# Patient Record
Sex: MALE | Race: WHITE | Employment: OTHER | URBAN - METROPOLITAN AREA
[De-identification: names, ages, dates, MRNs, and addresses within clinical notes are randomized per-mention and may not be internally consistent; named-entity substitution may affect disease eponyms.]

---

## 2017-08-09 ENCOUNTER — APPOINTMENT (OUTPATIENT)
Dept: ULTRASOUND IMAGING | Age: 82
End: 2017-08-09
Payer: MEDICARE

## 2017-08-09 ENCOUNTER — APPOINTMENT (OUTPATIENT)
Dept: GENERAL RADIOLOGY | Age: 82
End: 2017-08-09
Payer: MEDICARE

## 2017-08-09 ENCOUNTER — HOSPITAL ENCOUNTER (EMERGENCY)
Age: 82
Discharge: HOME | End: 2017-08-09
Payer: MEDICARE

## 2017-08-09 VITALS
SYSTOLIC BLOOD PRESSURE: 167 MMHG | OXYGEN SATURATION: 97 % | HEIGHT: 67 IN | TEMPERATURE: 97.5 F | DIASTOLIC BLOOD PRESSURE: 60 MMHG | WEIGHT: 170 LBS | HEART RATE: 73 BPM | BODY MASS INDEX: 26.68 KG/M2 | RESPIRATION RATE: 14 BRPM

## 2017-08-09 DIAGNOSIS — S60.00XA CONTUSION OF LEFT HAND INCLUDING FINGERS, INITIAL ENCOUNTER: Primary | ICD-10-CM

## 2017-08-09 DIAGNOSIS — M71.22 BAKER'S CYST OF KNEE, LEFT: ICD-10-CM

## 2017-08-09 DIAGNOSIS — G89.29 CHRONIC PAIN OF LEFT KNEE: ICD-10-CM

## 2017-08-09 DIAGNOSIS — M25.562 CHRONIC PAIN OF LEFT KNEE: ICD-10-CM

## 2017-08-09 DIAGNOSIS — S60.222A CONTUSION OF LEFT HAND INCLUDING FINGERS, INITIAL ENCOUNTER: Primary | ICD-10-CM

## 2017-08-09 PROCEDURE — 73130 X-RAY EXAM OF HAND: CPT

## 2017-08-09 PROCEDURE — 93971 EXTREMITY STUDY: CPT

## 2017-08-09 PROCEDURE — 6370000000 HC RX 637 (ALT 250 FOR IP): Performed by: PHYSICIAN ASSISTANT

## 2017-08-09 PROCEDURE — 99283 EMERGENCY DEPT VISIT LOW MDM: CPT

## 2017-08-09 PROCEDURE — 73562 X-RAY EXAM OF KNEE 3: CPT

## 2017-08-09 RX ORDER — ACETAMINOPHEN 325 MG/1
650 TABLET ORAL EVERY 6 HOURS PRN
Qty: 20 TABLET | Refills: 3 | Status: ON HOLD | OUTPATIENT
Start: 2017-08-09 | End: 2019-10-04 | Stop reason: HOSPADM

## 2017-08-09 RX ORDER — ACETAMINOPHEN 325 MG/1
650 TABLET ORAL ONCE
Status: COMPLETED | OUTPATIENT
Start: 2017-08-09 | End: 2017-08-09

## 2017-08-09 RX ORDER — GINSENG 100 MG
CAPSULE ORAL ONCE
Status: COMPLETED | OUTPATIENT
Start: 2017-08-09 | End: 2017-08-09

## 2017-08-09 RX ADMIN — ACETAMINOPHEN 650 MG: 325 TABLET ORAL at 10:30

## 2017-08-09 RX ADMIN — BACITRACIN: 500 OINTMENT TOPICAL at 10:30

## 2017-08-09 ASSESSMENT — ENCOUNTER SYMPTOMS
GASTROINTESTINAL NEGATIVE: 1
RESPIRATORY NEGATIVE: 1
EYES NEGATIVE: 1

## 2017-08-09 ASSESSMENT — PAIN DESCRIPTION - PAIN TYPE: TYPE: ACUTE PAIN

## 2017-08-09 ASSESSMENT — PAIN DESCRIPTION - LOCATION: LOCATION: HAND

## 2017-08-09 ASSESSMENT — PAIN SCALES - GENERAL
PAINLEVEL_OUTOF10: 8
PAINLEVEL_OUTOF10: 8

## 2017-08-09 ASSESSMENT — PAIN DESCRIPTION - ORIENTATION: ORIENTATION: LEFT

## 2017-08-28 ENCOUNTER — HOSPITAL ENCOUNTER (OUTPATIENT)
Dept: ULTRASOUND IMAGING | Age: 82
Discharge: HOME | End: 2017-08-28
Payer: MEDICARE

## 2017-08-28 DIAGNOSIS — F17.200 SMOKING: ICD-10-CM

## 2017-08-28 PROCEDURE — 76706 US ABDL AORTA SCREEN AAA: CPT

## 2018-09-08 ENCOUNTER — HOSPITAL ENCOUNTER (EMERGENCY)
Age: 83
Discharge: HOME | End: 2018-09-08
Attending: EMERGENCY MEDICINE
Payer: MEDICARE

## 2018-09-08 ENCOUNTER — APPOINTMENT (OUTPATIENT)
Dept: GENERAL RADIOLOGY | Age: 83
End: 2018-09-08
Payer: MEDICARE

## 2018-09-08 VITALS
WEIGHT: 170 LBS | SYSTOLIC BLOOD PRESSURE: 136 MMHG | RESPIRATION RATE: 18 BRPM | BODY MASS INDEX: 26.68 KG/M2 | OXYGEN SATURATION: 97 % | DIASTOLIC BLOOD PRESSURE: 74 MMHG | HEIGHT: 67 IN | HEART RATE: 82 BPM | TEMPERATURE: 98.1 F

## 2018-09-08 DIAGNOSIS — M25.462 KNEE EFFUSION, LEFT: Primary | ICD-10-CM

## 2018-09-08 PROCEDURE — 87205 SMEAR GRAM STAIN: CPT

## 2018-09-08 PROCEDURE — 6370000000 HC RX 637 (ALT 250 FOR IP): Performed by: EMERGENCY MEDICINE

## 2018-09-08 PROCEDURE — 89051 BODY FLUID CELL COUNT: CPT

## 2018-09-08 PROCEDURE — 87070 CULTURE OTHR SPECIMN AEROBIC: CPT

## 2018-09-08 PROCEDURE — 99283 EMERGENCY DEPT VISIT LOW MDM: CPT

## 2018-09-08 PROCEDURE — 73562 X-RAY EXAM OF KNEE 3: CPT

## 2018-09-08 RX ORDER — HYDROCODONE BITARTRATE AND ACETAMINOPHEN 5; 325 MG/1; MG/1
1 TABLET ORAL ONCE
Status: COMPLETED | OUTPATIENT
Start: 2018-09-08 | End: 2018-09-08

## 2018-09-08 RX ORDER — HYDROCODONE BITARTRATE AND ACETAMINOPHEN 5; 325 MG/1; MG/1
1 TABLET ORAL EVERY 6 HOURS PRN
Qty: 20 TABLET | Refills: 0 | Status: SHIPPED | OUTPATIENT
Start: 2018-09-08 | End: 2018-09-15

## 2018-09-08 RX ADMIN — HYDROCODONE BITARTRATE AND ACETAMINOPHEN 1 TABLET: 5; 325 TABLET ORAL at 20:50

## 2018-09-08 ASSESSMENT — ENCOUNTER SYMPTOMS
VOMITING: 0
EYE DISCHARGE: 0
ABDOMINAL PAIN: 0
CHEST TIGHTNESS: 0
ABDOMINAL DISTENTION: 0
WHEEZING: 0
COUGH: 0
SHORTNESS OF BREATH: 0
PHOTOPHOBIA: 0
SORE THROAT: 0

## 2018-09-08 ASSESSMENT — PAIN DESCRIPTION - ORIENTATION: ORIENTATION: LEFT

## 2018-09-08 ASSESSMENT — PAIN DESCRIPTION - PAIN TYPE: TYPE: ACUTE PAIN

## 2018-09-08 ASSESSMENT — PAIN DESCRIPTION - LOCATION: LOCATION: KNEE

## 2018-09-08 ASSESSMENT — PAIN SCALES - GENERAL
PAINLEVEL_OUTOF10: 0
PAINLEVEL_OUTOF10: 9

## 2018-09-09 LAB
APPEARANCE FLUID: NORMAL
CELL COUNT FLUID TYPE: NORMAL
CLOT EVALUATION: NORMAL
COLOR FLUID: YELLOW
LYMPHOCYTES, BODY FLUID: 2 %
MONOCYTE, FLUID: 10 %
NEUTROPHIL, FLUID: 88 %
NRBC FLUID: 2 %
NUCLEATED CELLS FLUID: NORMAL /CUMM
NUMBER OF CELLS COUNTED FLUID: 100
RBC FLUID: 611 /CUMM

## 2018-09-09 NOTE — ED NOTES
Ice applied to left knee.  No s/s of distress     Matt Lover, [facility]  09/08/18 4986

## 2018-09-09 NOTE — ED PROVIDER NOTES
negative. Except as noted above the remainder of the review of systems was reviewed and negative. PAST MEDICAL HISTORY     Past Medical History:   Diagnosis Date    Diabetes mellitus ([address]     Hypertension          SURGICAL HISTORY       Past Surgical History:   Procedure Laterality Date    BACK SURGERY      HERNIA REPAIR           CURRENT MEDICATIONS       Previous Medications    ACETAMINOPHEN (TYLENOL) 325 MG TABLET    Take 2 tablets by mouth every 6 hours as needed for Pain       ALLERGIES     Patient has no known allergies. FAMILY HISTORY     History reviewed. No pertinent family history. SOCIAL HISTORY       Social History     Social History    Marital status:      Spouse name: N/A    Number of children: N/A    Years of education: N/A     Social History Main Topics    Smoking status: Former Smoker    Smokeless tobacco: None      Comment: quit 30 years ago    Alcohol use No    Drug use: No    Sexual activity: Not Asked     Other Topics Concern    None     Social History Narrative    None       SCREENINGS             PHYSICAL EXAM    (up to 7 for level 4, 8 or more for level 5)     ED Triage Vitals   BP Temp Temp Source Pulse Resp SpO2 Height Weight   09/08/18 2030 09/08/18 2028 09/08/18 2028 09/08/18 2028 09/08/18 2028 09/08/18 2028 09/08/18 2028 09/08/18 2028   136/74 98.1 °F (36.7 °C) Oral 82 18 97 % 5' 7\" (1.702 m) 170 lb (77.1 kg)       Physical Exam   Constitutional: He is oriented to person, place, and time. He appears well-developed. HENT:   Head: Normocephalic. Nose: Nose normal.   Eyes: Pupils are equal, round, and reactive to light. Conjunctivae are normal.   Neck: Normal range of motion. Neck supple. Cardiovascular: Normal rate, regular rhythm, normal heart sounds and intact distal pulses. Pulmonary/Chest: Effort normal and breath sounds normal.   Abdominal: Soft. Bowel sounds are normal. There is no tenderness. There is no guarding.    Musculoskeletal: Left knee: He exhibits decreased range of motion, swelling and effusion. He exhibits no deformity, no laceration and no erythema. Legs:  Neurological: He is alert and oriented to person, place, and time. Skin: Skin is warm and dry. Psychiatric: He has a normal mood and affect. Nursing note and vitals reviewed. DIAGNOSTIC RESULTS     EKG: All EKG's are interpreted by the Emergency Department Physician who either signs or Co-signs this chart in the absence of a cardiologist.        RADIOLOGY:   Non-plain film images such as CT, Ultrasound and MRI are read by the radiologist. Plain radiographic images are visualized and preliminarily interpreted by the emergency physician with the below findings:        Interpretation per the Radiologist below, if available at the time of this note:    XR KNEE LEFT (3 VIEWS)    (Results Pending)         ED BEDSIDE ULTRASOUND:   Performed by ED Physician - none    LABS:  Labs Reviewed - No data to display    All other labs were within normal range or not returned as of this dictation. EMERGENCY DEPARTMENT COURSE and DIFFERENTIAL DIAGNOSIS/MDM:   Vitals:    Vitals:    09/08/18 2028 09/08/18 2030   BP:  136/74   Pulse: 82    Resp: 18    Temp: 98.1 °F (36.7 °C)    TempSrc: Oral    SpO2: 97%    Weight: 170 lb (77.1 kg)    Height: 5' 7\" (1.702 m)             MDM patient with a significant left knee effusion. Arthrocentesis was done and this appears to be inflammatory fluid. He will be cultured. He is feeling better post procedure. He is put on limited pain medication told ice and limit weightbearing and follow-up with orthopedics next 2-3 days.   Return for fever or worsening pain        CONSULTS:  None    PROCEDURES:  Unless otherwise noted below, none     Arthrocentesis  Date/Time: 9/8/2018 10:34 PM  Performed by: Alan Henson by: Nadeem Guido     Consent:     Consent obtained:  Verbal    Consent given by:  Patient    Risks discussed: Bleeding, infection and pain    Alternatives discussed:  No treatment  Location:     Location:  Knee  Anesthesia (see MAR for exact dosages): Anesthesia method:  Local infiltration    Local anesthetic:  Lidocaine 1% w/o epi  Procedure details:     Needle gauge:  18 G    Ultrasound guidance: no      Approach:  Superior    Aspirate characteristics:  Yellow and serous    Steroid injected: no      Specimen collected: yes    Post-procedure details:     Dressing:  Adhesive bandage    Patient tolerance of procedure: Tolerated well, no immediate complications        FINAL IMPRESSION      1. Knee effusion, left          DISPOSITION/PLAN   DISPOSITION Decision To Discharge 09/08/2018 10:35:50 PM      PATIENT REFERRED TO:  Clarke Herring MD  [address] [place] [address]  [phone]    In 3 days        DISCHARGE MEDICATIONS:  New Prescriptions    HYDROCODONE-ACETAMINOPHEN (NORCO) 5-325 MG PER TABLET    Take 1 tablet by mouth every 6 hours as needed for Pain for up to 7 days. .          (Please note that portions of this note were completed with a voice recognition program.  Efforts were made to edit the dictations but occasionally words are mis-transcribed.)    Jennifer Denton MD (electronically signed)  Attending Emergency Physician          Jennifer Denton MD  09/08/18 7411

## 2018-09-12 LAB
BODY FLUID CULTURE, STERILE: NORMAL
GRAM STAIN RESULT: NORMAL

## 2019-10-03 ENCOUNTER — APPOINTMENT (OUTPATIENT)
Dept: CT IMAGING | Age: 84
DRG: 310 | End: 2019-10-03
Payer: MEDICARE

## 2019-10-03 ENCOUNTER — HOSPITAL ENCOUNTER (INPATIENT)
Age: 84
LOS: 2 days | Discharge: HOME | DRG: 310 | End: 2019-10-05
Attending: EMERGENCY MEDICINE | Admitting: INTERNAL MEDICINE
Payer: MEDICARE

## 2019-10-03 ENCOUNTER — APPOINTMENT (OUTPATIENT)
Dept: GENERAL RADIOLOGY | Age: 84
DRG: 310 | End: 2019-10-03
Payer: MEDICARE

## 2019-10-03 DIAGNOSIS — R05.9 COUGH: ICD-10-CM

## 2019-10-03 DIAGNOSIS — I10 ESSENTIAL HYPERTENSION: ICD-10-CM

## 2019-10-03 DIAGNOSIS — R06.02 SHORTNESS OF BREATH: ICD-10-CM

## 2019-10-03 DIAGNOSIS — E11.65 TYPE 2 DIABETES MELLITUS WITH HYPERGLYCEMIA, WITHOUT LONG-TERM CURRENT USE OF INSULIN (HCC): ICD-10-CM

## 2019-10-03 DIAGNOSIS — I48.91 ATRIAL FIBRILLATION, NEW ONSET (HCC): Primary | ICD-10-CM

## 2019-10-03 DIAGNOSIS — R07.9 CHEST PAIN, UNSPECIFIED TYPE: ICD-10-CM

## 2019-10-03 LAB
ALBUMIN SERPL-MCNC: 4.2 G/DL (ref 3.5–4.6)
ALP BLD-CCNC: 80 U/L (ref 35–104)
ALT SERPL-CCNC: 16 U/L (ref 0–41)
ANION GAP SERPL CALCULATED.3IONS-SCNC: 12 MEQ/L (ref 9–15)
AST SERPL-CCNC: 20 U/L (ref 0–40)
BASOPHILS ABSOLUTE: 0.1 K/UL (ref 0–0.2)
BASOPHILS RELATIVE PERCENT: 1 %
BILIRUB SERPL-MCNC: 0.5 MG/DL (ref 0.2–0.7)
BILIRUBIN URINE: NEGATIVE
BLOOD, URINE: NEGATIVE
BUN BLDV-MCNC: 24 MG/DL (ref 8–23)
CALCIUM SERPL-MCNC: 9.2 MG/DL (ref 8.5–9.9)
CHLORIDE BLD-SCNC: 103 MEQ/L (ref 95–107)
CLARITY: CLEAR
CO2: 27 MEQ/L (ref 20–31)
COLOR: ABNORMAL
CREAT SERPL-MCNC: 1.18 MG/DL (ref 0.7–1.2)
EKG ATRIAL RATE: 80 BPM
EKG Q-T INTERVAL: 412 MS
EKG QRS DURATION: 154 MS
EKG QTC CALCULATION (BAZETT): 412 MS
EKG R AXIS: -47 DEGREES
EKG T AXIS: 93 DEGREES
EKG VENTRICULAR RATE: 60 BPM
EOSINOPHILS ABSOLUTE: 0.2 K/UL (ref 0–0.7)
EOSINOPHILS RELATIVE PERCENT: 2.5 %
GFR AFRICAN AMERICAN: >60
GFR NON-AFRICAN AMERICAN: 57.9
GLOBULIN: 3.5 G/DL (ref 2.3–3.5)
GLUCOSE BLD-MCNC: 129 MG/DL (ref 70–99)
GLUCOSE URINE: NEGATIVE MG/DL
HCT VFR BLD CALC: 38.3 % (ref 42–52)
HEMOGLOBIN: 12.9 G/DL (ref 14–18)
INFLUENZA A BY PCR: NEGATIVE
INFLUENZA B BY PCR: NEGATIVE
KETONES, URINE: ABNORMAL MG/DL
LACTIC ACID: 1.6 MMOL/L (ref 0.5–2.2)
LEUKOCYTE ESTERASE, URINE: NEGATIVE
LYMPHOCYTES ABSOLUTE: 2.2 K/UL (ref 1–4.8)
LYMPHOCYTES RELATIVE PERCENT: 27 %
MCH RBC QN AUTO: 32.5 PG (ref 27–31.3)
MCHC RBC AUTO-ENTMCNC: 33.7 % (ref 33–37)
MCV RBC AUTO: 96.4 FL (ref 80–100)
MONOCYTES ABSOLUTE: 1.1 K/UL (ref 0.2–0.8)
MONOCYTES RELATIVE PERCENT: 13.8 %
NEUTROPHILS ABSOLUTE: 4.5 K/UL (ref 1.4–6.5)
NEUTROPHILS RELATIVE PERCENT: 55.7 %
NITRITE, URINE: NEGATIVE
PDW BLD-RTO: 13.7 % (ref 11.5–14.5)
PH UA: 5.5 (ref 5–9)
PLATELET # BLD: 242 K/UL (ref 130–400)
POTASSIUM SERPL-SCNC: 3.9 MEQ/L (ref 3.4–4.9)
PROTEIN UA: ABNORMAL MG/DL
RBC # BLD: 3.97 M/UL (ref 4.7–6.1)
SODIUM BLD-SCNC: 142 MEQ/L (ref 135–144)
SPECIFIC GRAVITY UA: 1.03 (ref 1–1.03)
TOTAL PROTEIN: 7.7 G/DL (ref 6.3–8)
TROPONIN: <0.01 NG/ML (ref 0–0.01)
URINE REFLEX TO CULTURE: ABNORMAL
UROBILINOGEN, URINE: 1 E.U./DL
WBC # BLD: 8 K/UL (ref 4.8–10.8)

## 2019-10-03 PROCEDURE — 71275 CT ANGIOGRAPHY CHEST: CPT

## 2019-10-03 PROCEDURE — 94664 DEMO&/EVAL PT USE INHALER: CPT

## 2019-10-03 PROCEDURE — 6370000000 HC RX 637 (ALT 250 FOR IP): Performed by: NURSE PRACTITIONER

## 2019-10-03 PROCEDURE — 6360000004 HC RX CONTRAST MEDICATION: Performed by: PHYSICIAN ASSISTANT

## 2019-10-03 PROCEDURE — 84484 ASSAY OF TROPONIN QUANT: CPT

## 2019-10-03 PROCEDURE — 96372 THER/PROPH/DIAG INJ SC/IM: CPT

## 2019-10-03 PROCEDURE — 2060000000 HC ICU INTERMEDIATE R&B

## 2019-10-03 PROCEDURE — 99285 EMERGENCY DEPT VISIT HI MDM: CPT

## 2019-10-03 PROCEDURE — 87040 BLOOD CULTURE FOR BACTERIA: CPT

## 2019-10-03 PROCEDURE — 85025 COMPLETE CBC W/AUTO DIFF WBC: CPT

## 2019-10-03 PROCEDURE — 71046 X-RAY EXAM CHEST 2 VIEWS: CPT

## 2019-10-03 PROCEDURE — 87502 INFLUENZA DNA AMP PROBE: CPT

## 2019-10-03 PROCEDURE — 81003 URINALYSIS AUTO W/O SCOPE: CPT

## 2019-10-03 PROCEDURE — 36415 COLL VENOUS BLD VENIPUNCTURE: CPT

## 2019-10-03 PROCEDURE — 84145 PROCALCITONIN (PCT): CPT

## 2019-10-03 PROCEDURE — 99223 1ST HOSP IP/OBS HIGH 75: CPT | Performed by: INTERNAL MEDICINE

## 2019-10-03 PROCEDURE — 6370000000 HC RX 637 (ALT 250 FOR IP): Performed by: INTERNAL MEDICINE

## 2019-10-03 PROCEDURE — 80053 COMPREHEN METABOLIC PANEL: CPT

## 2019-10-03 PROCEDURE — 6360000002 HC RX W HCPCS: Performed by: PHYSICIAN ASSISTANT

## 2019-10-03 PROCEDURE — 93005 ELECTROCARDIOGRAM TRACING: CPT | Performed by: PHYSICIAN ASSISTANT

## 2019-10-03 PROCEDURE — 83605 ASSAY OF LACTIC ACID: CPT

## 2019-10-03 RX ORDER — HYDROCHLOROTHIAZIDE 12.5 MG/1
12.5 CAPSULE, GELATIN COATED ORAL DAILY
Status: ON HOLD | COMMUNITY
End: 2019-10-05 | Stop reason: HOSPADM

## 2019-10-03 RX ORDER — IPRATROPIUM BROMIDE AND ALBUTEROL SULFATE 2.5; .5 MG/3ML; MG/3ML
1 SOLUTION RESPIRATORY (INHALATION) EVERY 4 HOURS PRN
Status: DISCONTINUED | OUTPATIENT
Start: 2019-10-03 | End: 2019-10-05 | Stop reason: HOSPADM

## 2019-10-03 RX ORDER — ASPIRIN 81 MG/1
81 TABLET ORAL DAILY
Status: DISCONTINUED | OUTPATIENT
Start: 2019-10-03 | End: 2019-10-03 | Stop reason: RX

## 2019-10-03 RX ORDER — ASPIRIN 81 MG/1
324 TABLET, CHEWABLE ORAL ONCE
Status: DISCONTINUED | OUTPATIENT
Start: 2019-10-03 | End: 2019-10-05 | Stop reason: HOSPADM

## 2019-10-03 RX ORDER — ENALAPRIL MALEATE 20 MG/1
20 TABLET ORAL DAILY
Status: ON HOLD | COMMUNITY
End: 2019-10-05 | Stop reason: HOSPADM

## 2019-10-03 RX ORDER — CARVEDILOL 12.5 MG/1
12.5 TABLET ORAL 2 TIMES DAILY
Status: ON HOLD | COMMUNITY
End: 2019-10-05 | Stop reason: HOSPADM

## 2019-10-03 RX ORDER — TRAMADOL HYDROCHLORIDE 50 MG/1
50 TABLET ORAL EVERY 6 HOURS PRN
Status: DISCONTINUED | OUTPATIENT
Start: 2019-10-03 | End: 2019-10-05 | Stop reason: HOSPADM

## 2019-10-03 RX ORDER — SODIUM CHLORIDE 0.9 % (FLUSH) 0.9 %
10 SYRINGE (ML) INJECTION PRN
Status: DISCONTINUED | OUTPATIENT
Start: 2019-10-03 | End: 2019-10-05 | Stop reason: HOSPADM

## 2019-10-03 RX ORDER — ACETAMINOPHEN 325 MG/1
650 TABLET ORAL EVERY 4 HOURS PRN
Status: DISCONTINUED | OUTPATIENT
Start: 2019-10-03 | End: 2019-10-05 | Stop reason: HOSPADM

## 2019-10-03 RX ORDER — AZITHROMYCIN 250 MG/1
250 TABLET, FILM COATED ORAL DAILY
Status: DISCONTINUED | OUTPATIENT
Start: 2019-10-04 | End: 2019-10-04

## 2019-10-03 RX ORDER — ONDANSETRON 2 MG/ML
4 INJECTION INTRAMUSCULAR; INTRAVENOUS EVERY 6 HOURS PRN
Status: DISCONTINUED | OUTPATIENT
Start: 2019-10-03 | End: 2019-10-05 | Stop reason: HOSPADM

## 2019-10-03 RX ORDER — ASPIRIN 81 MG/1
81 TABLET ORAL DAILY
Status: DISCONTINUED | OUTPATIENT
Start: 2019-10-03 | End: 2019-10-05 | Stop reason: HOSPADM

## 2019-10-03 RX ORDER — AZITHROMYCIN 500 MG/1
500 TABLET, FILM COATED ORAL DAILY
Status: COMPLETED | OUTPATIENT
Start: 2019-10-03 | End: 2019-10-03

## 2019-10-03 RX ORDER — GUAIFENESIN 600 MG/1
600 TABLET, EXTENDED RELEASE ORAL 2 TIMES DAILY
Status: DISCONTINUED | OUTPATIENT
Start: 2019-10-03 | End: 2019-10-05 | Stop reason: HOSPADM

## 2019-10-03 RX ORDER — SODIUM CHLORIDE 0.9 % (FLUSH) 0.9 %
10 SYRINGE (ML) INJECTION EVERY 12 HOURS SCHEDULED
Status: DISCONTINUED | OUTPATIENT
Start: 2019-10-03 | End: 2019-10-05 | Stop reason: HOSPADM

## 2019-10-03 RX ORDER — ASPIRIN 81 MG/1
81 TABLET ORAL DAILY
Status: DISCONTINUED | OUTPATIENT
Start: 2019-10-03 | End: 2019-10-03 | Stop reason: CLARIF

## 2019-10-03 RX ORDER — LISINOPRIL 5 MG/1
5 TABLET ORAL DAILY
Status: DISCONTINUED | OUTPATIENT
Start: 2019-10-03 | End: 2019-10-05 | Stop reason: HOSPADM

## 2019-10-03 RX ADMIN — GUAIFENESIN 600 MG: 600 TABLET, EXTENDED RELEASE ORAL at 22:00

## 2019-10-03 RX ADMIN — ENOXAPARIN SODIUM 70 MG: 80 INJECTION SUBCUTANEOUS at 19:26

## 2019-10-03 RX ADMIN — IOPAMIDOL 100 ML: 612 INJECTION, SOLUTION INTRAVENOUS at 19:44

## 2019-10-03 RX ADMIN — LISINOPRIL 5 MG: 5 TABLET ORAL at 22:00

## 2019-10-03 RX ADMIN — AZITHROMYCIN 500 MG: 500 TABLET, FILM COATED ORAL at 22:00

## 2019-10-03 RX ADMIN — ASPIRIN 81 MG: 81 TABLET, COATED ORAL at 20:17

## 2019-10-03 ASSESSMENT — PAIN DESCRIPTION - LOCATION: LOCATION: BACK

## 2019-10-03 ASSESSMENT — ENCOUNTER SYMPTOMS
VOICE CHANGE: 0
EYE DISCHARGE: 0
BLOOD IN STOOL: 0
ABDOMINAL DISTENTION: 0
BACK PAIN: 1
APNEA: 0
WHEEZING: 0
ANAL BLEEDING: 0
NAUSEA: 0
COUGH: 1
STRIDOR: 0
VOMITING: 0
GASTROINTESTINAL NEGATIVE: 1
CHEST TIGHTNESS: 0
PHOTOPHOBIA: 0
SHORTNESS OF BREATH: 1
EYES NEGATIVE: 1

## 2019-10-03 ASSESSMENT — PAIN SCALES - GENERAL: PAINLEVEL_OUTOF10: 7

## 2019-10-04 LAB
ANION GAP SERPL CALCULATED.3IONS-SCNC: 11 MEQ/L (ref 9–15)
BUN BLDV-MCNC: 22 MG/DL (ref 8–23)
CALCIUM SERPL-MCNC: 8.6 MG/DL (ref 8.5–9.9)
CHLORIDE BLD-SCNC: 107 MEQ/L (ref 95–107)
CO2: 23 MEQ/L (ref 20–31)
CREAT SERPL-MCNC: 0.88 MG/DL (ref 0.7–1.2)
GFR AFRICAN AMERICAN: >60
GFR NON-AFRICAN AMERICAN: >60
GLUCOSE BLD-MCNC: 113 MG/DL (ref 70–99)
HCT VFR BLD CALC: 34.8 % (ref 42–52)
HEMOGLOBIN: 11.7 G/DL (ref 14–18)
LV EF: 35 %
LVEF MODALITY: NORMAL
MCH RBC QN AUTO: 32.6 PG (ref 27–31.3)
MCHC RBC AUTO-ENTMCNC: 33.7 % (ref 33–37)
MCV RBC AUTO: 96.8 FL (ref 80–100)
PDW BLD-RTO: 13.5 % (ref 11.5–14.5)
PLATELET # BLD: 224 K/UL (ref 130–400)
POTASSIUM REFLEX MAGNESIUM: 3.9 MEQ/L (ref 3.4–4.9)
PROCALCITONIN: 0.06 NG/ML (ref 0–0.15)
RBC # BLD: 3.59 M/UL (ref 4.7–6.1)
SODIUM BLD-SCNC: 141 MEQ/L (ref 135–144)
WBC # BLD: 8.2 K/UL (ref 4.8–10.8)

## 2019-10-04 PROCEDURE — 36415 COLL VENOUS BLD VENIPUNCTURE: CPT

## 2019-10-04 PROCEDURE — 6370000000 HC RX 637 (ALT 250 FOR IP): Performed by: NURSE PRACTITIONER

## 2019-10-04 PROCEDURE — 80048 BASIC METABOLIC PNL TOTAL CA: CPT

## 2019-10-04 PROCEDURE — 93010 ELECTROCARDIOGRAM REPORT: CPT | Performed by: INTERNAL MEDICINE

## 2019-10-04 PROCEDURE — 85027 COMPLETE CBC AUTOMATED: CPT

## 2019-10-04 PROCEDURE — 6370000000 HC RX 637 (ALT 250 FOR IP): Performed by: INTERNAL MEDICINE

## 2019-10-04 PROCEDURE — 97165 OT EVAL LOW COMPLEX 30 MIN: CPT

## 2019-10-04 PROCEDURE — 2060000000 HC ICU INTERMEDIATE R&B

## 2019-10-04 PROCEDURE — 6360000002 HC RX W HCPCS: Performed by: INTERNAL MEDICINE

## 2019-10-04 PROCEDURE — 2580000003 HC RX 258: Performed by: NURSE PRACTITIONER

## 2019-10-04 PROCEDURE — 93306 TTE W/DOPPLER COMPLETE: CPT

## 2019-10-04 PROCEDURE — 97161 PT EVAL LOW COMPLEX 20 MIN: CPT

## 2019-10-04 RX ORDER — PREDNISONE 20 MG/1
20 TABLET ORAL DAILY
Qty: 5 TABLET | Refills: 0 | Status: SHIPPED | OUTPATIENT
Start: 2019-10-05 | End: 2019-10-05

## 2019-10-04 RX ORDER — PREDNISONE 10 MG/1
20 TABLET ORAL DAILY
Status: DISCONTINUED | OUTPATIENT
Start: 2019-10-04 | End: 2019-10-05 | Stop reason: HOSPADM

## 2019-10-04 RX ORDER — GUAIFENESIN 600 MG/1
600 TABLET, EXTENDED RELEASE ORAL 2 TIMES DAILY
Qty: 22 TABLET | Refills: 0 | Status: SHIPPED | OUTPATIENT
Start: 2019-10-04 | End: 2019-10-15

## 2019-10-04 RX ORDER — MORPHINE SULFATE 2 MG/ML
0.5 INJECTION, SOLUTION INTRAMUSCULAR; INTRAVENOUS ONCE
Status: DISCONTINUED | OUTPATIENT
Start: 2019-10-04 | End: 2019-10-05 | Stop reason: HOSPADM

## 2019-10-04 RX ADMIN — LISINOPRIL 5 MG: 5 TABLET ORAL at 07:38

## 2019-10-04 RX ADMIN — GUAIFENESIN 600 MG: 600 TABLET, EXTENDED RELEASE ORAL at 07:38

## 2019-10-04 RX ADMIN — GUAIFENESIN 600 MG: 600 TABLET, EXTENDED RELEASE ORAL at 21:32

## 2019-10-04 RX ADMIN — ASPIRIN 81 MG: 81 TABLET, COATED ORAL at 07:38

## 2019-10-04 RX ADMIN — Medication 10 ML: at 07:39

## 2019-10-04 RX ADMIN — ENOXAPARIN SODIUM 73.5 MG: 120 INJECTION SUBCUTANEOUS at 21:32

## 2019-10-04 RX ADMIN — PREDNISONE 20 MG: 10 TABLET ORAL at 12:13

## 2019-10-04 RX ADMIN — ENOXAPARIN SODIUM 73.5 MG: 120 INJECTION SUBCUTANEOUS at 07:39

## 2019-10-04 RX ADMIN — Medication 10 ML: at 21:33

## 2019-10-04 ASSESSMENT — PAIN SCALES - GENERAL
PAINLEVEL_OUTOF10: 0
PAINLEVEL_OUTOF10: 0

## 2019-10-05 VITALS
DIASTOLIC BLOOD PRESSURE: 85 MMHG | SYSTOLIC BLOOD PRESSURE: 153 MMHG | BODY MASS INDEX: 24.55 KG/M2 | TEMPERATURE: 98.2 F | RESPIRATION RATE: 26 BRPM | HEIGHT: 68 IN | OXYGEN SATURATION: 100 % | WEIGHT: 162 LBS | HEART RATE: 35 BPM

## 2019-10-05 LAB
ANION GAP SERPL CALCULATED.3IONS-SCNC: 14 MEQ/L (ref 9–15)
BUN BLDV-MCNC: 19 MG/DL (ref 8–23)
CALCIUM SERPL-MCNC: 8.6 MG/DL (ref 8.5–9.9)
CHLORIDE BLD-SCNC: 104 MEQ/L (ref 95–107)
CO2: 22 MEQ/L (ref 20–31)
CREAT SERPL-MCNC: 0.92 MG/DL (ref 0.7–1.2)
GFR AFRICAN AMERICAN: >60
GFR NON-AFRICAN AMERICAN: >60
GLUCOSE BLD-MCNC: 106 MG/DL (ref 70–99)
HCT VFR BLD CALC: 35.6 % (ref 42–52)
HEMOGLOBIN: 11.8 G/DL (ref 14–18)
MCH RBC QN AUTO: 32.4 PG (ref 27–31.3)
MCHC RBC AUTO-ENTMCNC: 33.2 % (ref 33–37)
MCV RBC AUTO: 97.5 FL (ref 80–100)
PDW BLD-RTO: 13.2 % (ref 11.5–14.5)
PLATELET # BLD: 221 K/UL (ref 130–400)
POTASSIUM REFLEX MAGNESIUM: 3.7 MEQ/L (ref 3.4–4.9)
RBC # BLD: 3.65 M/UL (ref 4.7–6.1)
SODIUM BLD-SCNC: 140 MEQ/L (ref 135–144)
WBC # BLD: 8.3 K/UL (ref 4.8–10.8)

## 2019-10-05 PROCEDURE — 85027 COMPLETE CBC AUTOMATED: CPT

## 2019-10-05 PROCEDURE — 80048 BASIC METABOLIC PNL TOTAL CA: CPT

## 2019-10-05 PROCEDURE — 99233 SBSQ HOSP IP/OBS HIGH 50: CPT | Performed by: INTERNAL MEDICINE

## 2019-10-05 PROCEDURE — 94761 N-INVAS EAR/PLS OXIMETRY MLT: CPT

## 2019-10-05 PROCEDURE — 94640 AIRWAY INHALATION TREATMENT: CPT

## 2019-10-05 PROCEDURE — 6370000000 HC RX 637 (ALT 250 FOR IP): Performed by: NURSE PRACTITIONER

## 2019-10-05 PROCEDURE — 2580000003 HC RX 258: Performed by: NURSE PRACTITIONER

## 2019-10-05 PROCEDURE — 6370000000 HC RX 637 (ALT 250 FOR IP): Performed by: INTERNAL MEDICINE

## 2019-10-05 PROCEDURE — 36415 COLL VENOUS BLD VENIPUNCTURE: CPT

## 2019-10-05 PROCEDURE — 6360000002 HC RX W HCPCS: Performed by: INTERNAL MEDICINE

## 2019-10-05 RX ORDER — LISINOPRIL 5 MG/1
5 TABLET ORAL DAILY
Qty: 30 TABLET | Refills: 0 | Status: SHIPPED | OUTPATIENT
Start: 2019-10-06 | End: 2020-09-18

## 2019-10-05 RX ORDER — PREDNISONE 20 MG/1
20 TABLET ORAL DAILY
Qty: 5 TABLET | Refills: 0 | Status: SHIPPED | OUTPATIENT
Start: 2019-10-05 | End: 2019-10-10

## 2019-10-05 RX ADMIN — ENOXAPARIN SODIUM 73.5 MG: 120 INJECTION SUBCUTANEOUS at 09:20

## 2019-10-05 RX ADMIN — PREDNISONE 20 MG: 10 TABLET ORAL at 09:19

## 2019-10-05 RX ADMIN — Medication 10 ML: at 09:21

## 2019-10-05 RX ADMIN — ASPIRIN 81 MG: 81 TABLET, COATED ORAL at 09:18

## 2019-10-05 RX ADMIN — GUAIFENESIN 600 MG: 600 TABLET, EXTENDED RELEASE ORAL at 09:18

## 2019-10-05 RX ADMIN — LISINOPRIL 5 MG: 5 TABLET ORAL at 09:18

## 2019-10-05 ASSESSMENT — PAIN SCALES - GENERAL: PAINLEVEL_OUTOF10: 0

## 2019-10-08 LAB
BLOOD CULTURE, ROUTINE: NORMAL
CULTURE, BLOOD 2: NORMAL

## 2020-08-08 ENCOUNTER — APPOINTMENT (OUTPATIENT)
Dept: CT IMAGING | Age: 85
DRG: 243 | End: 2020-08-08
Payer: MEDICARE

## 2020-08-08 ENCOUNTER — APPOINTMENT (OUTPATIENT)
Dept: GENERAL RADIOLOGY | Age: 85
DRG: 243 | End: 2020-08-08
Payer: MEDICARE

## 2020-08-08 ENCOUNTER — HOSPITAL ENCOUNTER (INPATIENT)
Age: 85
LOS: 3 days | Discharge: HOME HEALTH | DRG: 243 | End: 2020-08-11
Attending: EMERGENCY MEDICINE | Admitting: INTERNAL MEDICINE
Payer: MEDICARE

## 2020-08-08 PROBLEM — I48.91 ATRIAL FIBRILLATION WITH SLOW VENTRICULAR RESPONSE (HCC): Status: ACTIVE | Noted: 2020-08-08

## 2020-08-08 LAB
ALBUMIN SERPL-MCNC: 4 G/DL (ref 3.5–4.6)
ALP BLD-CCNC: 75 U/L (ref 35–104)
ALT SERPL-CCNC: 12 U/L (ref 0–41)
ANION GAP SERPL CALCULATED.3IONS-SCNC: 15 MEQ/L (ref 9–15)
APTT: 29.3 SEC (ref 24.4–36.8)
AST SERPL-CCNC: 14 U/L (ref 0–40)
BASOPHILS ABSOLUTE: 0.1 K/UL (ref 0–0.2)
BASOPHILS RELATIVE PERCENT: 0.6 %
BILIRUB SERPL-MCNC: 0.7 MG/DL (ref 0.2–0.7)
BILIRUBIN URINE: NEGATIVE
BLOOD, URINE: NEGATIVE
BUN BLDV-MCNC: 20 MG/DL (ref 8–23)
CALCIUM SERPL-MCNC: 8.9 MG/DL (ref 8.5–9.9)
CHLORIDE BLD-SCNC: 104 MEQ/L (ref 95–107)
CLARITY: CLEAR
CO2: 21 MEQ/L (ref 20–31)
COLOR: YELLOW
CREAT SERPL-MCNC: 0.85 MG/DL (ref 0.7–1.2)
EOSINOPHILS ABSOLUTE: 0.2 K/UL (ref 0–0.7)
EOSINOPHILS RELATIVE PERCENT: 2.2 %
GFR AFRICAN AMERICAN: >60
GFR NON-AFRICAN AMERICAN: >60
GLOBULIN: 3.1 G/DL (ref 2.3–3.5)
GLUCOSE BLD-MCNC: 150 MG/DL (ref 70–99)
GLUCOSE URINE: NEGATIVE MG/DL
HCT VFR BLD CALC: 39.4 % (ref 42–52)
HEMOGLOBIN: 13.5 G/DL (ref 14–18)
INR BLD: 1.1
KETONES, URINE: NEGATIVE MG/DL
LACTIC ACID: 2.1 MMOL/L (ref 0.5–2.2)
LEUKOCYTE ESTERASE, URINE: NEGATIVE
LIPASE: 20 U/L (ref 12–95)
LYMPHOCYTES ABSOLUTE: 4.1 K/UL (ref 1–4.8)
LYMPHOCYTES RELATIVE PERCENT: 46 %
MAGNESIUM: 2.3 MG/DL (ref 1.7–2.4)
MCH RBC QN AUTO: 32.4 PG (ref 27–31.3)
MCHC RBC AUTO-ENTMCNC: 34.1 % (ref 33–37)
MCV RBC AUTO: 95 FL (ref 80–100)
MONOCYTES ABSOLUTE: 1 K/UL (ref 0.2–0.8)
MONOCYTES RELATIVE PERCENT: 11.1 %
NEUTROPHILS ABSOLUTE: 3.6 K/UL (ref 1.4–6.5)
NEUTROPHILS RELATIVE PERCENT: 40.1 %
NITRITE, URINE: NEGATIVE
PDW BLD-RTO: 13.3 % (ref 11.5–14.5)
PH UA: 8 (ref 5–9)
PLATELET # BLD: 222 K/UL (ref 130–400)
POTASSIUM SERPL-SCNC: 3.6 MEQ/L (ref 3.4–4.9)
PROTEIN UA: NEGATIVE MG/DL
PROTHROMBIN TIME: 14.3 SEC (ref 12.3–14.9)
RBC # BLD: 4.15 M/UL (ref 4.7–6.1)
SODIUM BLD-SCNC: 140 MEQ/L (ref 135–144)
SPECIFIC GRAVITY UA: 1.01 (ref 1–1.03)
TOTAL PROTEIN: 7.1 G/DL (ref 6.3–8)
TROPONIN: <0.01 NG/ML (ref 0–0.01)
URINE REFLEX TO CULTURE: NORMAL
UROBILINOGEN, URINE: 0.2 E.U./DL
WBC # BLD: 8.9 K/UL (ref 4.8–10.8)

## 2020-08-08 PROCEDURE — 93005 ELECTROCARDIOGRAM TRACING: CPT | Performed by: EMERGENCY MEDICINE

## 2020-08-08 PROCEDURE — 99285 EMERGENCY DEPT VISIT HI MDM: CPT

## 2020-08-08 PROCEDURE — 83605 ASSAY OF LACTIC ACID: CPT

## 2020-08-08 PROCEDURE — 80053 COMPREHEN METABOLIC PANEL: CPT

## 2020-08-08 PROCEDURE — 6360000004 HC RX CONTRAST MEDICATION: Performed by: EMERGENCY MEDICINE

## 2020-08-08 PROCEDURE — 81003 URINALYSIS AUTO W/O SCOPE: CPT

## 2020-08-08 PROCEDURE — 85610 PROTHROMBIN TIME: CPT

## 2020-08-08 PROCEDURE — 6360000002 HC RX W HCPCS: Performed by: EMERGENCY MEDICINE

## 2020-08-08 PROCEDURE — 85025 COMPLETE CBC W/AUTO DIFF WBC: CPT

## 2020-08-08 PROCEDURE — 71045 X-RAY EXAM CHEST 1 VIEW: CPT

## 2020-08-08 PROCEDURE — 85730 THROMBOPLASTIN TIME PARTIAL: CPT

## 2020-08-08 PROCEDURE — 2000000000 HC ICU R&B

## 2020-08-08 PROCEDURE — 83690 ASSAY OF LIPASE: CPT

## 2020-08-08 PROCEDURE — 84484 ASSAY OF TROPONIN QUANT: CPT

## 2020-08-08 PROCEDURE — 36415 COLL VENOUS BLD VENIPUNCTURE: CPT

## 2020-08-08 PROCEDURE — 83735 ASSAY OF MAGNESIUM: CPT

## 2020-08-08 PROCEDURE — 74177 CT ABD & PELVIS W/CONTRAST: CPT

## 2020-08-08 RX ORDER — SODIUM CHLORIDE 0.9 % (FLUSH) 0.9 %
10 SYRINGE (ML) INJECTION PRN
Status: DISCONTINUED | OUTPATIENT
Start: 2020-08-08 | End: 2020-08-11 | Stop reason: HOSPADM

## 2020-08-08 RX ORDER — ATROPINE SULFATE 0.1 MG/ML
1 INJECTION INTRAVENOUS ONCE
Status: COMPLETED | OUTPATIENT
Start: 2020-08-08 | End: 2020-08-08

## 2020-08-08 RX ORDER — ONDANSETRON 2 MG/ML
4 INJECTION INTRAMUSCULAR; INTRAVENOUS ONCE
Status: COMPLETED | OUTPATIENT
Start: 2020-08-08 | End: 2020-08-08

## 2020-08-08 RX ORDER — ACETAMINOPHEN 325 MG/1
650 TABLET ORAL EVERY 6 HOURS PRN
Status: DISCONTINUED | OUTPATIENT
Start: 2020-08-08 | End: 2020-08-11 | Stop reason: HOSPADM

## 2020-08-08 RX ORDER — ONDANSETRON 2 MG/ML
4 INJECTION INTRAMUSCULAR; INTRAVENOUS EVERY 6 HOURS PRN
Status: DISCONTINUED | OUTPATIENT
Start: 2020-08-08 | End: 2020-08-11 | Stop reason: HOSPADM

## 2020-08-08 RX ORDER — LISINOPRIL 10 MG/1
10 TABLET ORAL DAILY
Status: DISCONTINUED | OUTPATIENT
Start: 2020-08-08 | End: 2020-08-09 | Stop reason: SDUPTHER

## 2020-08-08 RX ORDER — ACETAMINOPHEN 650 MG/1
650 SUPPOSITORY RECTAL EVERY 6 HOURS PRN
Status: DISCONTINUED | OUTPATIENT
Start: 2020-08-08 | End: 2020-08-11 | Stop reason: HOSPADM

## 2020-08-08 RX ORDER — ASPIRIN 81 MG/1
81 TABLET, CHEWABLE ORAL DAILY
Status: DISCONTINUED | OUTPATIENT
Start: 2020-08-09 | End: 2020-08-11 | Stop reason: HOSPADM

## 2020-08-08 RX ORDER — SODIUM CHLORIDE 0.9 % (FLUSH) 0.9 %
10 SYRINGE (ML) INJECTION EVERY 12 HOURS SCHEDULED
Status: DISCONTINUED | OUTPATIENT
Start: 2020-08-08 | End: 2020-08-11 | Stop reason: HOSPADM

## 2020-08-08 RX ORDER — MORPHINE SULFATE 2 MG/ML
4 INJECTION, SOLUTION INTRAMUSCULAR; INTRAVENOUS ONCE
Status: COMPLETED | OUTPATIENT
Start: 2020-08-08 | End: 2020-08-08

## 2020-08-08 RX ORDER — PROMETHAZINE HYDROCHLORIDE 25 MG/1
12.5 TABLET ORAL EVERY 6 HOURS PRN
Status: DISCONTINUED | OUTPATIENT
Start: 2020-08-08 | End: 2020-08-11 | Stop reason: HOSPADM

## 2020-08-08 RX ORDER — NITROGLYCERIN 0.4 MG/1
0.4 TABLET SUBLINGUAL EVERY 5 MIN PRN
Status: DISCONTINUED | OUTPATIENT
Start: 2020-08-08 | End: 2020-08-11 | Stop reason: HOSPADM

## 2020-08-08 RX ORDER — POLYETHYLENE GLYCOL 3350 17 G/17G
17 POWDER, FOR SOLUTION ORAL DAILY PRN
Status: DISCONTINUED | OUTPATIENT
Start: 2020-08-08 | End: 2020-08-11 | Stop reason: HOSPADM

## 2020-08-08 RX ORDER — DOPAMINE HYDROCHLORIDE 160 MG/100ML
5 INJECTION, SOLUTION INTRAVENOUS CONTINUOUS
Status: DISCONTINUED | OUTPATIENT
Start: 2020-08-08 | End: 2020-08-11 | Stop reason: HOSPADM

## 2020-08-08 RX ADMIN — ATROPINE SULFATE 1 MG: 0.1 INJECTION PARENTERAL at 17:42

## 2020-08-08 RX ADMIN — IOPAMIDOL 100 ML: 755 INJECTION, SOLUTION INTRAVENOUS at 19:03

## 2020-08-08 RX ADMIN — ONDANSETRON 4 MG: 2 INJECTION INTRAMUSCULAR; INTRAVENOUS at 17:42

## 2020-08-08 RX ADMIN — MORPHINE SULFATE 4 MG: 2 INJECTION, SOLUTION INTRAMUSCULAR; INTRAVENOUS at 18:43

## 2020-08-08 RX ADMIN — ONDANSETRON 4 MG: 2 INJECTION INTRAMUSCULAR; INTRAVENOUS at 18:18

## 2020-08-08 ASSESSMENT — ENCOUNTER SYMPTOMS
SHORTNESS OF BREATH: 0
CHEST TIGHTNESS: 0
SORE THROAT: 0
EYE PAIN: 0
NAUSEA: 0
VOMITING: 0
ABDOMINAL PAIN: 0

## 2020-08-08 ASSESSMENT — PAIN SCALES - GENERAL
PAINLEVEL_OUTOF10: 0
PAINLEVEL_OUTOF10: 0

## 2020-08-08 NOTE — ED PROVIDER NOTES
[facility] ED  EMERGENCY DEPARTMENT ENCOUNTER      Pt Name: Keyon Sam  MRN: [id number]  [place] 2/12/1929  Date of evaluation: 8/8/2020  Provider: Radha [address]       Chief Complaint   Patient presents with    Loss of Consciousness    Chest Pain         HISTORY OF PRESENT ILLNESS   (Location/Symptom, Timing/Onset, Context/Setting, Quality, Duration, Modifying Factors, Severity)  Note limiting factors. Keyon Sam is a 80 y.o. male who presents to the emergency department . Patient is Macedonian-speaking and brought in after developing chest pain and then passing out. Patient was brought in generally weak. Blood sugar 135 on arrival.  History of atrial fibrillation noted in his medical records. Eliquis listed on his med list.  Not on rate controlling medications. HPI    Nursing Notes were reviewed. REVIEW OF SYSTEMS    (2-9 systems for level 4, 10 or more for level 5)     Review of Systems   Constitutional: Negative for activity change, appetite change and fatigue. HENT: Negative for congestion and sore throat. Eyes: Negative for pain and visual disturbance. Respiratory: Negative for chest tightness and shortness of breath. Cardiovascular: Negative for chest pain. Gastrointestinal: Negative for abdominal pain, nausea and vomiting. Endocrine: Negative for polydipsia. Genitourinary: Negative for flank pain and urgency. Musculoskeletal: Negative for gait problem and neck stiffness. Skin: Negative for rash. Neurological: Negative for weakness, light-headedness and headaches. Psychiatric/Behavioral: Negative for confusion and sleep disturbance. Except as noted above the remainder of the review of systems was reviewed and negative.        PAST MEDICAL HISTORY     Past Medical History:   Diagnosis Date    Diabetes mellitus ([address]     Hypertension          SURGICAL HISTORY       Past Surgical History:   Procedure Laterality Date    BACK SURGERY      HERNIA REPAIR           CURRENT MEDICATIONS       Previous Medications    APIXABAN (ELIQUIS) 2.5 MG TABS TABLET    Take 2 tablets by mouth 2 times daily    FLUTICASONE-SALMETEROL (ADVAIR DISKUS) 250-50 MCG/DOSE AEPB    Inhale 1 puff into the lungs every 12 hours for 14 days    LISINOPRIL (PRINIVIL;ZESTRIL) 5 MG TABLET    Take 1 tablet by mouth daily       ALLERGIES     Patient has no known allergies. FAMILY HISTORY     No family history on file.        SOCIAL HISTORY       Social History     Socioeconomic History    Marital status:      Spouse name: Not on file    Number of children: Not on file    Years of education: Not on file    Highest education level: Not on file   Occupational History    Not on file   Social Needs    Financial resource strain: Not on file    Food insecurity     Worry: Not on file     Inability: Not on file    Transportation needs     Medical: Not on file     Non-medical: Not on file   Tobacco Use    Smoking status: Former Smoker    Smokeless tobacco: Never Used    Tobacco comment: quit 30 years ago   Substance and Sexual Activity    Alcohol use: No    Drug use: No    Sexual activity: Not on file   Lifestyle    Physical activity     Days per week: Not on file     Minutes per session: Not on file    Stress: Not on file   Relationships    Social connections     Talks on phone: Not on file     Gets together: Not on file     Attends Oriental orthodox service: Not on file     Active member of club or organization: Not on file     Attends meetings of clubs or organizations: Not on file     Relationship status: Not on file    Intimate partner violence     Fear of current or ex partner: Not on file     Emotionally abused: Not on file     Physically abused: Not on file     Forced sexual activity: Not on file   Other Topics Concern    Not on file   Social History Narrative    Not on file       SCREENINGS                        PHYSICAL EXAM    (up to 7 for level 4, 8 or more for level 5)     ED Triage Vitals [08/08/20 1731]   BP Temp Temp src Pulse Resp SpO2 Height Weight   (!) 146/96 -- -- (!) 33 16 96 % 5' 4\" (1.626 m) 160 lb (72.6 kg)       Physical Exam  Vitals signs and nursing note reviewed. Constitutional:       General: He is in acute distress. Appearance: He is well-developed. He is ill-appearing. He is not diaphoretic. HENT:      Head: Normocephalic and atraumatic. Right Ear: External ear normal.      Left Ear: External ear normal.      Mouth/Throat:      Pharynx: No oropharyngeal exudate. Eyes:      Conjunctiva/sclera: Conjunctivae normal.      Pupils: Pupils are equal, round, and reactive to light. Neck:      Musculoskeletal: Normal range of motion and neck supple. Thyroid: No thyromegaly. Vascular: No JVD. Trachea: No tracheal deviation. Cardiovascular:      Rate and Rhythm: Bradycardia present. Rhythm irregular. Heart sounds: Normal heart sounds. No murmur. Pulmonary:      Effort: Pulmonary effort is normal. No respiratory distress. Breath sounds: Normal breath sounds. No wheezing or rales. Abdominal:      General: Bowel sounds are normal.      Palpations: Abdomen is soft. Tenderness: There is abdominal tenderness. There is no guarding or rebound. Musculoskeletal: Normal range of motion. Right lower leg: No edema. Left lower leg: No edema. Skin:     General: Skin is warm and dry. Coloration: Skin is not pale. Findings: No rash. Neurological:      Mental Status: He is alert and oriented to person, place, and time. Cranial Nerves: No cranial nerve deficit.    Psychiatric:         Behavior: Behavior normal.         DIAGNOSTIC RESULTS     EKG: All EKG's are interpreted by the Emergency Department Physician who either signs or Co-signs this chart in the absence of a cardiologist.    Atrial fibrillation with slow ventricular response 36 bpm left axis deviation and left bundle branch block    RADIOLOGY:   Non-plain film images such as CT, Ultrasound and MRI are read by the radiologist. Plain radiographic images are visualized and preliminarily interpreted by the emergency physician with the below findings:    CT abdomen pelvis basically no acute disease. Does have atherosclerotic vascular disease. Chest x-ray shows mild increase interstitial markings. Borderline cardiomegaly    Interpretation per the Radiologist below, if available at the time of this note:    XR CHEST PORTABLE    (Results Pending)         ED BEDSIDE ULTRASOUND:   Performed by ED Physician - none    LABS:  Labs Reviewed   CBC WITH AUTO DIFFERENTIAL   COMPREHENSIVE METABOLIC PANEL   TROPONIN   PROTIME-INR   APTT   LIPASE   LACTIC ACID, PLASMA   MAGNESIUM       All other labs were within normal range or not returned as of this dictation. EMERGENCY DEPARTMENT COURSE and DIFFERENTIAL DIAGNOSIS/MDM:   Vitals:    Vitals:    08/08/20 1731   BP: (!) 146/96   Pulse: (!) 33   Resp: 16   SpO2: 96%   Weight: 160 lb (72.6 kg)   Height: 5' 4\" (1.626 m)       Patient came in with severe bradycardia in the 30s. Blood pressure was okay despite the bradycardia but patient was listless. Patient was given atropine and heart rate came up into the 70s and he instantly felt better. Patient is on dopamine drip and heart rate is in the 70s. Patient needs to be admitted and will need a pacemaker. Patient is on Eliquis and did take his Eliquis today. [facility]      REASSESSMENT          CRITICAL CARE TIME   Total Critical Care time was 30 minutes, excluding separately reportable procedures. There was a high probability of clinically significant/life threatening deterioration in the patient's condition which required my urgent intervention. CONSULTS:  None    PROCEDURES:  Unless otherwise noted below, none     Procedures        FINAL IMPRESSION      1. Atrial fibrillation with slow ventricular response ([address]    2.  Syncope and collapse DISPOSITION/PLAN   DISPOSITION  Admit      PATIENT REFERRED TO:  No follow-up provider specified. DISCHARGE MEDICATIONS:  New Prescriptions    No medications on file     Controlled Substances Monitoring:     No flowsheet data found.     (Please note that portions of this note were completed with a voice recognition program.  Efforts were made to edit the dictations but occasionally words are mis-transcribed.)    Say Lu DO (electronically signed)  Attending Emergency Physician           Say Lu DO  08/08/20 3187

## 2020-08-08 NOTE — ED NOTES
Pt more awake and alert. Skin more pink then o/a. Pt no longer vomiting. Son at bedside. mp afib but at a rate in the 70's     Cruz Ferrer RN  08/08/20 1926

## 2020-08-08 NOTE — ED NOTES
Bed: 16  Expected date:   Expected time:   Means of arrival:   Comments:  92m syncope/chest pain, 155/83, 57, 21, 95ra     Evaristo Oleary RN  08/08/20 6843

## 2020-08-08 NOTE — ED NOTES
Xray unable to shoot film d/t pt vomiting. pt medicated as ordered     Cj Almaguer.  Marianna Nice RN  08/08/20 7973

## 2020-08-08 NOTE — ED NOTES
Dr. Christina Baez at bedside with pts son using  phone to complete assessment.  # [id number] used.       Francesco Olivia RN  08/08/20 DWAYNE Ervin  08/08/20 4964

## 2020-08-09 LAB
ANION GAP SERPL CALCULATED.3IONS-SCNC: 15 MEQ/L (ref 9–15)
BUN BLDV-MCNC: 18 MG/DL (ref 8–23)
CALCIUM SERPL-MCNC: 9.3 MG/DL (ref 8.5–9.9)
CHLORIDE BLD-SCNC: 100 MEQ/L (ref 95–107)
CHOLESTEROL, TOTAL: 164 MG/DL (ref 0–199)
CO2: 24 MEQ/L (ref 20–31)
CREAT SERPL-MCNC: 0.75 MG/DL (ref 0.7–1.2)
GFR AFRICAN AMERICAN: >60
GFR NON-AFRICAN AMERICAN: >60
GLUCOSE BLD-MCNC: 150 MG/DL (ref 70–99)
HCT VFR BLD CALC: 42.4 % (ref 42–52)
HDLC SERPL-MCNC: 40 MG/DL (ref 40–59)
HEMOGLOBIN: 14.3 G/DL (ref 14–18)
LDL CHOLESTEROL CALCULATED: 113 MG/DL (ref 0–129)
MAGNESIUM: 2.5 MG/DL (ref 1.7–2.4)
MCH RBC QN AUTO: 32 PG (ref 27–31.3)
MCHC RBC AUTO-ENTMCNC: 33.6 % (ref 33–37)
MCV RBC AUTO: 95.2 FL (ref 80–100)
PDW BLD-RTO: 13.6 % (ref 11.5–14.5)
PLATELET # BLD: 238 K/UL (ref 130–400)
POTASSIUM SERPL-SCNC: 3.9 MEQ/L (ref 3.4–4.9)
PRO-BNP: 4005 PG/ML
RBC # BLD: 4.45 M/UL (ref 4.7–6.1)
SODIUM BLD-SCNC: 139 MEQ/L (ref 135–144)
TRIGL SERPL-MCNC: 55 MG/DL (ref 0–150)
TROPONIN: <0.01 NG/ML (ref 0–0.01)
TSH SERPL DL<=0.05 MIU/L-ACNC: 0.7 UIU/ML (ref 0.44–3.86)
WBC # BLD: 8.6 K/UL (ref 4.8–10.8)

## 2020-08-09 PROCEDURE — 80061 LIPID PANEL: CPT

## 2020-08-09 PROCEDURE — 93005 ELECTROCARDIOGRAM TRACING: CPT | Performed by: EMERGENCY MEDICINE

## 2020-08-09 PROCEDURE — 2580000003 HC RX 258: Performed by: EMERGENCY MEDICINE

## 2020-08-09 PROCEDURE — 84484 ASSAY OF TROPONIN QUANT: CPT

## 2020-08-09 PROCEDURE — 99291 CRITICAL CARE FIRST HOUR: CPT | Performed by: INTERNAL MEDICINE

## 2020-08-09 PROCEDURE — 6360000002 HC RX W HCPCS: Performed by: EMERGENCY MEDICINE

## 2020-08-09 PROCEDURE — 36415 COLL VENOUS BLD VENIPUNCTURE: CPT

## 2020-08-09 PROCEDURE — 80048 BASIC METABOLIC PNL TOTAL CA: CPT

## 2020-08-09 PROCEDURE — 84443 ASSAY THYROID STIM HORMONE: CPT

## 2020-08-09 PROCEDURE — 6370000000 HC RX 637 (ALT 250 FOR IP): Performed by: EMERGENCY MEDICINE

## 2020-08-09 PROCEDURE — 2000000000 HC ICU R&B

## 2020-08-09 PROCEDURE — 83735 ASSAY OF MAGNESIUM: CPT

## 2020-08-09 PROCEDURE — 85027 COMPLETE CBC AUTOMATED: CPT

## 2020-08-09 PROCEDURE — 6360000002 HC RX W HCPCS: Performed by: INTERNAL MEDICINE

## 2020-08-09 PROCEDURE — 83880 ASSAY OF NATRIURETIC PEPTIDE: CPT

## 2020-08-09 RX ORDER — LISINOPRIL 5 MG/1
5 TABLET ORAL DAILY
Status: DISCONTINUED | OUTPATIENT
Start: 2020-08-09 | End: 2020-08-09 | Stop reason: DRUGHIGH

## 2020-08-09 RX ORDER — LISINOPRIL 10 MG/1
10 TABLET ORAL DAILY
Status: DISCONTINUED | OUTPATIENT
Start: 2020-08-10 | End: 2020-08-11 | Stop reason: HOSPADM

## 2020-08-09 RX ORDER — ONDANSETRON 2 MG/ML
4 INJECTION INTRAMUSCULAR; INTRAVENOUS EVERY 6 HOURS PRN
Status: DISCONTINUED | OUTPATIENT
Start: 2020-08-09 | End: 2020-08-09 | Stop reason: SDUPTHER

## 2020-08-09 RX ORDER — HYDRALAZINE HYDROCHLORIDE 20 MG/ML
10 INJECTION INTRAMUSCULAR; INTRAVENOUS EVERY 6 HOURS PRN
Status: DISCONTINUED | OUTPATIENT
Start: 2020-08-09 | End: 2020-08-11 | Stop reason: HOSPADM

## 2020-08-09 RX ORDER — LISINOPRIL 10 MG/1
10 TABLET ORAL DAILY
Status: DISCONTINUED | OUTPATIENT
Start: 2020-08-09 | End: 2020-08-09

## 2020-08-09 RX ADMIN — ONDANSETRON 4 MG: 2 INJECTION INTRAMUSCULAR; INTRAVENOUS at 04:01

## 2020-08-09 RX ADMIN — DOPAMINE HYDROCHLORIDE 5 MCG/KG/MIN: 160 INJECTION, SOLUTION INTRAVENOUS at 15:41

## 2020-08-09 RX ADMIN — LISINOPRIL 10 MG: 10 TABLET ORAL at 08:41

## 2020-08-09 RX ADMIN — ONDANSETRON 4 MG: 2 INJECTION INTRAMUSCULAR; INTRAVENOUS at 13:23

## 2020-08-09 RX ADMIN — ASPIRIN 81 MG: 81 TABLET, CHEWABLE ORAL at 08:41

## 2020-08-09 RX ADMIN — Medication 10 ML: at 22:30

## 2020-08-09 RX ADMIN — ENOXAPARIN SODIUM 80 MG: 80 INJECTION SUBCUTANEOUS at 12:20

## 2020-08-09 RX ADMIN — Medication 10 ML: at 08:41

## 2020-08-09 RX ADMIN — HYDRALAZINE HYDROCHLORIDE 10 MG: 20 INJECTION INTRAMUSCULAR; INTRAVENOUS at 12:20

## 2020-08-09 ASSESSMENT — PAIN SCALES - GENERAL
PAINLEVEL_OUTOF10: 0

## 2020-08-09 ASSESSMENT — ENCOUNTER SYMPTOMS
GASTROINTESTINAL NEGATIVE: 1
EYES NEGATIVE: 1
NAUSEA: 0
WHEEZING: 0
SHORTNESS OF BREATH: 0
VOMITING: 0
ABDOMINAL PAIN: 0

## 2020-08-09 NOTE — H&P
Chief Complaint   Patient presents with    Loss of Consciousness    Chest Pain        Patient is a 80 y.o. male who presents with a chief complaint of syncope. Patient is followed on a regular basis by Dr. Aimee Oh. Patient with past medical history of chronic atrial fibrillation on oral anticoagulation. He was noted to have A. fib with slow ventricular response with heart rate in the 30s in the emergency department, placed on dopamine drip and given atropine which responded. Patient is currently seen in intensive care unit, on dopamine drip. He appears to be resting comfortably. Echocardiogram in September 2019 showed ejection fraction of 35%. Patient denies any chest pain, shortness of breath. Cardiac enzymes are negative, BNP is 4000. Potassium is 3.6, magnesium 2.3. Per granddaughter apparently patient had some chest pain prior to his syncopal episode.     Past Medical History:   Diagnosis Date    [facility] ([facility]     Diabetes mellitus ([facility]     Hypertension       Patient Active Problem List   Diagnosis    New onset a-fib ([facility]    Atrial fibrillation with slow ventricular response ([facility])       Past Surgical History:   Procedure Laterality Date    BACK SURGERY      HERNIA REPAIR         Social History     Socioeconomic History    Marital status:      Spouse name: None    Number of children: None    Years of education: None    Highest education level: None   Occupational History    None   Social Needs    Financial resource strain: None    Food insecurity     Worry: None     Inability: None    Transportation needs     Medical: None     Non-medical: None   Tobacco Use    Smoking status: Former Smoker    Smokeless tobacco: Never Used    Tobacco comment: quit 30 years ago   Substance and Sexual Activity    Alcohol use: No    Drug use: No    Sexual activity: None   Lifestyle    Physical activity     Days per week: None     Minutes per session: None    Stress: None Relationships    Social connections     Talks on phone: None     Gets together: None     Attends Christian service: None     Active member of club or organization: None     Attends meetings of clubs or organizations: None     Relationship status: None    Intimate partner violence     Fear of current or ex partner: None     Emotionally abused: None     Physically abused: None     Forced sexual activity: None   Other Topics Concern    None   Social History Narrative    None       History reviewed. No pertinent family history.     Current Facility-Administered Medications   Medication Dose Route Frequency Provider Last Rate Last Dose    hydrALAZINE (APRESOLINE) injection 10 mg  10 mg Intravenous Q6H PRN Fortunato Mason, DO        enoxaparin (LOVENOX) injection 80 mg  1 mg/kg Subcutaneous Once Fortunato Mason, DO        DOPamine (INTROPIN) 400 mg in dextrose 5 % 250 mL infusion  5 mcg/kg/min Intravenous Continuous Marcelle Bishop DO 13.6 mL/hr at 08/09/20 0551 5 mcg/kg/min at 08/09/20 0551    sodium chloride flush 0.9 % injection 10 mL  10 mL Intravenous 2 times per day Marcelle Bishop DO   10 mL at 08/09/20 0841    sodium chloride flush 0.9 % injection 10 mL  10 mL Intravenous PRN Taras Weinstein DO        acetaminophen (TYLENOL) tablet 650 mg  650 mg Oral Q6H PRN Marcelle Thompson DO        Or    acetaminophen (TYLENOL) suppository 650 mg  650 mg Rectal Q6H PRN Marcelle Thompson, DO        polyethylene glycol (GLYCOLAX) packet 17 g  17 g Oral Daily PRN Marcelle Thompson DO        promethazine (PHENERGAN) tablet 12.5 mg  12.5 mg Oral Q6H PRN Marcelle Thompson DO        Or    ondansetron (ZOFRAN) injection 4 mg  4 mg Intravenous Q6H PRN Marcelle Bishop DO   4 mg at 08/09/20 0401    aspirin chewable tablet 81 mg  81 mg Oral Daily Marcelle Bishop DO   81 mg at 08/09/20 0841    nitroGLYCERIN (NITROSTAT) SL tablet 0.4 mg  0.4 mg Sublingual Q5 Min PRN Marcelle Bishop DO        lisinopril (PRINIVIL;ZESTRIL) tablet 10 mg  10 mg Oral Daily Marcelle Bishop, DO   10 mg at 08/09/20 0841       ALLERGIES: Patient has no known allergies. Review of Systems   Constitutional: Negative. Negative for chills and fever. HENT: Negative. Eyes: Negative. Respiratory: Negative for shortness of breath and wheezing. Cardiovascular: Negative for chest pain, palpitations and leg swelling. Gastrointestinal: Negative. Negative for abdominal pain, nausea and vomiting. Genitourinary: Negative. Musculoskeletal: Negative. Skin: Negative. Negative for rash. Neurological: Positive for syncope. Negative for dizziness, weakness and headaches. Psychiatric/Behavioral: Negative. VITALS:  Blood pressure (!) 175/85, pulse (!) 49, temperature 98.1 °F (36.7 °C), temperature source Oral, resp. rate 12, height 5' 4\" (1.626 m), weight 167 lb (75.8 kg), SpO2 99 %. Body mass index is 28.67 kg/m². Physical Exam   Constitutional: He is oriented to person, place, and time. He appears well-developed and well-nourished. HENT:   Head: Normocephalic and atraumatic. Eyes: Pupils are equal, round, and reactive to light. Neck: Normal range of motion. Neck supple. No JVD present. No tracheal deviation present. No thyromegaly present. Cardiovascular: Regular rhythm, normal heart sounds and intact distal pulses. Bradycardia present. PMI is not displaced. Exam reveals no gallop, no S3, no distant heart sounds and no friction rub. No murmur heard. Pulmonary/Chest: No respiratory distress. He has no wheezes. He has no rales. He exhibits no tenderness. Abdominal: Soft. Bowel sounds are normal. He exhibits no distension and no mass. There is no abdominal tenderness. There is no rebound and no guarding. Musculoskeletal:         General: No edema. Neurological: He is alert and oriented to person, place, and time. No cranial nerve deficit. Skin: Skin is warm and dry. No rash noted.  He is not diaphoretic. No erythema. No pallor. Psychiatric: He has a normal mood and affect.  His behavior is normal. Judgment and thought content normal.       LABS:  Recent Results (from the past 24 hour(s))   CBC Auto Differential    Collection Time: 08/08/20  5:30 PM   Result Value Ref Range    WBC 8.9 4.8 - 10.8 K/uL    RBC 4.15 (L) 4.70 - 6.10 M/uL    Hemoglobin 13.5 (L) 14.0 - 18.0 g/dL    Hematocrit 39.4 (L) 42.0 - 52.0 %    MCV 95.0 80.0 - 100.0 fL    MCH 32.4 (H) 27.0 - 31.3 pg    MCHC 34.1 33.0 - 37.0 %    RDW 13.3 11.5 - 14.5 %    Platelets 699 113 - 825 K/uL    Neutrophils % 40.1 %    Lymphocytes % 46.0 %    Monocytes % 11.1 %    Eosinophils % 2.2 %    Basophils % 0.6 %    Neutrophils Absolute 3.6 1.4 - 6.5 K/uL    Lymphocytes Absolute 4.1 1.0 - 4.8 K/uL    Monocytes Absolute 1.0 (H) 0.2 - 0.8 K/uL    Eosinophils Absolute 0.2 0.0 - 0.7 K/uL    Basophils Absolute 0.1 0.0 - 0.2 K/uL   Protime-INR    Collection Time: 08/08/20  5:30 PM   Result Value Ref Range    Protime 14.3 12.3 - 14.9 sec    INR 1.1    APTT    Collection Time: 08/08/20  5:30 PM   Result Value Ref Range    aPTT 29.3 24.4 - 36.8 sec   Lactic Acid, Plasma    Collection Time: 08/08/20  5:30 PM   Result Value Ref Range    Lactic Acid 2.1 0.5 - 2.2 mmol/L   EKG 12 Lead    Collection Time: 08/08/20  5:33 PM   Result Value Ref Range    Ventricular Rate 36 BPM    Atrial Rate 21 BPM    QRS Duration 158 ms    Q-T Interval 560 ms    QTc Calculation (Bazett) 433 ms    R Axis -48 degrees    T Axis 108 degrees   Comprehensive Metabolic Panel    Collection Time: 08/08/20  5:45 PM   Result Value Ref Range    Sodium 140 135 - 144 mEq/L    Potassium 3.6 3.4 - 4.9 mEq/L    Chloride 104 95 - 107 mEq/L    CO2 21 20 - 31 mEq/L    Anion Gap 15 9 - 15 mEq/L    Glucose 150 (H) 70 - 99 mg/dL    BUN 20 8 - 23 mg/dL    CREATININE 0.85 0.70 - 1.20 mg/dL    GFR Non-African American >60.0 >60    GFR  >60.0 >60    Calcium 8.9 8.5 - 9.9 mg/dL    Total Protein 7.1

## 2020-08-09 NOTE — PROGRESS NOTES
8781 assessment complete, see flow sheet. Patient Alert follows all commands with encouragement due  To Italian speaking. Dopamine cont at 5mcq. Resting at present. 1030 Seen by Dr Carl Delgado, update given. 1145 Patients son at bedside, permit for pacemaker reviewed and signed by son. Patients son said that Dr Carl Delgado also called and spoke to patients daughter and explained the procedure to her. No questions and or concerns were asked of me. Consent placed in chart. 1345 Patient had some nausea but no vomiting medicated with prn Zofran. Resting at present with eyes closed. No changes in assessment noted. 1730 Resting watching TV at present with son at bedside, offers no complaints. No changes in assessment noted.

## 2020-08-10 ENCOUNTER — APPOINTMENT (OUTPATIENT)
Dept: GENERAL RADIOLOGY | Age: 85
DRG: 243 | End: 2020-08-10
Payer: MEDICARE

## 2020-08-10 ENCOUNTER — APPOINTMENT (OUTPATIENT)
Dept: CARDIAC CATH/INVASIVE PROCEDURES | Age: 85
DRG: 243 | End: 2020-08-10
Payer: MEDICARE

## 2020-08-10 LAB
ANION GAP SERPL CALCULATED.3IONS-SCNC: 12 MEQ/L (ref 9–15)
BUN BLDV-MCNC: 18 MG/DL (ref 8–23)
CALCIUM SERPL-MCNC: 9.1 MG/DL (ref 8.5–9.9)
CHLORIDE BLD-SCNC: 102 MEQ/L (ref 95–107)
CO2: 22 MEQ/L (ref 20–31)
CREAT SERPL-MCNC: 0.69 MG/DL (ref 0.7–1.2)
GFR AFRICAN AMERICAN: >60
GFR NON-AFRICAN AMERICAN: >60
GLUCOSE BLD-MCNC: 131 MG/DL (ref 70–99)
HCT VFR BLD CALC: 43.3 % (ref 42–52)
HEMOGLOBIN: 14.5 G/DL (ref 14–18)
INR BLD: 1.2
LV EF: 35 %
LVEF MODALITY: NORMAL
MCH RBC QN AUTO: 31.9 PG (ref 27–31.3)
MCHC RBC AUTO-ENTMCNC: 33.4 % (ref 33–37)
MCV RBC AUTO: 95.5 FL (ref 80–100)
PDW BLD-RTO: 13.7 % (ref 11.5–14.5)
PLATELET # BLD: 226 K/UL (ref 130–400)
POTASSIUM SERPL-SCNC: 3.7 MEQ/L (ref 3.4–4.9)
PROTHROMBIN TIME: 14.8 SEC (ref 12.3–14.9)
RBC # BLD: 4.54 M/UL (ref 4.7–6.1)
SODIUM BLD-SCNC: 136 MEQ/L (ref 135–144)
WBC # BLD: 8.7 K/UL (ref 4.8–10.8)

## 2020-08-10 PROCEDURE — 33207 INSERT HEART PM VENTRICULAR: CPT | Performed by: INTERNAL MEDICINE

## 2020-08-10 PROCEDURE — 2580000003 HC RX 258: Performed by: EMERGENCY MEDICINE

## 2020-08-10 PROCEDURE — 2580000003 HC RX 258

## 2020-08-10 PROCEDURE — 6360000002 HC RX W HCPCS: Performed by: EMERGENCY MEDICINE

## 2020-08-10 PROCEDURE — 36415 COLL VENOUS BLD VENIPUNCTURE: CPT

## 2020-08-10 PROCEDURE — 93010 ELECTROCARDIOGRAM REPORT: CPT | Performed by: INTERNAL MEDICINE

## 2020-08-10 PROCEDURE — 2700000000 HC OXYGEN THERAPY PER DAY

## 2020-08-10 PROCEDURE — 93306 TTE W/DOPPLER COMPLETE: CPT

## 2020-08-10 PROCEDURE — 02HK3JZ INSERTION OF PACEMAKER LEAD INTO RIGHT VENTRICLE, PERCUTANEOUS APPROACH: ICD-10-PCS | Performed by: INTERNAL MEDICINE

## 2020-08-10 PROCEDURE — 2580000003 HC RX 258: Performed by: INTERNAL MEDICINE

## 2020-08-10 PROCEDURE — 2060000000 HC ICU INTERMEDIATE R&B

## 2020-08-10 PROCEDURE — 80048 BASIC METABOLIC PNL TOTAL CA: CPT

## 2020-08-10 PROCEDURE — 71045 X-RAY EXAM CHEST 1 VIEW: CPT

## 2020-08-10 PROCEDURE — 6370000000 HC RX 637 (ALT 250 FOR IP)

## 2020-08-10 PROCEDURE — 2500000003 HC RX 250 WO HCPCS

## 2020-08-10 PROCEDURE — 0JH604Z INSERTION OF PACEMAKER, SINGLE CHAMBER INTO CHEST SUBCUTANEOUS TISSUE AND FASCIA, OPEN APPROACH: ICD-10-PCS | Performed by: INTERNAL MEDICINE

## 2020-08-10 PROCEDURE — 85027 COMPLETE CBC AUTOMATED: CPT

## 2020-08-10 PROCEDURE — 6360000002 HC RX W HCPCS

## 2020-08-10 PROCEDURE — 85610 PROTHROMBIN TIME: CPT

## 2020-08-10 PROCEDURE — 6360000002 HC RX W HCPCS: Performed by: INTERNAL MEDICINE

## 2020-08-10 PROCEDURE — 93005 ELECTROCARDIOGRAM TRACING: CPT | Performed by: INTERNAL MEDICINE

## 2020-08-10 RX ADMIN — DOPAMINE HYDROCHLORIDE 5 MCG/KG/MIN: 160 INJECTION, SOLUTION INTRAVENOUS at 10:31

## 2020-08-10 RX ADMIN — Medication 10 ML: at 22:03

## 2020-08-10 RX ADMIN — CEFAZOLIN 1 G: 1 INJECTION, POWDER, FOR SOLUTION INTRAMUSCULAR; INTRAVENOUS at 20:01

## 2020-08-10 ASSESSMENT — PAIN SCALES - WONG BAKER
WONGBAKER_NUMERICALRESPONSE: 0

## 2020-08-10 ASSESSMENT — PAIN SCALES - GENERAL: PAINLEVEL_OUTOF10: 0

## 2020-08-10 NOTE — FLOWSHEET NOTE
0715 report at bedside, pt resting, turns self, vss, remains on dopamine gtt. MP afib 40s-70s. Bed alarm on.    0815 assessment complete, see flowsheet. Awake, alert, PADILLA/follows commands. Denies pain. PPP, no edema. VSS, LS CTA. Call to cath lab, pt is an add-on case and no time yet but most likely early afternoon. Remains NPO    0920 pt son at bedside, updated on above. No questions at this time. _Electronically signed by Parish Case RN on 8/10/2020 at 9:31 AM     1030 [employer] here for 2D.    1200 no change to assessment, resting in bed, no complaints, vss. Awaiting cath lab this afternoon for PPM.   _Electronically signed by Parish Case RN on 8/10/2020 at 1:33 PM    1400 resting with eyes closed. 80 pt son here, updated, call to cath lab, awaiting pickup, hopefully within next hour. _Electronically signed by Parish Csae RN on 8/10/2020 at 4:55 PM    1830 pt son put call light on, appeared that pt turned self over and accidentally got the iv tubing caught on bed.  New #20 placed to L wrist.     1930 Cath lab here to take pt. _Electronically signed by Parish Case RN on 8/10/2020 at 7:40 PM

## 2020-08-10 NOTE — BRIEF OP NOTE
Section of Cardiology  Adult Brief Pacemaker Procedure Note        Procedure(s):  Pacemaker, single chamber    Pre-operative Diagnosis:  SSS    H&P Status: Completed and reviewed.      Post-operative Diagnosis:  Successful PPM placement    Findings: see full report    Complications:  none    Primary Proceduralist:   Dr. Romero Oakley       Full procedure note to follow

## 2020-08-10 NOTE — FLOWSHEET NOTE
1945 Resting quietly watching TV Dopamine at 5 mcg AFIB 50-75  Voiding per urinal  2155 Complete bath with Chlorhexidine solution  Complete linen change and repositioned.   2200 Pacemaker booklet in [place] given to pt  0000 NPO for PM  0415 Slept at intervals Afib 40-70

## 2020-08-11 VITALS
OXYGEN SATURATION: 96 % | WEIGHT: 167 LBS | SYSTOLIC BLOOD PRESSURE: 98 MMHG | HEART RATE: 54 BPM | HEIGHT: 64 IN | DIASTOLIC BLOOD PRESSURE: 54 MMHG | RESPIRATION RATE: 17 BRPM | BODY MASS INDEX: 28.51 KG/M2 | TEMPERATURE: 97.5 F

## 2020-08-11 LAB
ANION GAP SERPL CALCULATED.3IONS-SCNC: 14 MEQ/L (ref 9–15)
BUN BLDV-MCNC: 34 MG/DL (ref 8–23)
CALCIUM SERPL-MCNC: 8.6 MG/DL (ref 8.5–9.9)
CHLORIDE BLD-SCNC: 104 MEQ/L (ref 95–107)
CO2: 21 MEQ/L (ref 20–31)
CREAT SERPL-MCNC: 0.92 MG/DL (ref 0.7–1.2)
EKG ATRIAL RATE: 21 BPM
EKG ATRIAL RATE: 220 BPM
EKG ATRIAL RATE: 65 BPM
EKG ATRIAL RATE: 90 BPM
EKG Q-T INTERVAL: 448 MS
EKG Q-T INTERVAL: 458 MS
EKG Q-T INTERVAL: 522 MS
EKG Q-T INTERVAL: 560 MS
EKG QRS DURATION: 146 MS
EKG QRS DURATION: 150 MS
EKG QRS DURATION: 156 MS
EKG QRS DURATION: 158 MS
EKG QTC CALCULATION (BAZETT): 433 MS
EKG QTC CALCULATION (BAZETT): 485 MS
EKG QTC CALCULATION (BAZETT): 493 MS
EKG QTC CALCULATION (BAZETT): 501 MS
EKG R AXIS: -46 DEGREES
EKG R AXIS: -48 DEGREES
EKG R AXIS: -48 DEGREES
EKG R AXIS: -49 DEGREES
EKG T AXIS: 108 DEGREES
EKG T AXIS: 121 DEGREES
EKG T AXIS: 124 DEGREES
EKG T AXIS: 97 DEGREES
EKG VENTRICULAR RATE: 36 BPM
EKG VENTRICULAR RATE: 52 BPM
EKG VENTRICULAR RATE: 72 BPM
EKG VENTRICULAR RATE: 73 BPM
GFR AFRICAN AMERICAN: >60
GFR NON-AFRICAN AMERICAN: >60
GLUCOSE BLD-MCNC: 102 MG/DL (ref 70–99)
HCT VFR BLD CALC: 40.1 % (ref 42–52)
HEMOGLOBIN: 13.6 G/DL (ref 14–18)
MCH RBC QN AUTO: 32.2 PG (ref 27–31.3)
MCHC RBC AUTO-ENTMCNC: 33.8 % (ref 33–37)
MCV RBC AUTO: 95.2 FL (ref 80–100)
PDW BLD-RTO: 13.3 % (ref 11.5–14.5)
PLATELET # BLD: 218 K/UL (ref 130–400)
POTASSIUM SERPL-SCNC: 3.8 MEQ/L (ref 3.4–4.9)
RBC # BLD: 4.22 M/UL (ref 4.7–6.1)
SODIUM BLD-SCNC: 139 MEQ/L (ref 135–144)
WBC # BLD: 7.8 K/UL (ref 4.8–10.8)

## 2020-08-11 PROCEDURE — C1786 PMKR, SINGLE, RATE-RESP: HCPCS

## 2020-08-11 PROCEDURE — 99024 POSTOP FOLLOW-UP VISIT: CPT | Performed by: INTERNAL MEDICINE

## 2020-08-11 PROCEDURE — 2580000003 HC RX 258: Performed by: INTERNAL MEDICINE

## 2020-08-11 PROCEDURE — 97162 PT EVAL MOD COMPLEX 30 MIN: CPT

## 2020-08-11 PROCEDURE — 6360000002 HC RX W HCPCS: Performed by: INTERNAL MEDICINE

## 2020-08-11 PROCEDURE — C1894 INTRO/SHEATH, NON-LASER: HCPCS

## 2020-08-11 PROCEDURE — 36415 COLL VENOUS BLD VENIPUNCTURE: CPT

## 2020-08-11 PROCEDURE — 80048 BASIC METABOLIC PNL TOTAL CA: CPT

## 2020-08-11 PROCEDURE — 97165 OT EVAL LOW COMPLEX 30 MIN: CPT

## 2020-08-11 PROCEDURE — 2580000003 HC RX 258: Performed by: EMERGENCY MEDICINE

## 2020-08-11 PROCEDURE — 94761 N-INVAS EAR/PLS OXIMETRY MLT: CPT

## 2020-08-11 PROCEDURE — 6370000000 HC RX 637 (ALT 250 FOR IP): Performed by: EMERGENCY MEDICINE

## 2020-08-11 PROCEDURE — 93005 ELECTROCARDIOGRAM TRACING: CPT | Performed by: INTERNAL MEDICINE

## 2020-08-11 PROCEDURE — C1898 LEAD, PMKR, OTHER THAN TRANS: HCPCS

## 2020-08-11 PROCEDURE — 85027 COMPLETE CBC AUTOMATED: CPT

## 2020-08-11 PROCEDURE — 6370000000 HC RX 637 (ALT 250 FOR IP): Performed by: INTERNAL MEDICINE

## 2020-08-11 PROCEDURE — 93010 ELECTROCARDIOGRAM REPORT: CPT | Performed by: INTERNAL MEDICINE

## 2020-08-11 RX ORDER — ASPIRIN 81 MG/1
81 TABLET, CHEWABLE ORAL DAILY
Qty: 30 TABLET | Refills: 3 | Status: SHIPPED | OUTPATIENT
Start: 2020-08-12 | End: 2020-09-18 | Stop reason: SDUPTHER

## 2020-08-11 RX ADMIN — LISINOPRIL 10 MG: 10 TABLET ORAL at 10:09

## 2020-08-11 RX ADMIN — Medication 10 ML: at 12:53

## 2020-08-11 RX ADMIN — CEFAZOLIN 1 G: 1 INJECTION, POWDER, FOR SOLUTION INTRAMUSCULAR; INTRAVENOUS at 12:53

## 2020-08-11 RX ADMIN — CEFAZOLIN 1 G: 1 INJECTION, POWDER, FOR SOLUTION INTRAMUSCULAR; INTRAVENOUS at 04:28

## 2020-08-11 RX ADMIN — ASPIRIN 81 MG: 81 TABLET, CHEWABLE ORAL at 10:08

## 2020-08-11 ASSESSMENT — PAIN SCALES - WONG BAKER
WONGBAKER_NUMERICALRESPONSE: 0

## 2020-08-11 ASSESSMENT — PAIN SCALES - GENERAL: PAINLEVEL_OUTOF10: 0

## 2020-08-11 NOTE — PROGRESS NOTES
Physical Therapy Med Surg Initial Assessment  Facility/Department: [facility]  Room: [facility]13       NAME: Omid Langston  : 1929 (80 y.o.)  MRN: [id number]  CODE STATUS: Full Code    Date of Service: 2020    Patient Diagnosis(es): Atrial fibrillation with slow ventricular response [facility]) [I48.91]   Chief Complaint   Patient presents with    Loss of Consciousness    Chest Pain     Patient Active Problem List    Diagnosis Date Noted    SSS (sick sinus syndrome) ([facility]      Priority: High    Atrial fibrillation with slow ventricular response ([facility] 2020    New onset a-fib ([facility] 10/03/2019        Past Medical History:   Diagnosis Date    Atrial fibrillation ([facility]     Diabetes mellitus ([facility]     Hypertension      Past Surgical History:   Procedure Laterality Date    BACK SURGERY      HERNIA REPAIR         Chart Reviewed: Yes  Family / Caregiver Present: No  Other (Comment):  #:[phone]    Restrictions:  Restrictions/Precautions: Fall Risk     SUBJECTIVE:      Pain  Pre Treatment Pain Screening  Pain at present: 0    Post Treatment Pain Screening:   Pain Assessment  Pain Level: 0    Prior Level of Function:  Social/Functional History  Lives With: Other (comment), Family, Son(Granddaughter)  Type of Home: House  Home Layout: Two level  Home Access: Stairs to enter with rails  Entrance Stairs - Number of Steps: ~ 4 NISH  Bathroom Shower/Tub: Tub/Shower unit  Bathroom Toilet: Standard  Bathroom Equipment: Shower chair  Home Equipment: [employer] [place] Help From: Family  ADL Assistance: Independent  Homemaking Assistance: Needs assistance  Ambulation Assistance: Independent(straight cane)  Transfer Assistance: Independent  Active : No  Leisure & Hobbies: watching tv    OBJECTIVE:   Vision: Impaired  Vision Exceptions: Wears glasses at all times  Hearing: Within functional limits    Cognition:  Overall Orientation Status: Impaired  Orientation Level: Oriented Complexity  History: high  Exam: med  Clinical Presentation: med    Patient Education: pt stated he had no concerns for return to home but that he would see PT if still here  Barriers to Learning: ? cognition    DISCHARGE RECOMMENDATIONS:  Discharge Recommendations: Continue to assess pending progress    Assessment: Pt demonstrates deficits as listed. Pt reports he was previously indep in walking with st cane. Pt is requiring assistance with mobility at this time. Pt reports he feels safe for return to home. Pts nurse informed of concern for pt return to home unless assistance and a ww available  REQUIRES PT FOLLOW UP: Yes      PLAN OF CARE:  Plan  Times per week: 3-6  Current Treatment Recommendations: Transfer Training, Endurance Training, Neuromuscular Re-education, Patient/Caregiver Education & Training, Equipment Evaluation, Education, & procurement, Balance Training, Gait Training, Functional Mobility Training, Safety Education & Training  Safety Devices  Type of devices: Bed alarm in place, Call light within reach, Left in bed    Goals:  Short term goals  Short term goal 1: SBA bed transfers  Short term goal 2: SBa gait with appropriate device 50 feet  Short term goal 3: pt able to stand 2 min with no UE support with SBA    AMPA (6 CLICK) BASIC MOBILITY  AM-PAC Inpatient Mobility Raw Score : 19     Therapy Time:   Individual   Time In 1415   Time Out 1445   Minutes [address], 08/11/20 at 2:52 PM         Definitions for assistance levels  Independent = pt does not require any physical supervision or assistance from another person for activity completion. Device may be needed.   Stand by assistance = pt requires verbal cues or instructions from another person, close to but not touching, to perform the activity  Minimal assistance= pt performs 75% or more of the activity; assistance is required to complete the activity  Moderate assistance= pt performs 50% of the activity; assistance is required to complete the activity  Maximal assistance = pt performs 25% of the activity; assistance is required to complete the activity  Dependent = pt requires total physical assistance to accomplish the task

## 2020-08-11 NOTE — FLOWSHEET NOTE
Discharge paper work discussed with son he states he doesn't understand English well so I called his daughter and discussed paperwork with her. I printed english and Lithuanian versions of discharge instructions.  I also wrote the floor number on the paperwork and told them they can ask for me today till 7 or tomorrow from 7a-7p

## 2020-08-11 NOTE — FLOWSHEET NOTE
Pt is resting in bed with son at bedside. Assessment complete at this time and morning meds given. Aquacel dressing to left clavicle dry and intact. No bleeding or bruising noted. No pain per pt at this time. Will continue to monitor.

## 2020-08-11 NOTE — CARE COORDINATION
ANGELIKA AT [address] [place] [address] Drive
 Consult with Behavioral health to aid in depression, anxiety, or coping issues? No   Palliative Care Consult? No   Pulmonary Rehab order for COPD, PN, and CHF (if EF > 35%)? No    Does patient have a reliable scale and know how to read it (for CHF)?  Nutrition consult for CHF?  Respiratory therapy consult that includes bedside instruction on administration of nebulizers and/or inhalers, and assessment of oxygen and equipment needs in the home?    The plan for Transition of Care is related to the following treatment [address] [facility] DTR  Initial Discharge Plan?    RETURN HOME WITH [facility] DTR AWAIT CARDIAC INTERVENTION/ PT/OT NOTES IF NEEDED    The Patient and/or patient representative: GRAND DTR was provided with choice of any post-acute providers for care and equipment and agrees with discharge plan    Yes    Electronically signed by María Sanchez RN on 8/10/2020 at 1:30 PM

## 2020-08-11 NOTE — DISCHARGE INSTR - COC
Continuity of Care Form    Patient Name: Rosana Moscoso   :  1929  MRN:  [id number]    Admit date:  2020  Discharge date:  20    Code Status Order: Full Code   Advance Directives:   [facility] Documentation     Date/Time Healthcare Directive Type of Healthcare Directive Copy in [facility] [address] Agent's Name Healthcare Agent's Phone Number    [phone]  Yes, patient has an advance directive for healthcare treatment  --  --  --  Marlen Jimenez   --          Admitting Physician:  Gayland Landau, DO  PCP: Esteban Guo    Discharging Nurse: [facility] Unit/Room#: M458/O964-69  Discharging Unit Phone Number: [phone]    Emergency Contact:   Extended Emergency Contact Information  Primary Emergency Contact: Dmitriy Blanc [address] Phone: [phone]  Relation: Grandchild    Past Surgical History:  Past Surgical History:   Procedure Laterality Date    BACK SURGERY      HERNIA REPAIR         Immunization History: There is no immunization history on file for this patient.     Active Problems:  Patient Active Problem List   Diagnosis Code    New onset a-fib ([facility] I48.91    Atrial fibrillation with slow ventricular response ([facility]) I48.91    SSS (sick sinus syndrome) ([facility] [facility] I49.5       Isolation/Infection:   Isolation          No Isolation        Patient Infection Status     None to display          Nurse Assessment:  Last Vital Signs: BP (!) 98/54   Pulse 54   Temp 97.5 °F (36.4 °C) (Oral)   Resp 17   Ht 5' 4\" (1.626 m)   Wt 167 lb (75.8 kg)   SpO2 97%   BMI 28.67 kg/m²     Last documented pain score (0-10 scale): Pain Level: 0  Last Weight:   Wt Readings from Last 1 Encounters:   20 167 lb (75.8 kg)     Mental Status:  oriented    IV Access:  - None    Nursing Mobility/ADLs:  Walking   Assisted  Transfer  Assisted  Bathing  Assisted  Dressing  Assisted  Toileting  Assisted  Feeding  Assisted  Med Admin Assisted  Med Delivery   whole    Wound Care Documentation and Therapy:        Elimination:  Continence:   · Bowel: Yes  · Bladder: Yes  Urinary Catheter: None   Colostomy/Ileostomy/Ileal Conduit: No       Date of Last BM: 8/11/20    Intake/Output Summary (Last 24 hours) at 8/11/2020 1542  Last data filed at 8/10/2020 1630  Gross per 24 hour   Intake --   Output 125 ml   Net -125 ml     I/O last 3 completed shifts:  In: -   Out: 125 [Urine:125]    Safety Concerns:     None    Impairments/Disabilities:      Language Barrier - spainish speaking only     Nutrition Therapy:  Current Nutrition Therapy:   - regular     Routes of Feeding: None  Liquids: No Restrictions  Daily Fluid Restriction: no  Last Modified Barium Swallow with Video (Video Swallowing Test): not done    Treatments at the Time of Hospital Discharge:   Respiratory Treatments: na   Oxygen Therapy:  is not on home oxygen therapy.   Ventilator:    - No ventilator support    Rehab Therapies: Physical Therapy  Weight Bearing Status/Restrictions: No weight bearing restirctions  Other Medical Equipment (for information only, NOT a DME order):   walker  Other Treatments: ***    Patient's personal belongings (please select all that are sent with patient):  Garett    RN SIGNATURE:  Electronically signed by Paxton Ann RN on 8/11/20 at 4:49 PM EDT    CASE MANAGEMENT/SOCIAL WORK SECTION    Inpatient Status Date: ***    Readmission Risk Assessment Score:  Readmission Risk              Risk of Unplanned Readmission:        12           Discharging to Facility/ Agency   · Name:   · Address:  · Phone:  · Fax:    Dialysis Facility (if applicable)   · Name:  · Address:  · Dialysis Schedule:  · Phone:  · Fax:    / signature: {Esignature:[id number]}    PHYSICIAN SECTION    Prognosis: {Prognosis:5064677491}    Condition at Discharge: [address] Patient Condition:096035407}    Rehab Potential (if transferring to Rehab): {Prognosis:8957185589}    Recommended Labs or Other Treatments After Discharge: ***    Physician Certification: I certify the above information and transfer of Frankie Rodriguez  is necessary for the continuing treatment of the diagnosis listed and that he requires {Admit to Appropriate Level of Care:41552} for {GREATER/LESS:622151003} 30 days.      Update Admission H&P: {CHP DME Changes in OJOMY:790830573}    PHYSICIAN SIGNATURE:  {Esignature:158598199}

## 2020-08-11 NOTE — PROGRESS NOTES
[facility] OCC[facility]ATIONAL THERAPY EVALUATION - ACUTE     NAME: Derrell Mason  : 1929 (80 y.o.)  MRN: [id number]  CODE STATUS: Full Code  Room: [place] [id number]    Date of Service: 2020    Patient Diagnosis(es): Atrial fibrillation with slow ventricular response [facility]) [I48.91]   Chief Complaint   Patient presents with    Loss of Consciousness    Chest Pain     Patient Active Problem List    Diagnosis Date Noted    SSS (sick sinus syndrome) ([facility] [address]      Priority: High    Atrial fibrillation with slow ventricular response ([facility] [address] [facility] 2020    New onset a-fib ([facility] [address] 10/03/2019        Past Medical History:   Diagnosis Date    [facility] ([facility]     Diabetes mellitus ([facility]     Hypertension      Past Surgical History:   Procedure Laterality Date    BACK SURGERY      HERNIA REPAIR          Restrictions  Restrictions/Precautions: ROM Restrictions     Safety Devices: Safety Devices  Safety Devices in place: Yes  Type of devices:  All fall risk precautions in place        Subjective  Pre Treatment Pain Screening  Pain at present: 0  Scale Used: Numeric Score  Intervention List: Patient able to continue with treatment    Pain Reassessment:   Pain Assessment  Patient Currently in Pain: Denies       Prior Level of Function:  Social/Functional History  Lives With: Other (comment), Family, Son(Granddaughter)  Type of Home: House  Home Layout: Two level  Home Access: Stairs to enter with rails  Entrance Stairs - Number of Steps: ~ 4 NISH  Bathroom Shower/Tub: Tub/Shower unit  Bathroom Toilet: Standard  Bathroom Equipment: Shower chair  Home Equipment: [employer] Help From: Family  ADL Assistance: Independent  Homemaking Assistance: Needs assistance  Ambulation Assistance: Independent(straight cane)  Transfer Assistance: Independent  Active : No  Leisure & Hobbies: watching tv    OBJECTIVE:     Orientation Status:  Orientation  Overall Orientation Status: Impaired  Orientation Level: Oriented to Bathing: Stand by assistance, Increased time to complete, Verbal cueing  UE Dressing: Verbal cueing, Stand by assistance  LE Dressing: Increased time to complete, Stand by assistance(Sitting on EOB, pt able to doff and don non-skid socks)  Toileting: Stand by assistance  Additional Comments: Simulated ADLs; anticipate above due to functional observation  Toilet Transfers  Toilet - Technique: Stand pivot  Toilet Transfer: Contact guard assistance; vc's for no pushing/pulling  Toilet Transfers Comments: Simulated; anticipate above based on functional observation  Tub Transfers  Tub Transfers: Not tested    Therapy key for assistance levels -   Independent = Pt. is able to perform task with no assistance but may require a device   Stand by assistance = Pt. does not perform task at an independent level but does not need physical assistance, requires verbal cues  Minimal, Moderate, Maximal Assistance = Pt. requires physical assistance (25%, 50%, 75% assist from helper) for task but is able to actively participate in task   Dependent = Pt. requires total assistance with task and is not able to actively participate with task completion     Functional Mobility:     Transfers  Sit to stand: Contact guard assistance  Stand to sit: Stand by assistance  Transfer Comments: VC's for no pushing/pulling    Bed Mobility  Bed mobility  Rolling to Left: Supervision  Rolling to Right: Supervision  Supine to Sit: Supervision  Sit to Supine: Supervision  Scooting: Supervision  Comment: Pt required vc's for no pushing/pulling during bed mobility    Seated and Standing Balance:  Balance  Sitting Balance: Supervision  Standing Balance: Stand by assistance    Functional Endurance:  Activity Tolerance  Activity Tolerance: Patient Tolerated treatment well    D/C Recommendations:  OT D/C RECOMMENDATIONS  REQUIRES OT FOLLOW UP: Yes    Equipment Recommendations:       OT Education:   OT Education  OT Education: OT Role, Plan of Care    OT Follow Up:  OT D/C RECOMMENDATIONS  REQUIRES OT FOLLOW UP: Yes       Assessment/Discharge Disposition:  Assessment: Pt is a 80year-old male who presents with the above deficits which impact pt independence with ADLs. Pt may benefit from OT services to maximize pt independence and safety with ADLs. Pt has supportive family at home to assist as needed. Performance deficits / Impairments: Decreased safe awareness, Decreased endurance, Decreased ADL status, Decreased fine motor control  Prognosis: Good  Discharge Recommendations: Continue to assess pending progress  Decision Making: Low Complexity  History: Pt has medium complex medical history  Exam: 4 deficits  Assistance / Modification: Min A    Six Click Score   How much help for putting on and taking off regular lower body clothing?: A Little  How much help for Bathing?: A Little  How much help for Toileting?: A Little  How much help for putting on and taking off regular upper body clothing?: None  How much help for taking care of personal grooming?: None  How much help for eating meals?: None  AM-[facility] Inpatient Daily Activity Raw Score: 21  AM-PAC Inpatient ADL T-Scale Score : 44.27  ADL Inpatient CMS 0-100% Score: 32.79    Plan:  Plan  Times per week: 1-3x/ week  Plan weeks: Length of acute stay  Current Treatment Recommendations: Endurance Training, Patient/Caregiver Education & Training, Self-Care / ADL, Balance Training, Safety Education & Training, Functional Mobility Training  Plan Comment: OT POC transferred to primary OT    Goals:   Patient will:    - Improve functional endurance to tolerate/complete 45 mins of ADL's  - Be Mod Independent in LB ADLs  - Be Mod Independent in ADL transfers without LOB  - Be Mod Independent in toileting tasks  - Improve B UE strength and endurance to [facility] in order to participate in self-care activities as projected. - Access appropriate D/C site with as few architectural barriers as possible.   - Other :  Supervision maintaining pacemaker precautions during bathing/dressing/toileting/toilet transfers    Patient Goal: Patient goals : Pt did not report     Discussed and agreed upon: Yes Comments:     Therapy Time:   OT Individual Minutes  Time In: 1102  Time Out: 1120  Minutes: 18    Eval: 18 minutes     Electronically signed by:    DAVID Kam  8/11/2020, 11:53 AM

## 2020-08-11 NOTE — DISCHARGE SUMMARY
Discharge Summary    Date:8/11/2020        Patient Claudette Silber     YOB: 1929     Age:89 y.o. Admit Date:8/8/2020   Admission Condition:good   Discharged Condition:good  Discharge Date: 08/11/20     Discharge Diagnoses   Active Problems:    SSS (sick sinus syndrome) (HCC)    Atrial fibrillation with slow ventricular response (HCC)  Resolved Problems:    * No resolved hospital problems. [facility] Stay   Narrative of Hospital Course: Patient is a 80 y.o. male who presents with a chief complaint of syncope. Patient is followed on a regular basis by Dr. Matthew Cervantes. Patient with past medical history of chronic atrial fibrillation on oral anticoagulation. He was noted to have A. fib with slow ventricular response with heart rate in the 30s in the emergency department, placed on dopamine drip and given atropine which responded. Patient is currently seen in intensive care unit, on dopamine drip. He appears to be resting comfortably. Echocardiogram in September 2019 showed ejection fraction of 35%. Patient denies any chest pain, shortness of breath. Cardiac enzymes are negative, BNP is 4000. Potassium is 3.6, magnesium 2.3. Per granddaughter apparently patient had some chest pain prior to his syncopal episode    Echo with ejection fraction of 35%, moderate mitral regurgitation on 8/8/2020. Status post permanent pacemaker placement, single lead. Not AICD candidate secondary to advanced age        Consultants:   IP CONSULT TO CARDIOLOGY    Time Spent on Discharge:  30 minutes were spent in patient examination, evaluation, counseling as well as medication reconciliation, prescriptions for required medications, discharge plan and follow up.       Surgeries/Procedures Performed:   PPM    Treatments:   surgery: PPM      Significant Diagnostic Studies:   Recent Labs:  CBC:   Lab Results   Component Value Date    WBC 7.8 08/11/2020    RBC 4.22 08/11/2020    HGB 13.6 08/11/2020    HCT 40.1 08/11/2020    MCV 95.2 08/11/2020    MCH 32.2 08/11/2020    MCHC 33.8 08/11/2020    RDW 13.3 08/11/2020     08/11/2020     BMP:    Lab Results   Component Value Date    GLUCOSE 102 08/11/2020     08/11/2020    K 3.8 08/11/2020    K 3.7 10/05/2019     08/11/2020    CO2 21 08/11/2020    ANIONGAP 14 08/11/2020    BUN 34 08/11/2020    CREATININE 0.92 08/11/2020    CALCIUM 8.6 08/11/2020    LABGLOM >60.0 08/11/2020    GFRAA >60.0 08/11/2020     HFP:    Lab Results   Component Value Date    PROT 7.1 08/08/2020     CMP:    Lab Results   Component Value Date    GLUCOSE 102 08/11/2020     08/11/2020    K 3.8 08/11/2020    K 3.7 10/05/2019     08/11/2020    CO2 21 08/11/2020    BUN 34 08/11/2020    CREATININE 0.92 08/11/2020    ANIONGAP 14 08/11/2020    ALKPHOS 75 08/08/2020    ALT 12 08/08/2020    AST 14 08/08/2020    BILITOT 0.7 08/08/2020    LABALBU 4.0 08/08/2020    LABGLOM >60.0 08/11/2020    GFRAA >60.0 08/11/2020    PROT 7.1 08/08/2020    CALCIUM 8.6 08/11/2020     PT/INR:    Lab Results   Component Value Date    PROTIME 14.8 08/10/2020    INR 1.2 08/10/2020     PTT:   Lab Results   Component Value Date    APTT 29.3 08/08/2020     FLP:    Lab Results   Component Value Date    CHOL 164 08/09/2020    TRIG 55 08/09/2020    HDL 40 08/09/2020     U/A:    Lab Results   Component Value Date    COLORU Yellow 08/08/2020    SPECGRAV 1.014 08/08/2020    PHUR 8.0 08/08/2020    PROTEINU Negative 08/08/2020    GLUCOSEU Negative 08/08/2020    KETUA Negative 08/08/2020    BILIRUBINUR Negative 08/08/2020    UROBILINOGEN 0.2 08/08/2020    NITRU Negative 08/08/2020    LEUKOCYTESUR Negative 08/08/2020     TSH:    Lab Results   Component Value Date    TSH 0.699 08/09/2020       Radiology Last 7 Days:  Ct Abdomen Pelvis W Iv Contrast Additional Contrast? None    Result Date: 8/9/2020  1. BIBASILAR AREAS ATELECTASIS  GROUNDGLASS INFILTRATES IN THE BASES RIGHT GREATER THAN LEFT 2.  AREAS LOW-ATTENUATION BOTH KIDNEYS. LIKELY RENAL CYST BUT NOT FULLY CHARACTERIZED IN THIS STUDY. CORRELATE CLINICALLY, FOLLOW-UP All CT scans at this facility use dose modulation, iterative reconstruction, and/or weight based dosing when appropriate to reduce radiation dose to as low as reasonably achievable. Xr Chest Portable    Result Date: 8/11/2020  NO ACUTE ACTIVE CARDIOPULMONARY PROCESS    Xr Chest Portable    Result Date: 8/9/2020  AREA OF ATELECTASIS OR GROUNDGLASS INFILTRATE LEFT LOWER LOBE. RADIOGRAPHIC FINDINGS SUGGESTIVE OF COPD.  CORRELATE CLINICALLY      Discharge Plan   Disposition: Home    Provider Follow-Up:   Matthew Cervantes  [facility] [address]  [phone]    In 2 weeks  Post hospitalization follow up    Yessenia Riggs DO  [address] [place] [address]  [facility]  [phone]    In 2 weeks  Post procedure follow up., Post hospitalization follow up       Hospital/Incidental Findings Requiring Follow-Up:      Patient Instructions   Diet: cardiac diet    Activity: activity as tolerated    Other Instructions:     Discharge Medications         Medication List      START taking these medications    aspirin 81 MG chewable tablet  Take 1 tablet by mouth daily  Start taking on:  August 12, 2020     metoprolol tartrate 25 MG tablet  Commonly known as:  LOPRESSOR  Take 1 tablet by mouth 2 times daily        CONTINUE taking these medications    apixaban 2.5 MG Tabs tablet  Commonly known as:  ELIQUIS  Take 2 tablets by mouth 2 times daily     fluticasone-salmeterol 250-50 MCG/DOSE Aepb  Commonly known as:  Advair Diskus  Inhale 1 puff into the lungs every 12 hours for 14 days     lisinopril 5 MG tablet  Commonly known as:  PRINIVIL;ZESTRIL  Take 1 tablet by mouth daily           Where to Get Your Medications      These medications were sent to [facility] [address], [address] [place] [address] DOMO GUPTA - P [phone] - F [phone]  [address] [address]    Hours:  24-hours Phone:  [phone] · aspirin 81 MG chewable tablet  · metoprolol tartrate 25 MG tablet         Electronically signed by Natan Peralta DO on 8/11/20 at 2:08 PM EDT

## 2020-08-11 NOTE — TELEPHONE ENCOUNTER
requesting medication refill. Please approve or deny this request.    Rx requested:  Requested Prescriptions     Pending Prescriptions Disp Refills    metoprolol tartrate (LOPRESSOR) 25 MG tablet [Pharmacy Med Name: METOPROLOL TARTRATE 25MG TABLETS] 180 tablet      Sig: TAKE 1 TABLET BY MOUTH TWICE DAILY         Last Office Visit:   Visit date not found      Next Visit Date:  No future appointments.

## 2020-08-12 ENCOUNTER — CARE COORDINATION (OUTPATIENT)
Dept: CASE MANAGEMENT | Age: 85
End: 2020-08-12

## 2020-08-15 NOTE — PROCEDURES
and  advanced under fluoroscopic guidance into the right atrium. The sheath  was then torn away under direct visualization. The lead was inserted  into the right ventricle under fluoroscopic guidance. Adequate pacing  and sensing thresholds were achieved and the lead was screwed into  place. The lead collar was advanced and anchored into place using #0  silk suture in an interrupted stitch fashion. The lead was then inserted into the appropriate position in the  generator and then secured with the setscrew provided. The lead and  generator were inserted into the pocket with the lead posterior. The  subcutaneous tissue was approximated utilizing #00 and #000 Chromic gut  suture in an interrupted stitch fashion. The dermal layer was  approximated utilizing #4-0 Vicryl in a continuous subcuticular stitch. The area was cleansed with sterile saline and dried. TinCoBen was  applied and Steri-strips overlaid. A sterile 4 x 4 pressure dressing  was applied and the patient was transferred to the post catheterization  holding area in stable and satisfactory condition. The patient tolerated the procedure well. GENERATOR DATA:  BrandMaker. DEVICE:  Model number and serial number, please see chart. STIMULATION THRESHOLDS:    RIGHT VENTRICLE LEAD DATA:  Within normal limits. PACEMAKER PARAMETER SETTINGS:  Pacing mode is VVIR. ASSESSMENT:  Status past successful single-chamber pacemaker placement,  MRI compatible.     PLAN:  Postprocedure care as usual.        Jocelyn Alegria DO    D: 08/15/2020 12:18:07       T: 08/15/2020 13:29:02     [email or web address]  Job#: [id number]     Doc#: [id number]    CC:

## 2020-09-18 ENCOUNTER — OFFICE VISIT (OUTPATIENT)
Dept: CARDIOLOGY CLINIC | Age: 85
End: 2020-09-18
Payer: MEDICARE

## 2020-09-18 VITALS
DIASTOLIC BLOOD PRESSURE: 94 MMHG | SYSTOLIC BLOOD PRESSURE: 148 MMHG | BODY MASS INDEX: 28.15 KG/M2 | WEIGHT: 164 LBS | HEART RATE: 84 BPM

## 2020-09-18 PROBLEM — I42.9 CARDIOMYOPATHY (HCC): Status: ACTIVE | Noted: 2020-09-18

## 2020-09-18 PROBLEM — I48.20 CHRONIC ATRIAL FIBRILLATION (HCC): Status: ACTIVE | Noted: 2020-08-08

## 2020-09-18 PROCEDURE — 1036F TOBACCO NON-USER: CPT | Performed by: INTERNAL MEDICINE

## 2020-09-18 PROCEDURE — G8427 DOCREV CUR MEDS BY ELIG CLIN: HCPCS | Performed by: INTERNAL MEDICINE

## 2020-09-18 PROCEDURE — 1123F ACP DISCUSS/DSCN MKR DOCD: CPT | Performed by: INTERNAL MEDICINE

## 2020-09-18 PROCEDURE — G8417 CALC BMI ABV UP PARAM F/U: HCPCS | Performed by: INTERNAL MEDICINE

## 2020-09-18 PROCEDURE — 4040F PNEUMOC VAC/ADMIN/RCVD: CPT | Performed by: INTERNAL MEDICINE

## 2020-09-18 PROCEDURE — 93000 ELECTROCARDIOGRAM COMPLETE: CPT | Performed by: INTERNAL MEDICINE

## 2020-09-18 PROCEDURE — 99214 OFFICE O/P EST MOD 30 MIN: CPT | Performed by: INTERNAL MEDICINE

## 2020-09-18 RX ORDER — ASPIRIN 81 MG/1
81 TABLET, CHEWABLE ORAL DAILY
Qty: 30 TABLET | Refills: 11 | Status: SHIPPED | OUTPATIENT
Start: 2020-09-18 | End: 2021-02-26 | Stop reason: ALTCHOICE

## 2020-09-18 RX ORDER — METOPROLOL SUCCINATE 50 MG/1
TABLET, EXTENDED RELEASE ORAL
COMMUNITY
Start: 2020-08-15 | End: 2020-09-18 | Stop reason: ALTCHOICE

## 2020-09-18 RX ORDER — LISINOPRIL 5 MG/1
5 TABLET ORAL DAILY
COMMUNITY
End: 2020-09-18 | Stop reason: SDUPTHER

## 2020-09-18 RX ORDER — LISINOPRIL 10 MG/1
TABLET ORAL
COMMUNITY
Start: 2020-08-15 | End: 2020-09-18 | Stop reason: DRUGHIGH

## 2020-09-18 RX ORDER — LISINOPRIL 5 MG/1
5 TABLET ORAL DAILY
Qty: 30 TABLET | Refills: 5 | Status: ON HOLD | OUTPATIENT
Start: 2020-09-18 | End: 2021-06-25 | Stop reason: HOSPADM

## 2020-09-18 NOTE — PROGRESS NOTES
Chief Complaint   Patient presents with    Atrial Fibrillation       8-9-20: Patient is a 80 y.o. male who presents with a chief complaint of syncope. Patient is followed on a regular basis by Dr. Matthew Cervantes. Patient with past medical history of chronic atrial fibrillation on oral anticoagulation. He was noted to have A. fib with slow ventricular response with heart rate in the 30s in the emergency department, placed on dopamine drip and given atropine which responded. Patient is currently seen in intensive care unit, on dopamine drip. He appears to be resting comfortably. Echocardiogram in September 2019 showed ejection fraction of 35%. Patient denies any chest pain, shortness of breath. Cardiac enzymes are negative, BNP is 4000. Potassium is 3.6, magnesium 2.3. Per granddaughter apparently patient had some chest pain prior to his syncopal episode. 9-18-20: Patient presents for initial medical evaluation. Patient is followed on a regular basis by Dr. Matthew Cervantes. S/P  HOSPITALIZATION for SSS, hx of chronic Afibb, noted to have HR in 30's s/p single PPM. Not felt to be a good AICD candidate due to age. Doing well. On DOAC. Pt denies chest pain, dyspnea, dyspnea on exertion, change in exercise capacity, fatigue,  nausea, vomiting, diarrhea, constipation, motor weakness, insomnia, weight loss, syncope, dizziness, lightheadedness, palpitations, PND, orthopnea, or claudication. Echo with EF of 35%, 1-2+ MR.              Patient Active Problem List   Diagnosis    New onset a-fib (HCC)    Chronic atrial fibrillation    [facility]    Cardiomyopathy ([facility]       Past Surgical History:   Procedure Laterality Date    BACK SURGERY      HERNIA REPAIR         Social History     Socioeconomic History    Marital status:      Spouse name: Not on file    Number of children: Not on file    Years of education: Not on file    Highest education level: Not on file   Occupational History    Not on file   Social Needs    Financial resource strain: Not on file    Food insecurity     Worry: Not on file     Inability: Not on file    Transportation needs     Medical: Not on file     Non-medical: Not on file   Tobacco Use    Smoking status: Former Smoker    Smokeless tobacco: Never Used    Tobacco comment: quit 30 years ago   Substance and Sexual Activity    Alcohol use: No    Drug use: No    Sexual activity: Not on file   Lifestyle    Physical activity     Days per week: Not on file     Minutes per session: Not on file    Stress: Not on file   Relationships    Social connections     Talks on phone: Not on file     Gets together: Not on file     Attends Sabianism service: Not on file     Active member of club or organization: Not on file     Attends meetings of clubs or organizations: Not on file     Relationship status: Not on file    Intimate partner violence     Fear of current or ex partner: Not on file     Emotionally abused: Not on file     Physically abused: Not on file     Forced sexual activity: Not on file   Other Topics Concern    Not on file   Social History Narrative    Not on file       No family history on file. Current Outpatient Medications   Medication Sig Dispense Refill    apixaban (ELIQUIS) 2.5 MG TABS tablet Take 1 tablet by mouth 2 times daily 60 tablet 5    lisinopril (PRINIVIL;ZESTRIL) 5 MG tablet Take 1 tablet by mouth daily 30 tablet 5    metoprolol tartrate (LOPRESSOR) 25 MG tablet Take 1 tablet by mouth 2 times daily 60 tablet 5    aspirin 81 MG chewable tablet Take 1 tablet by mouth daily 30 tablet 11    fluticasone-salmeterol (ADVAIR DISKUS) 250-50 MCG/DOSE AEPB Inhale 1 puff into the lungs every 12 hours for 14 days 1 Inhaler 0     No current facility-administered medications for this visit. Patient has no known allergies.     Review of Systems:  General ROS: negative  Psychological ROS: negative  Hematological and Lymphatic ROS: No history of blood clots or bleeding disorder. Respiratory ROS: no cough, shortness of breath, or wheezing  Cardiovascular ROS: no chest pain or dyspnea on exertion  Gastrointestinal ROS: no abdominal pain, change in bowel habits, or black or bloody stools  Genito-Urinary ROS: no dysuria, trouble voiding, or hematuria  Musculoskeletal ROS: negative  Neurological ROS: no TIA or stroke symptoms  Dermatological ROS: negative    VITALS:  Blood pressure (!) 148/94, pulse 84, weight 164 lb (74.4 kg). Body mass index is 28.15 kg/m². Physical Examination:  General appearance - alert, well appearing, and in no distress  Mental status - alert, oriented to person, place, and time  Neck - Neck is supple, no JVD or carotid bruits. No thyromegaly or adenopathy. Chest - clear to auscultation, no wheezes, rales or rhonchi, symmetric air entry  Heart - normal rate, regular rhythm, normal S1, S2, no murmurs, rubs, clicks or gallops  Abdomen - soft, nontender, nondistended, no masses or organomegaly  Neurological - alert, oriented, normal speech, no focal findings or movement disorder noted  Extremities - peripheral pulses normal, no pedal edema, no clubbing or cyanosis  Skin - normal coloration and turgor, no rashes, no suspicious skin lesions noted      EKG: atrial fibrillation    Orders Placed This Encounter   Procedures    EKG 12 Lead       ASSESSMENT:     Diagnosis Orders   1. Atrial fibrillation with slow ventricular response (HCC)  EKG 12 Lead   2. SSS (sick sinus syndrome) (HCC)     3. [facility]           PLAN:     Patient will need to continue to follow up with you for their general medical care    As always, aggressive risk factor modification is strongly recommended. We should adhere to the JNC VIII guidelines for HTN management and the NCEP ATP III guidelines for LDL-C management. Cardiac diet is always recommended with low fat, cholesterol, calories and sodium.     Continue medications at current doses. Follow up with device clinic    Cont with DOAC low dose. Monitor H/H with PCP.      Lopressor 25mg BID    Check EKG

## 2020-11-11 ENCOUNTER — HOSPITAL ENCOUNTER (EMERGENCY)
Age: 85
Discharge: HOME | End: 2020-11-11
Attending: EMERGENCY MEDICINE
Payer: MEDICARE

## 2020-11-11 ENCOUNTER — APPOINTMENT (OUTPATIENT)
Dept: GENERAL RADIOLOGY | Age: 85
End: 2020-11-11
Payer: MEDICARE

## 2020-11-11 VITALS
DIASTOLIC BLOOD PRESSURE: 88 MMHG | RESPIRATION RATE: 24 BRPM | SYSTOLIC BLOOD PRESSURE: 139 MMHG | HEART RATE: 77 BPM | BODY MASS INDEX: 25.11 KG/M2 | TEMPERATURE: 98.7 F | WEIGHT: 160 LBS | HEIGHT: 67 IN

## 2020-11-11 LAB
ALBUMIN SERPL-MCNC: 3.9 G/DL (ref 3.5–4.6)
ALP BLD-CCNC: 84 U/L (ref 35–104)
ALT SERPL-CCNC: 49 U/L (ref 0–41)
ANION GAP SERPL CALCULATED.3IONS-SCNC: 11 MEQ/L (ref 9–15)
AST SERPL-CCNC: 45 U/L (ref 0–40)
BASOPHILS ABSOLUTE: 0.1 K/UL (ref 0–0.2)
BASOPHILS RELATIVE PERCENT: 0.9 %
BILIRUB SERPL-MCNC: 0.6 MG/DL (ref 0.2–0.7)
BUN BLDV-MCNC: 24 MG/DL (ref 8–23)
CALCIUM SERPL-MCNC: 9.2 MG/DL (ref 8.5–9.9)
CHLORIDE BLD-SCNC: 106 MEQ/L (ref 95–107)
CO2: 22 MEQ/L (ref 20–31)
CREAT SERPL-MCNC: 0.89 MG/DL (ref 0.7–1.2)
EKG ATRIAL RATE: 76 BPM
EKG Q-T INTERVAL: 488 MS
EKG QRS DURATION: 194 MS
EKG QTC CALCULATION (BAZETT): 559 MS
EKG R AXIS: -69 DEGREES
EKG T AXIS: 99 DEGREES
EKG VENTRICULAR RATE: 79 BPM
EOSINOPHILS ABSOLUTE: 0.2 K/UL (ref 0–0.7)
EOSINOPHILS RELATIVE PERCENT: 2.6 %
GFR AFRICAN AMERICAN: >60
GFR NON-AFRICAN AMERICAN: >60
GLOBULIN: 2.4 G/DL (ref 2.3–3.5)
GLUCOSE BLD-MCNC: 139 MG/DL (ref 70–99)
HCT VFR BLD CALC: 39.6 % (ref 42–52)
HEMOGLOBIN: 13.2 G/DL (ref 14–18)
INR BLD: 1.2
LYMPHOCYTES ABSOLUTE: 2.3 K/UL (ref 1–4.8)
LYMPHOCYTES RELATIVE PERCENT: 28.1 %
MCH RBC QN AUTO: 32.1 PG (ref 27–31.3)
MCHC RBC AUTO-ENTMCNC: 33.3 % (ref 33–37)
MCV RBC AUTO: 96.3 FL (ref 80–100)
MONOCYTES ABSOLUTE: 0.8 K/UL (ref 0.2–0.8)
MONOCYTES RELATIVE PERCENT: 9.4 %
NEUTROPHILS ABSOLUTE: 4.8 K/UL (ref 1.4–6.5)
NEUTROPHILS RELATIVE PERCENT: 59 %
PDW BLD-RTO: 14.1 % (ref 11.5–14.5)
PLATELET # BLD: 224 K/UL (ref 130–400)
POTASSIUM SERPL-SCNC: 4 MEQ/L (ref 3.4–4.9)
PRO-BNP: 8635 PG/ML
PROTHROMBIN TIME: 15 SEC (ref 12.3–14.9)
RBC # BLD: 4.11 M/UL (ref 4.7–6.1)
SARS-COV-2, NAAT: NOT DETECTED
SODIUM BLD-SCNC: 139 MEQ/L (ref 135–144)
TOTAL PROTEIN: 6.3 G/DL (ref 6.3–8)
TROPONIN: <0.01 NG/ML (ref 0–0.01)
WBC # BLD: 8.1 K/UL (ref 4.8–10.8)

## 2020-11-11 PROCEDURE — 85025 COMPLETE CBC W/AUTO DIFF WBC: CPT

## 2020-11-11 PROCEDURE — 83880 ASSAY OF NATRIURETIC PEPTIDE: CPT

## 2020-11-11 PROCEDURE — 85610 PROTHROMBIN TIME: CPT

## 2020-11-11 PROCEDURE — 99281 EMR DPT VST MAYX REQ PHY/QHP: CPT

## 2020-11-11 PROCEDURE — 6370000000 HC RX 637 (ALT 250 FOR IP): Performed by: EMERGENCY MEDICINE

## 2020-11-11 PROCEDURE — 99282 EMERGENCY DEPT VISIT SF MDM: CPT

## 2020-11-11 PROCEDURE — 94761 N-INVAS EAR/PLS OXIMETRY MLT: CPT

## 2020-11-11 PROCEDURE — 84484 ASSAY OF TROPONIN QUANT: CPT

## 2020-11-11 PROCEDURE — 96365 THER/PROPH/DIAG IV INF INIT: CPT

## 2020-11-11 PROCEDURE — U0002 COVID-19 LAB TEST NON-CDC: HCPCS

## 2020-11-11 PROCEDURE — 36415 COLL VENOUS BLD VENIPUNCTURE: CPT

## 2020-11-11 PROCEDURE — 71045 X-RAY EXAM CHEST 1 VIEW: CPT

## 2020-11-11 PROCEDURE — 80053 COMPREHEN METABOLIC PANEL: CPT

## 2020-11-11 PROCEDURE — 93005 ELECTROCARDIOGRAM TRACING: CPT | Performed by: EMERGENCY MEDICINE

## 2020-11-11 PROCEDURE — 96366 THER/PROPH/DIAG IV INF ADDON: CPT

## 2020-11-11 PROCEDURE — 6360000002 HC RX W HCPCS: Performed by: EMERGENCY MEDICINE

## 2020-11-11 PROCEDURE — 94640 AIRWAY INHALATION TREATMENT: CPT

## 2020-11-11 RX ORDER — IPRATROPIUM BROMIDE AND ALBUTEROL SULFATE 2.5; .5 MG/3ML; MG/3ML
1 SOLUTION RESPIRATORY (INHALATION) PRN
Status: DISCONTINUED | OUTPATIENT
Start: 2020-11-11 | End: 2020-11-11 | Stop reason: HOSPADM

## 2020-11-11 RX ORDER — MAGNESIUM SULFATE IN WATER 40 MG/ML
2 INJECTION, SOLUTION INTRAVENOUS ONCE
Status: COMPLETED | OUTPATIENT
Start: 2020-11-11 | End: 2020-11-11

## 2020-11-11 RX ORDER — ALBUTEROL SULFATE 2.5 MG/3ML
2.5 SOLUTION RESPIRATORY (INHALATION) EVERY 6 HOURS PRN
Qty: 120 EACH | Refills: 1 | Status: ON HOLD | OUTPATIENT
Start: 2020-11-11 | End: 2021-06-07

## 2020-11-11 RX ADMIN — MAGNESIUM SULFATE IN WATER 2 G: 40 INJECTION, SOLUTION INTRAVENOUS at 14:07

## 2020-11-11 RX ADMIN — IPRATROPIUM BROMIDE AND ALBUTEROL SULFATE 1 AMPULE: .5; 3 SOLUTION RESPIRATORY (INHALATION) at 13:23

## 2020-11-11 RX ADMIN — IPRATROPIUM BROMIDE AND ALBUTEROL SULFATE 1 AMPULE: .5; 3 SOLUTION RESPIRATORY (INHALATION) at 13:28

## 2020-11-11 ASSESSMENT — ENCOUNTER SYMPTOMS
EYE PAIN: 0
SORE THROAT: 0
CHEST TIGHTNESS: 0
SHORTNESS OF BREATH: 1
VOMITING: 0
NAUSEA: 0
ABDOMINAL PAIN: 0

## 2020-11-11 ASSESSMENT — PAIN SCALES - GENERAL: PAINLEVEL_OUTOF10: 8

## 2020-11-11 NOTE — ED NOTES
Labs and nasal swab obtained by this RN, labeled and sent to lab via tube system.       Cindy Rouse RN  11/11/20 [id number]

## 2020-11-11 NOTE — ED NOTES
Discharge  instructions given and reviewed. Patient verbalized understanding. Patient ambulated out of ED with a steady gait to POV.      Solis Montalvo RN  11/11/20 [id number]

## 2020-11-11 NOTE — ED PROVIDER NOTES
[address] [facility] ED  EMERGENCY DEPARTMENT ENCOUNTER      Pt Name: Enedelia Hurst  MRN: [id number]  [place] 2/12/1929  Date of evaluation: 11/11/2020  Provider: Maddie Zimmer DO    CHIEF COMPLAINT       Chief Complaint   Patient presents with    Shortness of Breath     and chest pain. pacemaker placed 2 months ago         HISTORY OF PRESENT ILLNESS   (Location/Symptom, Timing/Onset, Context/Setting, Quality, Duration, Modifying Factors, Severity)  Note limiting factors. Enedelia Hurst is a 80 y.o. male who presents to the emergency department . Patient comes in with shortness of breath that started yesterday. He has had a little bit of chest discomfort. No fevers or chills no vomiting. He lives alone and has not been exposed to Covid. Patient had a pacemaker placed 2 months ago. History of chronic atrial fib on anticoagulation. Stopped smoking 4 years ago. Denies history of COPD. HPI    Nursing Notes were reviewed. REVIEW OF SYSTEMS    (2-9 systems for level 4, 10 or more for level 5)     Review of Systems   Constitutional: Negative for activity change, appetite change, fatigue and fever. HENT: Negative for congestion and sore throat. Eyes: Negative for pain and visual disturbance. Respiratory: Positive for shortness of breath. Negative for chest tightness. Cardiovascular: Positive for chest pain. Gastrointestinal: Negative for abdominal pain, nausea and vomiting. Endocrine: Negative for polydipsia. Genitourinary: Negative for flank pain and urgency. Musculoskeletal: Negative for gait problem and neck stiffness. Skin: Negative for rash. Neurological: Negative for weakness, light-headedness and headaches. Psychiatric/Behavioral: Negative for confusion and sleep disturbance. Except as noted above the remainder of the review of systems was reviewed and negative.        PAST MEDICAL HISTORY     Past Medical History:   Diagnosis Date    Atrial fibrillation ([facility]  Cardiomyopathy ([facility] 9/18/2020    [facility]     Hypertension         Echo with ejection fraction of 35%, moderate mitral regurgitation on 8/8/2020. Status post permanent pacemaker placement, single lead. Not AICD candidate secondary to advanced age    SURGICAL HISTORY       Past Surgical History:   Procedure Laterality Date    BACK SURGERY      HERNIA REPAIR           CURRENT MEDICATIONS       Previous Medications    APIXABAN (ELIQUIS) 2.5 MG TABS TABLET    Take 1 tablet by mouth 2 times daily    ASPIRIN 81 MG CHEWABLE TABLET    Take 1 tablet by mouth daily    FLUTICASONE-SALMETEROL (ADVAIR DISKUS) 250-50 MCG/DOSE AEPB    Inhale 1 puff into the lungs every 12 hours for 14 days    LISINOPRIL (PRINIVIL;ZESTRIL) 5 MG TABLET    Take 1 tablet by mouth daily    METOPROLOL TARTRATE (LOPRESSOR) 25 MG TABLET    Take 1 tablet by mouth 2 times daily       ALLERGIES     Patient has no known allergies. FAMILY HISTORY     No family history on file.        SOCIAL HISTORY       Social History     Socioeconomic History    Marital status:      Spouse name: Not on file    Number of children: Not on file    Years of education: Not on file    Highest education level: Not on file   Occupational History    Not on file   Social Needs    Financial resource strain: Not on file    Food insecurity     Worry: Not on file     Inability: Not on file    Transportation needs     Medical: Not on file     Non-medical: Not on file   Tobacco Use    Smoking status: Former Smoker    Smokeless tobacco: Never Used    Tobacco comment: quit 30 years ago   Substance and Sexual Activity    Alcohol use: No    Drug use: No    Sexual activity: Not on file   Lifestyle    Physical activity     Days per week: Not on file     Minutes per session: Not on file    Stress: Not on file   Relationships    Social connections     Talks on phone: Not on file     Gets together: Not on file     Attends [employer] service: Not on file     Active member of club or organization: Not on file     Attends meetings of clubs or organizations: Not on file     Relationship status: Not on file    Intimate partner violence     Fear of current or ex partner: Not on file     Emotionally abused: Not on file     Physically abused: Not on file     Forced sexual activity: Not on file   Other Topics Concern    Not on file   Social History Narrative    Not on file       SCREENINGS                        PHYSICAL EXAM    (up to 7 for level 4, 8 or more for level 5)     ED Triage Vitals [11/11/20 1239]   BP Temp Temp Source Pulse Resp SpO2 Height Weight   139/88 98.7 °F (37.1 °C) Oral 77 24 -- 5' 7\" (1.702 m) 160 lb (72.6 kg)       Physical Exam  Vitals signs and nursing note reviewed. Constitutional:       General: He is not in acute distress. Appearance: He is well-developed. He is not diaphoretic. HENT:      Head: Normocephalic and atraumatic. Right Ear: External ear normal.      Left Ear: External ear normal.      Mouth/Throat:      Pharynx: No oropharyngeal exudate. Eyes:      Conjunctiva/sclera: Conjunctivae normal.      Pupils: Pupils are equal, round, and reactive to light. Neck:      Musculoskeletal: Normal range of motion and neck supple. Thyroid: No thyromegaly. Vascular: No JVD. Trachea: No tracheal deviation. Cardiovascular:      Rate and Rhythm: Normal rate. Heart sounds: Normal heart sounds. No murmur. Pulmonary:      Effort: Tachypnea and respiratory distress present. Breath sounds: Examination of the right-lower field reveals rales. Examination of the left-lower field reveals rhonchi and rales. Rhonchi and rales present. No wheezing. Abdominal:      General: Bowel sounds are normal.      Palpations: Abdomen is soft. Tenderness: There is no abdominal tenderness. There is no guarding. Musculoskeletal: Normal range of motion. Skin:     General: Skin is warm and dry. Findings: No rash. PATIENT REFERRED TO:  Angela French  [address] [address]  [phone]      As needed      DISCHARGE MEDICATIONS:  New Prescriptions    ALBUTEROL (PROVENTIL) (2.5 MG/3ML) 0.083% NEBULIZER SOLUTION    Take 3 mLs by nebulization every 6 hours as needed for Wheezing     Controlled Substances Monitoring:     No flowsheet data found.     (Please note that portions of this note were completed with a voice recognition program.  Efforts were made to edit the dictations but occasionally words are mis-transcribed.)    Frankie Rossi DO (electronically signed)  Attending Emergency Physician            Frankie Rossi DO  11/11/20 2396

## 2020-11-12 PROCEDURE — 93010 ELECTROCARDIOGRAM REPORT: CPT | Performed by: INTERNAL MEDICINE

## 2020-11-18 ENCOUNTER — HOSPITAL ENCOUNTER (INPATIENT)
Age: 85
LOS: 4 days | Discharge: HOME | DRG: 292 | End: 2020-11-22
Attending: STUDENT IN AN ORGANIZED HEALTH CARE EDUCATION/TRAINING PROGRAM | Admitting: INTERNAL MEDICINE
Payer: MEDICARE

## 2020-11-18 ENCOUNTER — APPOINTMENT (OUTPATIENT)
Dept: CT IMAGING | Age: 85
DRG: 292 | End: 2020-11-18
Payer: MEDICARE

## 2020-11-18 PROBLEM — R06.02 SOB (SHORTNESS OF BREATH): Status: ACTIVE | Noted: 2020-11-18

## 2020-11-18 LAB
ALBUMIN SERPL-MCNC: 4.1 G/DL (ref 3.5–4.6)
ALP BLD-CCNC: 93 U/L (ref 35–104)
ALT SERPL-CCNC: 98 U/L (ref 0–41)
ANION GAP SERPL CALCULATED.3IONS-SCNC: 13 MEQ/L (ref 9–15)
ANISOCYTOSIS: ABNORMAL
APTT: 31.4 SEC (ref 24.4–36.8)
AST SERPL-CCNC: 65 U/L (ref 0–40)
BACTERIA: NEGATIVE /HPF
BASOPHILS ABSOLUTE: 0.1 K/UL (ref 0–0.2)
BASOPHILS RELATIVE PERCENT: 1 %
BILIRUB SERPL-MCNC: 1.1 MG/DL (ref 0.2–0.7)
BILIRUBIN URINE: ABNORMAL
BLOOD, URINE: NEGATIVE
BUN BLDV-MCNC: 27 MG/DL (ref 8–23)
CALCIUM SERPL-MCNC: 8.8 MG/DL (ref 8.5–9.9)
CHLORIDE BLD-SCNC: 104 MEQ/L (ref 95–107)
CK MB: 8.1 NG/ML (ref 0–6.7)
CLARITY: CLEAR
CO2: 22 MEQ/L (ref 20–31)
COLOR: ABNORMAL
CREAT SERPL-MCNC: 1.16 MG/DL (ref 0.7–1.2)
CREATINE KINASE-MB INDEX: 3.1 % (ref 0–3.5)
EKG ATRIAL RATE: 43 BPM
EKG Q-T INTERVAL: 480 MS
EKG QRS DURATION: 150 MS
EKG QTC CALCULATION (BAZETT): 503 MS
EKG R AXIS: -51 DEGREES
EKG T AXIS: 95 DEGREES
EKG VENTRICULAR RATE: 66 BPM
EOSINOPHILS ABSOLUTE: 0.1 K/UL (ref 0–0.7)
EOSINOPHILS RELATIVE PERCENT: 1 %
EPITHELIAL CELLS, UA: ABNORMAL /HPF (ref 0–5)
GFR AFRICAN AMERICAN: >60
GFR NON-AFRICAN AMERICAN: 58.9
GLOBULIN: 2.6 G/DL (ref 2.3–3.5)
GLUCOSE BLD-MCNC: 182 MG/DL (ref 70–99)
GLUCOSE URINE: NEGATIVE MG/DL
HAV IGM SER IA-ACNC: NORMAL
HCT VFR BLD CALC: 39.6 % (ref 42–52)
HEMOGLOBIN: 13.1 G/DL (ref 14–18)
HEPATITIS B CORE IGM ANTIBODY: NORMAL
HEPATITIS B SURFACE ANTIGEN INTERPRETATION: NORMAL
HEPATITIS C ANTIBODY INTERPRETATION: NORMAL
HEPATITIS INTERPRETATION:: NORMAL
HYALINE CASTS: ABNORMAL /HPF (ref 0–5)
INR BLD: 1.3
KETONES, URINE: ABNORMAL MG/DL
LEUKOCYTE ESTERASE, URINE: NEGATIVE
LIPASE: 27 U/L (ref 12–95)
LYMPHOCYTES ABSOLUTE: 2.7 K/UL (ref 1–4.8)
LYMPHOCYTES RELATIVE PERCENT: 37 %
MAGNESIUM: 2.3 MG/DL (ref 1.7–2.4)
MCH RBC QN AUTO: 32.3 PG (ref 27–31.3)
MCHC RBC AUTO-ENTMCNC: 33.2 % (ref 33–37)
MCV RBC AUTO: 97.2 FL (ref 80–100)
MONOCYTES ABSOLUTE: 0.6 K/UL (ref 0.2–0.8)
MONOCYTES RELATIVE PERCENT: 8.7 %
NEUTROPHILS ABSOLUTE: 3.7 K/UL (ref 1.4–6.5)
NEUTROPHILS RELATIVE PERCENT: 52 %
NITRITE, URINE: NEGATIVE
PDW BLD-RTO: 15.1 % (ref 11.5–14.5)
PH UA: 5 (ref 5–9)
PLATELET # BLD: 202 K/UL (ref 130–400)
PLATELET SLIDE REVIEW: NORMAL
POC CREATININE WHOLE BLOOD: 1.1
POLYCHROMASIA: ABNORMAL
POTASSIUM SERPL-SCNC: 4.3 MEQ/L (ref 3.4–4.9)
PROCALCITONIN: 0.07 NG/ML (ref 0–0.15)
PROTEIN UA: 30 MG/DL
PROTHROMBIN TIME: 16.6 SEC (ref 12.3–14.9)
RBC # BLD: 4.07 M/UL (ref 4.7–6.1)
RBC UA: ABNORMAL /HPF (ref 0–5)
SARS-COV-2, NAAT: NOT DETECTED
SLIDE REVIEW: ABNORMAL
SODIUM BLD-SCNC: 139 MEQ/L (ref 135–144)
SPECIFIC GRAVITY UA: 1.03 (ref 1–1.03)
TOTAL CK: 262 U/L (ref 0–190)
TOTAL PROTEIN: 6.7 G/DL (ref 6.3–8)
TROPONIN: <0.01 NG/ML (ref 0–0.01)
URINE REFLEX TO CULTURE: ABNORMAL
UROBILINOGEN, URINE: 1 E.U./DL
WBC # BLD: 7.2 K/UL (ref 4.8–10.8)
WBC UA: ABNORMAL /HPF (ref 0–5)

## 2020-11-18 PROCEDURE — 71275 CT ANGIOGRAPHY CHEST: CPT

## 2020-11-18 PROCEDURE — 99284 EMERGENCY DEPT VISIT MOD MDM: CPT

## 2020-11-18 PROCEDURE — U0002 COVID-19 LAB TEST NON-CDC: HCPCS

## 2020-11-18 PROCEDURE — 2580000003 HC RX 258: Performed by: STUDENT IN AN ORGANIZED HEALTH CARE EDUCATION/TRAINING PROGRAM

## 2020-11-18 PROCEDURE — 6360000004 HC RX CONTRAST MEDICATION: Performed by: EMERGENCY MEDICINE

## 2020-11-18 PROCEDURE — 83690 ASSAY OF LIPASE: CPT

## 2020-11-18 PROCEDURE — 81003 URINALYSIS AUTO W/O SCOPE: CPT

## 2020-11-18 PROCEDURE — 6360000004 HC RX CONTRAST MEDICATION: Performed by: STUDENT IN AN ORGANIZED HEALTH CARE EDUCATION/TRAINING PROGRAM

## 2020-11-18 PROCEDURE — 1210000000 HC MED SURG R&B

## 2020-11-18 PROCEDURE — 6370000000 HC RX 637 (ALT 250 FOR IP): Performed by: STUDENT IN AN ORGANIZED HEALTH CARE EDUCATION/TRAINING PROGRAM

## 2020-11-18 PROCEDURE — 74177 CT ABD & PELVIS W/CONTRAST: CPT

## 2020-11-18 PROCEDURE — 84484 ASSAY OF TROPONIN QUANT: CPT

## 2020-11-18 PROCEDURE — P9612 CATHETERIZE FOR URINE SPEC: HCPCS

## 2020-11-18 PROCEDURE — 82550 ASSAY OF CK (CPK): CPT

## 2020-11-18 PROCEDURE — 85730 THROMBOPLASTIN TIME PARTIAL: CPT

## 2020-11-18 PROCEDURE — 80074 ACUTE HEPATITIS PANEL: CPT

## 2020-11-18 PROCEDURE — 84145 PROCALCITONIN (PCT): CPT

## 2020-11-18 PROCEDURE — 6360000002 HC RX W HCPCS: Performed by: EMERGENCY MEDICINE

## 2020-11-18 PROCEDURE — 93005 ELECTROCARDIOGRAM TRACING: CPT | Performed by: STUDENT IN AN ORGANIZED HEALTH CARE EDUCATION/TRAINING PROGRAM

## 2020-11-18 PROCEDURE — 87040 BLOOD CULTURE FOR BACTERIA: CPT

## 2020-11-18 PROCEDURE — 85610 PROTHROMBIN TIME: CPT

## 2020-11-18 PROCEDURE — 96374 THER/PROPH/DIAG INJ IV PUSH: CPT

## 2020-11-18 PROCEDURE — 83735 ASSAY OF MAGNESIUM: CPT

## 2020-11-18 PROCEDURE — 82553 CREATINE MB FRACTION: CPT

## 2020-11-18 PROCEDURE — 80053 COMPREHEN METABOLIC PANEL: CPT

## 2020-11-18 PROCEDURE — 36415 COLL VENOUS BLD VENIPUNCTURE: CPT

## 2020-11-18 PROCEDURE — 85025 COMPLETE CBC W/AUTO DIFF WBC: CPT

## 2020-11-18 RX ORDER — SODIUM CHLORIDE 0.9 % (FLUSH) 0.9 %
10 SYRINGE (ML) INJECTION EVERY 12 HOURS SCHEDULED
Status: DISCONTINUED | OUTPATIENT
Start: 2020-11-18 | End: 2020-11-22 | Stop reason: HOSPADM

## 2020-11-18 RX ORDER — ACETAMINOPHEN 325 MG/1
650 TABLET ORAL EVERY 6 HOURS PRN
Status: DISCONTINUED | OUTPATIENT
Start: 2020-11-18 | End: 2020-11-21

## 2020-11-18 RX ORDER — ACETAMINOPHEN 650 MG/1
650 SUPPOSITORY RECTAL EVERY 6 HOURS PRN
Status: DISCONTINUED | OUTPATIENT
Start: 2020-11-18 | End: 2020-11-21

## 2020-11-18 RX ORDER — SODIUM CHLORIDE 0.9 % (FLUSH) 0.9 %
10 SYRINGE (ML) INJECTION PRN
Status: DISCONTINUED | OUTPATIENT
Start: 2020-11-18 | End: 2020-11-22 | Stop reason: HOSPADM

## 2020-11-18 RX ORDER — SODIUM CHLORIDE 0.9 % (FLUSH) 0.9 %
10 SYRINGE (ML) INJECTION ONCE
Status: COMPLETED | OUTPATIENT
Start: 2020-11-18 | End: 2020-11-18

## 2020-11-18 RX ORDER — PROMETHAZINE HYDROCHLORIDE 12.5 MG/1
12.5 TABLET ORAL EVERY 6 HOURS PRN
Status: DISCONTINUED | OUTPATIENT
Start: 2020-11-18 | End: 2020-11-22 | Stop reason: HOSPADM

## 2020-11-18 RX ORDER — POLYETHYLENE GLYCOL 3350 17 G/17G
17 POWDER, FOR SOLUTION ORAL DAILY PRN
Status: DISCONTINUED | OUTPATIENT
Start: 2020-11-18 | End: 2020-11-22 | Stop reason: HOSPADM

## 2020-11-18 RX ORDER — LORAZEPAM 2 MG/ML
1 INJECTION INTRAMUSCULAR ONCE
Status: COMPLETED | OUTPATIENT
Start: 2020-11-18 | End: 2020-11-18

## 2020-11-18 RX ORDER — PHENAZOPYRIDINE HYDROCHLORIDE 200 MG/1
200 TABLET, FILM COATED ORAL ONCE
Status: COMPLETED | OUTPATIENT
Start: 2020-11-18 | End: 2020-11-18

## 2020-11-18 RX ORDER — 0.9 % SODIUM CHLORIDE 0.9 %
1000 INTRAVENOUS SOLUTION INTRAVENOUS ONCE
Status: COMPLETED | OUTPATIENT
Start: 2020-11-18 | End: 2020-11-18

## 2020-11-18 RX ORDER — ONDANSETRON 2 MG/ML
4 INJECTION INTRAMUSCULAR; INTRAVENOUS EVERY 6 HOURS PRN
Status: DISCONTINUED | OUTPATIENT
Start: 2020-11-18 | End: 2020-11-22 | Stop reason: HOSPADM

## 2020-11-18 RX ADMIN — Medication 10 ML: at 17:34

## 2020-11-18 RX ADMIN — IOPAMIDOL 100 ML: 612 INJECTION, SOLUTION INTRAVENOUS at 20:01

## 2020-11-18 RX ADMIN — SODIUM CHLORIDE 1000 ML: 9 INJECTION, SOLUTION INTRAVENOUS at 17:33

## 2020-11-18 RX ADMIN — IOPAMIDOL 100 ML: 612 INJECTION, SOLUTION INTRAVENOUS at 18:14

## 2020-11-18 RX ADMIN — PHENAZOPYRIDINE HYDROCHLORIDE 200 MG: 200 TABLET ORAL at 17:34

## 2020-11-18 RX ADMIN — LORAZEPAM 1 MG: 2 INJECTION INTRAMUSCULAR; INTRAVENOUS at 20:13

## 2020-11-18 ASSESSMENT — PAIN DESCRIPTION - ORIENTATION: ORIENTATION: LOWER

## 2020-11-18 ASSESSMENT — ENCOUNTER SYMPTOMS
DIARRHEA: 0
CHEST TIGHTNESS: 0
SHORTNESS OF BREATH: 1
SINUS PRESSURE: 0
TROUBLE SWALLOWING: 0
ABDOMINAL PAIN: 1
BACK PAIN: 0
COUGH: 0
VOMITING: 0

## 2020-11-18 ASSESSMENT — PAIN DESCRIPTION - PAIN TYPE: TYPE: ACUTE PAIN

## 2020-11-18 ASSESSMENT — PAIN DESCRIPTION - LOCATION: LOCATION: ABDOMEN

## 2020-11-18 ASSESSMENT — PAIN SCALES - GENERAL: PAINLEVEL_OUTOF10: 10

## 2020-11-18 ASSESSMENT — PAIN DESCRIPTION - DESCRIPTORS: DESCRIPTORS: BURNING

## 2020-11-18 NOTE — ED PROVIDER NOTES
reviewed and negative. PAST MEDICAL HISTORY     Past Medical History:   Diagnosis Date    Atrial fibrillation ([facility]     Cardiomyopathy ([facility] 9/18/2020    Diabetes mellitus ([facility]     Hypertension          SURGICALHISTORY       Past Surgical History:   Procedure Laterality Date    BACK SURGERY      HERNIA REPAIR           CURRENT MEDICATIONS       Discharge Medication List as of 11/22/2020  1:37 PM      CONTINUE these medications which have NOT CHANGED    Details   albuterol (PROVENTIL) (2.5 MG/3ML) 0.083% nebulizer solution Take 3 mLs by nebulization every 6 hours as needed for Wheezing, Disp-120 each,R-1Print      apixaban (ELIQUIS) 2.5 MG TABS tablet Take 1 tablet by mouth 2 times daily, Disp-60 tablet,R-5Normal      lisinopril (PRINIVIL;ZESTRIL) 5 MG tablet Take 1 tablet by mouth daily, Disp-30 tablet,R-5Normal      metoprolol tartrate (LOPRESSOR) 25 MG tablet Take 1 tablet by mouth 2 times daily, Disp-60 tablet,R-5Normal      aspirin 81 MG chewable tablet Take 1 tablet by mouth daily, Disp-30 tablet,R-11Normal      fluticasone-salmeterol (ADVAIR DISKUS) 250-50 MCG/DOSE AEPB Inhale 1 puff into the lungs every 12 hours for 14 days, Disp-1 Inhaler, R-0Normal             ALLERGIES     Patient has no known allergies. FAMILY HISTORY     History reviewed. No pertinent family history.        SOCIAL HISTORY       Social History     Socioeconomic History    Marital status:      Spouse name: None    Number of children: None    Years of education: None    Highest education level: None   Occupational History    None   Social Needs    Financial resource strain: None    Food insecurity     Worry: None     Inability: None    Transportation needs     Medical: None     Non-medical: None   Tobacco Use    Smoking status: Former Smoker    Smokeless tobacco: Never Used    Tobacco comment: quit 30 years ago   Substance and Sexual Activity    Alcohol use: No    Drug use: No    Sexual activity: None Lifestyle    Physical activity     Days per week: None     Minutes per session: None    Stress: None   Relationships    Social connections     Talks on phone: None     Gets together: None     Attends Mu-ism service: None     Active member of club or organization: None     Attends meetings of clubs or organizations: None     Relationship status: None    Intimate partner violence     Fear of current or ex partner: None     Emotionally abused: None     Physically abused: None     Forced sexual activity: None   Other Topics Concern    None   Social History Narrative    None       SCREENINGS    [place] Coma Scale  Eye Opening: Spontaneous  Best Verbal Response: Oriented  Best Motor Response: Obeys commands  [place] Coma Scale Score: 15 @FLOW([id number])@      PHYSICAL EXAM    (up to 7 for level 4, 8 or more for level 5)     ED Triage Vitals [11/18/20 1625]   BP Temp Temp Source Pulse Resp SpO2 Height Weight   (!) 140/98 98.9 °F (37.2 °C) Oral 67 12 98 % 5' 8\" (1.727 m) 168 lb (76.2 kg)       Physical Exam  Vitals signs and nursing note reviewed. Constitutional:       General: He is awake. He is not in acute distress. Appearance: Normal appearance. He is well-developed and normal weight. He is not ill-appearing, toxic-appearing or diaphoretic. Comments: No photophobia. No phonophobia. HENT:      Head: Normocephalic and atraumatic. No Beverly's sign. Right Ear: External ear normal.      Left Ear: External ear normal.      Nose: Nose normal. No congestion or rhinorrhea. Mouth/Throat:      Mouth: Mucous membranes are moist.      Pharynx: Oropharynx is clear. No oropharyngeal exudate or posterior oropharyngeal erythema. Eyes:      General: No scleral icterus. Right eye: No foreign body or discharge. Left eye: No discharge. Extraocular Movements: Extraocular movements intact. Conjunctiva/sclera: Conjunctivae normal.      Left eye: No exudate.      Pupils: Pupils are equal, round, and reactive to light. Neck:      Musculoskeletal: Normal range of motion and neck supple. No neck rigidity. Vascular: No JVD. Trachea: No tracheal deviation. Comments: No meningismus. Cardiovascular:      Rate and Rhythm: Normal rate and regular rhythm. Heart sounds: Normal heart sounds. Heart sounds not distant. No murmur. No friction rub. No gallop. Pulmonary:      Effort: Pulmonary effort is normal. No respiratory distress. Breath sounds: Normal breath sounds. No stridor. No wheezing, rhonchi or rales. Chest:      Chest wall: No tenderness. Abdominal:      General: Abdomen is flat. Bowel sounds are normal. There is no distension or abdominal bruit. There are no signs of injury. Palpations: Abdomen is soft. There is no shifting dullness, fluid wave, hepatomegaly, splenomegaly, mass or pulsatile mass. Tenderness: There is abdominal tenderness in the right lower quadrant, suprapubic area and left lower quadrant. There is guarding. There is no right CVA tenderness, left CVA tenderness or rebound. Hernia: No hernia is present. There is no hernia in the umbilical area. Musculoskeletal: Normal range of motion. General: No swelling, tenderness, deformity or signs of injury. Lymphadenopathy:      Head:      Right side of head: No submental adenopathy. Left side of head: No submental adenopathy. Skin:     General: Skin is warm and dry. Capillary Refill: Capillary refill takes less than 2 seconds. Coloration: Skin is not jaundiced or pale. Findings: No bruising, erythema, lesion or rash. Neurological:      General: No focal deficit present. Mental Status: He is alert and oriented to person, place, and time. Mental status is at baseline. Cranial Nerves: No cranial nerve deficit. Sensory: No sensory deficit. Motor: No weakness.       Coordination: Coordination normal.      Deep Tendon Reflexes: Reflexes are normal and symmetric. Psychiatric:         Mood and Affect: Mood normal.         Behavior: Behavior normal. Behavior is cooperative. Thought Content: Thought content normal.         Judgment: Judgment normal.         DIAGNOSTIC RESULTS     EKG: All EKG's are interpreted by the Emergency Department Physician who either signs or Co-signsthis chart in the absence of a cardiologist.  EKG shows a ventricular paced rhythm rate of 66. No acute ST or T wave changes. Leftward axis axis. abnormal EKG. RADIOLOGY:   Non-plain filmimages such as CT, Ultrasound and MRI are read by the radiologist. Plain radiographic images are visualized and preliminarily interpreted by the emergency physician with the below findings:      Interpretation per the Radiologist below, if available at the time ofthis note:    RADIOLOGY REPORT   Final Result      [address] organ? GALLBLADDER, LIVER   Final Result      ESSENTIALLY NEGATIVE RIGHT UPPER QUADRANT ULTRASOUND. CTA CHEST W WO CONTRAST   Final Result      NO EVIDENCE OF PULMONARY EMBOLI, WITHIN THE MILD LIMITATION OF THE STUDY. MODERATE-SIZED LAYERING PLEURAL EFFUSIONS, MILD TO MODERATE PROBABLE ADJACENT COMPRESSIVE ATELECTASIS/EDEMA. MODERATE CARDIOMEGALY. CT ABDOMEN PELVIS W IV CONTRAST Additional Contrast? None   Final Result      No acute process in the abdomen/pelvis. No bowel obstruction.       Small bilateral pleural effusions with associated atelectasis.               ==========               ED BEDSIDE ULTRASOUND:   Performed by ED Physician - none    LABS:  Labs Reviewed   CBC WITH AUTO DIFFERENTIAL - Abnormal; Notable for the following components:       Result Value    RBC 4.07 (*)     Hemoglobin 13.1 (*)     Hematocrit 39.6 (*)     MCH 32.3 (*)     RDW 15.1 (*)     All other components within normal limits   COMPREHENSIVE METABOLIC PANEL - Abnormal; Notable for the following components:    Glucose 182 (*) BUN 27 (*)     GFR Non- 58.9 (*)     Total Bilirubin 1.1 (*)     ALT 98 (*)     AST 65 (*)     All other components within normal limits   PROTIME-INR - Abnormal; Notable for the following components:    Protime 16.6 (*)     All other components within normal limits   URINE RT REFLEX TO CULTURE - Abnormal; Notable for the following components:    Color, UA DARK YELLOW (*)     Bilirubin Urine SMALL (*)     Ketones, Urine TRACE (*)     Protein, UA 30 (*)     All other components within normal limits   CK - Abnormal; Notable for the following components:     Total  (*)     All other components within normal limits   CKMB & RELATIVE PERCENT - Abnormal; Notable for the following components:    CK-MB 8.1 (*)     All other components within normal limits   MICROSCOPIC URINALYSIS - Abnormal; Notable for the following components:    RBC, UA 3-5 (*)     All other components within normal limits   BASIC METABOLIC PANEL W/ REFLEX TO MG FOR LOW K - Abnormal; Notable for the following components:    BUN 26 (*)     All other components within normal limits   CBC WITH AUTO DIFFERENTIAL - Abnormal; Notable for the following components:    RBC 3.93 (*)     Hemoglobin 12.6 (*)     Hematocrit 38.3 (*)     MCH 32.1 (*)     MCHC 32.9 (*)     Monocytes Absolute 1.1 (*)     All other components within normal limits   BASIC METABOLIC PANEL W/ REFLEX TO MG FOR LOW K - Abnormal; Notable for the following components:    CO2 16 (*)     Anion Gap 16 (*)     Glucose 104 (*)     BUN 36 (*)     All other components within normal limits   CBC WITH AUTO DIFFERENTIAL - Abnormal; Notable for the following components:    RBC 4.15 (*)     Hemoglobin 13.4 (*)     Hematocrit 40.5 (*)     MCH 32.2 (*)     RDW 14.8 (*)     Monocytes Absolute 0.9 (*)     All other components within normal limits   HEPATIC FUNCTION PANEL - Abnormal; Notable for the following components:     (*)      (*)     Total Bilirubin 1.8 (*) Narrative:     ORDER#: [id number]                          ORDERED BY: Gaetano Kelly  SOURCE: Blood                              COLLECTED:  11/18/20 22:17  ANTIBIOTICS AT [facility].:                      RECEIVED :  11/18/20 22:17   APTT   LIPASE   HEPATITIS PANEL, ACUTE   TROPONIN   MAGNESIUM   PROCALCITONIN   COVID-19   PROCALCITONIN   COVID-19   POCT VENOUS       All other labs were within normal range or not returned as of this dictation. EMERGENCY DEPARTMENT COURSE and DIFFERENTIAL DIAGNOSIS/MDM:   Vitals:    Vitals:    11/21/20 1941 11/21/20 2014 11/22/20 0702 11/22/20 0759   BP:  (!) 134/108  120/70   Pulse:  89  92   Resp:  16 18    Temp:  97.5 °F (36.4 °C)  97.5 °F (36.4 °C)   TempSrc:  Oral  Oral   SpO2: 95% 98% 97%    Weight:       Height:           Dr. Evelyn Garcia will follow up the imaging study the CAT scan of the chest determine the patient's disposition. MDM  Patient had complained of lower abdominal pain and denied shortness of breath. CAT scan of the abdomen shows bilateral pleural effusions. The patient's son had asked about the patient's pacemaker function and on the rhythm monitor it was functioning appropriately. Patient then began breathing very rapidly. The patient's son states this is what he was doing at home. The ED attending attempted to call the radiologist and called the answering service that [phone]. They are not answering. A CAT scan of the chest has been ordered. CONSULTS:  IP CONSULT TO PULMONOLOGY  IP CONSULT TO GI    PROCEDURES:  Unless otherwise noted below, none     Procedures    FINAL IMPRESSION      1. Pleural effusion, bilateral    2. Lower abdominal pain of unknown etiology    3. Dyspnea, unspecified type          DISPOSITION/PLAN   DISPOSITION        PATIENT REFERRED TO:  Wili Li  [address] [place] [address] [address]  [phone]    On 11/25/2020 11/25/2020 @ 1:30PM, For hospital followup.       DISCHARGE MEDICATIONS:  Discharge Medication List as of 11/22/2020 1:37 PM      START taking these medications    Details   furosemide (LASIX) 20 MG tablet Take 1 tablet by mouth daily, Disp-30 tablet,R-0Normal                (Please note that portions of this note were completed with a voice recognition program.  Efforts were made to edit the dictations but occasionally words are mis-transcribed.)    Stacy Eugene MD (electronically signed)  Attending Emergency Physician    On my assessment of the patient he is relaxed and in no distress. His vital signs have been okay. His CT of his chest shows no evidence of pulmonary embolism but there are moderate pleural effusions. Reviewing his x-ray from August 2020 it is not present then. In reviewing his x-ray from last week to portable film but they do not appear to be present then either. I think the best approach here based on his symptoms is to admit him for his bilateral effusions with elective thoracentesis and analysis. Given his age group concerned that would be a malignant effusion is present. Infectious etiology has to be concerning also. Covid  testing is pending.      Stacy Eugene MD  11/18/20 3624       Stacy Eugene MD  11/26/20 010

## 2020-11-18 NOTE — ED NOTES
Labs and urine obtained by this RN, labeled and sent to lab via tube system.       Maria R Laird RN  11/18/20 9402

## 2020-11-19 ENCOUNTER — APPOINTMENT (OUTPATIENT)
Dept: ULTRASOUND IMAGING | Age: 85
DRG: 292 | End: 2020-11-19
Payer: MEDICARE

## 2020-11-19 LAB
ANION GAP SERPL CALCULATED.3IONS-SCNC: 13 MEQ/L (ref 9–15)
BASOPHILS ABSOLUTE: 0 K/UL (ref 0–0.2)
BASOPHILS RELATIVE PERCENT: 0.4 %
BUN BLDV-MCNC: 26 MG/DL (ref 8–23)
CALCIUM SERPL-MCNC: 8.7 MG/DL (ref 8.5–9.9)
CHLORIDE BLD-SCNC: 104 MEQ/L (ref 95–107)
CO2: 21 MEQ/L (ref 20–31)
CREAT SERPL-MCNC: 0.96 MG/DL (ref 0.7–1.2)
EOSINOPHILS ABSOLUTE: 0 K/UL (ref 0–0.7)
EOSINOPHILS RELATIVE PERCENT: 0.2 %
GFR AFRICAN AMERICAN: >60
GFR AFRICAN AMERICAN: >60
GFR NON-AFRICAN AMERICAN: >60
GFR NON-AFRICAN AMERICAN: >60
GLUCOSE BLD-MCNC: 119 MG/DL (ref 60–115)
GLUCOSE BLD-MCNC: 133 MG/DL (ref 60–115)
GLUCOSE BLD-MCNC: 90 MG/DL (ref 70–99)
HCT VFR BLD CALC: 38.3 % (ref 42–52)
HEMOGLOBIN: 12.6 G/DL (ref 14–18)
LYMPHOCYTES ABSOLUTE: 1.8 K/UL (ref 1–4.8)
LYMPHOCYTES RELATIVE PERCENT: 20.6 %
MCH RBC QN AUTO: 32.1 PG (ref 27–31.3)
MCHC RBC AUTO-ENTMCNC: 32.9 % (ref 33–37)
MCV RBC AUTO: 97.4 FL (ref 80–100)
MONOCYTES ABSOLUTE: 1.1 K/UL (ref 0.2–0.8)
MONOCYTES RELATIVE PERCENT: 12 %
NEUTROPHILS ABSOLUTE: 5.9 K/UL (ref 1.4–6.5)
NEUTROPHILS RELATIVE PERCENT: 66.8 %
PDW BLD-RTO: 14.5 % (ref 11.5–14.5)
PERFORMED ON: ABNORMAL
PERFORMED ON: ABNORMAL
PERFORMED ON: NORMAL
PLATELET # BLD: 204 K/UL (ref 130–400)
POC CREATININE: 1.1 MG/DL (ref 0.8–1.3)
POC SAMPLE TYPE: NORMAL
POTASSIUM REFLEX MAGNESIUM: 4.1 MEQ/L (ref 3.4–4.9)
RBC # BLD: 3.93 M/UL (ref 4.7–6.1)
SARS-COV-2, NAAT: NOT DETECTED
SODIUM BLD-SCNC: 138 MEQ/L (ref 135–144)
WBC # BLD: 8.9 K/UL (ref 4.8–10.8)

## 2020-11-19 PROCEDURE — 76705 ECHO EXAM OF ABDOMEN: CPT

## 2020-11-19 PROCEDURE — 6370000000 HC RX 637 (ALT 250 FOR IP): Performed by: NURSE PRACTITIONER

## 2020-11-19 PROCEDURE — 6360000002 HC RX W HCPCS: Performed by: NURSE PRACTITIONER

## 2020-11-19 PROCEDURE — 36415 COLL VENOUS BLD VENIPUNCTURE: CPT

## 2020-11-19 PROCEDURE — 85025 COMPLETE CBC W/AUTO DIFF WBC: CPT

## 2020-11-19 PROCEDURE — U0002 COVID-19 LAB TEST NON-CDC: HCPCS

## 2020-11-19 PROCEDURE — 6370000000 HC RX 637 (ALT 250 FOR IP): Performed by: INTERNAL MEDICINE

## 2020-11-19 PROCEDURE — 99222 1ST HOSP IP/OBS MODERATE 55: CPT | Performed by: INTERNAL MEDICINE

## 2020-11-19 PROCEDURE — 80048 BASIC METABOLIC PNL TOTAL CA: CPT

## 2020-11-19 PROCEDURE — 1210000000 HC MED SURG R&B

## 2020-11-19 PROCEDURE — 93010 ELECTROCARDIOGRAM REPORT: CPT | Performed by: INTERNAL MEDICINE

## 2020-11-19 PROCEDURE — 2580000003 HC RX 258: Performed by: NURSE PRACTITIONER

## 2020-11-19 RX ORDER — LISINOPRIL 5 MG/1
5 TABLET ORAL DAILY
Status: DISCONTINUED | OUTPATIENT
Start: 2020-11-19 | End: 2020-11-22 | Stop reason: HOSPADM

## 2020-11-19 RX ORDER — BUDESONIDE AND FORMOTEROL FUMARATE DIHYDRATE 160; 4.5 UG/1; UG/1
2 AEROSOL RESPIRATORY (INHALATION) 2 TIMES DAILY
Status: DISCONTINUED | OUTPATIENT
Start: 2020-11-19 | End: 2020-11-22 | Stop reason: HOSPADM

## 2020-11-19 RX ORDER — DOCUSATE SODIUM 100 MG/1
100 CAPSULE, LIQUID FILLED ORAL DAILY
Status: DISCONTINUED | OUTPATIENT
Start: 2020-11-19 | End: 2020-11-22 | Stop reason: HOSPADM

## 2020-11-19 RX ORDER — DICYCLOMINE HYDROCHLORIDE 10 MG/ML
20 INJECTION INTRAMUSCULAR ONCE
Status: DISCONTINUED | OUTPATIENT
Start: 2020-11-19 | End: 2020-11-19

## 2020-11-19 RX ORDER — DICYCLOMINE HYDROCHLORIDE 10 MG/ML
20 INJECTION INTRAMUSCULAR 4 TIMES DAILY PRN
Status: DISCONTINUED | OUTPATIENT
Start: 2020-11-19 | End: 2020-11-22 | Stop reason: HOSPADM

## 2020-11-19 RX ORDER — GUAIFENESIN 600 MG/1
600 TABLET, EXTENDED RELEASE ORAL 2 TIMES DAILY
Status: DISCONTINUED | OUTPATIENT
Start: 2020-11-19 | End: 2020-11-22 | Stop reason: HOSPADM

## 2020-11-19 RX ORDER — ASPIRIN 81 MG/1
81 TABLET, CHEWABLE ORAL DAILY
Status: DISCONTINUED | OUTPATIENT
Start: 2020-11-19 | End: 2020-11-22 | Stop reason: HOSPADM

## 2020-11-19 RX ADMIN — APIXABAN 2.5 MG: 2.5 TABLET, FILM COATED ORAL at 02:57

## 2020-11-19 RX ADMIN — AZITHROMYCIN MONOHYDRATE 500 MG: 500 INJECTION, POWDER, LYOPHILIZED, FOR SOLUTION INTRAVENOUS at 02:49

## 2020-11-19 RX ADMIN — Medication 10 ML: at 00:47

## 2020-11-19 RX ADMIN — DICYCLOMINE HYDROCHLORIDE 20 MG: 20 INJECTION INTRAMUSCULAR at 17:50

## 2020-11-19 RX ADMIN — CEFTRIAXONE SODIUM 1 G: 1 INJECTION, POWDER, FOR SOLUTION INTRAMUSCULAR; INTRAVENOUS at 03:50

## 2020-11-19 RX ADMIN — ONDANSETRON 4 MG: 2 INJECTION INTRAMUSCULAR; INTRAVENOUS at 17:51

## 2020-11-19 RX ADMIN — APIXABAN 2.5 MG: 2.5 TABLET, FILM COATED ORAL at 20:53

## 2020-11-19 RX ADMIN — GUAIFENESIN 600 MG: 600 TABLET, EXTENDED RELEASE ORAL at 20:53

## 2020-11-19 RX ADMIN — METOPROLOL TARTRATE 25 MG: 25 TABLET, FILM COATED ORAL at 20:54

## 2020-11-19 RX ADMIN — ASPIRIN 81 MG 81 MG: 81 TABLET ORAL at 09:27

## 2020-11-19 RX ADMIN — LISINOPRIL 5 MG: 5 TABLET ORAL at 09:27

## 2020-11-19 RX ADMIN — BUDESONIDE AND FORMOTEROL FUMARATE DIHYDRATE 2 PUFF: 160; 4.5 AEROSOL RESPIRATORY (INHALATION) at 20:48

## 2020-11-19 RX ADMIN — METOPROLOL TARTRATE 25 MG: 25 TABLET, FILM COATED ORAL at 09:27

## 2020-11-19 RX ADMIN — APIXABAN 2.5 MG: 2.5 TABLET, FILM COATED ORAL at 09:27

## 2020-11-19 RX ADMIN — Medication 10 ML: at 20:54

## 2020-11-19 RX ADMIN — DOCUSATE SODIUM 100 MG: 100 CAPSULE ORAL at 02:57

## 2020-11-19 ASSESSMENT — PAIN SCALES - GENERAL
PAINLEVEL_OUTOF10: 10
PAINLEVEL_OUTOF10: 0
PAINLEVEL_OUTOF10: 0

## 2020-11-19 ASSESSMENT — ENCOUNTER SYMPTOMS
ABDOMINAL PAIN: 1
SHORTNESS OF BREATH: 1

## 2020-11-19 ASSESSMENT — PAIN SCALES - WONG BAKER
WONGBAKER_NUMERICALRESPONSE: 4
WONGBAKER_NUMERICALRESPONSE: 0
WONGBAKER_NUMERICALRESPONSE: 0

## 2020-11-19 ASSESSMENT — PAIN DESCRIPTION - PAIN TYPE
TYPE: ACUTE PAIN;CHRONIC PAIN
TYPE: ACUTE PAIN

## 2020-11-19 ASSESSMENT — PAIN DESCRIPTION - ORIENTATION: ORIENTATION: RIGHT

## 2020-11-19 ASSESSMENT — PAIN DESCRIPTION - LOCATION
LOCATION: ABDOMEN
LOCATION: ABDOMEN

## 2020-11-19 ASSESSMENT — PAIN DESCRIPTION - DESCRIPTORS: DESCRIPTORS: OTHER (COMMENT)

## 2020-11-19 NOTE — PROGRESS NOTES
Spoke with   #[id number] on speaker in [facility] rule out room   regarding patient complaints of nausea and abdominal pain. Patient stated he has \"cramping\" and is requesting  something for \"constipation\". Patient verbalized last BM was \"yesterday\" and usually \"goes everyday\". No diarrhea. No chills. No emesis. Diet fair. Denies SOB. Alert and oriented. No further nausea since Zofran given IV as per MAR prn. Patient requesting something for bowels tonight so it 'works in the morning\". Presently no nausea, but still has cramping. Will note changes. No acute distress at this time.

## 2020-11-19 NOTE — FLOWSHEET NOTE
Attempted to complete admission. However, pt is Kazakh speaking only. This RN and the NP attempted to use the  with no success. The pt wouldn't converse with said . The NP tried to also speak with pt with no success. There was no contact information left in the chart. So this RN couldn't contact the family for help with admission. The PCA is Kazakh-speaking and he was able to communicate with her and say he was having some abdominal pain. Denied chest pain. Pt has 3 IV's the one is his RUE infiltrated so I removed it and applied a warm compress. Lungs were diminished with bilat expiratory wheezing. 100%  On 2L NC. BS are heard in all 4 quads, belly is rounded. I tried my best to explain his call light to him, and make him comfortable. I did as much of the admission that I could. Call light within reach. Will continue to monitor. Electronically signed by Mary Ruiz RN on 11/19/2020 at 1:55 AM      Pt sat up on the side bed. He seemed very SOB so I spot checked him and he was 97%. We discovered that he had an episode of bladder incontinence. So we started to change him, he started to get very agitated. Pt stated to the PCA that he feels like he cant breath. PCA said he keeps mentioning his family. Will continue to monitor. Electronically signed by Mary Ruiz RN on 11/19/2020 at 3:48 AM     Pt in bed, resting. Urinated once last night. Lg episode of incontinence. Call light within reach. Will continue to monitor. Electronically signed by Mary Ruiz RN on 11/19/2020 at 6:09 AM    I noticed this morning that there was contact information for this pts granddaughter. I tried to call the number provided but no one answered.      Electronically signed by Mary Ruiz RN on 11/19/2020 at 6:27 AM

## 2020-11-19 NOTE — H&P
[facility]  MEDICINE    HISTORY AND PHYSICAL EXAM    PATIENT NAME:  Kaiser Acevedo    MRN:  [id number]  SERVICE DATE:  11/19/2020   SERVICE TIME:  12:43 AM    Primary Care Physician: Donnell Song         SUBJECTIVE  CHIEF COMPLAINT: Shortness of breath and abdominal pain    HPI: Patient admitted for bilateral pleural effusion and atelectasis. Patient presented to the ER with a chief complaint of shortness of breath, per patient's family has reported to the staff in the ED. Patient is a poor historian. Patient is Albanian-speaking. A strong effort was made to use the  services over the phone. However, patient did not seem able to comprehend using the phone and speaking to myself or the RN at the bedside. It appears that this also may be age-related or related to the fact that patient received Ativan in the ED. In addition, the RN attempted to call the family but they did not leave their contact information with the ER staff when they spoke to them earlier. Therefore, through myself and the PCA, who is Albanian-speaking, patient was able to relay that he had abdominal pain that was generalized. Patient was able to relay that he was short of breath \"a little bit\". Patient denied chest pain. Patient is currently resting in a position of comfort with no guarding or grimacing. Patient has long expiratory respirations but does not seem to be in severe distress. The rest of patient's current and past medical histor was retrieved from the EMR. Family reported to ER staff that patient was seen on 11/11 for shortness of breath and this seems to have worsened. Patient also told the  that was used in the ED that he denied shortness of breath but was having abdominal pain. According to EMR, patient's past medical history includes atrial fibrillation controlled with pacemaker. Patient is also on Eliquis.       PAST MEDICAL HISTORY:    Past Medical History:   Diagnosis Date    [facility]     Cardiomyopathy ([facility] 9/18/2020    Diabetes mellitus ([facility]     Hypertension      PAST SURGICAL HISTORY:    Past Surgical History:   Procedure Laterality Date    BACK SURGERY      HERNIA REPAIR       FAMILY HISTORY:  History reviewed. No pertinent family history. SOCIAL HISTORY:    Social History     Socioeconomic History    Marital status:      Spouse name: Not on file    Number of children: Not on file    Years of education: Not on file    Highest education level: Not on file   Occupational History    Not on file   Social Needs    Financial resource strain: Not on file    Food insecurity     Worry: Not on file     Inability: Not on file    Transportation needs     Medical: Not on file     Non-medical: Not on file   Tobacco Use    Smoking status: Former Smoker    Smokeless tobacco: Never Used    Tobacco comment: quit 30 years ago   Substance and Sexual Activity    Alcohol use: No    Drug use: No    Sexual activity: Not on file   Lifestyle    Physical activity     Days per week: Not on file     Minutes per session: Not on file    Stress: Not on file   Relationships    Social connections     Talks on phone: Not on file     Gets together: Not on file     Attends Church service: Not on file     Active member of club or organization: Not on file     Attends meetings of clubs or organizations: Not on file     Relationship status: Not on file    Intimate partner violence     Fear of current or ex partner: Not on file     Emotionally abused: Not on file     Physically abused: Not on file     Forced sexual activity: Not on file   Other Topics Concern    Not on file   Social History Narrative    Not on file     MEDICATIONS:   Prior to Admission medications    Medication Sig Start Date End Date Taking?  Authorizing Provider   albuterol (PROVENTIL) (2.5 MG/3ML) 0.083% nebulizer solution Take 3 mLs by nebulization every 6 hours as needed for Wheezing 11/11/20 Normal range of motion. Lymphadenopathy:      Cervical: No cervical adenopathy. Skin:     General: Skin is warm and dry. Capillary Refill: Capillary refill takes less than 2 seconds. Neurological:      Mental Status: He is alert. Sensory: Sensation is intact. Deep Tendon Reflexes: Reflexes normal.   Psychiatric:         Thought Content: Thought content normal.           DATA:     Diagnostic tests reviewed for today's visit:    Most recent labs and imaging results reviewed.      LABS:    Recent Results (from the past 24 hour(s))   CBC Auto Differential    Collection Time: 11/18/20  5:00 PM   Result Value Ref Range    WBC 7.2 4.8 - 10.8 K/uL    RBC 4.07 (L) 4.70 - 6.10 M/uL    Hemoglobin 13.1 (L) 14.0 - 18.0 g/dL    Hematocrit 39.6 (L) 42.0 - 52.0 %    MCV 97.2 80.0 - 100.0 fL    MCH 32.3 (H) 27.0 - 31.3 pg    MCHC 33.2 33.0 - 37.0 %    RDW 15.1 (H) 11.5 - 14.5 %    Platelets 563 957 - 206 K/uL    PLATELET SLIDE REVIEW Normal     SLIDE REVIEW see below     Neutrophils % 52.0 %    Lymphocytes % 37.0 %    Monocytes % 8.7 %    Eosinophils % 1.0 %    Basophils % 1.0 %    Neutrophils Absolute 3.7 1.4 - 6.5 K/uL    Lymphocytes Absolute 2.7 1.0 - 4.8 K/uL    Monocytes Absolute 0.6 0.2 - 0.8 K/uL    Eosinophils Absolute 0.1 0.0 - 0.7 K/uL    Basophils Absolute 0.1 0.0 - 0.2 K/uL    Anisocytosis 1+     Polychromasia 1+    Comprehensive Metabolic Panel    Collection Time: 11/18/20  5:00 PM   Result Value Ref Range    Sodium 139 135 - 144 mEq/L    Potassium 4.3 3.4 - 4.9 mEq/L    Chloride 104 95 - 107 mEq/L    CO2 22 20 - 31 mEq/L    Anion Gap 13 9 - 15 mEq/L    Glucose 182 (H) 70 - 99 mg/dL    BUN 27 (H) 8 - 23 mg/dL    CREATININE 1.16 0.70 - 1.20 mg/dL    GFR Non-African American 58.9 (L) >60    GFR  >60.0 >60    Calcium 8.8 8.5 - 9.9 mg/dL    Total Protein 6.7 6.3 - 8.0 g/dL    Alb 4.1 3.5 - 4.6 g/dL    Total Bilirubin 1.1 (H) 0.2 - 0.7 mg/dL    Alkaline Phosphatase 93 35 - 104 U/L    ALT 3-5 (A) 0 - 5 /HPF    Epithelial Cells, UA 0-2 0 - 5 /HPF   POCT Creatinine    Collection Time: 11/18/20  5:11 PM   Result Value Ref Range    POC CREATININE WHOLE BLOOD 1.1    EKG 12 Lead    Collection Time: 11/18/20  8:22 PM   Result Value Ref Range    Ventricular Rate 66 BPM    Atrial Rate 43 BPM    QRS Duration 150 ms    Q-T Interval 480 ms    QTc Calculation (Bazett) 503 ms    R Axis -51 degrees    T Axis 95 degrees   PROCALCITONIN    Collection Time: 11/18/20  9:45 PM   Result Value Ref Range    Procalcitonin 0.07 0.00 - 0.15 ng/mL   COVID-19    Collection Time: 11/18/20 10:18 PM   Result Value Ref Range    SARS-CoV-2, NAAT Not Detected Not Detected       IMAGING:  Ct Abdomen Pelvis W Iv Contrast Additional Contrast? None    Result Date: 11/18/2020  CT ABDOMEN PELVIS W IV CONTRAST HISTORY:   diffuse abdominal pain with vomiting; colitis vs obstruction . Other . Diffuse abdominal pain with emesis. Concern for colitis or bowel obstruction. TECHNIQUE: CT of the abdomen and pelvis was performed using standard technique, scanning from just above the dome of the diaphragm to the symphysis pubis. Including delayed images through the kidneys. Sagittal and coronal reconstructions performed on both phases. Contrast: IV: 100 ml Isovue 300 Oral:  None. All CT scans at this facility use dose modulation, iterative reconstruction, and/or weight based dosing when appropriate to reduce radiation dose to as low as reasonably achievable. COMPARISON: CT 8/8/2020 RESULT: Limitations from motion artifact. Patient had difficulty holding breath for the study. Liver: No mass or lesion. Biliary: No bile duct dilation. Gallbladder is unremarkable. Pancreas: No mass or duct dilation. Spleen: No mass. No splenomegaly. Adrenals: No mass. Kidneys: No mass, calculus or hydronephrosis. Normal uptake and excretion of contrast into the renal collecting systems. GI tract: No dilation or wall thickening.  Mild diverticulosis without diverticulitis. No evidence for appendicitis. Lymph nodes: No abdominal or pelvic lymphadenopathy. Mesentery/Peritoneum/Retroperitoneum: No ascites or mass. Vasculature: The celiac axis and SMA are patent. The portal vein and branches, splenic vein, SMV, and hepatic veins are patent. Moderate arterial atherosclerotic disease without aneurysm. Pelvis: No free fluid. Bladder is decompressed. Changes from prior hernia repair in the right inguinal region. Small fat-containing left inguinal hernia. Bones: No acute osseous findings. Multilevel degenerative changes with multilevel bridging and but osteophytes within the visualized spine. Soft tissues: Unremarkable. Lower thorax: Small bilateral pleural effusions with associated atelectasis, right greater than left. Cardiomegaly. Coronary calcifications and/or stents. Aortic root calcifications. Partially imaged pacemaker. No acute process in the abdomen/pelvis. No bowel obstruction. Small bilateral pleural effusions with associated atelectasis. ==========       VTE Prophylaxis: Patient on Eliquis. ASSESSMENT AND PLAN    1) SOB (shortness of breath): Mild shortness of breath. 100% on 2 L. Chest CTA shows bilateral pleural effusions and atelectasis. We will consult pulmonology. We will monitor patient's respiratory status closely. We will start azithromycin and Rocephin IV. 2) Abd pain: Generalized abdominal pain and tenderness. Mildly elevated LFTs. ALT 98 AST 65. Abdomen pelvis CT was negative for acute disease process but showed some mild gas buildup. We will administer Bentyl and Colace. We will get abdominal ultrasound; gallbladder liver. We will recheck LFTs. 3) Atrial Fib history: History of A. fib. Patient on Eliquis and has pacemaker. EKG in the ED showed paced rhythm with a rate of 66. No ST elevation. Patient will remain on telemetry monitoring. We will resume patient's Eliquis.     Plan of care discussed with: Sincere attempt was made to discuss with patient.       SIGNATURE: Theo Hyatt RN, NP  DATE: November 19, 2020  TIME: 12:43 AM     MD JANELLE - supervising physician

## 2020-11-19 NOTE — CARE COORDINATION
CM PLACED CALL TO PATIENT ROOM, PATIENT IS Turkmen SPEAKING ONLY. PER NURSING/PHYSICIAN NOTES THE PATIENT DOES NOT USE THE TRANSLATION DEVICES VERY EFFECTIVELY. CALL PLACED TO FAMILY, NO ANSWER. WILL ATTEMPT TO CALL THE PATIENT FAMILY TOMORROW TO DISCUSS D/C PLANNING. PATIENT HAS HAD 1 NEG COVID TEST, PATIENT IS ON 2 L NC SATURATION 100%. AWAITING PT/OT EVAL TO DETERMINE LEVEL OF CARE AT D/C.

## 2020-11-19 NOTE — PROGRESS NOTES
Hospitalist Daily Progress Note  Name: Didier Grewal  Age: 80 y.o. Gender: male  CodeStatus: Full Code  Allergies: No Known Allergies    Chief Complaint:Shortness of Breath (increased throughout day)    Primary Care Provider: Santi Sena  InpatientTreatment Team: Treatment Team: Attending Provider: Krystal Gibson DO; Consulting Physician: Ronald Walters MD; Patient Care Tech: Natalia Flores; Registered Nurse: America Zamora RN; Respiratory Therapist (Day): Curtis Encarnacion RCP; : Angel Parra; Patient Care Tech: Candida Rios; : Onelia Castaneda RN  Admission Date: 11/18/2020      Subjective: Patient seen evaluated bedside. Attempts were made to the patient using  however this was unsuccessful I was able to speak with him using a transcription translation service however the patient denied any chest pain shortness of breath nausea or vomiting. Patient said his abdominal pain is resolved. Patient has not no acute pressing concerns. Attempts made to take the power of  over the phone, however is unable to reach either a voicemail or a live person to discuss the case    Physical Exam  Constitutional:       Appearance: Normal appearance. He is not ill-appearing or diaphoretic. HENT:      Head: Normocephalic and atraumatic. Nose: Nose normal.      Mouth/Throat:      Mouth: Mucous membranes are moist.      Pharynx: Oropharynx is clear. Eyes:      Conjunctiva/sclera: Conjunctivae normal.      Pupils: Pupils are equal, round, and reactive to light. Cardiovascular:      Rate and Rhythm: Normal rate. Heart sounds: Murmur present. No friction rub. No gallop. Pulmonary:      Breath sounds: Rales present. No wheezing or rhonchi. Abdominal:      General: There is no distension. Tenderness: There is no abdominal tenderness. There is no guarding. Musculoskeletal: Normal range of motion. Right lower leg: No edema.       Left lower leg: No edema. Neurological:      General: No focal deficit present. Mental Status: He is alert and oriented to person, place, and time. Psychiatric:         Mood and Affect: Mood normal.         Thought Content: Thought content normal.         Judgment: Judgment normal.         Review of Systems  ROS reviewed via transcriptional translating services negative except for as above  Medications:  Reviewed    Infusion Medications:   Scheduled Medications:    docusate sodium  100 mg Oral Daily    azithromycin  500 mg Intravenous Q24H    cefTRIAXone (ROCEPHIN) IV  1 g Intravenous Q24H    lisinopril  5 mg Oral Daily    aspirin  81 mg Oral Daily    apixaban  2.5 mg Oral BID    budesonide-formoterol  2 puff Inhalation BID    metoprolol tartrate  25 mg Oral BID    sodium chloride flush  10 mL Intravenous 2 times per day     PRN Meds: dicyclomine, sodium chloride flush, acetaminophen **OR** acetaminophen, polyethylene glycol, promethazine **OR** ondansetron    Labs:   Recent Labs     11/18/20  1700 11/19/20  0714   WBC 7.2 8.9   HGB 13.1* 12.6*   HCT 39.6* 38.3*    204     Recent Labs     11/18/20  1700 11/18/20  1710 11/19/20  0714     --  138   K 4.3  --  4.1     --  104   CO2 22  --  21   BUN 27*  --  26*   CREATININE 1.16 1.1 0.96   CALCIUM 8.8  --  8.7     Recent Labs     11/18/20  1700   AST 65*   ALT 98*   BILITOT 1.1*   ALKPHOS 93     Recent Labs     11/18/20  1700   INR 1.3     Recent Labs     11/18/20  1700   CKTOTAL 262*   TROPONINI <0.010       Urinalysis:   Lab Results   Component Value Date    NITRU Negative 11/18/2020    WBCUA 0-2 11/18/2020    BACTERIA Negative 11/18/2020    RBCUA 3-5 11/18/2020    BLOODU Negative 11/18/2020    SPECGRAV 1.029 11/18/2020    GLUCOSEU Negative 11/18/2020       Radiology:   Most recent    Chest CT      WITH CONTRAST:No results found for this or any previous visit. WITHOUT CONTRAST: No results found for this or any previous visit.     CXR 2-view:   Results for orders placed during the hospital encounter of 10/03/19   XR CHEST STANDARD (2 VW)    Narrative XR CHEST (2 VW): 10/3/2019    CLINICAL HISTORY:  cough with back pain . COMPARISON: None available. Upright PA and lateral radiographs of the chest were obtained. FINDINGS:    There is no cardiomegaly, pulmonary infiltrate, significant pleural effusion, vascular congestion, pneumothorax, or acute fractures suspected. Moderate degenerative changes of the thoracic spine are noted, with minimal chronic-appearing wedging of the mid thoracic vertebral bodies. Impression NO EVIDENCE OF ACTIVE CARDIOPULMONARY DISEASE. Portable:   Results for orders placed during the hospital encounter of 11/11/20   XR CHEST PORTABLE    Narrative EXAMINATION: CHEST PORTABLE VIEW     CLINICAL HISTORY: Short of breath    COMPARISONS: August 10, 2020     FINDINGS:    2 views of the chest is submitted. Left-sided CCD device. Leads overlying the cardiac silhouette. Unchanged The cardiac silhouette is enlarged. Unremarkable. Pulmonary vascular attenuated  Right sided trachea. Atelectasis left lower lobe  No focal infiltrates. No Pneumothoraces. Impression NO ACUTE ACTIVE CARDIOPULMONARY PROCESS       Echo No results found for this or any previous visit. Assessment/Plan:    Active Hospital Problems    Diagnosis Date Noted    SOB (shortness of breath) [R06.02] 11/18/2020     Dyspnea with bilateral pleural effusions. Pulmonary consulted. Continue Symbicort and as needed nebulizer treatments. Repeat procalcitonin tomorrow remaining negative will discontinue antibiotic coverage. Home O2 evaluation prior to discharge. Right upper quadrant abdominal pain: Apparently resolved at this time. Ultrasound negative for imaging.   Continue Bentyl for pain    Atrial fibrillation: Continue Eliquis, continue metoprolol for rate control. Cardiomyopathy: Unknown type: Continue ACE inhibitor, beta-blocker, aspirin, Eliquis. DVT prophylaxis indicated well anticoagulated with Eliquis    Additional work up or/and treatment plan may be added today or then after based on clinical progression. I am managing a portion of pt care. Some medical issues are handled byother specialists. Additional work up and treatment should be done in out pt setting by pt PCP and other out pt providers. In addition to examining and evaluating pt, I spent additional time explaining care, normaland abnormal findings, and treatment plan. All of pt questions were answered. Counseling, diet and education were provided. Case will be discussed with nursing staff when appropriate. Family will be updated if and whenappropriate.       Electronically signed by Radha Salmeron DO on 11/19/2020 at 3:18 PM

## 2020-11-19 NOTE — CONSULTS
Pulmonary Medicine  Consult Note      Reason for consultation: Pleural effusion      HISTORY OF PRESENT ILLNESS:      This is a 70-year-old male with past medical history significant for atrial fibrillation, cardiomyopathy, hypertension, diabetes mellitus, was presented in ER with increased shortness of breath for last 10 days. He was having abdominal pain but denies having any abdominal pain at this time. He has no fever or chills. He is very poor historian and Lithuanian-speaking and difficult to understand what he is trying to say. He had CT chest done which shows right-sided pleural effusion with compressive atelectasis. He does not have any cough or sputum production. No vomiting or diarrhea. He does have some swelling in the lower extremity. Past Medical History:        Diagnosis Date    At[facility] ([facility]     Cardiomyopathy ([facility] 9/18/2020    [facility]     Hypertension        Past Surgical History:        Procedure Laterality Date    BACK SURGERY      HERNIA REPAIR         Social History:     reports that he has quit smoking. He has never used smokeless tobacco. He reports that he does not drink alcohol or use drugs. Family History:   History reviewed. No pertinent family history. Allergies:  Patient has no known allergies.         MEDICATIONS during current hospitalization:    Continuous Infusions:    Scheduled Meds:   docusate sodium  100 mg Oral Daily    azithromycin  500 mg Intravenous Q24H    cefTRIAXone (ROCEPHIN) IV  1 g Intravenous Q24H    lisinopril  5 mg Oral Daily    aspirin  81 mg Oral Daily    apixaban  2.5 mg Oral BID    budesonide-formoterol  2 puff Inhalation BID    metoprolol tartrate  25 mg Oral BID    guaiFENesin  600 mg Oral BID    sodium chloride flush  10 mL Intravenous 2 times per day       PRN Meds:dicyclomine, sodium chloride flush, acetaminophen **OR** acetaminophen, polyethylene glycol, promethazine **OR** ondansetron    REVIEW OF SYSTEMS:  As in history of present illness  Other 14 point review of system is negative. PHYSICAL EXAM:    Vitals:  BP (!) 140/83   Pulse 81   Temp 97.2 °F (36.2 °C) (Oral)   Resp 18   Ht 5' 8\" (1.727 m)   Wt 168 lb (76.2 kg)   SpO2 100%   BMI 25.54 kg/m²   General:Alert, awake, Oriented x3  .comfortable in bed, No distress. Head: Atraumatic , Normocephalic   Eyes: PERRL. No sclera icterus. No conjunctival injection. No discharge   ENT: No nasal  discharge. Pharynx clear. Neck:  Trachea midline. No thyromegaly, no JVD, No cervical adenopathy. Chest : Bilaterally symmetrical ,Normal effort,  No accessory muscle use  Lung : Diminished BS right base. Few right basilar Rales. No wheezing. No rhonchi. Heart[de-identified] Normal  rate. Regular rhythm. No mumur ,  Rub or gallop  ABD: Non-tender. Non-distended. No masses. No organmegaly. Normal bowel sounds. No hernia. Extremities: 1+ pitting in both lower leg , No Cyanosis ,No clubbing  Neuro:no cranial nerve abnormality, normal reflex and sensation, no focal weakness   Skin: Warm and dry. No erythema rash on exposed extremities. Data Review  Recent Labs     11/18/20  1700 11/19/20  0714   WBC 7.2 8.9   HGB 13.1* 12.6*   HCT 39.6* 38.3*    204      Recent Labs     11/18/20  1700 11/18/20  1710 11/19/20  0714     --  138   K 4.3  --  4.1     --  104   CO2 22  --  21   BUN 27*  --  26*   CREATININE 1.16 1.1 0.96   GLUCOSE 182*  --  90       MV Settings: ABGs: No results for input(s): PHART, UVR2VIA, PO2ART, XWF0NWX, BEART, F8QCVLLX, CKJ8ZYF in the last 72 hours. O2 Device: Nasal cannula  O2 Flow Rate (L/min): 2 L/min  Lab Results   Component Value Date    LACTA 2.1 08/08/2020    LACTA 1.6 10/03/2019       Radiology  Cta Chest W Wo Contrast    Result Date: 11/19/2020  CTA CHEST W WO CONTRAST: 11/18/2020 CLINICAL HISTORY:  SOB, tachypnea . COMPARISON: Chest CTA 10/3/2019 and CT abdomen pelvis 8/20/2020.  Spiral enhanced images were both phases. Contrast: IV: 100 ml Isovue 300 Oral:  None. All CT scans at this facility use dose modulation, iterative reconstruction, and/or weight based dosing when appropriate to reduce radiation dose to as low as reasonably achievable. COMPARISON: CT 8/8/2020 RESULT: Limitations from motion artifact. Patient had difficulty holding breath for the study. Liver: No mass or lesion. Biliary: No bile duct dilation. Gallbladder is unremarkable. Pancreas: No mass or duct dilation. Spleen: No mass. No splenomegaly. Adrenals: No mass. Kidneys: No mass, calculus or hydronephrosis. Normal uptake and excretion of contrast into the renal collecting systems. GI tract: No dilation or wall thickening. Mild diverticulosis without diverticulitis. No evidence for appendicitis. Lymph nodes: No abdominal or pelvic lymphadenopathy. Mesentery/Peritoneum/Retroperitoneum: No ascites or mass. Vasculature: The celiac axis and SMA are patent. The portal vein and branches, splenic vein, SMV, and hepatic veins are patent. Moderate arterial atherosclerotic disease without aneurysm. Pelvis: No free fluid. Bladder is decompressed. Changes from prior hernia repair in the right inguinal region. Small fat-containing left inguinal hernia. Bones: No acute osseous findings. Multilevel degenerative changes with multilevel bridging and but osteophytes within the visualized spine. Soft tissues: Unremarkable. Lower thorax: Small bilateral pleural effusions with associated atelectasis, right greater than left. Cardiomegaly. Coronary calcifications and/or stents. Aortic root calcifications. Partially imaged pacemaker. No acute process in the abdomen/pelvis. No bowel obstruction. Small bilateral pleural effusions with associated atelectasis. ==========     Xr Chest Portable    Result Date: 11/11/2020  EXAMINATION: CHEST PORTABLE VIEW  CLINICAL HISTORY: Short of breath COMPARISONS: August 10, 2020  FINDINGS: 2 views of the chest is submitted. Left-sided CCD device. Leads overlying the cardiac silhouette. Unchanged The cardiac silhouette is enlarged. Unremarkable. Pulmonary vascular attenuated Right sided trachea. Atelectasis left lower lobe No focal infiltrates. No Pneumothoraces. NO ACUTE ACTIVE CARDIOPULMONARY PROCESS    Us Abdomen Limited Specify Organ? Gallbladder, Liver    Result Date: 11/19/2020  US ABDOMEN LIMITED : 11/19/2020 CLINICAL HISTORY:  general abd pain with elevated LFTs . COMPARISON: CT abdomen and pelvis and chest CTA 11/18/2020. TECHNIQUE: Transabdominal ultrasound of the right upper quadrant was performed. FINDINGS: A moderate-sized right pleural effusion is noted. There is borderline dilatation of the pancreatic duct within the head for the patient's age group measuring approximately 2.5 mm in caliber. The gallbladder, visualized liver, pancreas, right kidney, and great vessels are unremarkable. There is no significant gallbladder distention, wall thickening, calculi, pericholecystic fluid, biliary dilatation, ascites, or incidental findings of concern identified. The common duct measures approximately 2 mm at the bren hepatis. ESSENTIALLY NEGATIVE RIGHT UPPER QUADRANT ULTRASOUND. Assessment and  plan:     1. Right pleural effusion with compressive atelectasis doubt pneumonia  2. Abdominal pain, resolved  3. Atrial fibrillation  4. Cardiomyopathy    Patient is afebrile, WBC within normal range and procalcitonin is negative doubt any underlying pneumonia recommend to DC antibiotic. Right pleural effusion likely etiology cardiac. Effusion is moderate size and will request thoracentesis under ultrasound. He is on 2 L O2 via nasal cannula and O2 saturation 100%. He is in isolation to rule out Covid.   Will follow    Thank you for consult    Electronically signed by Wiliam Hawkins MD, FCCP on 11/19/2020 at 4:44 PM

## 2020-11-20 LAB
ALBUMIN SERPL-MCNC: 3.9 G/DL (ref 3.5–4.6)
ALP BLD-CCNC: 100 U/L (ref 35–104)
ALT SERPL-CCNC: 549 U/L (ref 0–41)
ANION GAP SERPL CALCULATED.3IONS-SCNC: 16 MEQ/L (ref 9–15)
AST SERPL-CCNC: 579 U/L (ref 0–40)
BASOPHILS ABSOLUTE: 0 K/UL (ref 0–0.2)
BASOPHILS RELATIVE PERCENT: 0.4 %
BILIRUB SERPL-MCNC: 1.8 MG/DL (ref 0.2–0.7)
BILIRUBIN DIRECT: 0.6 MG/DL (ref 0–0.4)
BILIRUBIN, INDIRECT: 1.2 MG/DL (ref 0–0.6)
BUN BLDV-MCNC: 36 MG/DL (ref 8–23)
CALCIUM SERPL-MCNC: 8.8 MG/DL (ref 8.5–9.9)
CHLORIDE BLD-SCNC: 105 MEQ/L (ref 95–107)
CO2: 16 MEQ/L (ref 20–31)
CREAT SERPL-MCNC: 1.02 MG/DL (ref 0.7–1.2)
EOSINOPHILS ABSOLUTE: 0 K/UL (ref 0–0.7)
EOSINOPHILS RELATIVE PERCENT: 0.1 %
GFR AFRICAN AMERICAN: >60
GFR NON-AFRICAN AMERICAN: >60
GLUCOSE BLD-MCNC: 104 MG/DL (ref 70–99)
HCT VFR BLD CALC: 40.5 % (ref 42–52)
HEMOGLOBIN: 13.4 G/DL (ref 14–18)
LYMPHOCYTES ABSOLUTE: 2 K/UL (ref 1–4.8)
LYMPHOCYTES RELATIVE PERCENT: 23.4 %
MCH RBC QN AUTO: 32.2 PG (ref 27–31.3)
MCHC RBC AUTO-ENTMCNC: 33 % (ref 33–37)
MCV RBC AUTO: 97.7 FL (ref 80–100)
MONOCYTES ABSOLUTE: 0.9 K/UL (ref 0.2–0.8)
MONOCYTES RELATIVE PERCENT: 10.5 %
NEUTROPHILS ABSOLUTE: 5.6 K/UL (ref 1.4–6.5)
NEUTROPHILS RELATIVE PERCENT: 65.6 %
PDW BLD-RTO: 14.8 % (ref 11.5–14.5)
PLATELET # BLD: 204 K/UL (ref 130–400)
POTASSIUM REFLEX MAGNESIUM: 4.8 MEQ/L (ref 3.4–4.9)
PROCALCITONIN: 0.15 NG/ML (ref 0–0.15)
RBC # BLD: 4.15 M/UL (ref 4.7–6.1)
SODIUM BLD-SCNC: 137 MEQ/L (ref 135–144)
TOTAL PROTEIN: 6.6 G/DL (ref 6.3–8)
WBC # BLD: 8.6 K/UL (ref 4.8–10.8)

## 2020-11-20 PROCEDURE — 36415 COLL VENOUS BLD VENIPUNCTURE: CPT

## 2020-11-20 PROCEDURE — 99232 SBSQ HOSP IP/OBS MODERATE 35: CPT | Performed by: INTERNAL MEDICINE

## 2020-11-20 PROCEDURE — 80076 HEPATIC FUNCTION PANEL: CPT

## 2020-11-20 PROCEDURE — 6370000000 HC RX 637 (ALT 250 FOR IP): Performed by: NURSE PRACTITIONER

## 2020-11-20 PROCEDURE — 94640 AIRWAY INHALATION TREATMENT: CPT

## 2020-11-20 PROCEDURE — 85025 COMPLETE CBC W/AUTO DIFF WBC: CPT

## 2020-11-20 PROCEDURE — 6370000000 HC RX 637 (ALT 250 FOR IP): Performed by: INTERNAL MEDICINE

## 2020-11-20 PROCEDURE — 2700000000 HC OXYGEN THERAPY PER DAY

## 2020-11-20 PROCEDURE — 80048 BASIC METABOLIC PNL TOTAL CA: CPT

## 2020-11-20 PROCEDURE — 94761 N-INVAS EAR/PLS OXIMETRY MLT: CPT

## 2020-11-20 PROCEDURE — 1210000000 HC MED SURG R&B

## 2020-11-20 PROCEDURE — 6360000002 HC RX W HCPCS: Performed by: INTERNAL MEDICINE

## 2020-11-20 PROCEDURE — 2580000003 HC RX 258: Performed by: NURSE PRACTITIONER

## 2020-11-20 PROCEDURE — 84145 PROCALCITONIN (PCT): CPT

## 2020-11-20 RX ORDER — FUROSEMIDE 10 MG/ML
20 INJECTION INTRAMUSCULAR; INTRAVENOUS 2 TIMES DAILY
Status: DISCONTINUED | OUTPATIENT
Start: 2020-11-20 | End: 2020-11-21

## 2020-11-20 RX ADMIN — BUDESONIDE AND FORMOTEROL FUMARATE DIHYDRATE 2 PUFF: 160; 4.5 AEROSOL RESPIRATORY (INHALATION) at 08:04

## 2020-11-20 RX ADMIN — Medication 10 ML: at 20:38

## 2020-11-20 RX ADMIN — BUDESONIDE AND FORMOTEROL FUMARATE DIHYDRATE 2 PUFF: 160; 4.5 AEROSOL RESPIRATORY (INHALATION) at 19:56

## 2020-11-20 RX ADMIN — METOPROLOL TARTRATE 25 MG: 25 TABLET, FILM COATED ORAL at 09:58

## 2020-11-20 RX ADMIN — GUAIFENESIN 600 MG: 600 TABLET, EXTENDED RELEASE ORAL at 20:37

## 2020-11-20 RX ADMIN — FUROSEMIDE 20 MG: 10 INJECTION, SOLUTION INTRAVENOUS at 18:05

## 2020-11-20 RX ADMIN — APIXABAN 2.5 MG: 2.5 TABLET, FILM COATED ORAL at 20:37

## 2020-11-20 RX ADMIN — Medication 10 ML: at 09:59

## 2020-11-20 RX ADMIN — LISINOPRIL 5 MG: 5 TABLET ORAL at 09:58

## 2020-11-20 RX ADMIN — DOCUSATE SODIUM 100 MG: 100 CAPSULE ORAL at 09:58

## 2020-11-20 RX ADMIN — GUAIFENESIN 600 MG: 600 TABLET, EXTENDED RELEASE ORAL at 09:58

## 2020-11-20 ASSESSMENT — PAIN SCALES - GENERAL: PAINLEVEL_OUTOF10: 0

## 2020-11-20 NOTE — PROGRESS NOTES
Physician Progress Note      Lorne Ang  CSN #:                  [id number]  :                       1929  ADMIT DATE:       2020 4:21 PM  [address] DATE:  RESPONDING  PROVIDER #:        Colonel Hayley BARTON          QUERY TEXT:    Dear Attending:    Patient admitted with pleural effusion, noted to have atrial fibrillation. If   possible, please document in progress notes and discharge summary further   specificity regarding the type of atrial fibrillation: The medical record reflects the following:  Risk Factors: hx atrial fibrillation on eliquis cardiomyopathy  DM   HTN  Clinical Indicators: EKG:EKG shows a ventricular paced rhythm rate of 66. No   acute ST or T wave changes. Leftward axis axis. abnormal EKG. pulmonary   consult: \"20\". Right pleural effusion likely etiology cardiac. Effusion is moderate size and will request thoracentesis under ultrasound\"  Treatment: EKG  Eliquis  metoprolol  pulmonary consult    Thank you,  Juvencio Vsos RN BSN CDS  contact [facility] Vitaliy Kain  or  [email or web address] w/ any questions    Chronic: nonspecific term that could be referring to paroxysmal, persistent,   or permanent  Longstanding persistent: persistent and continuous, lasting > 1 year. Paroxysmal - self-terminating or intermittent; resolves with or without   intervention within 7 days of onset; may recur with various frequency. Persistent - Fails to terminate within 7 days; Often requires meds or   cardioversion to restore to NSR. Permanent - longstanding & persistent; Medication has been ineffective in   restoring NSR &/or cardioversion is contraindicated    Definitions per [employer] Training Guide and Quick Reference Guide, Pina Rodriguez 112 5   Diseases and Disorders of the Circulatory System; 2019; [employer]. Software content   from the [employer]?  [employer] Transformation Program  Options provided:  -- Paroxysmal Atrial Fibrillation  -- Longstanding Persistent Atrial Fibrillation  -- Permanent Atrial Fibrillation  -- Persistent Atrial Fibrillation  -- Chronic Atrial Fibrillation, unspecified  -- Other - I will add my own diagnosis  -- Disagree - Not applicable / Not valid  -- Disagree - Clinically unable to determine / Unknown  -- Refer to Clinical Documentation Reviewer    PROVIDER RESPONSE TEXT:    Afib by history, unable to determine type of chronicity as patient is pacer   dependent.     Query created by: Carlos Pillai on 11/20/2020 10:53 AM      Electronically signed by:  Maria E Espinosa MD 11/20/2020 10:59 AM

## 2020-11-20 NOTE — PROGRESS NOTES
Patient assessed and charted on by this RN. Vital signs are stable without O2. Expiratory wheezing. Patient complaining of 10 out of 10 pain in abdomen and wants something to poop. Administered tylenol and glyoclax. Patient does not speak english and refused snacks. Will continue to monitor. Fall precautions are in place. 2220 - Second Covid taken and sent down to lab. Awaiting orders. 0510 - 2nd COVID negative. Transfer order obtained for morning. Patient urine 250ml through night rashmi in color.

## 2020-11-20 NOTE — CARE COORDINATION
Pt is [place] speaking only and report received that he is not doing well with translation devices. Per previous visit, pt has a son but he is also primarily [place] speaking and he defers to granddaughter Julissa Mesa with whom pt lives. There is only a phone number for Julissa Mesa. Attempted phone call to her and left message.   Also per previous visit, pt uses a cane at home and granddaughter assists as needed

## 2020-11-20 NOTE — PROGRESS NOTES
Patient assessment and vitals complete and stable. Patient up to chair most of morning. Ashanti Hungarian speaking only and refused to use .

## 2020-11-20 NOTE — PROGRESS NOTES
Hospitalist Progress Note      Date of Admission: 11/18/2020  Chief Complaint:    Chief Complaint   Patient presents with    Shortness of Breath     increased throughout day     Subjective:  Patient desires to not use     Medications:    Infusion Medications   Scheduled Medications    furosemide  20 mg Intravenous BID    docusate sodium  100 mg Oral Daily    lisinopril  5 mg Oral Daily    aspirin  81 mg Oral Daily    apixaban  2.5 mg Oral BID    budesonide-formoterol  2 puff Inhalation BID    metoprolol tartrate  25 mg Oral BID    guaiFENesin  600 mg Oral BID    sodium chloride flush  10 mL Intravenous 2 times per day     PRN Meds: dicyclomine, sodium chloride flush, acetaminophen **OR** acetaminophen, polyethylene glycol, promethazine **OR** ondansetron    Intake/Output Summary (Last 24 hours) at 11/20/2020 1056  Last data filed at 11/20/2020 0508  Gross per 24 hour   Intake 872 ml   Output 250 ml   Net 622 ml     Exam:  BP (!) 125/92   Pulse 80   Temp 97.2 °F (36.2 °C) (Oral)   Resp 16   Ht 5' 8\" (1.727 m)   Wt 168 lb (76.2 kg)   SpO2 100%   BMI 25.54 kg/m²   Head: Normocephalic, atraumatic  Sclera clear  Neck: trachea central  Lungs: normal effort of breathing    Labs:   Recent Labs     11/18/20  1700 11/19/20  0714 11/20/20  0714   WBC 7.2 8.9 8.6   HGB 13.1* 12.6* 13.4*   HCT 39.6* 38.3* 40.5*    204 204     Recent Labs     11/18/20  1700 11/18/20  1710 11/19/20  0714 11/20/20  0714     --  138 137   K 4.3  --  4.1 4.8     --  104 105   CO2 22  --  21 16*   BUN 27*  --  26* 36*   CREATININE 1.16 1.1 0.96 1.02   CALCIUM 8.8  --  8.7 8.8   AST 65*  --   --   --    ALT 98*  --   --   --    BILITOT 1.1*  --   --   --    ALKPHOS 93  --   --   --      Recent Labs     11/18/20  1700   INR 1.3     Recent Labs     11/18/20  1700   CKTOTAL 262*   TROPONINI <0.010     Radiology:  RADIOLOGY REPORT   Final Result      US ABDOMEN LIMITED Specify organ?  GALLBLADDER, LIVER   Final Result      ESSENTIALLY NEGATIVE RIGHT UPPER QUADRANT ULTRASOUND. CTA CHEST W WO CONTRAST   Final Result      NO EVIDENCE OF PULMONARY EMBOLI, WITHIN THE MILD LIMITATION OF THE STUDY. MODERATE-SIZED LAYERING PLEURAL EFFUSIONS, MILD TO MODERATE PROBABLE ADJACENT COMPRESSIVE ATELECTASIS/EDEMA. MODERATE CARDIOMEGALY. CT ABDOMEN PELVIS W IV CONTRAST Additional Contrast? None   Final Result      No acute process in the abdomen/pelvis. No bowel obstruction. Small bilateral pleural effusions with associated atelectasis.               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 Liver congestion sec to chf. Iv diuresis, monitor renal function. 99% room air yesterday and 98 in ED. RN requested to remove oxygen supplementation. Pleural effusion: agree with pulm, likely of cardiac origin. Expect improvement with diuresis.      Afib: rate controlled on xarelto    Electronically signed by Sole Quiroz MD on 11/20/2020 at 10:56 AM

## 2020-11-20 NOTE — PROGRESS NOTES
INPATIENT PROGRESS NOTES    PATIENT NAME: Altagracia Rivera  MRN: [id number]  SERVICE DATE:  November 20, 2020   SERVICE TIME:  3:00 PM      PRIMARY SERVICE: Pulmonary Disease    CHIEF COMPLAIN: Shortness of breath      INTERVAL HPI: Patient seen and examined at bedside, Interval Notes, orders reviewed. Nursing notes noted  Patient is doing better. He is on 2 L O2 and his O2 saturation 100%. He is not having short of breath. Discussed with radiology department patient is on Eliquis and thoracentesis cannot be done. Since patient has been doing better will hold off on thoracentesis. Pleural effusion likely cardiac origin and expect improvement with diuretic    OBJECTIVE    Body mass index is 25.54 kg/m². PHYSICAL EXAM:  Vitals:  BP (!) 125/92   Pulse 80   Temp 97.2 °F (36.2 °C) (Oral)   Resp 16   Ht 5' 8\" (1.727 m)   Wt 168 lb (76.2 kg)   SpO2 100%   BMI 25.54 kg/m²   General:Alert, awake . comfortable in bed, No distress. Head: Atraumatic , Normocephalic   Eyes: PERRL. No sclera icterus. No conjunctival injection. No discharge   ENT: No nasal  discharge. Pharynx clear. lips, teeth, mucosa and gums are normal, tongue protrudes in the midline  Neck:  Trachea midline. No thyromegaly, no JVD, No cervical adenopathy. Chest : Bilaterally symmetrical ,Normal effort,  No accessory muscle use  Lung : . Fair BS bilateral, decreased BS at right base. No Rales. No wheezing. No rhonchi. Heart[de-identified] Normal  rate. Regular rhythm. No mumur ,  Rub or gallop  ABD: Non-tender. Non-distended. No masses. No organmegaly. Normal bowel sounds. No hernia.   Ext : No Pitting both leg , No Cyanosis No clubbing  Neuro: no focal weakness      DATA:   Recent Labs     11/19/20  0714 11/20/20  0714   WBC 8.9 8.6   HGB 12.6* 13.4*   HCT 38.3* 40.5*   MCV 97.4 97.7    204     Recent Labs     11/18/20  1700  11/19/20  0714 11/20/20  0714     --  138 137   K 4.3  --  4.1 4.8     --  104 105   CO2 22  --  21 16*   BUN 27* --  26* 36*   CREATININE 1.16   < > 0.96 1.02   GLUCOSE 182*  --  90 104*   CALCIUM 8.8  --  8.7 8.8   PROT 6.7  --   --   --    LABALBU 4.1  --   --   --    BILITOT 1.1*  --   --   --    ALKPHOS 93  --   --   --    AST 65*  --   --   --    ALT 98*  --   --   --    LABGLOM 58.9*   < > >60.0 >60.0   GFRAA >60.0   < > >60.0 >60.0   GLOB 2.6  --   --   --     < > = values in this interval not displayed. MV Settings:          No results for input(s): PHART, GQK9ASW, PO2ART, FZN4VKM, BEART, D6YIIOZW in the last 72 hours. O2 Device: Nasal cannula  O2 Flow Rate (L/min): 2 L/min    Dietary Nutrition Supplements: Standard High Calorie Oral Supplement  DIET GENERAL; Safety Tray; Safety Tray (Disposables)     MEDICATIONS during current hospitalization:    Continuous Infusions:    Scheduled Meds:   furosemide  20 mg Intravenous BID    docusate sodium  100 mg Oral Daily    lisinopril  5 mg Oral Daily    aspirin  81 mg Oral Daily    apixaban  2.5 mg Oral BID    budesonide-formoterol  2 puff Inhalation BID    metoprolol tartrate  25 mg Oral BID    guaiFENesin  600 mg Oral BID    sodium chloride flush  10 mL Intravenous 2 times per day       PRN Meds:dicyclomine, sodium chloride flush, acetaminophen **OR** acetaminophen, polyethylene glycol, promethazine **OR** ondansetron    Radiology  Cta Chest W Wo Contrast    Result Date: 11/19/2020  CTA CHEST W WO CONTRAST: 11/18/2020 CLINICAL HISTORY:  SOB, tachypnea . COMPARISON: Chest CTA 10/3/2019 and CT abdomen pelvis 8/20/2020. Spiral enhanced images were obtained of the chest during the infusion of approximately 100 mL of Isovue 370 contrast with pulmonary artery  CTA protocol. Routine and volume rendered images were obtained on a 3-dimensional workstation. All CT scans at this facility use dose modulation, iterative reconstruction, and/or weight based dosing when appropriate to reduce radiation dose to as low as reasonably achievable.  FINDINGS:  Motion artifact mildly limits detail of the lower lobe pulmonary artery branches. However, there are no filling defects identified to suggest pulmonary emboli. Moderate-sized bilateral layering pleural effusions are present, with mild to moderate adjacent compressive atelectasis/edema. The heart is moderately enlarged with a left subclavian single lead pacemaker. The thoracic aorta is mildly ectatic with mild calcific plaquing without significant IV contrast. No findings to suggest dissection are identified. There is no other significant pulmonary infiltrate, worrisome nodules, pericardial effusion or other significant changes from the prior studies identified. Degenerative changes of the thoracic spine are again noted. The limited imaging of the included upper abdomen is noncontributory. NO EVIDENCE OF PULMONARY EMBOLI, WITHIN THE MILD LIMITATION OF THE STUDY. MODERATE-SIZED LAYERING PLEURAL EFFUSIONS, MILD TO MODERATE PROBABLE ADJACENT COMPRESSIVE ATELECTASIS/EDEMA. MODERATE CARDIOMEGALY. Ct Abdomen Pelvis W Iv Contrast Additional Contrast? None    Result Date: 11/18/2020  CT ABDOMEN PELVIS W IV CONTRAST HISTORY:   diffuse abdominal pain with vomiting; colitis vs obstruction . Other . Diffuse abdominal pain with emesis. Concern for colitis or bowel obstruction. TECHNIQUE: CT of the abdomen and pelvis was performed using standard technique, scanning from just above the dome of the diaphragm to the symphysis pubis. Including delayed images through the kidneys. Sagittal and coronal reconstructions performed on both phases. Contrast: IV: 100 ml Isovue 300 Oral:  None. All CT scans at this facility use dose modulation, iterative reconstruction, and/or weight based dosing when appropriate to reduce radiation dose to as low as reasonably achievable. COMPARISON: CT 8/8/2020 RESULT: Limitations from motion artifact. Patient had difficulty holding breath for the study. Liver: No mass or lesion. Biliary: No bile duct dilation. Gallbladder is unremarkable. Pancreas: No mass or duct dilation. Spleen: No mass. No splenomegaly. Adrenals: No mass. Kidneys: No mass, calculus or hydronephrosis. Normal uptake and excretion of contrast into the renal collecting systems. GI tract: No dilation or wall thickening. Mild diverticulosis without diverticulitis. No evidence for appendicitis. Lymph nodes: No abdominal or pelvic lymphadenopathy. Mesentery/Peritoneum/Retroperitoneum: No ascites or mass. Vasculature: The celiac axis and SMA are patent. The portal vein and branches, splenic vein, SMV, and hepatic veins are patent. Moderate arterial atherosclerotic disease without aneurysm. Pelvis: No free fluid. Bladder is decompressed. Changes from prior hernia repair in the right inguinal region. Small fat-containing left inguinal hernia. Bones: No acute osseous findings. Multilevel degenerative changes with multilevel bridging and but osteophytes within the visualized spine. Soft tissues: Unremarkable. Lower thorax: Small bilateral pleural effusions with associated atelectasis, right greater than left. Cardiomegaly. Coronary calcifications and/or stents. Aortic root calcifications. Partially imaged pacemaker. No acute process in the abdomen/pelvis. No bowel obstruction. Small bilateral pleural effusions with associated atelectasis. ==========     Xr Chest Portable    Result Date: 11/11/2020  EXAMINATION: CHEST PORTABLE VIEW  CLINICAL HISTORY: Short of breath COMPARISONS: August 10, 2020  FINDINGS: 2 views of the chest is submitted. Left-sided CCD device. Leads overlying the cardiac silhouette. Unchanged The cardiac silhouette is enlarged. Unremarkable. Pulmonary vascular attenuated Right sided trachea. Atelectasis left lower lobe No focal infiltrates. No Pneumothoraces. NO ACUTE ACTIVE CARDIOPULMONARY PROCESS    Us Abdomen Limited Specify Organ?  Gallbladder, Liver    Result Date: 11/19/2020  US ABDOMEN LIMITED : 11/19/2020 CLINICAL HISTORY:  general abd pain with elevated LFTs . COMPARISON: CT abdomen and pelvis and chest CTA 11/18/2020. TECHNIQUE: Transabdominal ultrasound of the right upper quadrant was performed. FINDINGS: A moderate-sized right pleural effusion is noted. There is borderline dilatation of the pancreatic duct within the head for the patient's age group measuring approximately 2.5 mm in caliber. The gallbladder, visualized liver, pancreas, right kidney, and great vessels are unremarkable. There is no significant gallbladder distention, wall thickening, calculi, pericholecystic fluid, biliary dilatation, ascites, or incidental findings of concern identified. The common duct measures approximately 2 mm at the bren hepatis. ESSENTIALLY NEGATIVE RIGHT UPPER QUADRANT ULTRASOUND. IMPRESSION AND SUGGESTION:  1. Congestive heart failure with right pleural effusion  2. Atrial fibrillation  3. Cardiomyopathy    Currently on 2 L O2 his O2 saturation is 100%. Titrate off oxygen. Pleural effusion likely cardiac origin and will hold off on thoracentesis. He is on Eliquis.   Continue diuretic therapy      Electronically signed by Paulino Loo MD, FCCP on 11/20/2020 at 3:00 PM

## 2020-11-21 LAB
ALBUMIN SERPL-MCNC: 3.4 G/DL (ref 3.5–4.6)
ALP BLD-CCNC: 82 U/L (ref 35–104)
ALT SERPL-CCNC: 438 U/L (ref 0–41)
ANION GAP SERPL CALCULATED.3IONS-SCNC: 14 MEQ/L (ref 9–15)
AST SERPL-CCNC: 236 U/L (ref 0–40)
BASOPHILS ABSOLUTE: 0 K/UL (ref 0–0.2)
BASOPHILS RELATIVE PERCENT: 0.6 %
BILIRUB SERPL-MCNC: 1 MG/DL (ref 0.2–0.7)
BUN BLDV-MCNC: 37 MG/DL (ref 8–23)
CALCIUM SERPL-MCNC: 8 MG/DL (ref 8.5–9.9)
CHLORIDE BLD-SCNC: 107 MEQ/L (ref 95–107)
CO2: 20 MEQ/L (ref 20–31)
CREAT SERPL-MCNC: 1.19 MG/DL (ref 0.7–1.2)
EOSINOPHILS ABSOLUTE: 0.1 K/UL (ref 0–0.7)
EOSINOPHILS RELATIVE PERCENT: 1.3 %
GFR AFRICAN AMERICAN: >60
GFR NON-AFRICAN AMERICAN: 57.2
GLOBULIN: 2.4 G/DL (ref 2.3–3.5)
GLUCOSE BLD-MCNC: 173 MG/DL (ref 70–99)
HCT VFR BLD CALC: 40.8 % (ref 42–52)
HEMOGLOBIN: 13.4 G/DL (ref 14–18)
LACTATE DEHYDROGENASE: 267 U/L (ref 135–225)
LYMPHOCYTES ABSOLUTE: 1.7 K/UL (ref 1–4.8)
LYMPHOCYTES RELATIVE PERCENT: 21 %
MCH RBC QN AUTO: 32.1 PG (ref 27–31.3)
MCHC RBC AUTO-ENTMCNC: 32.9 % (ref 33–37)
MCV RBC AUTO: 97.7 FL (ref 80–100)
MONOCYTES ABSOLUTE: 0.8 K/UL (ref 0.2–0.8)
MONOCYTES RELATIVE PERCENT: 10.3 %
NEUTROPHILS ABSOLUTE: 5.3 K/UL (ref 1.4–6.5)
NEUTROPHILS RELATIVE PERCENT: 66.8 %
PDW BLD-RTO: 14.8 % (ref 11.5–14.5)
PLATELET # BLD: 206 K/UL (ref 130–400)
POTASSIUM REFLEX MAGNESIUM: 3.7 MEQ/L (ref 3.4–4.9)
RBC # BLD: 4.18 M/UL (ref 4.7–6.1)
SODIUM BLD-SCNC: 141 MEQ/L (ref 135–144)
TOTAL PROTEIN: 5.8 G/DL (ref 6.3–8)
WBC # BLD: 7.9 K/UL (ref 4.8–10.8)

## 2020-11-21 PROCEDURE — 2580000003 HC RX 258: Performed by: NURSE PRACTITIONER

## 2020-11-21 PROCEDURE — 99232 SBSQ HOSP IP/OBS MODERATE 35: CPT | Performed by: INTERNAL MEDICINE

## 2020-11-21 PROCEDURE — 6370000000 HC RX 637 (ALT 250 FOR IP): Performed by: INTERNAL MEDICINE

## 2020-11-21 PROCEDURE — 97162 PT EVAL MOD COMPLEX 30 MIN: CPT

## 2020-11-21 PROCEDURE — 94761 N-INVAS EAR/PLS OXIMETRY MLT: CPT

## 2020-11-21 PROCEDURE — 80053 COMPREHEN METABOLIC PANEL: CPT

## 2020-11-21 PROCEDURE — 85025 COMPLETE CBC W/AUTO DIFF WBC: CPT

## 2020-11-21 PROCEDURE — 36415 COLL VENOUS BLD VENIPUNCTURE: CPT

## 2020-11-21 PROCEDURE — 99222 1ST HOSP IP/OBS MODERATE 55: CPT | Performed by: SPECIALIST

## 2020-11-21 PROCEDURE — 94760 N-INVAS EAR/PLS OXIMETRY 1: CPT

## 2020-11-21 PROCEDURE — 83615 LACTATE (LD) (LDH) ENZYME: CPT

## 2020-11-21 PROCEDURE — 2700000000 HC OXYGEN THERAPY PER DAY

## 2020-11-21 PROCEDURE — 94640 AIRWAY INHALATION TREATMENT: CPT

## 2020-11-21 PROCEDURE — 6360000002 HC RX W HCPCS: Performed by: INTERNAL MEDICINE

## 2020-11-21 PROCEDURE — 6370000000 HC RX 637 (ALT 250 FOR IP): Performed by: NURSE PRACTITIONER

## 2020-11-21 PROCEDURE — 1210000000 HC MED SURG R&B

## 2020-11-21 RX ORDER — FUROSEMIDE 20 MG/1
20 TABLET ORAL DAILY
Status: DISCONTINUED | OUTPATIENT
Start: 2020-11-21 | End: 2020-11-22 | Stop reason: HOSPADM

## 2020-11-21 RX ADMIN — DOCUSATE SODIUM 100 MG: 100 CAPSULE ORAL at 08:42

## 2020-11-21 RX ADMIN — APIXABAN 2.5 MG: 2.5 TABLET, FILM COATED ORAL at 21:18

## 2020-11-21 RX ADMIN — BUDESONIDE AND FORMOTEROL FUMARATE DIHYDRATE 2 PUFF: 160; 4.5 AEROSOL RESPIRATORY (INHALATION) at 07:19

## 2020-11-21 RX ADMIN — FUROSEMIDE 20 MG: 10 INJECTION, SOLUTION INTRAVENOUS at 08:42

## 2020-11-21 RX ADMIN — APIXABAN 2.5 MG: 2.5 TABLET, FILM COATED ORAL at 08:42

## 2020-11-21 RX ADMIN — ASPIRIN 81 MG 81 MG: 81 TABLET ORAL at 08:42

## 2020-11-21 RX ADMIN — Medication 10 ML: at 21:18

## 2020-11-21 RX ADMIN — FUROSEMIDE 20 MG: 20 TABLET ORAL at 15:20

## 2020-11-21 RX ADMIN — GUAIFENESIN 600 MG: 600 TABLET, EXTENDED RELEASE ORAL at 08:42

## 2020-11-21 RX ADMIN — GUAIFENESIN 600 MG: 600 TABLET, EXTENDED RELEASE ORAL at 21:18

## 2020-11-21 RX ADMIN — METOPROLOL TARTRATE 25 MG: 25 TABLET, FILM COATED ORAL at 21:18

## 2020-11-21 RX ADMIN — BUDESONIDE AND FORMOTEROL FUMARATE DIHYDRATE 2 PUFF: 160; 4.5 AEROSOL RESPIRATORY (INHALATION) at 19:41

## 2020-11-21 ASSESSMENT — PAIN SCALES - GENERAL
PAINLEVEL_OUTOF10: 0
PAINLEVEL_OUTOF10: 0

## 2020-11-21 NOTE — CONSULTS
Consults    Patient Name: Kayla Dawson  Admit Date: 2020  4:21 PM  MR #: [id number]  : 1929    Attending Physician: Alyssa Astorga MD  Reason for consult: Abnormal LFT    History of Presenting Illness: Kayla Dawson is a 80 y.o. male on hospital day 3 with a history of chest pain and shortness of breath, GI consult was requested because of abnormal LFT.,  Patient is [place] speaking and could not obtain history from him.,  He was obtained from reviewing the chart.,  He is in no acute pain or discomfort, no nausea or vomiting.,  Patient is not on any hepatotoxic medications. . History Obtained From: Chart. History:      Past Medical History:   Diagnosis Date    Atrial fibrillation ([facility]     Cardiomyopathy ([facility] 2020    Diabetes mellitus ([facility]     Hypertension      Past Surgical History:   Procedure Laterality Date    BACK SURGERY      HERNIA REPAIR         Family History  History reviewed. No pertinent family history.   [] Unable to obtain due to ventilated and/ or neurologic status    Social History     Socioeconomic History    Marital status:      Spouse name: Not on file    Number of children: Not on file    Years of education: Not on file    Highest education level: Not on file   Occupational History    Not on file   Social Needs    Financial resource strain: Not on file    Food insecurity     Worry: Not on file     Inability: Not on file    Transportation needs     Medical: Not on file     Non-medical: Not on file   Tobacco Use    Smoking status: Former Smoker    Smokeless tobacco: Never Used    Tobacco comment: quit 30 years ago   Substance and Sexual Activity    Alcohol use: No    Drug use: No    Sexual activity: Not on file   Lifestyle    Physical activity     Days per week: Not on file     Minutes per session: Not on file    Stress: Not on file   Relationships    Social connections     Talks on phone: Not on file     Gets together: Not on file Attends Anabaptism service: Not on file     Active member of club or organization: Not on file     Attends meetings of clubs or organizations: Not on file     Relationship status: Not on file    Intimate partner violence     Fear of current or ex partner: Not on file     Emotionally abused: Not on file     Physically abused: Not on file     Forced sexual activity: Not on file   Other Topics Concern    Not on file   Social History Narrative    Not on file      [] Unable to obtain due to ventilated and/ or neurologic status      Home Medications:      Medications Prior to Admission: albuterol (PROVENTIL) (2.5 MG/3ML) 0.083% nebulizer solution, Take 3 mLs by nebulization every 6 hours as needed for Wheezing  apixaban (ELIQUIS) 2.5 MG TABS tablet, Take 1 tablet by mouth 2 times daily  lisinopril (PRINIVIL;ZESTRIL) 5 MG tablet, Take 1 tablet by mouth daily  metoprolol tartrate (LOPRESSOR) 25 MG tablet, Take 1 tablet by mouth 2 times daily  aspirin 81 MG chewable tablet, Take 1 tablet by mouth daily  fluticasone-salmeterol (ADVAIR DISKUS) 250-50 MCG/DOSE AEPB, Inhale 1 puff into the lungs every 12 hours for 14 days    Current Hospital Medications:     Scheduled Meds:   furosemide  20 mg Oral Daily    docusate sodium  100 mg Oral Daily    lisinopril  5 mg Oral Daily    aspirin  81 mg Oral Daily    apixaban  2.5 mg Oral BID    budesonide-formoterol  2 puff Inhalation BID    metoprolol tartrate  25 mg Oral BID    guaiFENesin  600 mg Oral BID    sodium chloride flush  10 mL Intravenous 2 times per day     Continuous Infusions:  PRN Meds:.dicyclomine, sodium chloride flush, polyethylene glycol, promethazine **OR** ondansetron  .        Allergies:     No Known Allergies     Review of Systems:       [] CV, Resp, Neuro, , and all other systems reviewed and negative other than listed in HPI.      [x] Unable to obtain due to ventilated and/ or neurologic status      Objective Findings:     Vitals:   Vitals:    11/20/20 1956 11/20/20 2035 11/21/20 0720 11/21/20 0745   BP:  103/64  (!) 90/48   Pulse:  62  64   Resp:  18 16 16   Temp:  98.1 °F (36.7 °C)  97.3 °F (36.3 °C)   TempSrc:  Oral  Oral   SpO2: 99% 96% 99%    Weight:       Height:            Physical Examination:  General: In no acute distress  HEENT: Normocephalic,  scleral icterus. None, no cutaneous stigmata of chronic liver disease  Neck: No jugular venous distention. Heart: Regular, no murmur, no rub/gallop. No right ventricular heave. Pacemaker in the chest wall  Lungs: Clear to ascultation, no rales/wheezing/rhonchi. Good chest wall excursion. Abdomen: Soft, nontender no palpable mass no evidence of any ascites  Extremities: No clubbing/cyanosis, no edema. Skin: Warm, dry, normal turgor, no rash, no bruise, no petichiae. Neuro: No myoclonus or tremor.    Psych: Normal affect    Results/ Medications reviewed 11/21/2020, 12:58 PM     Laboratory, Microbiology, Pathology, Radiology, Cardiology, Medications and Transcriptions reviewed  Scheduled Meds:   furosemide  20 mg Oral Daily    docusate sodium  100 mg Oral Daily    lisinopril  5 mg Oral Daily    aspirin  81 mg Oral Daily    apixaban  2.5 mg Oral BID    budesonide-formoterol  2 puff Inhalation BID    metoprolol tartrate  25 mg Oral BID    guaiFENesin  600 mg Oral BID    sodium chloride flush  10 mL Intravenous 2 times per day     Continuous Infusions:    Recent Labs     11/19/20 0714 11/20/20 0714 11/21/20 0714   WBC 8.9 8.6 7.9   HGB 12.6* 13.4* 13.4*   HCT 38.3* 40.5* 40.8*   MCV 97.4 97.7 97.7    204 206     Recent Labs     11/19/20  0714 11/20/20  0714 11/21/20  0714    137 141   K 4.1 4.8 3.7    105 107   CO2 21 16* 20   BUN 26* 36* 37*   CREATININE 0.96 1.02 1.19     Recent Labs     11/18/20  1700 11/20/20  0714 11/21/20 0714   AST 65* 579* 236*   ALT 98* 549* 438*   BILIDIR  --  0.6*  --    BILITOT 1.1* 1.8* 1.0*   ALKPHOS 93 100 82     Recent Labs     11/18/20  1709 LIPASE 27     Recent Labs     11/18/20  1700 11/20/20  0714 11/21/20  0714   PROT 6.7 6.6 5.8*   INR 1.3  --   --      Cta Chest W Wo Contrast    Result Date: 11/19/2020  CTA CHEST W WO CONTRAST: 11/18/2020 CLINICAL HISTORY:  SOB, tachypnea . COMPARISON: Chest CTA 10/3/2019 and CT abdomen pelvis 8/20/2020. Spiral enhanced images were obtained of the chest during the infusion of approximately 100 mL of Isovue 370 contrast with pulmonary artery  CTA protocol. Routine and volume rendered images were obtained on a 3-dimensional workstation. All CT scans at this facility use dose modulation, iterative reconstruction, and/or weight based dosing when appropriate to reduce radiation dose to as low as reasonably achievable. FINDINGS:  Motion artifact mildly limits detail of the lower lobe pulmonary artery branches. However, there are no filling defects identified to suggest pulmonary emboli. Moderate-sized bilateral layering pleural effusions are present, with mild to moderate adjacent compressive atelectasis/edema. The heart is moderately enlarged with a left subclavian single lead pacemaker. The thoracic aorta is mildly ectatic with mild calcific plaquing without significant IV contrast. No findings to suggest dissection are identified. There is no other significant pulmonary infiltrate, worrisome nodules, pericardial effusion or other significant changes from the prior studies identified. Degenerative changes of the thoracic spine are again noted. The limited imaging of the included upper abdomen is noncontributory. NO EVIDENCE OF PULMONARY EMBOLI, WITHIN THE MILD LIMITATION OF THE STUDY. MODERATE-SIZED LAYERING PLEURAL EFFUSIONS, MILD TO MODERATE PROBABLE ADJACENT COMPRESSIVE ATELECTASIS/EDEMA. MODERATE CARDIOMEGALY. Ct Abdomen Pelvis W Iv Contrast Additional Contrast? None    Result Date: 11/18/2020  CT ABDOMEN PELVIS W IV CONTRAST HISTORY:   diffuse abdominal pain with vomiting; colitis vs obstruction . Other .  Diffuse abdominal pain with emesis. Concern for colitis or bowel obstruction. TECHNIQUE: CT of the abdomen and pelvis was performed using standard technique, scanning from just above the dome of the diaphragm to the symphysis pubis. Including delayed images through the kidneys. Sagittal and coronal reconstructions performed on both phases. Contrast: IV: 100 ml Isovue 300 Oral:  None. All CT scans at this facility use dose modulation, iterative reconstruction, and/or weight based dosing when appropriate to reduce radiation dose to as low as reasonably achievable. COMPARISON: CT 8/8/2020 RESULT: Limitations from motion artifact. Patient had difficulty holding breath for the study. Liver: No mass or lesion. Biliary: No bile duct dilation. Gallbladder is unremarkable. Pancreas: No mass or duct dilation. Spleen: No mass. No splenomegaly. Adrenals: No mass. Kidneys: No mass, calculus or hydronephrosis. Normal uptake and excretion of contrast into the renal collecting systems. GI tract: No dilation or wall thickening. Mild diverticulosis without diverticulitis. No evidence for appendicitis. Lymph nodes: No abdominal or pelvic lymphadenopathy. Mesentery/Peritoneum/Retroperitoneum: No ascites or mass. Vasculature: The celiac axis and SMA are patent. The portal vein and branches, splenic vein, SMV, and hepatic veins are patent. Moderate arterial atherosclerotic disease without aneurysm. Pelvis: No free fluid. Bladder is decompressed. Changes from prior hernia repair in the right inguinal region. Small fat-containing left inguinal hernia. Bones: No acute osseous findings. Multilevel degenerative changes with multilevel bridging and but osteophytes within the visualized spine. Soft tissues: Unremarkable. Lower thorax: Small bilateral pleural effusions with associated atelectasis, right greater than left. Cardiomegaly. Coronary calcifications and/or stents. Aortic root calcifications. Partially imaged pacemaker. No acute process in the abdomen/pelvis. No bowel obstruction. Small bilateral pleural effusions with associated atelectasis. ==========     Xr Chest Portable    Result Date: 11/11/2020  EXAMINATION: CHEST PORTABLE VIEW  CLINICAL HISTORY: Short of breath COMPARISONS: August 10, 2020  FINDINGS: 2 views of the chest is submitted. Left-sided CCD device. Leads overlying the cardiac silhouette. Unchanged The cardiac silhouette is enlarged. Unremarkable. Pulmonary vascular attenuated Right sided trachea. Atelectasis left lower lobe No focal infiltrates. No Pneumothoraces. NO ACUTE ACTIVE CARDIOPULMONARY PROCESS    Us Abdomen Limited Specify Organ? Gallbladder, Liver    Result Date: 11/19/2020  US ABDOMEN LIMITED : 11/19/2020 CLINICAL HISTORY:  general abd pain with elevated LFTs . COMPARISON: CT abdomen and pelvis and chest CTA 11/18/2020. TECHNIQUE: Transabdominal ultrasound of the right upper quadrant was performed. FINDINGS: A moderate-sized right pleural effusion is noted. There is borderline dilatation of the pancreatic duct within the head for the patient's age group measuring approximately 2.5 mm in caliber. The gallbladder, visualized liver, pancreas, right kidney, and great vessels are unremarkable. There is no significant gallbladder distention, wall thickening, calculi, pericholecystic fluid, biliary dilatation, ascites, or incidental findings of concern identified. The common duct measures approximately 2 mm at the bren hepatis. ESSENTIALLY NEGATIVE RIGHT UPPER QUADRANT ULTRASOUND. Impression:   Abnormal LFTs which is improving. Serology for hepatitis A, B, and C was negative, T of the abdomen showed no focal liver lesions and patent hepatic artery and portal vein. Probably ischemic hepatitis,  Plan:   Follow LFT and also check LDH level. Comments:      Thank you for allowing us to participate in the care of this patient. Will continue to follow. Please call if questions or concerns arise.     Electronically signed by Saira Weeks MD on 11/21/2020 at 12:58 PM

## 2020-11-21 NOTE — PROGRESS NOTES
Patient assessed and charted on by this RN. Vital signs are stable. Held lopressor as ordered. HR 62. Patient denies pain or nausea. Fall precautions are in place. Stable on RA. Denies any needs at this time. Will continue to monitor. 6262 - Patient has rested comfortably. Uses urinal.    0600 - Patient currently eating sandwich. Denies any further needs.

## 2020-11-21 NOTE — PROGRESS NOTES
INPATIENT PROGRESS NOTES    PATIENT NAME: Donnie Tran  MRN: [id number]  SERVICE DATE:  November 21, 2020   SERVICE TIME:  6:10 PM      PRIMARY SERVICE: Pulmonary Disease    CHIEF COMPLAIN: Shortness of breath      INTERVAL HPI: Patient seen and examined at bedside, Interval Notes, orders reviewed. Nursing notes noted  Patient is doing better. He is on room air and his O2 saturation 99%. he is not having short of breath. Denies having any chest pain. He denies having any cough or sputum production. No fever or chills. No nausea vomiting diarrhea. Leg swelling improved. OBJECTIVE    Body mass index is 25.54 kg/m². PHYSICAL EXAM:  Vitals:  BP (!) 90/48   Pulse 64   Temp 97.3 °F (36.3 °C) (Oral)   Resp 16   Ht 5' 8\" (1.727 m)   Wt 168 lb (76.2 kg)   SpO2 99%   BMI 25.54 kg/m²   General:Alert, awake . comfortable in bed, No distress. Head: Atraumatic , Normocephalic   Eyes: PERRL. No sclera icterus. No conjunctival injection. No discharge   ENT: No nasal  discharge. Pharynx clear. lips, teeth, mucosa and gums are normal, tongue protrudes in the midline  Neck:  Trachea midline. No thyromegaly, no JVD, No cervical adenopathy. Chest : Bilaterally symmetrical ,Normal effort,  No accessory muscle use  Lung : . Fair BS bilateral, decreased BS at right base. No Rales. No wheezing. Few right basilar rhonchi. Heart[de-identified] Normal  rate. Regular rhythm. No mumur ,  Rub or gallop  ABD: Non-tender. Non-distended. No masses. No organmegaly. Normal bowel sounds. No hernia.   Ext : No Pitting both leg , No Cyanosis No clubbing  Neuro: no focal weakness      DATA:   Recent Labs     11/20/20  0714 11/21/20  0714   WBC 8.6 7.9   HGB 13.4* 13.4*   HCT 40.5* 40.8*   MCV 97.7 97.7    206     Recent Labs     11/20/20  0714 11/21/20 0714    141   K 4.8 3.7    107   CO2 16* 20   BUN 36* 37*   CREATININE 1.02 1.19   GLUCOSE 104* 173*   CALCIUM 8.8 8.0*   PROT 6.6 5.8*   LABALBU 3.9 3.4*   BILITOT 1.8* 1.0* ALKPHOS 100 82   * 236*   * 438*   LABGLOM >60.0 57.2*   GFRAA >60.0 >60.0   GLOB  --  2.4       MV Settings:          No results for input(s): PHART, LMZ8RGI, PO2ART, ERQ0ZFC, BEART, V7CJIYFP in the last 72 hours. O2 Device: None (Room air)  O2 Flow Rate (L/min): 2 L/min    Dietary Nutrition Supplements: Standard High Calorie Oral Supplement  DIET GENERAL; Safety Tray; Safety Tray (Disposables)     MEDICATIONS during current hospitalization:    Continuous Infusions:    Scheduled Meds:   furosemide  20 mg Oral Daily    docusate sodium  100 mg Oral Daily    lisinopril  5 mg Oral Daily    aspirin  81 mg Oral Daily    apixaban  2.5 mg Oral BID    budesonide-formoterol  2 puff Inhalation BID    metoprolol tartrate  25 mg Oral BID    guaiFENesin  600 mg Oral BID    sodium chloride flush  10 mL Intravenous 2 times per day       PRN Meds:dicyclomine, sodium chloride flush, polyethylene glycol, promethazine **OR** ondansetron    Radiology  Cta Chest W Wo Contrast    Result Date: 11/19/2020  CTA CHEST W WO CONTRAST: 11/18/2020 CLINICAL HISTORY:  SOB, tachypnea . COMPARISON: Chest CTA 10/3/2019 and CT abdomen pelvis 8/20/2020. Spiral enhanced images were obtained of the chest during the infusion of approximately 100 mL of Isovue 370 contrast with pulmonary artery  CTA protocol. Routine and volume rendered images were obtained on a 3-dimensional workstation. All CT scans at this facility use dose modulation, iterative reconstruction, and/or weight based dosing when appropriate to reduce radiation dose to as low as reasonably achievable. FINDINGS:  Motion artifact mildly limits detail of the lower lobe pulmonary artery branches. However, there are no filling defects identified to suggest pulmonary emboli. Moderate-sized bilateral layering pleural effusions are present, with mild to moderate adjacent compressive atelectasis/edema.  The heart is moderately enlarged with a left subclavian single lead pacemaker. The thoracic aorta is mildly ectatic with mild calcific plaquing without significant IV contrast. No findings to suggest dissection are identified. There is no other significant pulmonary infiltrate, worrisome nodules, pericardial effusion or other significant changes from the prior studies identified. Degenerative changes of the thoracic spine are again noted. The limited imaging of the included upper abdomen is noncontributory. NO EVIDENCE OF PULMONARY EMBOLI, WITHIN THE MILD LIMITATION OF THE STUDY. MODERATE-SIZED LAYERING PLEURAL EFFUSIONS, MILD TO MODERATE PROBABLE ADJACENT COMPRESSIVE ATELECTASIS/EDEMA. MODERATE CARDIOMEGALY. Ct Abdomen Pelvis W Iv Contrast Additional Contrast? None    Result Date: 11/18/2020  CT ABDOMEN PELVIS W IV CONTRAST HISTORY:   diffuse abdominal pain with vomiting; colitis vs obstruction . Other . Diffuse abdominal pain with emesis. Concern for colitis or bowel obstruction. TECHNIQUE: CT of the abdomen and pelvis was performed using standard technique, scanning from just above the dome of the diaphragm to the symphysis pubis. Including delayed images through the kidneys. Sagittal and coronal reconstructions performed on both phases. Contrast: IV: 100 ml Isovue 300 Oral:  None. All CT scans at this facility use dose modulation, iterative reconstruction, and/or weight based dosing when appropriate to reduce radiation dose to as low as reasonably achievable. COMPARISON: CT 8/8/2020 RESULT: Limitations from motion artifact. Patient had difficulty holding breath for the study. Liver: No mass or lesion. Biliary: No bile duct dilation. Gallbladder is unremarkable. Pancreas: No mass or duct dilation. Spleen: No mass. No splenomegaly. Adrenals: No mass. Kidneys: No mass, calculus or hydronephrosis. Normal uptake and excretion of contrast into the renal collecting systems. GI tract: No dilation or wall thickening. Mild diverticulosis without diverticulitis.  No evidence for appendicitis. Lymph nodes: No abdominal or pelvic lymphadenopathy. Mesentery/Peritoneum/Retroperitoneum: No ascites or mass. Vasculature: The celiac axis and SMA are patent. The portal vein and branches, splenic vein, SMV, and hepatic veins are patent. Moderate arterial atherosclerotic disease without aneurysm. Pelvis: No free fluid. Bladder is decompressed. Changes from prior hernia repair in the right inguinal region. Small fat-containing left inguinal hernia. Bones: No acute osseous findings. Multilevel degenerative changes with multilevel bridging and but osteophytes within the visualized spine. Soft tissues: Unremarkable. Lower thorax: Small bilateral pleural effusions with associated atelectasis, right greater than left. Cardiomegaly. Coronary calcifications and/or stents. Aortic root calcifications. Partially imaged pacemaker. No acute process in the abdomen/pelvis. No bowel obstruction. Small bilateral pleural effusions with associated atelectasis. ==========     Xr Chest Portable    Result Date: 11/11/2020  EXAMINATION: CHEST PORTABLE VIEW  CLINICAL HISTORY: Short of breath COMPARISONS: August 10, 2020  FINDINGS: 2 views of the chest is submitted. Left-sided CCD device. Leads overlying the cardiac silhouette. Unchanged The cardiac silhouette is enlarged. Unremarkable. Pulmonary vascular attenuated Right sided trachea. Atelectasis left lower lobe No focal infiltrates. No Pneumothoraces. NO ACUTE ACTIVE CARDIOPULMONARY PROCESS    Us Abdomen Limited Specify Organ? Gallbladder, Liver    Result Date: 11/19/2020  US ABDOMEN LIMITED : 11/19/2020 CLINICAL HISTORY:  general abd pain with elevated LFTs . COMPARISON: CT abdomen and pelvis and chest CTA 11/18/2020. TECHNIQUE: Transabdominal ultrasound of the right upper quadrant was performed. FINDINGS: A moderate-sized right pleural effusion is noted.  There is borderline dilatation of the pancreatic duct within the head for the patient's age group measuring approximately 2.5 mm in caliber. The gallbladder, visualized liver, pancreas, right kidney, and great vessels are unremarkable. There is no significant gallbladder distention, wall thickening, calculi, pericholecystic fluid, biliary dilatation, ascites, or incidental findings of concern identified. The common duct measures approximately 2 mm at the bren hepatis. ESSENTIALLY NEGATIVE RIGHT UPPER QUADRANT ULTRASOUND. IMPRESSION AND SUGGESTION:  1. Congestive heart failure with right pleural effusion  2. Atrial fibrillation  3. Cardiomyopathy    He is on room air O2 saturation 99%. No complaint of shortness of breath. Pleural effusion likely cardiac origin and will hold off on thoracentesis. He is on Eliquis.   Continue diuretic therapy      Electronically signed by Selina Moore MD, FCCP on 11/21/2020 at 6:10 PM

## 2020-11-21 NOTE — PROGRESS NOTES
Hospitalist Progress Note      Date of Admission: 11/18/2020  Chief Complaint:    Chief Complaint   Patient presents with    Shortness of Breath     increased throughout day     Subjective:  Patient desires to not use     Medications:    Infusion Medications   Scheduled Medications    furosemide  20 mg Oral Daily    docusate sodium  100 mg Oral Daily    lisinopril  5 mg Oral Daily    aspirin  81 mg Oral Daily    apixaban  2.5 mg Oral BID    budesonide-formoterol  2 puff Inhalation BID    metoprolol tartrate  25 mg Oral BID    guaiFENesin  600 mg Oral BID    sodium chloride flush  10 mL Intravenous 2 times per day     PRN Meds: dicyclomine, sodium chloride flush, polyethylene glycol, promethazine **OR** ondansetron    Intake/Output Summary (Last 24 hours) at 11/21/2020 1848  Last data filed at 11/21/2020 1522  Gross per 24 hour   Intake 860 ml   Output 800 ml   Net 60 ml     Exam:  BP (!) 90/48   Pulse 64   Temp 97.3 °F (36.3 °C) (Oral)   Resp 16   Ht 5' 8\" (1.727 m)   Wt 168 lb (76.2 kg)   SpO2 99%   BMI 25.54 kg/m²   Head: Normocephalic, atraumatic  Sclera clear  Neck: trachea central  Lungs: normal effort of breathing    Labs:   Recent Labs     11/19/20 0714 11/20/20 0714 11/21/20 0714   WBC 8.9 8.6 7.9   HGB 12.6* 13.4* 13.4*   HCT 38.3* 40.5* 40.8*    204 206     Recent Labs     11/19/20 0714 11/20/20 0714 11/21/20  0714    137 141   K 4.1 4.8 3.7    105 107   CO2 21 16* 20   BUN 26* 36* 37*   CREATININE 0.96 1.02 1.19   CALCIUM 8.7 8.8 8.0*   AST  --  579* 236*   ALT  --  549* 438*   BILIDIR  --  0.6*  --    BILITOT  --  1.8* 1.0*   ALKPHOS  --  100 82     No results for input(s): INR in the last 72 hours. No results for input(s): Vernica Shashank in the last 72 hours. Radiology:  RADIOLOGY REPORT   Final Result      US ABDOMEN LIMITED Specify organ? GALLBLADDER, LIVER   Final Result      ESSENTIALLY NEGATIVE RIGHT UPPER QUADRANT ULTRASOUND. CTA CHEST W WO CONTRAST   Final Result      NO EVIDENCE OF PULMONARY EMBOLI, WITHIN THE MILD LIMITATION OF THE STUDY. MODERATE-SIZED LAYERING PLEURAL EFFUSIONS, MILD TO MODERATE PROBABLE ADJACENT COMPRESSIVE ATELECTASIS/EDEMA. MODERATE CARDIOMEGALY. CT ABDOMEN PELVIS W IV CONTRAST Additional Contrast? None   Final Result      No acute process in the abdomen/pelvis. No bowel obstruction. Small bilateral pleural effusions with associated atelectasis.               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 Liver congestion sec to chf. Iv diuresis transitioning to PO today, monitor renal function. 98% room air    LFT bump: GI consulted. Downtrending. Pleural effusion: agree with pulm, likely of cardiac origin. Expect improvement with diuresis.      Afib: rate controlled on xarelto    DC planning: anticipate dc in am.     Electronically signed by Cynthea Shone, MD on 11/21/2020 at 6:48 PM

## 2020-11-21 NOTE — PROGRESS NOTES
Physical Therapy Med Surg Initial Assessment  Facility/Department: [facility]  [facility]       NAME: José Antonio Gutierrez  : 1929 (80 y.o.)  MRN: [id number]  CODE STATUS: Full Code    Date of Service: 2020    Patient Diagnosis(es): SOB (shortness of breath) [R06.02]  SOB (shortness of breath) [R06.02]   Chief Complaint   Patient presents with    Shortness of Breath     increased throughout day     Patient Active Problem List    Diagnosis Date Noted    SSS (sick sinus syndrome) ([facility]      Priority: High    SOB (shortness of breath) 2020    Cardiomyopathy ([facility] [address] 2020    Chronic atrial fibrillation ([facility] [address] 2020    New onset a-fib ([address] 10/03/2019        Past Medical History:   Diagnosis Date    Atrial fibrillation ([address]     Cardiomyopathy ([facility] [address] 2020    Diabetes mellitus ([facility] [address]     Hypertension      Past Surgical History:   Procedure Laterality Date    BACK SURGERY      HERNIA REPAIR       Chart Reviewed: Yes  Patient assessed for rehabilitation services?: Yes  Family / Caregiver Present: No  General Comment  Comments: Pt laying in bed-agreeable to PT eval; Turkish speaking, this PT fluent in [address], son at bedside durign evaluation    Restrictions:  Restrictions/Precautions: Fall Risk(moderate fall risk per [place] score)     SUBJECTIVE: Subjective: Pt denies pain; asking when he will be able to go home  Response To Previous Treatment: Not applicable    Pain  Pre Treatment Pain Screening  Pain at present: 0  Scale Used: Numeric Score  Intervention List: Patient able to continue with treatment    Post Treatment Pain Screening:   Pain Screening  Patient Currently in Pain: No  Pain Assessment  Pain Level: 0    Prior Level of Function:  Social/Functional History  Lives With: Son(and granddtr)  Type of Home: House  Home Layout: Two level, Able to Live on Main level with bedroom/bathroom  Home Access: Stairs to enter with rails  Entrance Stairs - Number of Steps: 4  Entrance Stairs - Rails: Both  Bathroom Shower/Tub: Tub/Shower unit  Bathroom Equipment: Shower chair, Grab bars in shower  Home Equipment: Rolling walker, Cane, [address] Help From: Family  ADL Assistance: Independent  Homemaking Assistance: Needs assistance  Homemaking Responsibilities: No  Ambulation Assistance: Independent(cane in home; assist with community ambulation from family)  Transfer Assistance: Independent  Active : No    OBJECTIVE:   Vision: Within Functional Limits  Hearing: Within functional limits    Cognition:  Overall Orientation Status: Impaired  Orientation Level: Oriented to place, Oriented to person, Disoriented to time, Disoriented to situation  Follows Commands: Within Functional Limits    Observation/Palpation  Posture: Fair(agre related postureal changes, flexed posture)  Observation: pleasant, cooperative, no acute distress on RA    ROM:  RLE PROM: WFL  LLE PROM: WFL    Strength:  Strength RLE  Strength RLE: WFL  Strength LLE  Strength LLE: WFL    Neuro:  Balance  Posture: Fair  Sitting - Static: Good  Sitting - Dynamic: Good  Standing - Static: Fair;+(supervision with no UE support 1 min)  Standing - Dynamic: Fair;+(requires 1-2 UE support to navigate room environement)     Motor Control  Gross Motor?: WFL  Sensation  Overall Sensation Status: WFL    Bed mobility  Supine to Sit: Supervision  Sit to Supine: Supervision    Transfers  Sit to Stand: Supervision  Stand to sit: Supervision    Ambulation  Ambulation?: Yes  Ambulation 1  Surface: level tile  Device: Rolling Walker; No Device  Quality of Gait: reciprocal, narrow BONNY, turns quickly  Distance: 40ft with turns in room  Comments: trialed ambulation with and without 2ww. CGA with no AD, SBA with 2ww. impulsive.  Son reports that pt typically uses cane for mobility in home    Activity Tolerance  Activity Tolerance: Patient Tolerated treatment well      PT Education  PT Education: Goals;PT Role;Plan of for activity completion. Device may be needed.   Stand by assistance = pt requires verbal cues or instructions from another person, close to but not touching, to perform the activity  Minimal assistance= pt performs 75% or more of the activity; assistance is required to complete the activity  Moderate assistance= pt performs 50% of the activity; assistance is required to complete the activity  Maximal assistance = pt performs 25% of the activity; assistance is required to complete the activity  Dependent = pt requires total physical assistance to accomplish the task

## 2020-11-22 VITALS
SYSTOLIC BLOOD PRESSURE: 120 MMHG | RESPIRATION RATE: 18 BRPM | HEART RATE: 92 BPM | DIASTOLIC BLOOD PRESSURE: 70 MMHG | TEMPERATURE: 97.5 F | BODY MASS INDEX: 25.46 KG/M2 | OXYGEN SATURATION: 97 % | WEIGHT: 168 LBS | HEIGHT: 68 IN

## 2020-11-22 LAB
ALBUMIN SERPL-MCNC: 3.5 G/DL (ref 3.5–4.6)
ALP BLD-CCNC: 82 U/L (ref 35–104)
ALT SERPL-CCNC: 336 U/L (ref 0–41)
ANION GAP SERPL CALCULATED.3IONS-SCNC: 12 MEQ/L (ref 9–15)
AST SERPL-CCNC: 126 U/L (ref 0–40)
BASOPHILS ABSOLUTE: 0 K/UL (ref 0–0.2)
BASOPHILS RELATIVE PERCENT: 0.5 %
BILIRUB SERPL-MCNC: 1 MG/DL (ref 0.2–0.7)
BUN BLDV-MCNC: 30 MG/DL (ref 8–23)
CALCIUM SERPL-MCNC: 8.5 MG/DL (ref 8.5–9.9)
CHLORIDE BLD-SCNC: 100 MEQ/L (ref 95–107)
CO2: 24 MEQ/L (ref 20–31)
CREAT SERPL-MCNC: 1.05 MG/DL (ref 0.7–1.2)
EOSINOPHILS ABSOLUTE: 0.2 K/UL (ref 0–0.7)
EOSINOPHILS RELATIVE PERCENT: 2.6 %
GFR AFRICAN AMERICAN: >60
GFR NON-AFRICAN AMERICAN: >60
GLOBULIN: 2.5 G/DL (ref 2.3–3.5)
GLUCOSE BLD-MCNC: 128 MG/DL (ref 70–99)
HCT VFR BLD CALC: 41 % (ref 42–52)
HEMOGLOBIN: 13.2 G/DL (ref 14–18)
LYMPHOCYTES ABSOLUTE: 1.7 K/UL (ref 1–4.8)
LYMPHOCYTES RELATIVE PERCENT: 24.7 %
MCH RBC QN AUTO: 31.8 PG (ref 27–31.3)
MCHC RBC AUTO-ENTMCNC: 32.3 % (ref 33–37)
MCV RBC AUTO: 98.5 FL (ref 80–100)
MONOCYTES ABSOLUTE: 0.9 K/UL (ref 0.2–0.8)
MONOCYTES RELATIVE PERCENT: 13.2 %
NEUTROPHILS ABSOLUTE: 4 K/UL (ref 1.4–6.5)
NEUTROPHILS RELATIVE PERCENT: 59 %
PDW BLD-RTO: 14.7 % (ref 11.5–14.5)
PLATELET # BLD: 204 K/UL (ref 130–400)
POTASSIUM REFLEX MAGNESIUM: 3.9 MEQ/L (ref 3.4–4.9)
RBC # BLD: 4.16 M/UL (ref 4.7–6.1)
SODIUM BLD-SCNC: 136 MEQ/L (ref 135–144)
TOTAL PROTEIN: 6 G/DL (ref 6.3–8)
WBC # BLD: 6.7 K/UL (ref 4.8–10.8)

## 2020-11-22 PROCEDURE — 99232 SBSQ HOSP IP/OBS MODERATE 35: CPT | Performed by: INTERNAL MEDICINE

## 2020-11-22 PROCEDURE — 94640 AIRWAY INHALATION TREATMENT: CPT

## 2020-11-22 PROCEDURE — 36415 COLL VENOUS BLD VENIPUNCTURE: CPT

## 2020-11-22 PROCEDURE — 94761 N-INVAS EAR/PLS OXIMETRY MLT: CPT

## 2020-11-22 PROCEDURE — 6370000000 HC RX 637 (ALT 250 FOR IP): Performed by: NURSE PRACTITIONER

## 2020-11-22 PROCEDURE — 80053 COMPREHEN METABOLIC PANEL: CPT

## 2020-11-22 PROCEDURE — 6370000000 HC RX 637 (ALT 250 FOR IP): Performed by: INTERNAL MEDICINE

## 2020-11-22 PROCEDURE — 85025 COMPLETE CBC W/AUTO DIFF WBC: CPT

## 2020-11-22 RX ORDER — FUROSEMIDE 20 MG/1
20 TABLET ORAL DAILY
Qty: 30 TABLET | Refills: 0 | Status: ON HOLD | OUTPATIENT
Start: 2020-11-23 | End: 2021-06-07

## 2020-11-22 RX ADMIN — GUAIFENESIN 600 MG: 600 TABLET, EXTENDED RELEASE ORAL at 09:03

## 2020-11-22 RX ADMIN — APIXABAN 2.5 MG: 2.5 TABLET, FILM COATED ORAL at 09:03

## 2020-11-22 RX ADMIN — LISINOPRIL 5 MG: 5 TABLET ORAL at 09:03

## 2020-11-22 RX ADMIN — DOCUSATE SODIUM 100 MG: 100 CAPSULE ORAL at 09:03

## 2020-11-22 RX ADMIN — ASPIRIN 81 MG 81 MG: 81 TABLET ORAL at 09:03

## 2020-11-22 RX ADMIN — METOPROLOL TARTRATE 25 MG: 25 TABLET, FILM COATED ORAL at 09:03

## 2020-11-22 RX ADMIN — BUDESONIDE AND FORMOTEROL FUMARATE DIHYDRATE 2 PUFF: 160; 4.5 AEROSOL RESPIRATORY (INHALATION) at 07:00

## 2020-11-22 RX ADMIN — FUROSEMIDE 20 MG: 20 TABLET ORAL at 09:03

## 2020-11-22 NOTE — DISCHARGE SUMMARY
10/3/2019 and CT abdomen pelvis 8/20/2020. Spiral enhanced images were obtained of the chest during the infusion of approximately 100 mL of Isovue 370 contrast with pulmonary artery  CTA protocol. Routine and volume rendered images were obtained on a 3-dimensional workstation. All CT scans at this facility use dose modulation, iterative reconstruction, and/or weight based dosing when appropriate to reduce radiation dose to as low as reasonably achievable. FINDINGS:  Motion artifact mildly limits detail of the lower lobe pulmonary artery branches. However, there are no filling defects identified to suggest pulmonary emboli. Moderate-sized bilateral layering pleural effusions are present, with mild to moderate adjacent compressive atelectasis/edema. The heart is moderately enlarged with a left subclavian single lead pacemaker. The thoracic aorta is mildly ectatic with mild calcific plaquing without significant IV contrast. No findings to suggest dissection are identified. There is no other significant pulmonary infiltrate, worrisome nodules, pericardial effusion or other significant changes from the prior studies identified. Degenerative changes of the thoracic spine are again noted. The limited imaging of the included upper abdomen is noncontributory. NO EVIDENCE OF PULMONARY EMBOLI, WITHIN THE MILD LIMITATION OF THE STUDY. MODERATE-SIZED LAYERING PLEURAL EFFUSIONS, MILD TO MODERATE PROBABLE ADJACENT COMPRESSIVE ATELECTASIS/EDEMA. MODERATE CARDIOMEGALY. Ct Abdomen Pelvis W Iv Contrast Additional Contrast? None    Result Date: 11/18/2020  CT ABDOMEN PELVIS W IV CONTRAST HISTORY:   diffuse abdominal pain with vomiting; colitis vs obstruction . Other . Diffuse abdominal pain with emesis. Concern for colitis or bowel obstruction. TECHNIQUE: CT of the abdomen and pelvis was performed using standard technique, scanning from just above the dome of the diaphragm to the symphysis pubis.   Including delayed images through the kidneys. Sagittal and coronal reconstructions performed on both phases. Contrast: IV: 100 ml Isovue 300 Oral:  None. All CT scans at this facility use dose modulation, iterative reconstruction, and/or weight based dosing when appropriate to reduce radiation dose to as low as reasonably achievable. COMPARISON: CT 8/8/2020 RESULT: Limitations from motion artifact. Patient had difficulty holding breath for the study. Liver: No mass or lesion. Biliary: No bile duct dilation. Gallbladder is unremarkable. Pancreas: No mass or duct dilation. Spleen: No mass. No splenomegaly. Adrenals: No mass. Kidneys: No mass, calculus or hydronephrosis. Normal uptake and excretion of contrast into the renal collecting systems. GI tract: No dilation or wall thickening. Mild diverticulosis without diverticulitis. No evidence for appendicitis. Lymph nodes: No abdominal or pelvic lymphadenopathy. Mesentery/Peritoneum/Retroperitoneum: No ascites or mass. Vasculature: The celiac axis and SMA are patent. The portal vein and branches, splenic vein, SMV, and hepatic veins are patent. Moderate arterial atherosclerotic disease without aneurysm. Pelvis: No free fluid. Bladder is decompressed. Changes from prior hernia repair in the right inguinal region. Small fat-containing left inguinal hernia. Bones: No acute osseous findings. Multilevel degenerative changes with multilevel bridging and but osteophytes within the visualized spine. Soft tissues: Unremarkable. Lower thorax: Small bilateral pleural effusions with associated atelectasis, right greater than left. Cardiomegaly. Coronary calcifications and/or stents. Aortic root calcifications. Partially imaged pacemaker. No acute process in the abdomen/pelvis. No bowel obstruction. Small bilateral pleural effusions with associated atelectasis. ==========     Us Abdomen Limited Specify Organ?  Gallbladder, Liver    Result Date: 11/19/2020  US ABDOMEN LIMITED : 11/19/2020 CLINICAL HISTORY:  general abd pain with elevated LFTs . COMPARISON: CT abdomen and pelvis and chest CTA 11/18/2020. TECHNIQUE: Transabdominal ultrasound of the right upper quadrant was performed. FINDINGS: A moderate-sized right pleural effusion is noted. There is borderline dilatation of the pancreatic duct within the head for the patient's age group measuring approximately 2.5 mm in caliber. The gallbladder, visualized liver, pancreas, right kidney, and great vessels are unremarkable. There is no significant gallbladder distention, wall thickening, calculi, pericholecystic fluid, biliary dilatation, ascites, or incidental findings of concern identified. The common duct measures approximately 2 mm at the bren hepatis. ESSENTIALLY NEGATIVE RIGHT UPPER QUADRANT ULTRASOUND. Discharge Medications:       Blaze Muñoz   Home Medication Instructions CFF:[id number]    Printed on:11/22/20 1330   Medication Information                      albuterol (PROVENTIL) (2.5 MG/3ML) 0.083% nebulizer solution  Take 3 mLs by nebulization every 6 hours as needed for Wheezing             apixaban (ELIQUIS) 2.5 MG TABS tablet  Take 1 tablet by mouth 2 times daily             aspirin 81 MG chewable tablet  Take 1 tablet by mouth daily             fluticasone-salmeterol (ADVAIR DISKUS) 250-50 MCG/DOSE AEPB  Inhale 1 puff into the lungs every 12 hours for 14 days             furosemide (LASIX) 20 MG tablet  Take 1 tablet by mouth daily             lisinopril (PRINIVIL;ZESTRIL) 5 MG tablet  Take 1 tablet by mouth daily             metoprolol tartrate (LOPRESSOR) 25 MG tablet  Take 1 tablet by mouth 2 times daily                 Disposition:   If discharged to Home, Any [place] [address] needs that were indicated and/or required as been addressed and set up by Social Work. Condition at discharge: Pt was medically stable at the time of discharge. Activity: activity as tolerated    Total time taken for discharging this patient: 40 minutes. Greater than 70% of time was spent focused exclusively on this patient. Time was taken to review chart, discuss plans with consultants, reconciling medications, discussing plan answering questions with patient.      Signed:  Baruch Cranker

## 2020-11-22 NOTE — PROGRESS NOTES
INPATIENT PROGRESS NOTES    PATIENT NAME: Kaiser Acevedo  MRN: [id number]  SERVICE DATE:  November 22, 2020   SERVICE TIME:  2:51 PM      PRIMARY SERVICE: Pulmonary Disease    CHIEF COMPLAIN: Shortness of breath      INTERVAL HPI: Patient seen and examined at bedside, Interval Notes, orders reviewed. Nursing notes noted  Patient was seen earlier today  he is doing better. He is on room air and his O2 saturation 997%. No complaint of shortness of breath cough or sputum production. No chest pain. No fever or chills. No nausea, vomiting or diarrhea. OBJECTIVE    Body mass index is 25.54 kg/m². PHYSICAL EXAM:  Vitals:  /70   Pulse 92   Temp 97.5 °F (36.4 °C) (Oral)   Resp 18   Ht 5' 8\" (1.727 m)   Wt 168 lb (76.2 kg)   SpO2 97%   BMI 25.54 kg/m²   General:Alert, awake . comfortable in bed, No distress. Head: Atraumatic , Normocephalic   Eyes: PERRL. No sclera icterus. No conjunctival injection. No discharge   ENT: No nasal  discharge. Pharynx clear. lips, teeth, mucosa and gums are normal, tongue protrudes in the midline  Neck:  Trachea midline. No thyromegaly, no JVD, No cervical adenopathy. Chest : Bilaterally symmetrical ,Normal effort,  No accessory muscle use  Lung : . Fair BS bilateral, decreased BS at right base. No Rales. No wheezing. Few right basilar rhonchi. Heart[de-identified] Normal  rate. Regular rhythm. No mumur ,  Rub or gallop  ABD: Non-tender. Non-distended. No masses. No organmegaly. Normal bowel sounds. No hernia.   Ext : No Pitting both leg , No Cyanosis No clubbing  Neuro: no focal weakness      DATA:   Recent Labs     11/21/20  0714 11/22/20  0636   WBC 7.9 6.7   HGB 13.4* 13.2*   HCT 40.8* 41.0*   MCV 97.7 98.5    204     Recent Labs     11/21/20  0714 11/22/20  0636    136   K 3.7 3.9    100   CO2 20 24   BUN 37* 30*   CREATININE 1.19 1.05   GLUCOSE 173* 128*   CALCIUM 8.0* 8.5   PROT 5.8* 6.0*   LABALBU 3.4* 3.5   BILITOT 1.0* 1.0*   ALKPHOS 82 82   * 126*   * 336*   LABGLOM 57.2* >60.0   GFRAA >60.0 >60.0   GLOB 2.4 2.5       MV Settings:          No results for input(s): PHART, HTV0IAR, PO2ART, OBT9SEU, BEART, F8TQWCFJ in the last 72 hours. O2 Device: None (Room air)  O2 Flow Rate (L/min): 2 L/min    Dietary Nutrition Supplements: Standard High Calorie Oral Supplement  DIET GENERAL; Safety Tray; Safety Tray (Disposables)     MEDICATIONS during current hospitalization:    Continuous Infusions:    Scheduled Meds:   furosemide  20 mg Oral Daily    docusate sodium  100 mg Oral Daily    lisinopril  5 mg Oral Daily    aspirin  81 mg Oral Daily    apixaban  2.5 mg Oral BID    budesonide-formoterol  2 puff Inhalation BID    metoprolol tartrate  25 mg Oral BID    guaiFENesin  600 mg Oral BID    sodium chloride flush  10 mL Intravenous 2 times per day       PRN Meds:dicyclomine, sodium chloride flush, polyethylene glycol, promethazine **OR** ondansetron    Radiology  Cta Chest W Wo Contrast    Result Date: 11/19/2020  CTA CHEST W WO CONTRAST: 11/18/2020 CLINICAL HISTORY:  SOB, tachypnea . COMPARISON: Chest CTA 10/3/2019 and CT abdomen pelvis 8/20/2020. Spiral enhanced images were obtained of the chest during the infusion of approximately 100 mL of Isovue 370 contrast with pulmonary artery  CTA protocol. Routine and volume rendered images were obtained on a 3-dimensional workstation. All CT scans at this facility use dose modulation, iterative reconstruction, and/or weight based dosing when appropriate to reduce radiation dose to as low as reasonably achievable. FINDINGS:  Motion artifact mildly limits detail of the lower lobe pulmonary artery branches. However, there are no filling defects identified to suggest pulmonary emboli. Moderate-sized bilateral layering pleural effusions are present, with mild to moderate adjacent compressive atelectasis/edema. The heart is moderately enlarged with a left subclavian single lead pacemaker.  The thoracic aorta is mildly ectatic with mild calcific plaquing without significant IV contrast. No findings to suggest dissection are identified. There is no other significant pulmonary infiltrate, worrisome nodules, pericardial effusion or other significant changes from the prior studies identified. Degenerative changes of the thoracic spine are again noted. The limited imaging of the included upper abdomen is noncontributory. NO EVIDENCE OF PULMONARY EMBOLI, WITHIN THE MILD LIMITATION OF THE STUDY. MODERATE-SIZED LAYERING PLEURAL EFFUSIONS, MILD TO MODERATE PROBABLE ADJACENT COMPRESSIVE ATELECTASIS/EDEMA. MODERATE CARDIOMEGALY. Ct Abdomen Pelvis W Iv Contrast Additional Contrast? None    Result Date: 11/18/2020  CT ABDOMEN PELVIS W IV CONTRAST HISTORY:   diffuse abdominal pain with vomiting; colitis vs obstruction . Other . Diffuse abdominal pain with emesis. Concern for colitis or bowel obstruction. TECHNIQUE: CT of the abdomen and pelvis was performed using standard technique, scanning from just above the dome of the diaphragm to the symphysis pubis. Including delayed images through the kidneys. Sagittal and coronal reconstructions performed on both phases. Contrast: IV: 100 ml Isovue 300 Oral:  None. All CT scans at this facility use dose modulation, iterative reconstruction, and/or weight based dosing when appropriate to reduce radiation dose to as low as reasonably achievable. COMPARISON: CT 8/8/2020 RESULT: Limitations from motion artifact. Patient had difficulty holding breath for the study. Liver: No mass or lesion. Biliary: No bile duct dilation. Gallbladder is unremarkable. Pancreas: No mass or duct dilation. Spleen: No mass. No splenomegaly. Adrenals: No mass. Kidneys: No mass, calculus or hydronephrosis. Normal uptake and excretion of contrast into the renal collecting systems. GI tract: No dilation or wall thickening. Mild diverticulosis without diverticulitis. No evidence for appendicitis.  Lymph nodes: No abdominal or pelvic lymphadenopathy. Mesentery/Peritoneum/Retroperitoneum: No ascites or mass. Vasculature: The celiac axis and SMA are patent. The portal vein and branches, splenic vein, SMV, and hepatic veins are patent. Moderate arterial atherosclerotic disease without aneurysm. Pelvis: No free fluid. Bladder is decompressed. Changes from prior hernia repair in the right inguinal region. Small fat-containing left inguinal hernia. Bones: No acute osseous findings. Multilevel degenerative changes with multilevel bridging and but osteophytes within the visualized spine. Soft tissues: Unremarkable. Lower thorax: Small bilateral pleural effusions with associated atelectasis, right greater than left. Cardiomegaly. Coronary calcifications and/or stents. Aortic root calcifications. Partially imaged pacemaker. No acute process in the abdomen/pelvis. No bowel obstruction. Small bilateral pleural effusions with associated atelectasis. ==========     Xr Chest Portable    Result Date: 11/11/2020  EXAMINATION: CHEST PORTABLE VIEW  CLINICAL HISTORY: Short of breath COMPARISONS: August 10, 2020  FINDINGS: 2 views of the chest is submitted. Left-sided CCD device. Leads overlying the cardiac silhouette. Unchanged The cardiac silhouette is enlarged. Unremarkable. Pulmonary vascular attenuated Right sided trachea. Atelectasis left lower lobe No focal infiltrates. No Pneumothoraces. NO ACUTE ACTIVE CARDIOPULMONARY PROCESS    Us Abdomen Limited Specify Organ? Gallbladder, Liver    Result Date: 11/19/2020  US ABDOMEN LIMITED : 11/19/2020 CLINICAL HISTORY:  general abd pain with elevated LFTs . COMPARISON: CT abdomen and pelvis and chest CTA 11/18/2020. TECHNIQUE: Transabdominal ultrasound of the right upper quadrant was performed. FINDINGS: A moderate-sized right pleural effusion is noted.  There is borderline dilatation of the pancreatic duct within the head for the patient's age group measuring approximately 2.5 mm in caliber. The gallbladder, visualized liver, pancreas, right kidney, and great vessels are unremarkable. There is no significant gallbladder distention, wall thickening, calculi, pericholecystic fluid, biliary dilatation, ascites, or incidental findings of concern identified. The common duct measures approximately 2 mm at the bren hepatis. ESSENTIALLY NEGATIVE RIGHT UPPER QUADRANT ULTRASOUND. IMPRESSION AND SUGGESTION:  1. Congestive heart failure with right pleural effusion  2. Atrial fibrillation  3. Cardiomyopathy    He is on room air O2 saturation 99%. No complaint of shortness of breath. Pleural effusion likely cardiac origin and will hold off on thoracentesis. He is on Eliquis. Continue diuretic therapy.   Okay to discharge with follow-up in office 4 weeks      Electronically signed by Trace Cedillo MD, FCCP on 11/22/2020

## 2020-11-22 NOTE — PROGRESS NOTES
Pt and son given d/c instructions with . IV hep lock removed.  Electronically signed by Kasey Lind RN on 11/22/2020 at 1:53 PM

## 2020-11-22 NOTE — PROGRESS NOTES
Assessment completed. Denies any SOB, N/V, dizziness, or pain at this time. Denies any other needs at this time. Call light is within reach.   Electronically signed by Jean-Pierre Lema RN on 11/22/2020 at 3:31 AM

## 2020-11-23 NOTE — CARE COORDINATION
11/23/2020: [place] Pneumonia booklet and zones sheet mailed to patient's home along with Care Transition Nurse(CTN) contact information. CTN will phone the patient to review literature and provide follow up.

## 2020-11-24 LAB
BLOOD CULTURE, ROUTINE: NORMAL
CULTURE, BLOOD 2: NORMAL

## 2020-12-02 ENCOUNTER — HOSPITAL ENCOUNTER (EMERGENCY)
Age: 85
Discharge: HOME | End: 2020-12-02
Payer: MEDICARE

## 2020-12-02 ENCOUNTER — APPOINTMENT (OUTPATIENT)
Dept: GENERAL RADIOLOGY | Age: 85
End: 2020-12-02
Payer: MEDICARE

## 2020-12-02 VITALS
RESPIRATION RATE: 18 BRPM | HEIGHT: 66 IN | OXYGEN SATURATION: 100 % | SYSTOLIC BLOOD PRESSURE: 114 MMHG | BODY MASS INDEX: 22.5 KG/M2 | WEIGHT: 140 LBS | DIASTOLIC BLOOD PRESSURE: 83 MMHG | HEART RATE: 60 BPM | TEMPERATURE: 98.2 F

## 2020-12-02 LAB
ALBUMIN SERPL-MCNC: 4.2 G/DL (ref 3.5–4.6)
ALP BLD-CCNC: 86 U/L (ref 35–104)
ALT SERPL-CCNC: 69 U/L (ref 0–41)
ANION GAP SERPL CALCULATED.3IONS-SCNC: 15 MEQ/L (ref 9–15)
APTT: 37.5 SEC (ref 24.4–36.8)
AST SERPL-CCNC: 46 U/L (ref 0–40)
BASOPHILS ABSOLUTE: 0 K/UL (ref 0–0.2)
BASOPHILS RELATIVE PERCENT: 0.6 %
BILIRUB SERPL-MCNC: 1.7 MG/DL (ref 0.2–0.7)
BUN BLDV-MCNC: 31 MG/DL (ref 8–23)
CALCIUM SERPL-MCNC: 9.5 MG/DL (ref 8.5–9.9)
CHLORIDE BLD-SCNC: 102 MEQ/L (ref 95–107)
CO2: 21 MEQ/L (ref 20–31)
CREAT SERPL-MCNC: 1.33 MG/DL (ref 0.7–1.2)
EKG ATRIAL RATE: 67 BPM
EKG Q-T INTERVAL: 532 MS
EKG QRS DURATION: 180 MS
EKG QTC CALCULATION (BAZETT): 553 MS
EKG R AXIS: -66 DEGREES
EKG T AXIS: 98 DEGREES
EKG VENTRICULAR RATE: 65 BPM
EOSINOPHILS ABSOLUTE: 0.1 K/UL (ref 0–0.7)
EOSINOPHILS RELATIVE PERCENT: 0.9 %
GFR AFRICAN AMERICAN: >60
GFR NON-AFRICAN AMERICAN: 50.3
GLOBULIN: 3.2 G/DL (ref 2.3–3.5)
GLUCOSE BLD-MCNC: 113 MG/DL (ref 70–99)
HCT VFR BLD CALC: 44 % (ref 42–52)
HEMOGLOBIN: 14.5 G/DL (ref 14–18)
INR BLD: 1.5
LACTIC ACID: 3 MMOL/L (ref 0.5–2.2)
LYMPHOCYTES ABSOLUTE: 2.9 K/UL (ref 1–4.8)
LYMPHOCYTES RELATIVE PERCENT: 39.3 %
MCH RBC QN AUTO: 32.3 PG (ref 27–31.3)
MCHC RBC AUTO-ENTMCNC: 33 % (ref 33–37)
MCV RBC AUTO: 97.8 FL (ref 80–100)
MONOCYTES ABSOLUTE: 0.7 K/UL (ref 0.2–0.8)
MONOCYTES RELATIVE PERCENT: 9.7 %
NEUTROPHILS ABSOLUTE: 3.7 K/UL (ref 1.4–6.5)
NEUTROPHILS RELATIVE PERCENT: 49.5 %
PDW BLD-RTO: 15 % (ref 11.5–14.5)
PLATELET # BLD: 202 K/UL (ref 130–400)
POTASSIUM SERPL-SCNC: 5 MEQ/L (ref 3.4–4.9)
PRO-BNP: NORMAL PG/ML
PROCALCITONIN: 0.07 NG/ML (ref 0–0.15)
PROTHROMBIN TIME: 18.6 SEC (ref 12.3–14.9)
RBC # BLD: 4.5 M/UL (ref 4.7–6.1)
SODIUM BLD-SCNC: 138 MEQ/L (ref 135–144)
TOTAL CK: 69 U/L (ref 0–190)
TOTAL PROTEIN: 7.4 G/DL (ref 6.3–8)
TROPONIN: <0.01 NG/ML (ref 0–0.01)
WBC # BLD: 7.4 K/UL (ref 4.8–10.8)

## 2020-12-02 PROCEDURE — 93010 ELECTROCARDIOGRAM REPORT: CPT | Performed by: INTERNAL MEDICINE

## 2020-12-02 PROCEDURE — 82550 ASSAY OF CK (CPK): CPT

## 2020-12-02 PROCEDURE — 6360000002 HC RX W HCPCS: Performed by: PHYSICIAN ASSISTANT

## 2020-12-02 PROCEDURE — 71045 X-RAY EXAM CHEST 1 VIEW: CPT

## 2020-12-02 PROCEDURE — 96374 THER/PROPH/DIAG INJ IV PUSH: CPT

## 2020-12-02 PROCEDURE — 80053 COMPREHEN METABOLIC PANEL: CPT

## 2020-12-02 PROCEDURE — 85730 THROMBOPLASTIN TIME PARTIAL: CPT

## 2020-12-02 PROCEDURE — 87040 BLOOD CULTURE FOR BACTERIA: CPT

## 2020-12-02 PROCEDURE — 99283 EMERGENCY DEPT VISIT LOW MDM: CPT

## 2020-12-02 PROCEDURE — 93005 ELECTROCARDIOGRAM TRACING: CPT | Performed by: PHYSICIAN ASSISTANT

## 2020-12-02 PROCEDURE — 36415 COLL VENOUS BLD VENIPUNCTURE: CPT

## 2020-12-02 PROCEDURE — 84145 PROCALCITONIN (PCT): CPT

## 2020-12-02 PROCEDURE — 83880 ASSAY OF NATRIURETIC PEPTIDE: CPT

## 2020-12-02 PROCEDURE — 85025 COMPLETE CBC W/AUTO DIFF WBC: CPT

## 2020-12-02 PROCEDURE — 83605 ASSAY OF LACTIC ACID: CPT

## 2020-12-02 PROCEDURE — 85610 PROTHROMBIN TIME: CPT

## 2020-12-02 PROCEDURE — 84484 ASSAY OF TROPONIN QUANT: CPT

## 2020-12-02 RX ORDER — FUROSEMIDE 10 MG/ML
40 INJECTION INTRAMUSCULAR; INTRAVENOUS ONCE
Status: COMPLETED | OUTPATIENT
Start: 2020-12-02 | End: 2020-12-02

## 2020-12-02 RX ORDER — FUROSEMIDE 40 MG/1
40 TABLET ORAL DAILY
Qty: 7 TABLET | Refills: 0 | Status: ON HOLD | OUTPATIENT
Start: 2020-12-02 | End: 2021-06-07

## 2020-12-02 RX ADMIN — FUROSEMIDE 40 MG: 10 INJECTION, SOLUTION INTRAMUSCULAR; INTRAVENOUS at 07:54

## 2020-12-02 ASSESSMENT — ENCOUNTER SYMPTOMS
RHINORRHEA: 0
ABDOMINAL PAIN: 0
NAUSEA: 0
DIARRHEA: 0
SHORTNESS OF BREATH: 1
CONSTIPATION: 0
WHEEZING: 0
EYE DISCHARGE: 0
ABDOMINAL DISTENTION: 0
VOMITING: 0
STRIDOR: 0
SORE THROAT: 0
COLOR CHANGE: 0

## 2020-12-02 NOTE — ED NOTES
Pt resting with eyes closed  No signs of distress noted  Call light within reach  Will continue to monitor      [id number] [facility] ARMANDO RN  12/02/20 [id number]

## 2020-12-02 NOTE — ED TRIAGE NOTES
SOB since 0600, recent diagnosis of pneumonia, family denies covid exposure, crackles in posterior lung bases and bilateral lower extremity edema, denies cough, fever, chest pain, does not wear home O2, patient is 100% on RA on arrival, unable to lay back

## 2020-12-02 NOTE — ED PROVIDER NOTES
[address] [facility] ED  eMERGENCY dEPARTMENT eNCOUnter      Pt Name: Aniya Becerril  MRN: [id number]  [place] 2/12/1929  Date of evaluation: 12/2/2020  Provider: Sarita Garduno PA-C    CHIEF COMPLAINT       Chief Complaint   Patient presents with    Shortness of Breath         HISTORY OF PRESENT ILLNESS   (Location/Symptom, Timing/Onset,Context/Setting, Quality, Duration, Modifying Factors, Severity)  Note limiting factors. Aniya Becerril is a 80 y.o. male who presents to the emergency department with a complaint of shortness of breath which patient states started approximately 2 hours ago. Patient is Kazakh-speaking, and evaluation was done through . Patient is a very poor historian, is difficult to obtain accurate information. Patient denies any pain, states no shortness of breath at the time of his arrival.  Denies cough, fevers, recent ill contacts. Patient states he was recently admitted to the hospital proximally 1 month ago for shortness of breath issues, which she states were related to his heart. He does not recall who his cardiologist is. Patient denies pain at this time, 0 out of 10    HPI    NursingNotes were reviewed. REVIEW OF SYSTEMS    (2-9 systems for level 4, 10 or more for level 5)     Review of Systems   Constitutional: Negative for activity change and appetite change. HENT: Negative for congestion, ear discharge, ear pain, nosebleeds, rhinorrhea and sore throat. Eyes: Negative for discharge. Respiratory: Positive for shortness of breath. Negative for wheezing and stridor. Cardiovascular: Negative for chest pain, palpitations and leg swelling. Gastrointestinal: Negative for abdominal distention, abdominal pain, constipation, diarrhea, nausea and vomiting. Genitourinary: Negative for difficulty urinating and dysuria. Musculoskeletal: Negative for arthralgias. Skin: Negative for color change, pallor, rash and wound.    Neurological: Negative for dizziness, tremors, syncope, weakness, numbness and headaches. Psychiatric/Behavioral: Negative for agitation and confusion. Except as noted above the remainder of the review of systems was reviewed and negative. PAST MEDICAL HISTORY     Past Medical History:   Diagnosis Date    Atrial fibrillation ([facility]     Cardiomyopathy ([facility] 9/18/2020    Diabetes mellitus ([facility]     Hypertension          SURGICALHISTORY       Past Surgical History:   Procedure Laterality Date    BACK SURGERY      HERNIA REPAIR           CURRENT MEDICATIONS       Previous Medications    ALBUTEROL (PROVENTIL) (2.5 MG/3ML) 0.083% NEBULIZER SOLUTION    Take 3 mLs by nebulization every 6 hours as needed for Wheezing    APIXABAN (ELIQUIS) 2.5 MG TABS TABLET    Take 1 tablet by mouth 2 times daily    ASPIRIN 81 MG CHEWABLE TABLET    Take 1 tablet by mouth daily    FLUTICASONE-SALMETEROL (ADVAIR DISKUS) 250-50 MCG/DOSE AEPB    Inhale 1 puff into the lungs every 12 hours for 14 days    FUROSEMIDE (LASIX) 20 MG TABLET    Take 1 tablet by mouth daily    LISINOPRIL (PRINIVIL;ZESTRIL) 5 MG TABLET    Take 1 tablet by mouth daily    METOPROLOL TARTRATE (LOPRESSOR) 25 MG TABLET    Take 1 tablet by mouth 2 times daily       ALLERGIES     Patient has no known allergies. FAMILY HISTORY     History reviewed. No pertinent family history.        SOCIAL HISTORY       Social History     Socioeconomic History    Marital status:      Spouse name: None    Number of children: None    Years of education: None    Highest education level: None   Occupational History    None   Social Needs    Financial resource strain: None    Food insecurity     Worry: None     Inability: None    Transportation needs     Medical: None     Non-medical: None   Tobacco Use    Smoking status: Former Smoker    Smokeless tobacco: Never Used    Tobacco comment: quit 30 years ago   Substance and Sexual Activity    Alcohol use: No    Drug use: No    Sexual activity: None   Lifestyle    Physical activity     Days per week: None     Minutes per session: None    Stress: None   Relationships    Social connections     Talks on phone: None     Gets together: None     Attends Protestant service: None     Active member of club or organization: None     Attends meetings of clubs or organizations: None     Relationship status: None    Intimate partner violence     Fear of current or ex partner: None     Emotionally abused: None     Physically abused: None     Forced sexual activity: None   Other Topics Concern    None   Social History Narrative    None       SCREENINGS      @FLOW([id number])@      PHYSICAL EXAM    (up to 7 for level 4, 8 or more for level 5)     ED Triage Vitals [12/02/20 0622]   BP Temp Temp Source Pulse Resp SpO2 Height Weight   120/88 98.2 °F (36.8 °C) Oral 77 12 96 % 5' 6\" (1.676 m) 140 lb (63.5 kg)       Physical Exam  Vitals signs and nursing note reviewed. Constitutional:       General: He is not in acute distress. Appearance: He is well-developed. He is not ill-appearing, toxic-appearing or diaphoretic. HENT:      Head: Normocephalic. Nose: No congestion. Mouth/Throat:      Mouth: Mucous membranes are moist.      Pharynx: No oropharyngeal exudate or posterior oropharyngeal erythema. Eyes:      Extraocular Movements: Extraocular movements intact. Conjunctiva/sclera: Conjunctivae normal.      Pupils: Pupils are equal, round, and reactive to light. Neck:      Musculoskeletal: Normal range of motion and neck supple. No neck rigidity. Vascular: No JVD. Trachea: No tracheal deviation. Cardiovascular:      Rate and Rhythm: Normal rate. Pulses: Normal pulses. Heart sounds: Normal heart sounds. No murmur. No friction rub. No gallop. Pulmonary:      Effort: Pulmonary effort is normal. No tachypnea, accessory muscle usage, respiratory distress or retractions. Breath sounds: No stridor.  No wheezing, rhonchi or rales. Chest:      Chest wall: No tenderness. Abdominal:      General: Abdomen is flat. Bowel sounds are normal. There is no distension or abdominal bruit. Palpations: There is no shifting dullness, fluid wave, hepatomegaly, splenomegaly, mass or pulsatile mass. Tenderness: There is no abdominal tenderness. There is no right CVA tenderness, left CVA tenderness, guarding or rebound. Negative signs include Strong's sign, Rovsing's sign and McBurney's sign. Musculoskeletal: Normal range of motion. General: No deformity. Right lower leg: Edema present. Left lower leg: Edema present. Comments: 1+ edema noted to bilateral lower extremities. Skin:     General: Skin is warm and dry. Capillary Refill: Capillary refill takes less than 2 seconds. Coloration: Skin is not jaundiced. Neurological:      General: No focal deficit present. Mental Status: He is alert and oriented to person, place, and time. Mental status is at baseline. Cranial Nerves: No cranial nerve deficit. Sensory: No sensory deficit. Motor: No weakness. Coordination: Coordination normal.   Psychiatric:         Mood and Affect: Mood normal.         DIAGNOSTIC RESULTS     EKG: All EKG's are interpreted by the Emergency Department Physician who either signs or Co-signsthis chart in the absence of a cardiologist.    EKG shows ventricular paced rhythm at 65 bpm QTC is 553 ms. RADIOLOGY:   Non-plain filmimages such as CT, Ultrasound and MRI are read by the radiologist. Plain radiographic images are visualized and preliminarily interpreted by the emergency physician with the below findings:    Chest x-ray shows mild diffuse pulmonary vascular congestion.     Interpretation per the Radiologist below, if available at the time ofthis note:    XR CHEST PORTABLE   Final Result   BIBASILAR INFILTRATES/CONSOLIDATIONS WITH TRACE 333  [facility]            ED BEDSIDE ULTRASOUND:   Performed by ED Physician - none    LABS:  Labs Reviewed   COMPREHENSIVE METABOLIC PANEL - Abnormal; Notable for the following components:       Result Value    Potassium 5.0 (*)     Glucose 113 (*)     BUN 31 (*)     CREATININE 1.33 (*)     GFR Non- 50.3 (*)     Total Bilirubin 1.7 (*)     ALT 69 (*)     AST 46 (*)     All other components within normal limits   CBC WITH AUTO DIFFERENTIAL - Abnormal; Notable for the following components:    RBC 4.50 (*)     MCH 32.3 (*)     RDW 15.0 (*)     All other components within normal limits   LACTIC ACID, PLASMA - Abnormal; Notable for the following components:    Lactic Acid 3.0 (*)     All other components within normal limits    Narrative:     Sabrinajam Gunn tel. [phone],  RENU results called to and read back by Loreta Jefferson, 12/02/2020 08:17, by  Matthew Rubio   PROTIME-INR - Abnormal; Notable for the following components:    Protime 18.6 (*)     All other components within normal limits   APTT - Abnormal; Notable for the following components:    aPTT 37.5 (*)     All other components within normal limits   CULTURE, BLOOD 1   CULTURE, BLOOD 2   TROPONIN   CK   BRAIN NATRIURETIC PEPTIDE   PROCALCITONIN       All other labs were within normal range or not returned as of this dictation. EMERGENCY DEPARTMENT COURSE and DIFFERENTIAL DIAGNOSIS/MDM:   Vitals:    Vitals:    12/02/20 0645 12/02/20 0700 12/02/20 0730 12/02/20 0845   BP: (!) 109/91 124/86 119/82 117/82   Pulse: 65 61 70 60   Resp: 20      Temp:       TempSrc:       SpO2: 97% 100% 98% 100%   Weight:       Height:            MDM  Number of Diagnoses or Management Options  Acute on chronic congestive heart failure, unspecified heart failure type [facility]):   Diagnosis management comments: Patient presented ED with a complaint of shortness of breath which he states started 2 hours ago. He states he had admission to the hospital approximately 30 days ago for heart failure.   He was placed on Lasix at that time. On arrival he appears in no acute distress, he denies any shortness of breath or chest pain on arrival chest x-ray shows some mild pulmonary vascular congestion, he was given additional 40 mg of Lasix, does remain comfortable and without any shortness of breath during remainder of his visit. Saturations on room air have maintained between 97 and 100%. He has a cough which is dry nonproductive, remaining labs are fairly unimpressive. I did speak with [facility] cardiology Dr. Obie Enriquez, who had recommended increasing patient's Lasix to 40 mg once daily. And schedule follow-up with Dr. Genaro Gastelum his cardiologist in the next 48 hours. Patient was advised if he has any worsening or change in symptoms return to the ER. CRITICAL CARE TIME   Total Critical Care time was 0 minutes, excluding separately reportableprocedures. There was a high probability of clinicallysignificant/life threatening deterioration in the patient's condition which required my urgent intervention. CONSULTS:  IP CONSULT TO CARDIOLOGY    PROCEDURES:  Unless otherwise noted below, none     Procedures    FINAL IMPRESSION      1.  Acute on chronic congestive heart failure, unspecified heart failure type [facility])          DISPOSITION/PLAN   DISPOSITION Decision To Discharge 12/02/2020 09:07:11 AM      PATIENT REFERRED TO:  Anabella Santos  [address] [facility] [address] [address]  [phone]    In 2 days      Leonel Gastelum DO  [address] [place] [address]     In 3 days        DISCHARGE MEDICATIONS:  New Prescriptions    FUROSEMIDE (LASIX) 40 MG TABLET    Take 1 tablet by mouth daily for 7 days          (Please note that portions of this note were completed with a voice recognition program.  Efforts were made to edit the dictations but occasionally words are mis-transcribed.)    Bryce Charles PA-C (electronically signed)  Attending Emergency Physician         Bryce Charles PA-C  12/02/20 4245

## 2020-12-07 LAB
BLOOD CULTURE, ROUTINE: NORMAL
CULTURE, BLOOD 2: NORMAL

## 2020-12-22 ENCOUNTER — HOSPITAL ENCOUNTER (EMERGENCY)
Age: 85
Discharge: HOME | End: 2020-12-22
Attending: EMERGENCY MEDICINE
Payer: MEDICARE

## 2020-12-22 ENCOUNTER — APPOINTMENT (OUTPATIENT)
Dept: CT IMAGING | Age: 85
End: 2020-12-22
Payer: MEDICARE

## 2020-12-22 ENCOUNTER — TELEPHONE (OUTPATIENT)
Dept: OTHER | Facility: CLINIC | Age: 85
End: 2020-12-22

## 2020-12-22 VITALS
HEIGHT: 68 IN | BODY MASS INDEX: 28.19 KG/M2 | TEMPERATURE: 98.1 F | OXYGEN SATURATION: 99 % | WEIGHT: 186 LBS | DIASTOLIC BLOOD PRESSURE: 75 MMHG | RESPIRATION RATE: 18 BRPM | HEART RATE: 77 BPM | SYSTOLIC BLOOD PRESSURE: 112 MMHG

## 2020-12-22 LAB
ALBUMIN SERPL-MCNC: 4.3 G/DL (ref 3.5–4.6)
ALP BLD-CCNC: 74 U/L (ref 35–104)
ALT SERPL-CCNC: 20 U/L (ref 0–41)
ANION GAP SERPL CALCULATED.3IONS-SCNC: 10 MEQ/L (ref 9–15)
AST SERPL-CCNC: 23 U/L (ref 0–40)
BASOPHILS ABSOLUTE: 0.1 K/UL (ref 0–0.2)
BASOPHILS RELATIVE PERCENT: 1 %
BILIRUB SERPL-MCNC: 1.3 MG/DL (ref 0.2–0.7)
BUN BLDV-MCNC: 24 MG/DL (ref 8–23)
CALCIUM SERPL-MCNC: 9.4 MG/DL (ref 8.5–9.9)
CHLORIDE BLD-SCNC: 106 MEQ/L (ref 95–107)
CO2: 27 MEQ/L (ref 20–31)
CREAT SERPL-MCNC: 0.86 MG/DL (ref 0.7–1.2)
EOSINOPHILS ABSOLUTE: 0.2 K/UL (ref 0–0.7)
EOSINOPHILS RELATIVE PERCENT: 2.5 %
GFR AFRICAN AMERICAN: >60
GFR NON-AFRICAN AMERICAN: >60
GLOBULIN: 3.3 G/DL (ref 2.3–3.5)
GLUCOSE BLD-MCNC: 99 MG/DL (ref 70–99)
HCT VFR BLD CALC: 43.4 % (ref 42–52)
HEMOGLOBIN: 14.5 G/DL (ref 14–18)
LYMPHOCYTES ABSOLUTE: 2.8 K/UL (ref 1–4.8)
LYMPHOCYTES RELATIVE PERCENT: 45.9 %
MCH RBC QN AUTO: 32.5 PG (ref 27–31.3)
MCHC RBC AUTO-ENTMCNC: 33.4 % (ref 33–37)
MCV RBC AUTO: 97.2 FL (ref 80–100)
MONOCYTES ABSOLUTE: 0.7 K/UL (ref 0.2–0.8)
MONOCYTES RELATIVE PERCENT: 11.5 %
NEUTROPHILS ABSOLUTE: 2.4 K/UL (ref 1.4–6.5)
NEUTROPHILS RELATIVE PERCENT: 39.1 %
PDW BLD-RTO: 15 % (ref 11.5–14.5)
PLATELET # BLD: 180 K/UL (ref 130–400)
POTASSIUM SERPL-SCNC: 3.9 MEQ/L (ref 3.4–4.9)
RBC # BLD: 4.47 M/UL (ref 4.7–6.1)
SODIUM BLD-SCNC: 143 MEQ/L (ref 135–144)
TOTAL PROTEIN: 7.6 G/DL (ref 6.3–8)
WBC # BLD: 6.1 K/UL (ref 4.8–10.8)

## 2020-12-22 PROCEDURE — 96375 TX/PRO/DX INJ NEW DRUG ADDON: CPT

## 2020-12-22 PROCEDURE — 99283 EMERGENCY DEPT VISIT LOW MDM: CPT

## 2020-12-22 PROCEDURE — 70486 CT MAXILLOFACIAL W/O DYE: CPT

## 2020-12-22 PROCEDURE — 36415 COLL VENOUS BLD VENIPUNCTURE: CPT

## 2020-12-22 PROCEDURE — 6360000002 HC RX W HCPCS: Performed by: EMERGENCY MEDICINE

## 2020-12-22 PROCEDURE — 80053 COMPREHEN METABOLIC PANEL: CPT

## 2020-12-22 PROCEDURE — 70450 CT HEAD/BRAIN W/O DYE: CPT

## 2020-12-22 PROCEDURE — 85025 COMPLETE CBC W/AUTO DIFF WBC: CPT

## 2020-12-22 PROCEDURE — 96374 THER/PROPH/DIAG INJ IV PUSH: CPT

## 2020-12-22 PROCEDURE — 6830039000 HC L3 TRAUMA ALERT

## 2020-12-22 PROCEDURE — 72125 CT NECK SPINE W/O DYE: CPT

## 2020-12-22 RX ORDER — FENTANYL CITRATE 50 UG/ML
50 INJECTION, SOLUTION INTRAMUSCULAR; INTRAVENOUS ONCE
Status: COMPLETED | OUTPATIENT
Start: 2020-12-22 | End: 2020-12-22

## 2020-12-22 RX ORDER — ONDANSETRON 2 MG/ML
4 INJECTION INTRAMUSCULAR; INTRAVENOUS ONCE
Status: COMPLETED | OUTPATIENT
Start: 2020-12-22 | End: 2020-12-22

## 2020-12-22 RX ORDER — TRAMADOL HYDROCHLORIDE 50 MG/1
50 TABLET ORAL EVERY 4 HOURS PRN
Qty: 18 TABLET | Refills: 0 | Status: SHIPPED | OUTPATIENT
Start: 2020-12-22 | End: 2020-12-25

## 2020-12-22 RX ADMIN — ONDANSETRON 4 MG: 2 INJECTION INTRAMUSCULAR; INTRAVENOUS at 11:08

## 2020-12-22 RX ADMIN — FENTANYL CITRATE 50 MCG: 50 INJECTION, SOLUTION INTRAMUSCULAR; INTRAVENOUS at 11:07

## 2020-12-22 ASSESSMENT — ENCOUNTER SYMPTOMS
ABDOMINAL PAIN: 0
SHORTNESS OF BREATH: 0
BACK PAIN: 0
DIARRHEA: 0
NAUSEA: 0
SORE THROAT: 0
COUGH: 0
VOMITING: 0

## 2020-12-22 ASSESSMENT — PAIN SCALES - GENERAL
PAINLEVEL_OUTOF10: 2
PAINLEVEL_OUTOF10: 2

## 2020-12-22 ASSESSMENT — PAIN DESCRIPTION - DESCRIPTORS: DESCRIPTORS: THROBBING

## 2020-12-22 ASSESSMENT — PAIN DESCRIPTION - LOCATION: LOCATION: FACE

## 2020-12-22 ASSESSMENT — PAIN DESCRIPTION - FREQUENCY: FREQUENCY: CONTINUOUS

## 2020-12-22 ASSESSMENT — PAIN DESCRIPTION - PAIN TYPE: TYPE: ACUTE PAIN

## 2020-12-22 NOTE — ED PROVIDER NOTES
FLUTICASONE-SALMETEROL (ADVAIR DISKUS) 250-50 MCG/DOSE AEPB    Inhale 1 puff into the lungs every 12 hours for 14 days    FUROSEMIDE (LASIX) 20 MG TABLET    Take 1 tablet by mouth daily    FUROSEMIDE (LASIX) 40 MG TABLET    Take 1 tablet by mouth daily for 7 days    LISINOPRIL (PRINIVIL;ZESTRIL) 5 MG TABLET    Take 1 tablet by mouth daily    METOPROLOL TARTRATE (LOPRESSOR) 25 MG TABLET    TAKE 1 TABLET BY MOUTH TWICE DAILY       ALLERGIES     Patient has no known allergies. FAMILY HISTORY     History reviewed. No pertinent family history.        SOCIAL HISTORY       Social History     Socioeconomic History    Marital status:      Spouse name: None    Number of children: None    Years of education: None    Highest education level: None   Occupational History    None   Social Needs    Financial resource strain: None    Food insecurity     Worry: None     Inability: None    Transportation needs     Medical: None     Non-medical: None   Tobacco Use    Smoking status: Former Smoker    Smokeless tobacco: Never Used    Tobacco comment: quit 30 years ago   Substance and Sexual Activity    Alcohol use: No    Drug use: No    Sexual activity: None   Lifestyle    Physical activity     Days per week: None     Minutes per session: None    Stress: None   Relationships    Social connections     Talks on phone: None     Gets together: None     Attends Jain service: None     Active member of club or organization: None     Attends meetings of clubs or organizations: None     Relationship status: None    Intimate partner violence     Fear of current or ex partner: None     Emotionally abused: None     Physically abused: None     Forced sexual activity: None   Other Topics Concern    None   Social History Narrative    None         PHYSICAL EXAM       ED Triage Vitals [12/22/20 1055]   BP Temp Temp src Pulse Resp SpO2 Height Weight   130/60 -- -- 70 18 97 % -- --       Physical Exam  Vitals signs and nursing note reviewed. Constitutional:       Appearance: He is well-developed. HENT:      Head: Normocephalic. Right Ear: External ear normal.      Left Ear: External ear normal.   Eyes:      Conjunctiva/sclera: Conjunctivae normal.      Pupils: Pupils are equal, round, and reactive to light. Neck:      Musculoskeletal: Normal range of motion and neck supple. Comments: No cspine tenderness  Cardiovascular:      Rate and Rhythm: Normal rate and regular rhythm. Heart sounds: Normal heart sounds. Pulmonary:      Effort: Pulmonary effort is normal.      Breath sounds: Normal breath sounds. Abdominal:      General: Bowel sounds are normal. There is no distension. Palpations: Abdomen is soft. Tenderness: There is no abdominal tenderness. Musculoskeletal: Normal range of motion. Skin:     General: Skin is warm and dry. Neurological:      Mental Status: He is alert and oriented to person, place, and time. Psychiatric:         Mood and Affect: Mood normal.           MDM  81 yo male presents to the ED s/p fall with facial pain. Pt is afebrile, hemodynamically stable. Pt given IV fentanyl, IV zofran in the ED. Labs unremarkable. CT head, cspine negative. CT facial bones negative. Pt reassessed and feels better. Pt able to tolerate PO water in the ED. Pt educated about the results and about fall. Pt given fall warning signs and will f/u with pcp. Pt understands plan. FINAL IMPRESSION      1. Fall, initial encounter    2.  Closed head injury, initial encounter          DISPOSITION/PLAN   DISPOSITION          DISCHARGE MEDICATIONS:  [unfilled]         Francesco Simpson MD(electronically signed)  Attending Emergency Physician            Francesco Simpson MD  12/22/20 4382

## 2020-12-22 NOTE — TELEPHONE ENCOUNTER
Writer contacted  ED provider Dr Arti Rodríguez to inform of 30 day readmission risk via text.      Attending: Dr Arti Rodríguez

## 2020-12-23 ENCOUNTER — OFFICE VISIT (OUTPATIENT)
Dept: PULMONOLOGY | Age: 85
End: 2020-12-23
Payer: MEDICARE

## 2020-12-23 VITALS
HEIGHT: 68 IN | HEART RATE: 67 BPM | SYSTOLIC BLOOD PRESSURE: 124 MMHG | TEMPERATURE: 99 F | OXYGEN SATURATION: 99 % | RESPIRATION RATE: 16 BRPM | BODY MASS INDEX: 28.28 KG/M2 | DIASTOLIC BLOOD PRESSURE: 62 MMHG

## 2020-12-23 DIAGNOSIS — I50.9 CONGESTIVE HEART FAILURE, UNSPECIFIED HF CHRONICITY, UNSPECIFIED HEART FAILURE TYPE (HCC): ICD-10-CM

## 2020-12-23 DIAGNOSIS — J98.11 ATELECTASIS OF RIGHT LUNG: ICD-10-CM

## 2020-12-23 DIAGNOSIS — J90 PLEURAL EFFUSION, BILATERAL: Primary | ICD-10-CM

## 2020-12-23 DIAGNOSIS — R06.02 SOB (SHORTNESS OF BREATH): ICD-10-CM

## 2020-12-23 PROCEDURE — G8427 DOCREV CUR MEDS BY ELIG CLIN: HCPCS | Performed by: INTERNAL MEDICINE

## 2020-12-23 PROCEDURE — G8484 FLU IMMUNIZE NO ADMIN: HCPCS | Performed by: INTERNAL MEDICINE

## 2020-12-23 PROCEDURE — G8417 CALC BMI ABV UP PARAM F/U: HCPCS | Performed by: INTERNAL MEDICINE

## 2020-12-23 PROCEDURE — 1036F TOBACCO NON-USER: CPT | Performed by: INTERNAL MEDICINE

## 2020-12-23 PROCEDURE — 1123F ACP DISCUSS/DSCN MKR DOCD: CPT | Performed by: INTERNAL MEDICINE

## 2020-12-23 PROCEDURE — 1111F DSCHRG MED/CURRENT MED MERGE: CPT | Performed by: INTERNAL MEDICINE

## 2020-12-23 PROCEDURE — 4040F PNEUMOC VAC/ADMIN/RCVD: CPT | Performed by: INTERNAL MEDICINE

## 2020-12-23 PROCEDURE — 99214 OFFICE O/P EST MOD 30 MIN: CPT | Performed by: INTERNAL MEDICINE

## 2020-12-23 ASSESSMENT — ENCOUNTER SYMPTOMS
ABDOMINAL PAIN: 0
VOICE CHANGE: 0
RHINORRHEA: 0
SHORTNESS OF BREATH: 1
EYE ITCHING: 0
DIARRHEA: 0
SORE THROAT: 0
COUGH: 0
NAUSEA: 0
CHEST TIGHTNESS: 0
VOMITING: 0
WHEEZING: 0

## 2020-12-23 NOTE — PROGRESS NOTES
Subjective:     Megha Paniagua is a 80 y.o. male who complains today of:     Chief Complaint   Patient presents with   [address] from Hospital     f/u after hosp. HPI  He was in hospital for shortness of breath. He had CXR show pleural effusion and atelactasis  He is not using O2  C/o shortness of breath  with exertion. No Wheezing   No Cough or Sputum. No Hemoptysis  No Chest tightness   No Chest pain with radiation  or pleuritic pain  No Fever or chills. No Rhinorrhea and postnasal drip. He has nebulizer with albuterol for prn use. Allergies:  Patient has no known allergies. Past Medical History:   Diagnosis Date    [facility] ([facility]     Cardiomyopathy ([facility] 9/18/2020    Diabetes mellitus ([facility]     Hypertension      Past Surgical History:   Procedure Laterality Date    BACK SURGERY      HERNIA REPAIR       No family history on file.   Social History     Socioeconomic History    Marital status:      Spouse name: Not on file    Number of children: Not on file    Years of education: Not on file    Highest education level: Not on file   Occupational History    Not on file   Social Needs    Financial resource strain: Not on file    Food insecurity     Worry: Not on file     Inability: Not on file    Transportation needs     Medical: Not on file     Non-medical: Not on file   Tobacco Use    Smoking status: Former Smoker    Smokeless tobacco: Never Used    Tobacco comment: quit 30 years ago   Substance and Sexual Activity    Alcohol use: No    Drug use: No    Sexual activity: Not on file   Lifestyle    Physical activity     Days per week: Not on file     Minutes per session: Not on file    Stress: Not on file   Relationships    Social connections     Talks on phone: Not on file     Gets together: Not on file     Attends Spiritism service: Not on file     Active member of club or organization: Not on file Attends meetings of clubs or organizations: Not on file     Relationship status: Not on file    Intimate partner violence     Fear of current or ex partner: Not on file     Emotionally abused: Not on file     Physically abused: Not on file     Forced sexual activity: Not on file   Other Topics Concern    Not on file   Social History Narrative    Not on file         Review of Systems   Constitutional: Negative for chills, diaphoresis, fatigue and fever. HENT: Negative for congestion, mouth sores, nosebleeds, postnasal drip, rhinorrhea, sneezing, sore throat and voice change. Eyes: Negative for itching and visual disturbance. Respiratory: Positive for shortness of breath. Negative for cough, chest tightness and wheezing. Cardiovascular: Negative. Negative for chest pain, palpitations and leg swelling. Gastrointestinal: Negative for abdominal pain, diarrhea, nausea and vomiting. Genitourinary: Negative for difficulty urinating and hematuria. Musculoskeletal: Negative for arthralgias, joint swelling and myalgias. Skin: Negative for rash. Allergic/Immunologic: Negative for environmental allergies. Neurological: Negative for dizziness, tremors, weakness and headaches. Psychiatric/Behavioral: Negative for behavioral problems and sleep disturbance.         :     Vitals:    12/23/20 1129   BP: 124/62   Pulse: 67   Resp: 16   Temp: 99 °F (37.2 °C)   TempSrc: Temporal   SpO2: 99%   Height: 5' 8\" (1.727 m)     Wt Readings from Last 3 Encounters:   12/22/20 186 lb (84.4 kg)   12/02/20 140 lb (63.5 kg)   11/18/20 168 lb (76.2 kg)         Physical Exam  Constitutional:       Appearance: He is well-developed. Comments: Sitting in chair   HENT:      Head: Normocephalic and atraumatic. Nose: Nose normal.   Eyes:      Conjunctiva/sclera: Conjunctivae normal.      Pupils: Pupils are equal, round, and reactive to light. Neck:      Thyroid: No thyromegaly. Vascular: No JVD. Trachea: No tracheal deviation. Cardiovascular:      Rate and Rhythm: Normal rate and regular rhythm. Heart sounds: No murmur. No friction rub. No gallop. Pulmonary:      Effort: Pulmonary effort is normal. No respiratory distress. Breath sounds: Normal breath sounds. No wheezing or rales. Comments: diminished Breath sound bilaterally. Chest:      Chest wall: No tenderness. Abdominal:      General: There is no distension. Musculoskeletal: Normal range of motion. Lymphadenopathy:      Cervical: No cervical adenopathy. Skin:     General: Skin is warm and dry. Findings: No rash. Neurological:      Mental Status: He is alert and oriented to person, place, and time. Cranial Nerves: No cranial nerve deficit. Psychiatric:         Behavior: Behavior normal.         Current Outpatient Medications   Medication Sig Dispense Refill    traMADol (ULTRAM) 50 MG tablet Take 1 tablet by mouth every 4 hours as needed for Pain for up to 3 days. Intended supply: 3 days. Take lowest dose possible to manage pain 18 tablet 0    metoprolol tartrate (LOPRESSOR) 25 MG tablet TAKE 1 TABLET BY MOUTH TWICE DAILY 60 tablet 5    furosemide (LASIX) 20 MG tablet Take 1 tablet by mouth daily 30 tablet 0    albuterol (PROVENTIL) (2.5 MG/3ML) 0.083% nebulizer solution Take 3 mLs by nebulization every 6 hours as needed for Wheezing 120 each 1    apixaban (ELIQUIS) 2.5 MG TABS tablet Take 1 tablet by mouth 2 times daily 60 tablet 5    lisinopril (PRINIVIL;ZESTRIL) 5 MG tablet Take 1 tablet by mouth daily 30 tablet 5    aspirin 81 MG chewable tablet Take 1 tablet by mouth daily 30 tablet 11    furosemide (LASIX) 40 MG tablet Take 1 tablet by mouth daily for 7 days 7 tablet 0    fluticasone-salmeterol (ADVAIR DISKUS) 250-50 MCG/DOSE AEPB Inhale 1 puff into the lungs every 12 hours for 14 days 1 Inhaler 0     No current facility-administered medications for this visit. Results for orders placed during the hospital encounter of 10/03/19   XR CHEST STANDARD (2 VW)    Narrative XR CHEST (2 VW): 10/3/2019    CLINICAL HISTORY:  cough with back pain . COMPARISON: None available. Upright PA and lateral radiographs of the chest were obtained. FINDINGS:    There is no cardiomegaly, pulmonary infiltrate, significant pleural effusion, vascular congestion, pneumothorax, or acute fractures suspected. Moderate degenerative changes of the thoracic spine are noted, with minimal chronic-appearing wedging of the mid thoracic vertebral bodies. Impression NO EVIDENCE OF ACTIVE CARDIOPULMONARY DISEASE.           ]  Results for orders placed during the hospital encounter of 12/02/20   XR CHEST PORTABLE    Narrative EXAMINATION: CHEST PORTABLE VIEW     CLINICAL HISTORY: Short of breath    COMPARISONS: November 11, 2020     FINDINGS:    Single  views of the chest is submitted. There is a left-sided CCD device. Leads overlying the cardiac silhouette. Unchanged. The cardiac silhouette is enlarged  Pulmonary vascular unremarkable. Right sided trachea. Bibasilar infiltrates/ consolidations with trace bilateral pleural effusions. No Pneumothoraces. Impression BIBASILAR INFILTRATES/CONSOLIDATIONS WITH TRACE BILATERAL PLEURAL EFFUSIONS     Assessment/Plan:     1. Pleural effusion, bilateral  He was in hospital for shortness of breath. He had CXR show pleural effusion and atelactasis. Clinically he is doing much better and likely improvement in effusion. He does not want to do chest x-ray    2. Congestive heart failure, unspecified HF chronicity, unspecified heart failure type [facility])  He is following with cardiologist Dr. Niltno Cha regarding , cardiomyopathy, chronic A. fib and sick sinus syndrome    3.  SOB (shortness of breath) He said his shortness of breath improved, he does have a mild exertional shortness of breath but denies having any wheezing or cough. .  He has oxygen at home but not using it. He has nebulizer with albuterol for as needed use. He is also not using that at this time. 4. Atelectasis of right lung  Advise deep breathing exercise. No follow up, need  to call office if follow-up needed.     Tonia Genao MD

## 2021-01-12 ENCOUNTER — HOSPITAL ENCOUNTER (EMERGENCY)
Age: 86
Discharge: HOME | End: 2021-01-12
Attending: STUDENT IN AN ORGANIZED HEALTH CARE EDUCATION/TRAINING PROGRAM
Payer: MEDICARE

## 2021-01-12 ENCOUNTER — APPOINTMENT (OUTPATIENT)
Dept: GENERAL RADIOLOGY | Age: 86
End: 2021-01-12
Payer: MEDICARE

## 2021-01-12 ENCOUNTER — APPOINTMENT (OUTPATIENT)
Dept: CT IMAGING | Age: 86
End: 2021-01-12
Payer: MEDICARE

## 2021-01-12 VITALS
OXYGEN SATURATION: 100 % | BODY MASS INDEX: 28.19 KG/M2 | WEIGHT: 186 LBS | TEMPERATURE: 97.7 F | DIASTOLIC BLOOD PRESSURE: 101 MMHG | RESPIRATION RATE: 16 BRPM | HEIGHT: 68 IN | SYSTOLIC BLOOD PRESSURE: 120 MMHG | HEART RATE: 87 BPM

## 2021-01-12 DIAGNOSIS — U07.1 COVID-19 VIRUS INFECTION: Primary | ICD-10-CM

## 2021-01-12 LAB
ALBUMIN SERPL-MCNC: 3.8 G/DL (ref 3.5–4.6)
ALP BLD-CCNC: 74 U/L (ref 35–104)
ALT SERPL-CCNC: 19 U/L (ref 0–41)
ANION GAP SERPL CALCULATED.3IONS-SCNC: 15 MEQ/L (ref 9–15)
ANISOCYTOSIS: ABNORMAL
APTT: 47.5 SEC (ref 24.4–36.8)
AST SERPL-CCNC: 25 U/L (ref 0–40)
ATYPICAL LYMPHOCYTE RELATIVE PERCENT: 3 %
BASOPHILS ABSOLUTE: 0 K/UL (ref 0–0.2)
BASOPHILS RELATIVE PERCENT: 0.6 %
BILIRUB SERPL-MCNC: 0.9 MG/DL (ref 0.2–0.7)
BUN BLDV-MCNC: 22 MG/DL (ref 8–23)
C-REACTIVE PROTEIN: 6.6 MG/L (ref 0–5)
CALCIUM SERPL-MCNC: 8.7 MG/DL (ref 8.5–9.9)
CHLORIDE BLD-SCNC: 101 MEQ/L (ref 95–107)
CO2: 22 MEQ/L (ref 20–31)
CREAT SERPL-MCNC: 1.09 MG/DL (ref 0.7–1.2)
D DIMER: 1.24 MG/L FEU (ref 0–0.5)
EKG ATRIAL RATE: 62 BPM
EKG Q-T INTERVAL: 474 MS
EKG QRS DURATION: 160 MS
EKG QTC CALCULATION (BAZETT): 492 MS
EKG R AXIS: -50 DEGREES
EKG T AXIS: 119 DEGREES
EKG VENTRICULAR RATE: 65 BPM
EOSINOPHILS ABSOLUTE: 0.1 K/UL (ref 0–0.7)
EOSINOPHILS RELATIVE PERCENT: 2 %
GFR AFRICAN AMERICAN: >60
GFR NON-AFRICAN AMERICAN: >60
GLOBULIN: 2.8 G/DL (ref 2.3–3.5)
GLUCOSE BLD-MCNC: 154 MG/DL (ref 70–99)
HCT VFR BLD CALC: 39.6 % (ref 42–52)
HEMOGLOBIN: 13 G/DL (ref 14–18)
INFLUENZA A BY PCR: NEGATIVE
INFLUENZA B BY PCR: NEGATIVE
INR BLD: 1.3
LACTIC ACID: 2.9 MMOL/L (ref 0.5–2.2)
LYMPHOCYTES ABSOLUTE: 1.7 K/UL (ref 1–4.8)
LYMPHOCYTES RELATIVE PERCENT: 45 %
MAGNESIUM: 2.2 MG/DL (ref 1.7–2.4)
MCH RBC QN AUTO: 31.9 PG (ref 27–31.3)
MCHC RBC AUTO-ENTMCNC: 32.8 % (ref 33–37)
MCV RBC AUTO: 97 FL (ref 80–100)
MONOCYTES ABSOLUTE: 0.3 K/UL (ref 0.2–0.8)
MONOCYTES RELATIVE PERCENT: 7.9 %
NEUTROPHILS ABSOLUTE: 1.5 K/UL (ref 1.4–6.5)
NEUTROPHILS RELATIVE PERCENT: 43 %
PDW BLD-RTO: 15 % (ref 11.5–14.5)
PLATELET # BLD: 148 K/UL (ref 130–400)
PLATELET SLIDE REVIEW: NORMAL
POTASSIUM SERPL-SCNC: 4 MEQ/L (ref 3.4–4.9)
PRO-BNP: NORMAL PG/ML
PROCALCITONIN: 0.07 NG/ML (ref 0–0.15)
PROTHROMBIN TIME: 16.2 SEC (ref 12.3–14.9)
RBC # BLD: 4.09 M/UL (ref 4.7–6.1)
SARS-COV-2, NAAT: DETECTED
SMUDGE CELLS: 4
SODIUM BLD-SCNC: 138 MEQ/L (ref 135–144)
TOTAL CK: 70 U/L (ref 0–190)
TOTAL PROTEIN: 6.6 G/DL (ref 6.3–8)
TROPONIN: <0.01 NG/ML (ref 0–0.01)
TSH SERPL DL<=0.05 MIU/L-ACNC: 2.49 UIU/ML (ref 0.44–3.86)
WBC # BLD: 3.5 K/UL (ref 4.8–10.8)

## 2021-01-12 PROCEDURE — 84443 ASSAY THYROID STIM HORMONE: CPT

## 2021-01-12 PROCEDURE — 87040 BLOOD CULTURE FOR BACTERIA: CPT

## 2021-01-12 PROCEDURE — 36415 COLL VENOUS BLD VENIPUNCTURE: CPT

## 2021-01-12 PROCEDURE — 82550 ASSAY OF CK (CPK): CPT

## 2021-01-12 PROCEDURE — 85730 THROMBOPLASTIN TIME PARTIAL: CPT

## 2021-01-12 PROCEDURE — 83735 ASSAY OF MAGNESIUM: CPT

## 2021-01-12 PROCEDURE — 84145 PROCALCITONIN (PCT): CPT

## 2021-01-12 PROCEDURE — 99285 EMERGENCY DEPT VISIT HI MDM: CPT

## 2021-01-12 PROCEDURE — 71275 CT ANGIOGRAPHY CHEST: CPT

## 2021-01-12 PROCEDURE — 85025 COMPLETE CBC W/AUTO DIFF WBC: CPT

## 2021-01-12 PROCEDURE — 83880 ASSAY OF NATRIURETIC PEPTIDE: CPT

## 2021-01-12 PROCEDURE — 6360000004 HC RX CONTRAST MEDICATION: Performed by: STUDENT IN AN ORGANIZED HEALTH CARE EDUCATION/TRAINING PROGRAM

## 2021-01-12 PROCEDURE — 80053 COMPREHEN METABOLIC PANEL: CPT

## 2021-01-12 PROCEDURE — 84484 ASSAY OF TROPONIN QUANT: CPT

## 2021-01-12 PROCEDURE — 87502 INFLUENZA DNA AMP PROBE: CPT

## 2021-01-12 PROCEDURE — 85610 PROTHROMBIN TIME: CPT

## 2021-01-12 PROCEDURE — 85379 FIBRIN DEGRADATION QUANT: CPT

## 2021-01-12 PROCEDURE — 83605 ASSAY OF LACTIC ACID: CPT

## 2021-01-12 PROCEDURE — 86140 C-REACTIVE PROTEIN: CPT

## 2021-01-12 PROCEDURE — 93005 ELECTROCARDIOGRAM TRACING: CPT

## 2021-01-12 PROCEDURE — 71045 X-RAY EXAM CHEST 1 VIEW: CPT

## 2021-01-12 PROCEDURE — 93005 ELECTROCARDIOGRAM TRACING: CPT | Performed by: STUDENT IN AN ORGANIZED HEALTH CARE EDUCATION/TRAINING PROGRAM

## 2021-01-12 PROCEDURE — U0002 COVID-19 LAB TEST NON-CDC: HCPCS

## 2021-01-12 RX ADMIN — IOPAMIDOL 100 ML: 612 INJECTION, SOLUTION INTRAVENOUS at 19:02

## 2021-01-12 ASSESSMENT — ENCOUNTER SYMPTOMS
TROUBLE SWALLOWING: 0
BACK PAIN: 0
VOMITING: 0
SHORTNESS OF BREATH: 1
ABDOMINAL PAIN: 0
COUGH: 0
SINUS PRESSURE: 0
CHEST TIGHTNESS: 0
DIARRHEA: 0

## 2021-01-12 NOTE — ED NOTES
Labs obtained by [facility].  Phleb coming to draw 2nd culture     Sarina Peabody, DWAYNE  01/12/21 3194

## 2021-01-12 NOTE — ED NOTES
Bed: 11  Expected date:   Expected time:   Means of arrival:   Comments:  81 yo male sob with exertion Yoruba speaking  Wife covid positive       Kris Turner RN  01/12/21 7780

## 2021-01-13 ENCOUNTER — CARE COORDINATION (OUTPATIENT)
Dept: CARE COORDINATION | Age: 86
End: 2021-01-13

## 2021-01-13 NOTE — ED PROVIDER NOTES
[address] [facility] ED  eMERGENCY dEPARTMENT eNCOUnter      Pt Name: Destini Hernandez  MRN: [id number]  [place] 2/12/1929  Date of evaluation: 1/12/2021  Provider: Valeriano Barba MD    CHIEF COMPLAINT       Chief Complaint   Patient presents with    Shortness of Breath     with exertion. Wife is Covid +         HISTORY OF PRESENT ILLNESS   (Location/Symptom, Timing/Onset,Context/Setting, Quality, Duration, Modifying Factors, Severity)  Note limiting factors. Destini Hernandez is a 80 y.o. male who presents to the emergency department with complaint of shortness of breath with exertion that started today. Patient reports that his wife has COVID-19. Patient denies any fever or chills. He denies cough but does complain of shortness of breath. Patient denies chest pain, nausea, vomiting, or diarrhea. Patient states if he rests the shortness of breath gets better. If he walks he gets short of breath. The history is provided by the patient. The history is limited by a language barrier. A  was used. NursingNotes were reviewed. REVIEW OF SYSTEMS    (2-9 systems for level 4, 10 or more for level 5)     Review of Systems   Constitutional: Negative for activity change, appetite change, chills, fever and unexpected weight change. HENT: Negative for drooling, ear pain, nosebleeds, sinus pressure and trouble swallowing. Respiratory: Positive for shortness of breath. Negative for cough and chest tightness. Cardiovascular: Negative for chest pain and leg swelling. Gastrointestinal: Negative for abdominal pain, diarrhea and vomiting. Endocrine: Negative for polydipsia and polyphagia. Genitourinary: Negative for dysuria, flank pain and frequency. Musculoskeletal: Negative for back pain and myalgias. Skin: Negative for pallor and rash. Neurological: Negative for syncope, weakness and headaches. Hematological: Does not bruise/bleed easily.    All other systems reviewed and are negative. Except as noted above the remainder of the review of systems was reviewed and negative. PAST MEDICAL HISTORY     Past Medical History:   Diagnosis Date    Atrial fibrillation ([facility]     Cardiomyopathy ([facility] 9/18/2020    Diabetes mellitus ([facility]     Hypertension          SURGICALHISTORY       Past Surgical History:   Procedure Laterality Date    BACK SURGERY      HERNIA REPAIR           CURRENT MEDICATIONS       Previous Medications    ALBUTEROL (PROVENTIL) (2.5 MG/3ML) 0.083% NEBULIZER SOLUTION    Take 3 mLs by nebulization every 6 hours as needed for Wheezing    APIXABAN (ELIQUIS) 2.5 MG TABS TABLET    Take 1 tablet by mouth 2 times daily    ASPIRIN 81 MG CHEWABLE TABLET    Take 1 tablet by mouth daily    FLUTICASONE-SALMETEROL (ADVAIR DISKUS) 250-50 MCG/DOSE AEPB    Inhale 1 puff into the lungs every 12 hours for 14 days    FUROSEMIDE (LASIX) 20 MG TABLET    Take 1 tablet by mouth daily    FUROSEMIDE (LASIX) 40 MG TABLET    Take 1 tablet by mouth daily for 7 days    LISINOPRIL (PRINIVIL;ZESTRIL) 5 MG TABLET    Take 1 tablet by mouth daily    METOPROLOL TARTRATE (LOPRESSOR) 25 MG TABLET    TAKE 1 TABLET BY MOUTH TWICE DAILY       ALLERGIES     Patient has no known allergies. FAMILY HISTORY     No family history on file. SOCIAL HISTORY       Social History     Socioeconomic History    Marital status:      Spouse name: Not on file    Number of children: Not on file    Years of education: Not on file    Highest education level: Not on file   Occupational History    Not on file   Social Needs    Financial resource strain: Not on file    Food insecurity     Worry: Not on file     Inability: Not on file    Transportation needs     Medical: Not on file     Non-medical: Not on file   Tobacco Use    Smoking status: Former Smoker    Smokeless tobacco: Never Used    Tobacco comment: quit 30 years ago   Substance and Sexual Activity    Alcohol use: No    Drug use:  No  Sexual activity: Not on file   Lifestyle    Physical activity     Days per week: Not on file     Minutes per session: Not on file    Stress: Not on file   Relationships    Social connections     Talks on phone: Not on file     Gets together: Not on file     Attends Yarsanism service: Not on file     Active member of club or organization: Not on file     Attends meetings of clubs or organizations: Not on file     Relationship status: Not on file    Intimate partner violence     Fear of current or ex partner: Not on file     Emotionally abused: Not on file     Physically abused: Not on file     Forced sexual activity: Not on file   Other Topics Concern    Not on file   Social History Narrative    Not on file       SCREENINGS      @[facility][id number][facility]@      PHYSICAL EXAM    (up to 7 for level 4, 8 or more for level 5)     ED Triage Vitals [01/12/21 1649]   BP Temp Temp Source Pulse Resp SpO2 Height Weight   (!) 125/95 97.7 °F (36.5 °C) Oral 84 24 100 % 5' 8\" (1.727 m) 186 lb (84.4 kg)       Physical Exam  Vitals signs and nursing note reviewed. Constitutional:       General: He is awake. He is in acute distress. Appearance: Normal appearance. He is well-developed and normal weight. He is not ill-appearing, toxic-appearing or diaphoretic. Comments: No photophobia. No phonophobia. HENT:      Head: Normocephalic and atraumatic. No Beverly's sign. Right Ear: External ear normal.      Left Ear: External ear normal.      Nose: Nose normal. No congestion or rhinorrhea. Mouth/Throat:      Mouth: Mucous membranes are moist.      Pharynx: Oropharynx is clear. No oropharyngeal exudate or posterior oropharyngeal erythema. Eyes:      General: No scleral icterus. Right eye: No foreign body or discharge. Left eye: No discharge. Extraocular Movements: Extraocular movements intact. Conjunctiva/sclera: Conjunctivae normal.      Left eye: No exudate.      Pupils: Pupils are equal, round, and reactive to light. Neck:      Musculoskeletal: Normal range of motion and neck supple. No neck rigidity. Vascular: No JVD. Trachea: No tracheal deviation. Comments: No meningismus. Cardiovascular:      Rate and Rhythm: Normal rate and regular rhythm. Heart sounds: Normal heart sounds. Heart sounds not distant. No murmur. No friction rub. No gallop. Pulmonary:      Effort: Pulmonary effort is normal. Tachypnea present. No accessory muscle usage, prolonged expiration, respiratory distress or retractions. Breath sounds: No stridor. Examination of the right-upper field reveals decreased breath sounds. Examination of the left-upper field reveals decreased breath sounds. Decreased breath sounds present. No wheezing, rhonchi or rales. Chest:      Chest wall: No tenderness. Abdominal:      General: Abdomen is flat. Bowel sounds are normal. There is no distension. Palpations: Abdomen is soft. There is no mass. Tenderness: There is no abdominal tenderness. There is no right CVA tenderness, left CVA tenderness, guarding or rebound. Hernia: No hernia is present. Musculoskeletal: Normal range of motion. General: No swelling, tenderness, deformity or signs of injury. Lymphadenopathy:      Head:      Right side of head: No submental adenopathy. Left side of head: No submental adenopathy. Skin:     General: Skin is warm and dry. Capillary Refill: Capillary refill takes less than 2 seconds. Coloration: Skin is not jaundiced or pale. Findings: No bruising, erythema, lesion or rash. Neurological:      General: No focal deficit present. Mental Status: He is alert and oriented to person, place, and time. Mental status is at baseline. Cranial Nerves: No cranial nerve deficit. Sensory: No sensory deficit. Motor: No weakness.       Coordination: Coordination normal.      Deep Tendon Reflexes: Reflexes are normal and symmetric. Psychiatric:         Mood and Affect: Mood normal.         Behavior: Behavior normal. Behavior is cooperative. Thought Content: Thought content normal.         Judgment: Judgment normal.         DIAGNOSTIC RESULTS     EKG: All EKG's are interpreted by the Emergency Department Physician who either signs or Co-signsthis chart in the absence of a cardiologist.    EKG: Atrial fibrillation with frequent electronic ventricular paced complexes. Rate is 65 bpm.  Left axis. Left bundle branch block. RADIOLOGY:   Non-plain filmimages such as CT, Ultrasound and MRI are read by the radiologist. Plain radiographic images are visualized and preliminarily interpreted by the emergency physician with the below findings:    Chest x-ray: Pacer, cardiomegaly, no focal consolidative infiltrate. Interpretation per the Radiologist below, if available at the time ofthis note:    CTA CHEST [address]   Final Result      No CT evidence of pulmonary embolism. Trace bilateral pleural effusion/thickening. Other chronic findings, grossly similar to prior CT.                      XR CHEST PORTABLE    (Results Pending)         ED BEDSIDE ULTRASOUND:   Performed by ED Physician - none    LABS:  Labs Reviewed   D-DIMER, QUANTITATIVE - Abnormal; Notable for the following components:       Result Value    D-Dimer, Quant 1.24 (*)     All other components within normal limits    Narrative:     Grace Arias tel. [phone],  Dimer results called to and read back by JOANN Miller, 01/12/2021 18:41, by [email or web address]   LACTIC ACID, PLASMA - Abnormal; Notable for the following components:    Lactic Acid 2.9 (*)     All other components within normal limits   C-REACTIVE PROTEIN - Abnormal; Notable for the following components:    CRP 6.6 (*)     All other components within normal limits   APTT - Abnormal; Notable for the following components:    aPTT 47.5 (*)     All other components within normal limits    Narrative:     CALL Garner  [facility] tel. [phone],  Dimer results called to and read back by S Paul, 01/12/2021 18:41, by [facility]   CBC WITH AUTO DIFFERENTIAL - Abnormal; Notable for the following components:    WBC 3.5 (*)     RBC 4.09 (*)     Hemoglobin 13.0 (*)     Hematocrit 39.6 (*)     MCH 31.9 (*)     MCHC 32.8 (*)     RDW 15.0 (*)     All other components within normal limits   COMPREHENSIVE METABOLIC PANEL - Abnormal; Notable for the following components:    Glucose 154 (*)     Total Bilirubin 0.9 (*)     All other components within normal limits   PROTIME-INR - Abnormal; Notable for the following components:    Protime 16.2 (*)     All other components within normal limits    Narrative:     Gen Rick tel. [phone],  Dimer results called to and read back by S Danielso, 01/12/2021 18:41, by [facility]   COVID-19 - Abnormal; Notable for the following components:    SARS-CoV-2, NAAT DETECTED (*)     All other components within normal limits    Narrative:     Gen Cabrera tel. [phone],  COVID-19 result called to Dr Onelia Oliver, 01/12/2021 18:35, by Martínez Augusta   RAPID INFLUENZA A/B ANTIGENS   CULTURE, BLOOD 1   CULTURE, BLOOD 2   PROCALCITONIN   BRAIN NATRIURETIC PEPTIDE   CK   MAGNESIUM   TROPONIN   TSH WITHOUT REFLEX       All other labs were within normal range or not returned as of this dictation. EMERGENCY DEPARTMENT COURSE and DIFFERENTIAL DIAGNOSIS/MDM:   Vitals:    Vitals:    01/12/21 1730 01/12/21 1800 01/12/21 1830 01/12/21 1914   BP: 130/88 118/88 109/72 (!) 120/101   Pulse: 66 70 67 87   Resp: 24 26 24 16   Temp:       TempSrc:       SpO2: 100% 99% 98% 100%   Weight:       Height:           Rauljam Rhoades is assumed care of the patient at 7 PM we will follow up on the CAT scan and the other studies and determine the patient's disposition. Patient does have a history of CHF however the chest x-ray does not show fluid overload. Correlate with CAT scan. MDM  Patient is COVID-19 positive.   Patient's D-dimer is positive a CAT scan of the chest has been ordered. However CT scan was normal.  Not even showing a viral pneumonia. Patient has not required any supplemental oxygen. His sats have been in the high 90s. At this point he is discharged back to home with instructions regarding COVID-19 infection. He is also dispensed a pulse oximeter to monitor for any hypoxia.     CONSULTS:  None    PROCEDURES:  Unless otherwise noted below, none     Procedures    FINAL IMPRESSION      1. COVID-19 virus infection          DISPOSITION/PLAN   DISPOSITION        PATIENT REFERRED TO:  Sole Modi  [address] [place] [address] [address] [address]  [phone]    In 3 days        DISCHARGE MEDICATIONS:  New Prescriptions    No medications on file          (Please note that portions of this note were completed with a voice recognition program.  Efforts were made to edit the dictations but occasionally words are mis-transcribed.)    Iveth Valencia MD (electronically signed)  Attending Emergency Physician          Iveth Valencia MD  01/12/21 3662

## 2021-01-13 NOTE — ED NOTES
Called patient's son to let him know patient is discharged and needs a ride home.       Ruperto Orozco RN  01/12/21 2114

## 2021-01-13 NOTE — CARE COORDINATION
Patient contacted regarding [id number] diagnosis\". Discussed COVID-19 related testing which was available at this time. Test results were positive. Patient informed of results, if available? Yes and Completed 2021    Care Transition Nurse/ Ambulatory Care Manager contacted the patient by telephone to perform post discharge assessment. Call within 2 business days of discharge: Yes. Verified name and  with family as identifiers. Provided introduction to self, and explanation of the CTN/ACM role, and reason for call due to risk factors for infection and/or exposure to COVID-19. Symptoms reviewed with family who verbalized the following symptoms: fatigue, shortness of breath, no new symptoms and no worsening symptoms. Due to no new or worsening symptoms encounter was not routed to provider for escalation. Discussed follow-up appointments. If no appointment was previously scheduled, appointment scheduling offered: [facility] follow up appointment(s):   Future Appointments   Date Time Provider Maco Miller   2021  9:30 AM Fortunato Pascualer, [facility]     [address] [address] follow up appointment(s): [facility]    Non-face-to-face services provided:  Obtained and reviewed discharge summary and/or continuity of care documents     Advance Care Planning:   Does patient have an Advance Directive:  reviewed and current. Patient has following risk factors of: heart failure. CTN/ACM reviewed discharge instructions, medical action plan and red flags such as increased shortness of breath, increasing fever and signs of decompensation with patient who verbalized understanding. Discussed exposure protocols and quarantine with CDC Guidelines What to do if you are sick with coronavirus disease .  Family was given an opportunity for questions and concerns.  The family agrees to contact the [facility] exposure line [phone], [facility] [place] department [address]: ([phone]) and PCP office for questions related to their healthcare. CTN/ACM provided contact information for future needs. Reviewed and educated family on any new and changed medications related to discharge diagnosis     Patient/family/caregiver given information for [employer] Loop and agrees to enroll no Salvadorean speaking only  Patient's preferred e-mail:    Patient's preferred phone number:   Based on Loop alert triggers, patient will be contacted by nurse care manager for worsening symptoms. Plan for follow-up call in 3-5 days based on severity of symptoms and risk factors. I spoke with the son Jose Manuel Reyes via [address] [place] [address]  #[phone]. Jose Manuel Reyes tells us that his father is fatigued and he is SOB with activity. I asked about a pulse oximeter and the son says that he bought one last week for his mother. I reviewed how to use the device and Jose Manuel Reyes tells me that his SaO2 levels for his father are between 95%-100%. I reviewed when he needs to go back to the ER regarding SaO2. He says that his dad is eating and drinking well. I have reviewed preventative protocols for all family members as there are multiple family members living together. I have instructed them to call me with any questions or concerns and I have provided him with my contact information.

## 2021-01-14 PROCEDURE — 93010 ELECTROCARDIOGRAM REPORT: CPT | Performed by: INTERNAL MEDICINE

## 2021-01-16 ENCOUNTER — CARE COORDINATION (OUTPATIENT)
Dept: CARE COORDINATION | Age: 86
End: 2021-01-16

## 2021-01-16 NOTE — CARE COORDINATION
Patient contacted regarding COVID-19 risk and screening. Discussed COVID-19 related testing which was available at this time. Test results were positive. Patient informed of results, if available? Yes and Completed 2021. Care Transition Nurse/ Ambulatory Care Manager contacted the patient by telephone to perform follow-up assessment. Verified name and  with patient as identifiers. Patient has following risk factors of: heart failure. Symptoms reviewed with patient who verbalized the following symptoms: fatigue, shortness of breath, no new symptoms and no worsening symptoms. Due to no new or worsening symptoms encounter was not routed to provider for escalation. Education provided regarding infection prevention, and signs and symptoms of COVID-19 and when to seek medical attention with family who verbalized understanding. Discussed exposure protocols and quarantine from [address] you at higher risk for severe illness  and given an opportunity for questions and concerns. The family agrees to contact the COVID-19 hotline [phone] or PCP office for questions related to their healthcare. CTN/ACM provided contact information for future reference. From CDC: Are you at higher risk for severe illness?  Wash your hands often.  Avoid close contact (6 feet, which is about two arm lengths) with people who are sick.  Put distance between yourself and other people if COVID-19 is spreading in your community.  Clean and disinfect frequently touched surfaces.  Avoid all cruise travel and non-essential air travel.  Call your healthcare professional if you have concerns about COVID-19 and your underlying condition or if you are sick. For more information on steps you can take to protect yourself, see CDC's How to Jaz for follow-up call in 5-7 days based on severity of symptoms and risk factors.   Spoke with patient's son via  #[phone]. Jaiden Farrell tells states that his father has had issues off and on with his breathing. He adds that he has called the ambulance a couple of times. They come check him out and tell him that he is okay. I asked if he is using the pulse oximeter and he tells me that he is using it everyday. I asked if it has been below 90% and he says no \"it is mostly 91%-92%. He states that his father is tired, weak, and SOB. They called [facility] office and they were told to call cardiology. He states that Dr Janey Mistry office told him the first available appointment would be on April 4. In reviewing the chart it looks like this appointment was made in October. I will follow up with Dr Janey Mistry office on Monday.

## 2021-01-17 LAB — BLOOD CULTURE, ROUTINE: NORMAL

## 2021-01-18 ENCOUNTER — CARE COORDINATION (OUTPATIENT)
Dept: CARE COORDINATION | Age: 86
End: 2021-01-18

## 2021-01-18 NOTE — CARE COORDINATION
I followed up with Raffi Mcnamara (son and health care decision maker)  I have follow up with sceduling a follow up appointment with Dr Deepa Eugene as promised. Raffi Mcnamara is aware that they will have a telephone/virtual visit this Friday 1/22/21 @ 11:15 am.  He tells me that his father is finally doing better and his oxygen levels are above 90%.   I have instructed Raffi Mcnamara to take his father Jose Potter back to the ER for any increasing SOB

## 2021-02-01 ENCOUNTER — CARE COORDINATION (OUTPATIENT)
Dept: CARE COORDINATION | Age: 86
End: 2021-02-01

## 2021-02-01 NOTE — CARE COORDINATION
Attempted to contact patient for 14 day follow up post ED COVID monitoring. Unable to reach patient by phone. Message left regarding purpose of call. Number provided and call back requested.

## 2021-02-02 ENCOUNTER — CARE COORDINATION (OUTPATIENT)
Dept: CARE COORDINATION | Age: 86
End: 2021-02-02

## 2021-02-02 NOTE — CARE COORDINATION
You Patient resolved from the Care Transitions episode on 2/2/2021  Discussed COVID-19 related testing which was available at this time. Test results were positive. Patient informed of results, if available? Yes and Completed 1/12/2021    Patient/family has been provided the following resources and education related to COVID-19:                         Signs, symptoms and red flags related to COVID-19            [facility] exposure and quarantine guidelines            [facility] exposure contact - [phone]            Contact for their local Department of Health                 Patient currently reports that the following symptoms have improved:  no new/worsening symptoms     No further outreach scheduled with this CTN/ACM. Episode of Care resolved. Patient has this CTN/ACM contact information if future needs arise. Carlene Bell

## 2021-02-26 ENCOUNTER — OFFICE VISIT (OUTPATIENT)
Dept: CARDIOLOGY CLINIC | Age: 86
End: 2021-02-26
Payer: MEDICARE

## 2021-02-26 VITALS
SYSTOLIC BLOOD PRESSURE: 120 MMHG | BODY MASS INDEX: 22.96 KG/M2 | DIASTOLIC BLOOD PRESSURE: 62 MMHG | OXYGEN SATURATION: 95 % | HEART RATE: 76 BPM | WEIGHT: 151 LBS

## 2021-02-26 DIAGNOSIS — I48.20 CHRONIC ATRIAL FIBRILLATION (HCC): ICD-10-CM

## 2021-02-26 DIAGNOSIS — Z95.0 PRESENCE OF CARDIAC PACEMAKER: Primary | ICD-10-CM

## 2021-02-26 DIAGNOSIS — R06.02 SOB (SHORTNESS OF BREATH): ICD-10-CM

## 2021-02-26 DIAGNOSIS — I42.9 CARDIOMYOPATHY, UNSPECIFIED TYPE (HCC): ICD-10-CM

## 2021-02-26 PROCEDURE — 1123F ACP DISCUSS/DSCN MKR DOCD: CPT | Performed by: INTERNAL MEDICINE

## 2021-02-26 PROCEDURE — 1036F TOBACCO NON-USER: CPT | Performed by: INTERNAL MEDICINE

## 2021-02-26 PROCEDURE — 4040F PNEUMOC VAC/ADMIN/RCVD: CPT | Performed by: INTERNAL MEDICINE

## 2021-02-26 PROCEDURE — 93000 ELECTROCARDIOGRAM COMPLETE: CPT | Performed by: INTERNAL MEDICINE

## 2021-02-26 PROCEDURE — 99213 OFFICE O/P EST LOW 20 MIN: CPT | Performed by: INTERNAL MEDICINE

## 2021-02-26 PROCEDURE — G8420 CALC BMI NORM PARAMETERS: HCPCS | Performed by: INTERNAL MEDICINE

## 2021-02-26 PROCEDURE — G8484 FLU IMMUNIZE NO ADMIN: HCPCS | Performed by: INTERNAL MEDICINE

## 2021-02-26 PROCEDURE — G8427 DOCREV CUR MEDS BY ELIG CLIN: HCPCS | Performed by: INTERNAL MEDICINE

## 2021-02-26 RX ORDER — DILTIAZEM HYDROCHLORIDE 120 MG/1
120 CAPSULE, COATED, EXTENDED RELEASE ORAL DAILY
Qty: 30 CAPSULE | Refills: 5 | Status: ON HOLD | OUTPATIENT
Start: 2021-02-26 | End: 2021-06-25 | Stop reason: HOSPADM

## 2021-02-26 NOTE — PROGRESS NOTES
Chief Complaint   Patient presents with    Shortness of Breath     s/p covid-no change since     Atrial Fibrillation    Discuss Medications     metoprolol       8-9-20: Patient is a 80 y.o. male who presents with a chief complaint of syncope. Patient is followed on a regular basis by Dr. Sherice Ridley. Patient with past medical history of chronic atrial fibrillation on oral anticoagulation. He was noted to have A. fib with slow ventricular response with heart rate in the 30s in the emergency department, placed on dopamine drip and given atropine which responded. Patient is currently seen in intensive care unit, on dopamine drip. He appears to be resting comfortably. Echocardiogram in September 2019 showed ejection fraction of 35%. Patient denies any chest pain, shortness of breath. Cardiac enzymes are negative, BNP is 4000. Potassium is 3.6, magnesium 2.3. Per granddaughter apparently patient had some chest pain prior to his syncopal episode. 9-18-20: Patient presents for initial medical evaluation. Patient is followed on a regular basis by Dr. Sherice Ridley. S/P  HOSPITALIZATION for SSS, hx of chronic Afibb, noted to have HR in 30's s/p single PPM. Not felt to be a good AICD candidate due to age. Doing well. On DOAC. Pt denies chest pain, dyspnea, dyspnea on exertion, change in exercise capacity, fatigue,  nausea, vomiting, diarrhea, constipation, motor weakness, insomnia, weight loss, syncope, dizziness, lightheadedness, palpitations, PND, orthopnea, or claudication. Echo with EF of 35%, 1-2+ MR.     2-26-21: hx of SSS, chronic Afib, s/p PPM single chamber. Hx of CMP, EF of 35%, 1-2+ MR. Not felt to be a good AICD candidate due to age. Getting his medications sometimes, does have SOB and anxiety with lopressor at times.    Pt denies chest pain, dyspnea, dyspnea on exertion, change in exercise capacity, fatigue,  nausea, vomiting, diarrhea, constipation, motor weakness, insomnia, weight loss, syncope, dizziness, lightheadedness, palpitations, PND, orthopnea, or claudication. Patient Active Problem List   Diagnosis    New onset a-fib (HCC)    Chronic atrial fibrillation (HCC)    [facility]SS [facility] syndrome[facility] ([facility])    Cardiomyopathy ([facility]    SOB (shortness of breath)    Pleural effusion on right    Atelectasis of right lung       Past Surgical History:   Procedure Laterality Date    BACK SURGERY      CARDIAC PACEMAKER PLACEMENT  2020    HERNIA REPAIR         Social History     Socioeconomic History    Marital status:      Spouse name: None    Number of children: None    Years of education: None    Highest education level: None   Occupational History    None   Social Needs    Financial resource strain: None    Food insecurity     Worry: None     Inability: None    Transportation needs     Medical: None     Non-medical: None   Tobacco Use    Smoking status: Former Smoker    Smokeless tobacco: Never Used    Tobacco comment: quit 30 years ago   Substance and Sexual Activity    Alcohol use: No    Drug use: No    Sexual activity: None   Lifestyle    Physical activity     Days per week: None     Minutes per session: None    Stress: None   Relationships    Social connections     Talks on phone: None     Gets together: None     Attends [employer]ism service: None     Active member of club or organization: None     Attends meetings of clubs or organizations: None     Relationship status: None    Intimate partner violence     Fear of current or ex partner: None     Emotionally abused: None     Physically abused: None     Forced sexual activity: None   Other Topics Concern    None   Social History Narrative    None       No family history on file.     Current Outpatient Medications   Medication Sig Dispense Refill    dilTIAZem (CARDIZEM CD) 120 MG extended release capsule Take 1 capsule by mouth daily 30 capsule 5    apixaban (ELIQUIS) 2.5 MG TABS tablet Take 1 tablet by mouth 2 times daily 60 tablet 5    metoprolol tartrate (LOPRESSOR) 25 MG tablet TAKE 1 TABLET BY MOUTH TWICE DAILY 60 tablet 5    albuterol (PROVENTIL) (2.5 MG/3ML) 0.083% nebulizer solution Take 3 mLs by nebulization every 6 hours as needed for Wheezing 120 each 1    lisinopril (PRINIVIL;ZESTRIL) 5 MG tablet Take 1 tablet by mouth daily 30 tablet 5    furosemide (LASIX) 40 MG tablet Take 1 tablet by mouth daily for 7 days 7 tablet 0    furosemide (LASIX) 20 MG tablet Take 1 tablet by mouth daily 30 tablet 0    fluticasone-salmeterol (ADVAIR DISKUS) 250-50 MCG/DOSE AEPB Inhale 1 puff into the lungs every 12 hours for 14 days 1 Inhaler 0     No current facility-administered medications for this visit. Patient has no known allergies. Review of Systems:  General ROS: negative  Psychological ROS: negative  Hematological and Lymphatic ROS: No history of blood clots or bleeding disorder. Respiratory ROS: no cough, shortness of breath, or wheezing  Cardiovascular ROS: no chest pain or dyspnea on exertion  Gastrointestinal ROS: no abdominal pain, change in bowel habits, or black or bloody stools  Genito-Urinary ROS: no dysuria, trouble voiding, or hematuria  Musculoskeletal ROS: negative  Neurological ROS: no TIA or stroke symptoms  Dermatological ROS: negative    VITALS:  Blood pressure 120/62, pulse 76, weight 151 lb (68.5 kg), SpO2 95 %. Body mass index is 22.96 kg/m². Physical Examination:  General appearance - alert, well appearing, and in no distress  Mental status - alert, oriented to person, place, and time  Neck - Neck is supple, no JVD or carotid bruits. No thyromegaly or adenopathy.    Chest - clear to auscultation, no wheezes, rales or rhonchi, symmetric air entry  Heart - normal rate, regular rhythm, normal S1, S2, no murmurs, rubs, clicks or gallops  Abdomen - soft, nontender, nondistended, no masses or organomegaly  Neurological - alert, oriented, normal speech, no focal findings or movement

## 2021-03-19 ENCOUNTER — HOSPITAL ENCOUNTER (OUTPATIENT)
Dept: CARDIOLOGY | Age: 86
Discharge: HOME | End: 2021-03-19
Payer: MEDICARE

## 2021-03-19 PROCEDURE — 93279 PRGRMG DEV EVAL PM/LDLS PM: CPT

## 2021-06-07 ENCOUNTER — APPOINTMENT (OUTPATIENT)
Dept: CT IMAGING | Age: 86
DRG: 292 | End: 2021-06-07
Payer: MEDICARE

## 2021-06-07 ENCOUNTER — HOSPITAL ENCOUNTER (INPATIENT)
Age: 86
LOS: 1 days | Discharge: HOME | DRG: 292 | End: 2021-06-08
Attending: EMERGENCY MEDICINE | Admitting: INTERNAL MEDICINE
Payer: MEDICARE

## 2021-06-07 ENCOUNTER — APPOINTMENT (OUTPATIENT)
Dept: GENERAL RADIOLOGY | Age: 86
DRG: 292 | End: 2021-06-07
Payer: MEDICARE

## 2021-06-07 DIAGNOSIS — R91.8 LEFT LOWER LOBE PULMONARY INFILTRATE: ICD-10-CM

## 2021-06-07 DIAGNOSIS — R06.00 DYSPNEA AND RESPIRATORY ABNORMALITIES: ICD-10-CM

## 2021-06-07 DIAGNOSIS — R06.89 DYSPNEA AND RESPIRATORY ABNORMALITIES: ICD-10-CM

## 2021-06-07 DIAGNOSIS — R60.0 BILATERAL LOWER EXTREMITY EDEMA: Primary | ICD-10-CM

## 2021-06-07 DIAGNOSIS — I50.23 ACUTE ON CHRONIC SYSTOLIC CONGESTIVE HEART FAILURE (HCC): ICD-10-CM

## 2021-06-07 PROBLEM — I50.43 CHF (CONGESTIVE HEART FAILURE), NYHA CLASS I, ACUTE ON CHRONIC, COMBINED (HCC): Status: ACTIVE | Noted: 2021-06-07

## 2021-06-07 LAB
ALBUMIN SERPL-MCNC: 3.9 G/DL (ref 3.5–4.6)
ALP BLD-CCNC: 93 U/L (ref 35–104)
ALT SERPL-CCNC: 11 U/L (ref 0–41)
ANION GAP SERPL CALCULATED.3IONS-SCNC: 11 MEQ/L (ref 9–15)
AST SERPL-CCNC: 26 U/L (ref 0–40)
BILIRUB SERPL-MCNC: 0.9 MG/DL (ref 0.2–0.7)
BUN BLDV-MCNC: 20 MG/DL (ref 8–23)
CALCIUM SERPL-MCNC: 9.4 MG/DL (ref 8.5–9.9)
CHLORIDE BLD-SCNC: 106 MEQ/L (ref 95–107)
CO2: 21 MEQ/L (ref 20–31)
CREAT SERPL-MCNC: 1.01 MG/DL (ref 0.7–1.2)
GFR AFRICAN AMERICAN: >60
GFR NON-AFRICAN AMERICAN: >60
GLOBULIN: 3.1 G/DL (ref 2.3–3.5)
GLUCOSE BLD-MCNC: 112 MG/DL (ref 70–99)
HCT VFR BLD CALC: 37.1 % (ref 42–52)
HEMOGLOBIN: 12.5 G/DL (ref 14–18)
MCH RBC QN AUTO: 32 PG (ref 27–31.3)
MCHC RBC AUTO-ENTMCNC: 33.8 % (ref 33–37)
MCV RBC AUTO: 94.8 FL (ref 80–100)
PDW BLD-RTO: 13.3 % (ref 11.5–14.5)
PLATELET # BLD: 80 K/UL (ref 130–400)
PLATELET SLIDE REVIEW: ABNORMAL
POTASSIUM SERPL-SCNC: 4.6 MEQ/L (ref 3.4–4.9)
PROCALCITONIN: <0.02 NG/ML (ref 0–0.15)
RBC # BLD: 3.91 M/UL (ref 4.7–6.1)
SODIUM BLD-SCNC: 138 MEQ/L (ref 135–144)
TOTAL PROTEIN: 7 G/DL (ref 6.3–8)
TROPONIN: <0.01 NG/ML (ref 0–0.01)
WBC # BLD: 6.9 K/UL (ref 4.8–10.8)

## 2021-06-07 PROCEDURE — 6360000002 HC RX W HCPCS: Performed by: INTERNAL MEDICINE

## 2021-06-07 PROCEDURE — 2580000003 HC RX 258: Performed by: INTERNAL MEDICINE

## 2021-06-07 PROCEDURE — 71045 X-RAY EXAM CHEST 1 VIEW: CPT

## 2021-06-07 PROCEDURE — 85027 COMPLETE CBC AUTOMATED: CPT

## 2021-06-07 PROCEDURE — 99284 EMERGENCY DEPT VISIT MOD MDM: CPT

## 2021-06-07 PROCEDURE — 2060000000 HC ICU INTERMEDIATE R&B

## 2021-06-07 PROCEDURE — 6370000000 HC RX 637 (ALT 250 FOR IP): Performed by: EMERGENCY MEDICINE

## 2021-06-07 PROCEDURE — 6360000004 HC RX CONTRAST MEDICATION: Performed by: EMERGENCY MEDICINE

## 2021-06-07 PROCEDURE — 6360000002 HC RX W HCPCS: Performed by: EMERGENCY MEDICINE

## 2021-06-07 PROCEDURE — 93005 ELECTROCARDIOGRAM TRACING: CPT | Performed by: EMERGENCY MEDICINE

## 2021-06-07 PROCEDURE — 2580000003 HC RX 258: Performed by: EMERGENCY MEDICINE

## 2021-06-07 PROCEDURE — 84145 PROCALCITONIN (PCT): CPT

## 2021-06-07 PROCEDURE — 71250 CT THORAX DX C-: CPT

## 2021-06-07 PROCEDURE — 36415 COLL VENOUS BLD VENIPUNCTURE: CPT

## 2021-06-07 PROCEDURE — 87040 BLOOD CULTURE FOR BACTERIA: CPT

## 2021-06-07 PROCEDURE — 74177 CT ABD & PELVIS W/CONTRAST: CPT

## 2021-06-07 PROCEDURE — 80053 COMPREHEN METABOLIC PANEL: CPT

## 2021-06-07 PROCEDURE — 96375 TX/PRO/DX INJ NEW DRUG ADDON: CPT

## 2021-06-07 PROCEDURE — 84484 ASSAY OF TROPONIN QUANT: CPT

## 2021-06-07 PROCEDURE — 96365 THER/PROPH/DIAG IV INF INIT: CPT

## 2021-06-07 RX ORDER — POLYETHYLENE GLYCOL 3350 17 G/17G
17 POWDER, FOR SOLUTION ORAL DAILY PRN
Status: DISCONTINUED | OUTPATIENT
Start: 2021-06-07 | End: 2021-06-08 | Stop reason: HOSPADM

## 2021-06-07 RX ORDER — SODIUM CHLORIDE 0.9 % (FLUSH) 0.9 %
5-40 SYRINGE (ML) INJECTION EVERY 12 HOURS SCHEDULED
Status: DISCONTINUED | OUTPATIENT
Start: 2021-06-07 | End: 2021-06-08 | Stop reason: HOSPADM

## 2021-06-07 RX ORDER — METHYLPREDNISOLONE SODIUM SUCCINATE 125 MG/2ML
125 INJECTION, POWDER, LYOPHILIZED, FOR SOLUTION INTRAMUSCULAR; INTRAVENOUS ONCE
Status: COMPLETED | OUTPATIENT
Start: 2021-06-07 | End: 2021-06-07

## 2021-06-07 RX ORDER — LEVOFLOXACIN 5 MG/ML
250 INJECTION, SOLUTION INTRAVENOUS EVERY 24 HOURS
Status: DISCONTINUED | OUTPATIENT
Start: 2021-06-07 | End: 2021-06-08 | Stop reason: HOSPADM

## 2021-06-07 RX ORDER — ONDANSETRON 2 MG/ML
4 INJECTION INTRAMUSCULAR; INTRAVENOUS EVERY 6 HOURS PRN
Status: DISCONTINUED | OUTPATIENT
Start: 2021-06-07 | End: 2021-06-08 | Stop reason: HOSPADM

## 2021-06-07 RX ORDER — ACETAMINOPHEN 325 MG/1
650 TABLET ORAL EVERY 6 HOURS PRN
Status: DISCONTINUED | OUTPATIENT
Start: 2021-06-07 | End: 2021-06-08 | Stop reason: HOSPADM

## 2021-06-07 RX ORDER — SODIUM CHLORIDE 0.9 % (FLUSH) 0.9 %
5-40 SYRINGE (ML) INJECTION PRN
Status: DISCONTINUED | OUTPATIENT
Start: 2021-06-07 | End: 2021-06-08 | Stop reason: HOSPADM

## 2021-06-07 RX ORDER — ONDANSETRON 4 MG/1
4 TABLET, ORALLY DISINTEGRATING ORAL EVERY 8 HOURS PRN
Status: DISCONTINUED | OUTPATIENT
Start: 2021-06-07 | End: 2021-06-08 | Stop reason: HOSPADM

## 2021-06-07 RX ORDER — SODIUM CHLORIDE 9 MG/ML
25 INJECTION, SOLUTION INTRAVENOUS PRN
Status: DISCONTINUED | OUTPATIENT
Start: 2021-06-07 | End: 2021-06-08 | Stop reason: HOSPADM

## 2021-06-07 RX ORDER — ACETAMINOPHEN 650 MG/1
650 SUPPOSITORY RECTAL EVERY 6 HOURS PRN
Status: DISCONTINUED | OUTPATIENT
Start: 2021-06-07 | End: 2021-06-08 | Stop reason: HOSPADM

## 2021-06-07 RX ORDER — FUROSEMIDE 10 MG/ML
60 INJECTION INTRAMUSCULAR; INTRAVENOUS ONCE
Status: COMPLETED | OUTPATIENT
Start: 2021-06-07 | End: 2021-06-07

## 2021-06-07 RX ADMIN — LEVOFLOXACIN 250 MG: 5 INJECTION, SOLUTION INTRAVENOUS at 20:57

## 2021-06-07 RX ADMIN — SODIUM CHLORIDE, PRESERVATIVE FREE 10 ML: 5 INJECTION INTRAVENOUS at 20:57

## 2021-06-07 RX ADMIN — FUROSEMIDE 60 MG: 10 INJECTION, SOLUTION INTRAMUSCULAR; INTRAVENOUS at 11:00

## 2021-06-07 RX ADMIN — CEFTRIAXONE SODIUM 1000 MG: 1 INJECTION, POWDER, FOR SOLUTION INTRAMUSCULAR; INTRAVENOUS at 12:32

## 2021-06-07 RX ADMIN — IOPAMIDOL 100 ML: 755 INJECTION, SOLUTION INTRAVENOUS at 12:21

## 2021-06-07 RX ADMIN — METHYLPREDNISOLONE SODIUM SUCCINATE 125 MG: 125 INJECTION, POWDER, FOR SOLUTION INTRAMUSCULAR; INTRAVENOUS at 11:37

## 2021-06-07 RX ADMIN — AZITHROMYCIN MONOHYDRATE 500 MG: 500 INJECTION, POWDER, LYOPHILIZED, FOR SOLUTION INTRAVENOUS at 13:12

## 2021-06-07 RX ADMIN — ENOXAPARIN SODIUM 40 MG: 40 INJECTION SUBCUTANEOUS at 20:57

## 2021-06-07 RX ADMIN — NITROGLYCERIN 0.5 INCH: 20 OINTMENT TOPICAL at 11:00

## 2021-06-07 ASSESSMENT — PAIN DESCRIPTION - DESCRIPTORS: DESCRIPTORS: ACHING

## 2021-06-07 ASSESSMENT — ENCOUNTER SYMPTOMS
FACIAL SWELLING: 0
COLOR CHANGE: 0
EYE DISCHARGE: 0
VOMITING: 0
ABDOMINAL PAIN: 0
RHINORRHEA: 0
SHORTNESS OF BREATH: 1

## 2021-06-07 ASSESSMENT — PAIN DESCRIPTION - LOCATION: LOCATION: KNEE

## 2021-06-07 ASSESSMENT — PAIN DESCRIPTION - ORIENTATION: ORIENTATION: LEFT

## 2021-06-07 ASSESSMENT — PAIN SCALES - GENERAL
PAINLEVEL_OUTOF10: 8
PAINLEVEL_OUTOF10: 0

## 2021-06-07 ASSESSMENT — PAIN DESCRIPTION - PAIN TYPE: TYPE: CHRONIC PAIN

## 2021-06-07 NOTE — ED PROVIDER NOTES
[facility] ED  eMERGENCY dEPARTMENT eNCOUnter      Pt Name: Mike Pruitt  MRN: [id number]  [place] 2/12/1929  Date of evaluation: 6/7/2021  Provider: Miriam Coello, [facility]       Chief Complaint   Patient presents with    Leg Swelling     bilateral leg swelling for several days         HISTORY OF PRESENT ILLNESS   (Location/Symptom, Timing/Onset,Context/Setting, Quality, Duration, Modifying Factors, Severity)  Note limiting factors. Mike Pruitt is a 80 y.o. male who presents to the emergency department with bilateral leg swelling and increased shortness of breath for approximately 4 days now. I really cannot get much more history from the son or the patient themselves. The patient does have a history of congestive heart failure and a recent history of concern for Covid. HPI    NursingNotes were reviewed. REVIEW OF SYSTEMS    (2-9 systems for level 4, 10 or more for level 5)     Review of Systems   Constitutional: Negative for activity change, appetite change and fatigue. HENT: Negative for congestion, facial swelling and rhinorrhea. Eyes: Negative for discharge. Respiratory: Positive for shortness of breath. Cardiovascular: Positive for leg swelling. Negative for chest pain. Gastrointestinal: Negative for abdominal pain and vomiting. Endocrine: Negative for cold intolerance. Musculoskeletal: Negative for arthralgias and myalgias. Skin: Negative for color change. Allergic/Immunologic: Negative for environmental allergies. Neurological: Negative for dizziness and light-headedness. Hematological: Negative for adenopathy. Psychiatric/Behavioral: Negative for behavioral problems. Except as noted above the remainder of the review of systems was reviewed and negative.        PAST MEDICAL HISTORY     Past Medical History:   Diagnosis Date    Atrial fibrillation ([address]     Cardiomy[facility] 9/18/2020    Diabetes mellitus [facility]     Hypertension SURGICALHISTORY       Past Surgical History:   Procedure Laterality Date    BACK SURGERY      CARDIAC PACEMAKER PLACEMENT  2020    HERNIA REPAIR           CURRENT MEDICATIONS       Previous Medications    ALBUTEROL (PROVENTIL) (2.5 MG/3ML) 0.083% NEBULIZER SOLUTION    Take 3 mLs by nebulization every 6 hours as needed for Wheezing    APIXABAN (ELIQUIS) 2.5 MG TABS TABLET    Take 1 tablet by mouth 2 times daily    DILTIAZEM (CARDIZEM CD) 120 MG EXTENDED RELEASE CAPSULE    Take 1 capsule by mouth daily    FLUTICASONE-SALMETEROL (ADVAIR DISKUS) 250-50 MCG/DOSE AEPB    Inhale 1 puff into the lungs every 12 hours for 14 days    FUROSEMIDE (LASIX) 20 MG TABLET    Take 1 tablet by mouth daily    FUROSEMIDE (LASIX) 40 MG TABLET    Take 1 tablet by mouth daily for 7 days    LISINOPRIL (PRINIVIL;ZESTRIL) 5 MG TABLET    Take 1 tablet by mouth daily    METOPROLOL TARTRATE (LOPRESSOR) 25 MG TABLET    TAKE 1 TABLET BY MOUTH TWICE DAILY       ALLERGIES     Patient has no known allergies. FAMILY HISTORY     No family history on file. SOCIAL HISTORY       Social History     Socioeconomic History    Marital status:      Spouse name: Not on file    Number of children: Not on file    Years of education: Not on file    Highest education level: Not on file   Occupational History    Not on file   Tobacco Use    Smoking status: Former Smoker    Smokeless tobacco: Never Used    Tobacco comment: quit 30 years ago   Substance and Sexual Activity    Alcohol use: No    Drug use: No    Sexual activity: Not on file   Other Topics Concern    Not on file   Social History Narrative    Not on file     Social Determinants of Health     Financial Resource Strain:     Difficulty of Paying Living Expenses:    Food Insecurity:     Worried About [address] in the Last Year:     [address]  in the Last Year:    Transportation Needs:     Lack of Transportation (Medical):      Lack of Transportation (Non-Medical):    Physical Activity:     Days of Exercise per Week:     Minutes of Exercise per Session:    Stress:     Feeling of Stress :    Social Connections:     Frequency of Communication with Friends and Family:     Frequency of Social Gatherings with Friends and Family:     Attends Yarsanism Services:     Active Member of Clubs or Organizations:     Attends Club or Organization Meetings:     Marital Status:    Intimate Partner Violence:     Fear of Current or Ex-Partner:     Emotionally Abused:     Physically Abused:     Sexually Abused:        SCREENINGS      @FLOW([id number])@      PHYSICAL EXAM    (up to 7 for level 4, 8 or more for level 5)     ED Triage Vitals [06/07/21 0950]   BP Temp Temp Source Pulse Resp SpO2 Height Weight   (!) 106/43 97.4 °F (36.3 °C) Oral 70 20 97 % 5' 7\" (1.702 m) 150 lb (68 kg)       Physical Exam  Constitutional:       Appearance: He is well-developed. He is not ill-appearing. HENT:      Head: Normocephalic and atraumatic. Nose: No congestion. Eyes:      Conjunctiva/sclera: Conjunctivae normal.      Pupils: Pupils are equal, round, and reactive to light. Cardiovascular:      Rate and Rhythm: Normal rate. Heart sounds: No murmur heard. Comments: Pacemaker noted  Pulmonary:      Effort: Pulmonary effort is normal.      Breath sounds: Rhonchi and rales present. Comments: Bilateral lower lobes are rhonchorous  Patient is tachypneic but not dyspneic  Abdominal:      General: Bowel sounds are normal. There is no distension. Palpations: Abdomen is soft. Musculoskeletal:         General: Normal range of motion. Cervical back: Normal range of motion and neck supple. Right lower leg: Edema present. Left lower leg: Edema present. Comments: No erythema, positive pitting   Skin:     General: Skin is warm and dry. Findings: No rash. Neurological:      Mental Status: He is alert and oriented to person, place, and time. Deep Tendon Reflexes: Reflexes are normal and symmetric. DIAGNOSTIC RESULTS     EKG: All EKG's are interpreted by the Emergency Department Physician who either signs or Co-signsthis chart in the absence of a cardiologist.    Patient has underlying A. fib rhythm at 66 bpm with frequent paced beats. No acute ST segment elevation or T wave inversion    RADIOLOGY:   Non-plain filmimages such as CT, Ultrasound and MRI are read by the radiologist. Plain radiographic images are visualized and preliminarily interpreted by the emergency physician with the below findings:        Interpretation per the Radiologist below, if available at the time ofthis note:    XR CHEST PORTABLE   Final Result   There is left lower lobe consolidation and/or effusion. There is lucency in both the right hemidiaphragm which may reflect free intraperitoneal air. The findings of possible free intraperitoneal air were called to ER at 1124 hours. CT ABDOMEN PELVIS W IV CONTRAST Additional Contrast? None    (Results Pending)         ED BEDSIDE ULTRASOUND:   Performed by ED Physician - none    LABS:  Labs Reviewed   CBC - Abnormal; Notable for the following components:       Result Value    RBC 3.91 (*)     Hemoglobin 12.5 (*)     Hematocrit 37.1 (*)     MCH 32.0 (*)     All other components within normal limits   COMPREHENSIVE METABOLIC PANEL - Abnormal; Notable for the following components:    Glucose 112 (*)     Total Bilirubin 0.9 (*)     All other components within normal limits   CULTURE, BLOOD 1   CULTURE, BLOOD 2   TROPONIN       All other labs were within normal range or not returned as of this dictation.     EMERGENCY DEPARTMENT COURSE and DIFFERENTIAL DIAGNOSIS/MDM:   Vitals:    Vitals:    06/07/21 0950 06/07/21 1130   BP: (!) 106/43 124/82   Pulse: 70 61   Resp: 20 24   Temp: 97.4 °F (36.3 °C)    TempSrc: Oral    SpO2: 97%    Weight: 150 lb (68 kg)    Height: 5' 7\" (1.702 m)        Patient is in obvious

## 2021-06-07 NOTE — ED NOTES
Report to [facility] update given. Bed not clean. Awaiting monitor     Dustin Wallace.  Arun Koroma RN  06/07/21 9591

## 2021-06-08 VITALS
OXYGEN SATURATION: 98 % | SYSTOLIC BLOOD PRESSURE: 134 MMHG | RESPIRATION RATE: 18 BRPM | WEIGHT: 150 LBS | TEMPERATURE: 97.5 F | DIASTOLIC BLOOD PRESSURE: 82 MMHG | HEART RATE: 50 BPM | BODY MASS INDEX: 23.54 KG/M2 | HEIGHT: 67 IN

## 2021-06-08 DIAGNOSIS — J90 PLEURAL EFFUSION ON LEFT: Primary | ICD-10-CM

## 2021-06-08 DIAGNOSIS — R93.89 ABNORMAL CHEST CT: ICD-10-CM

## 2021-06-08 LAB
EKG ATRIAL RATE: 61 BPM
EKG Q-T INTERVAL: 472 MS
EKG QRS DURATION: 124 MS
EKG QTC CALCULATION (BAZETT): 494 MS
EKG R AXIS: -5 DEGREES
EKG T AXIS: 159 DEGREES
EKG VENTRICULAR RATE: 66 BPM
LACTATE DEHYDROGENASE: 226 U/L (ref 135–225)

## 2021-06-08 PROCEDURE — 99222 1ST HOSP IP/OBS MODERATE 55: CPT | Performed by: INTERNAL MEDICINE

## 2021-06-08 PROCEDURE — 93010 ELECTROCARDIOGRAM REPORT: CPT | Performed by: INTERNAL MEDICINE

## 2021-06-08 PROCEDURE — 93306 TTE W/DOPPLER COMPLETE: CPT

## 2021-06-08 PROCEDURE — 92610 EVALUATE SWALLOWING FUNCTION: CPT

## 2021-06-08 PROCEDURE — 99223 1ST HOSP IP/OBS HIGH 75: CPT | Performed by: INTERNAL MEDICINE

## 2021-06-08 PROCEDURE — 6360000002 HC RX W HCPCS: Performed by: INTERNAL MEDICINE

## 2021-06-08 PROCEDURE — 36415 COLL VENOUS BLD VENIPUNCTURE: CPT

## 2021-06-08 PROCEDURE — 6370000000 HC RX 637 (ALT 250 FOR IP): Performed by: INTERNAL MEDICINE

## 2021-06-08 PROCEDURE — 83615 LACTATE (LD) (LDH) ENZYME: CPT

## 2021-06-08 RX ORDER — LEVOFLOXACIN 250 MG/1
250 TABLET ORAL DAILY
Qty: 5 TABLET | Refills: 0 | Status: SHIPPED | OUTPATIENT
Start: 2021-06-08 | End: 2021-06-13

## 2021-06-08 RX ADMIN — MAGNESIUM HYDROXIDE 30 ML: 400 SUSPENSION ORAL at 06:25

## 2021-06-08 RX ADMIN — ENOXAPARIN SODIUM 40 MG: 40 INJECTION SUBCUTANEOUS at 08:01

## 2021-06-08 RX ADMIN — METOPROLOL TARTRATE 25 MG: 25 TABLET, FILM COATED ORAL at 08:05

## 2021-06-08 ASSESSMENT — ENCOUNTER SYMPTOMS
RESPIRATORY NEGATIVE: 1
STRIDOR: 0
BLOOD IN STOOL: 0
SHORTNESS OF BREATH: 0
COUGH: 0
CHEST TIGHTNESS: 0
NAUSEA: 0
WHEEZING: 0
GASTROINTESTINAL NEGATIVE: 1
EYES NEGATIVE: 1

## 2021-06-08 NOTE — CONSULTS
Consults    Patient Name: Shilpi Snyder  Admit Date: 2021  9:58 AM  MR #: [id number]  : 1929    Attending Physician: Lazaro Pandey MD  Reason for consult: ? CHF    History of Presenting Illness: Shilpi Snyder is a 80 y.o. male on hospital day 1 with a history of . History Obtained From:  patient, electronic medical record     used. Pt and family thought he had appointment at the hospital. Son dropped him off. Pt has been in his usual state of health. Denies any recent CP nor SOB. He always has little LE edema which has not changed. He slept well. Wants coffee and breakfast.  Wants to go home. He does have PAF and on Low dose Eliquis. 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 He does have PPM for SSS. He is currently V Paced with underlying SR. He is on RA and sats   History:      EKG:V PAced with SR. Past Medical History:   Diagnosis Date    Atrial fibrillation ([address]     Cardiomyopathy ([address] 2020    [facility] mellitus [facility]     Hypertension      Past Surgical History:   Procedure Laterality Date    BACK SURGERY      CARDIAC PACEMAKER PLACEMENT      HERNIA REPAIR         Family History  No family history on file.   [] Unable to obtain due to ventilated and/ or neurologic status    Social History     Socioeconomic History    Marital status:      Spouse name: Not on file    Number of children: Not on file    Years of education: Not on file    Highest education level: Not on file   Occupational History    Not on file   Tobacco Use    Smoking status: Former Smoker    Smokeless tobacco: Never Used    Tobacco comment: quit 30 years ago   Substance and Sexual Activity    Alcohol use: No    Drug use: No    Sexual activity: Not on file   Other Topics Concern    Not on file   Social History Narrative    Not on file     Social Determinants of Health     Financial Resource Strain:     Difficulty of Paying Living Expenses:    Food Insecurity:     Worried About Running Out of Food in the Last Year:     [address] in the Last Year:    Transportation Needs:     Lack of Transportation (Medical):      Lack of Transportation (Non-Medical):    Physical Activity:     Days of Exercise per Week:     Minutes of Exercise per Session:    Stress:     Feeling of Stress :    Social Connections:     Frequency of Communication with Friends and Family:     Frequency of Social Gatherings with Friends and Family:     Attends Hinduism Services:     Active Member of Clubs or Organizations:     Attends Club or Organization Meetings:     Marital Status:    Intimate Partner Violence:     Fear of Current or Ex-Partner:     Emotionally Abused:     Physically Abused:     Sexually Abused:       [] Unable to obtain due to ventilated and/ or neurologic status      Home Medications:      Medications Prior to Admission: tiotropium (SPIRIVA RESPIMAT) 2.5 MCG/ACT AERS inhaler, Inhale 2 puffs into the lungs daily  dilTIAZem (CARDIZEM CD) 120 MG extended release capsule, Take 1 capsule by mouth daily  apixaban (ELIQUIS) 2.5 MG TABS tablet, Take 1 tablet by mouth 2 times daily  metoprolol tartrate (LOPRESSOR) 25 MG tablet, TAKE 1 TABLET BY MOUTH TWICE DAILY  lisinopril (PRINIVIL;ZESTRIL) 5 MG tablet, Take 1 tablet by mouth daily  fluticasone-salmeterol (ADVAIR DISKUS) 250-50 MCG/DOSE AEPB, Inhale 1 puff into the lungs every 12 hours for 14 days  [DISCONTINUED] furosemide (LASIX) 40 MG tablet, Take 1 tablet by mouth daily for 7 days  [DISCONTINUED] furosemide (LASIX) 20 MG tablet, Take 1 tablet by mouth daily  [DISCONTINUED] albuterol (PROVENTIL) (2.5 MG/3ML) 0.083% nebulizer solution, Take 3 mLs by nebulization every 6 hours as needed for Wheezing    Current Hospital Medications:     Scheduled Meds:   magnesium hydroxide  30 mL Oral Q6H    sodium chloride flush  5-40 mL Intravenous 2 times per day    enoxaparin  40 mg Subcutaneous Daily  levofloxacin  250 mg Intravenous Q24H     Continuous Infusions:   sodium chloride       PRN Meds:.sodium chloride flush, sodium chloride, ondansetron **OR** ondansetron, polyethylene glycol, acetaminophen **OR** acetaminophen  .  sodium chloride          Allergies:     No Known Allergies     Review of Systems:       Review of Systems   Constitutional: Negative. Negative for diaphoresis and fatigue. HENT: Negative. Eyes: Negative. Respiratory: Negative. Negative for cough, chest tightness, shortness of breath, wheezing and stridor. Cardiovascular: Positive for leg swelling. Negative for chest pain and palpitations. Gastrointestinal: Negative. Negative for blood in stool and nausea. Genitourinary: Negative. Musculoskeletal: Negative. Skin: Negative. Neurological: Negative. Negative for dizziness, syncope, weakness and light-headedness. Hematological: Negative. Psychiatric/Behavioral: Negative. Objective Findings:     Vitals:/87   Pulse 74   Temp 98.1 °F (36.7 °C) (Oral)   Resp 17   Ht 5' 7\" (1.702 m)   Wt 150 lb (68 kg)   SpO2 98%   BMI 23.49 kg/m²      Physical Examination:    Physical Exam   Constitutional: He appears healthy. No distress. HENT:   Normal cephalic and Atraumatic   Eyes: Pupils are equal, round, and reactive to light. Neck: Thyroid normal. No JVD present. No neck adenopathy. No thyromegaly present. Cardiovascular: Normal rate, regular rhythm, intact distal pulses and normal pulses. Murmur heard. Pulmonary/Chest: Effort normal and breath sounds normal. He has no wheezes. He has no rales. He exhibits no tenderness. Abdominal: Soft. Bowel sounds are normal. There is no abdominal tenderness. Musculoskeletal:         General: No tenderness or edema. Normal range of motion. Cervical back: Normal range of motion and neck supple. Neurological: He is alert and oriented to person, place, and time. Skin: Skin is warm.  No cyanosis. Nails show no clubbing. Results/ Medications reviewed 6/8/2021, 7:17 AM     Laboratory, Microbiology, Pathology, Radiology, Cardiology, Medications and Transcriptions reviewed  Scheduled Meds:   magnesium hydroxide  30 mL Oral Q6H    sodium chloride flush  5-40 mL Intravenous 2 times per day    enoxaparin  40 mg Subcutaneous Daily    levofloxacin  250 mg Intravenous Q24H     Continuous Infusions:   sodium chloride         Recent Labs     06/07/21  1045   WBC 6.9   HGB 12.5*   HCT 37.1*   MCV 94.8   PLT 80*     Recent Labs     06/07/21  1045      K 4.6      CO2 21   BUN 20   CREATININE 1.01     Recent Labs     06/07/21  1045   AST 26   ALT 11   BILITOT 0.9*   ALKPHOS 93     No results for input(s): LIPASE, AMYLASE in the last 72 hours. Recent Labs     06/07/21  1045   PROT 7.0     CT CHEST WO CONTRAST    Result Date: 6/7/2021  EXAMINATION: CT CHEST WO CONTRAST, 6/7/2021 5:35 PM CLINICAL HISTORY:  rule out pneumonia COMPARISON: Chest CT from January 10, 6194 TECHNIQUE: Helical CT was performed through the chest utilizing 100-mL of Optiray IV contrast, All CT scans at this facility use dose modulation, iterative reconstruction, and/or weight based dosing when appropriate to reduce radiation dose to as low as reasonably achievable. FINDINGS There is a cardiac pacemaker on the left The thyroid gland is within normal limits. The axillary regions demonstrate no significant lymphadenopathy or solid or cystic lesions. There is no mediastinal lymphadenopathy. The heart is enlarged, cardiomegaly without pericardial effusion. The great vessels of the chest are normal in course and caliber. There are no infiltrates. There is atelectasis of the dependent portions of both bases and there are bilateral layering pleural effusions greater on the left. Maximum depth of the right effusion is approximately 1.9 cm. Maximum depth of the left effusion  measures 5.4 cm.  There are no acute bony abnormalities. The visualized portions of the upper abdomen are within normal limits. There are bilateral layering pleural effusions greater on the left. There are no areas of consolidation, infiltrates. Cardiomegaly     CT ABDOMEN PELVIS W IV CONTRAST Additional Contrast? None    Result Date: 6/7/2021  CT of the Abdomen and Pelvis with intravenous contrast medium History:  Pneumoperitoneum. Right abdominal pain Technical Factors: CT imaging of the abdomen and pelvis were obtained and formatted as 5 mm contiguous axial images from the domes of the diaphragm to the symphysis pubis. Sagittal and coronal reconstructions were also obtained. Oral contrast medium:  None. Intravenous contrast medium:  Isovue-370, 100 mL. Comparison:  CT abdomen pelvis, November 18, 2020, August 8, 2020. Findings: Lungs: Moderate left pleural effusion. Consolidation left lower lobe. Pacemaker wires, right atrium, and right ventricle. Liver:  Normal in size, shape, and decreased in attenuation. Bile Ducts:  Normal in caliber. Gallbladder:  No stones or wall thickening. Pancreas:  Normal without masses, cysts, ductal dilatation or calcification. Spleen:  Normal in size without masses or calcifications. 8 mm splenule anterior and medial to spleen. Kidneys:  Normal in size and enhancement. No hydronephrosis, masses, or stones. Adrenals:  Normal. Small bowel:  Normal in caliber. Fluid and air-filled small bowel loops are identified in the right perihepatic space, and cephalad to the right hepatic lobe (series 601, image 36). Appendix:  Normal. Colon:  Normal in caliber. Diverticular change, sigmoid colon. Peritoneum:  No ascites, free air, or fluid collections. Vessels: Aorta normal in course and caliber. Portal vein, splenic vein, superior mesenteric vein are patent. Lymph nodes:  Retroperitoneal:  No enlarged retroperitoneal lymph nodes. Mesenteric:  No enlarged mesenteric lymph nodes. Pelvic: No enlarged pelvic lymph nodes. Ureters: Normal in course and caliber. No calcifications. Bladder: No wall thickening Abdominal Wall:  No hernia identified. Bones:  No bone lesions. Diffuse anterior osteophytes, lumbar spine. No post operative changes. No pneumoperitoneum. Air and fluid-filled small bowel loops identified within right perihepatic space and cephalad to the right hepatic lobe. Moderate left pleural effusion. Atelectasis/pneumonia, left lower lobe. Hepatic steatosis. Sigmoid diverticulosis. All CT scans at this facility use dose modulation, iterative reconstruction, and/or weight based dosing when appropriate to reduce radiation dose to as low as reasonably achievable. XR CHEST PORTABLE    Result Date: 6/7/2021  Exam: XR CHEST PORTABLE History:  short of breath Technique: AP portable view of the chest obtained. Comparison: Chest x-ray from January 12, 2021 Chest x-ray portable Findings: There is a single lead cardiac pacemaker on the left side The cardiomediastinal silhouette is within normal limits. The right lung is unremarkable. There is a left lower lobe opacity which may represent an infiltrate and pleural effusion. There is lucency of the right hemidiaphragm which may represent superimposed loops of bowel versus free air under the diaphragm. There is left lower lobe consolidation and/or effusion. There is lucency in both the right hemidiaphragm which may reflect free intraperitoneal air. The findings of possible free intraperitoneal air were called to ER at 1124 hours. Active Hospital Problems    Diagnosis Date Noted    CHF (congestive heart failure), NYHA class I, acute on chronic, combined ([facility] [address] [I50.43] 06/07/2021     Priority: Low         Impression/Plan:   1. PAF- in SR. resume Eliquis low dose at DC. 2. 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 3. CCMP EF 35%  4. Mild to Mod AR  5. PPM  6.  OK For dc and out pt f/u     Thank you for allowing us to participate in the care of this patient. Will continue to follow. Please call if questions or concerns arise.     Electronically signed by Preston Parisi MD on 6/8/2021 at 7:17 AM

## 2021-06-08 NOTE — PROGRESS NOTES
for evaluation  Subjective  Subjective: Patient denies difficulty swallowing. Behavior/Cognition: Alert; Cooperative;Pleasant mood  Respiratory Status: Room air  O2 Device: None (Room air)  Communication Observation: Functional  Follows Directions: Simple  Dentition: Dentures bottom; Dentures top  Patient Positioning: Upright in bed  Baseline Vocal Quality: Normal  Volitional Cough: Strong  Prior Dysphagia History: None  Consistencies Administered: Reg solid; Thin - cup    Current Diet level:  Current Diet : Regular  Current Liquid Diet : Thin    Oral Motor Deficits  Oral/Motor  Oral Motor: Within functional limits    Oral Phase Dysfunction  Oral Phase  Oral Phase: WFL  Oral Phase  Oral Phase - Comment: WFL. Adequate mastication and oral clearance of bolus in all opportunities independently. Indicators of Pharyngeal Phase Dysfunction   Pharyngeal Phase  Pharyngeal Phase: WFL  Pharyngeal Phase   Pharyngeal: WFL. Hyolaryngeal movement is present. No overt s/s of aspiration observed per all consistencies tested. Impression  Dysphagia Diagnosis: Swallow function appears grossly intact  Dysphagia Impression : No clinical indicators of oral or pharyngeal dysphagia. Dysphagia Outcome Severity Scale: Level 6: Within functional limits/Modified independence     Treatment Plan  Requires SLP Intervention: No  Duration/Frequency of Treatment: no f/u    Prognosis  Individuals consulted  Consulted and agree with results and recommendations: Patient;RN (DWAYNE Donovan Samples)    Education  Patient Education: Results of BSE  Patient Education Response: Demonstrated understanding  Safety Devices in place: Yes  Type of devices: Bed alarm in place;Call light within reach    Pain Assessment:  Pre-Treatment  Pain assessment: 0-10  Pain level: 0  Intervention:  Patient denies pain. Post-Treatment  Pain assessment: 0-10  Pain level: 0  Intervention:  Patient denies pain.          Therapy Time  SLP Individual Minutes  Time In: 6064  Time Out: [facility]: 10              Signature: Electronically signed by AIMEE Sanchez on 6/8/2021 at 11:13 AM

## 2021-06-08 NOTE — FLOWSHEET NOTE
Discussed d/c instructions in detail with pt and daughter. No questions or concerns. They are aware of appointments and when to follow up. Claire Núñez to set up outpt thoracentesis. The office will call. Talked with patient and his mother via conference call concerning ongoing hospitalization.  Patient was informed about the voicemail left by his old therapist, Chip Stephenson, from the [facility], that he will not be seeing patient anymore as a client.  Dr. Stephenson noted that patient has not seen him for over 7 weeks and the recommendation is that he be part of a more comprehensive mental Health Center.  Patient focused on wanting to convince his mother that he should be discharged so he could come home and take care of his issues.  He was evasive to be specific to his issues, so writer focused the conversation on housing.    Patient's mother indicated that she has been in touch with his management company, [employer], and was told by the  that there have been too many incidents where the resident has been disruptive to other residents and the police have been called.  They would like to terminate his lease.  Patient's mother needs a letter verifying that patient is in the hospital and that she can be the person that the management company talks to regarding negotiations for this issue.  Patient became extremely upset highlighting to his mother that she needs to advocate for him and support him to keep his apartment.  It was noted that patient needs to be active in this matter and create clarity as to what is going on.  Patient was suspicious that more information was given to the management company then had been expressed.  His mother assured him that she explained all communications.  Patient agreed to and understood that he needed to reach out to the management company in order to create some understanding as to where things lie.  In addition, patient noted that he did not want a letter sent to his mother indicating that he was in the hospital until he first was able to talk to somebody at the management office.  Patient made 3 attempts call with no success.  He agrees  to try again in the morning when the office reopens.      Patient stressed that staying in the hospital continues to ruin his life.  He presents little insight as to how his behaviors or lack of action towards bettering his mental health has created his current situation.

## 2021-06-08 NOTE — H&P
6/7/21 Yes Marcia Issa MD   furosemide (LASIX) 40 MG tablet Take 1 tablet by mouth daily for 7 days 12/2/20 6/7/21  Tiara Hinson PA-C       Allergies:  Patient has no known allergies. Social History:   TOBACCO:   reports that he has quit smoking. He has never used smokeless tobacco.  ETOH:   reports no history of alcohol use. Family History:   No family history on file. REVIEW OF SYSTEMS:  Ten systems reviewed and negative except as stated in the HPI    Physical Exam:    Vitals: /87   Pulse 74   Temp 98.1 °F (36.7 °C) (Oral)   Resp 17   Ht 5' 7\" (1.702 m)   Wt 150 lb (68 kg)   SpO2 98%   BMI 23.49 kg/m²   General appearance: alert, appears stated age and cooperative  Skin: Skin color, texture, turgor normal. No rashes or lesions  HEENT: Head: Normocephalic, no lesions, without obvious abnormality. . No dental issues   Neck: no jugular venous distention  Lungs: clear to auscultation bilaterally  Heart: regular rate and rhythm, S1, S2 normal, no murmur, click, rub or gallop  Abdomen: soft, non-tender; bowel sounds normal; no masses,  no organomegaly  Extremities: extremities normal, atraumatic, no cyanosis or edema  Neurologic: Mental status: Alert, oriented, thought content appropriate. Recent Labs     06/07/21  1045   WBC 6.9   HGB 12.5*   PLT 80*     Recent Labs     06/07/21  1045      K 4.6      CO2 21   BUN 20   CREATININE 1.01   GLUCOSE 112*   AST 26   ALT 11   BILITOT 0.9*   ALKPHOS 93     Troponin T:   Recent Labs     06/07/21  1045   TROPONINI <0.010       ABGs: No results found for: PHART, PO2ART, [id number]  INR: No results for input(s): INR in the last 72 hours. URINALYSIS:No results for input(s): NITRITE, COLORU, PHUR, LABCAST, WBCUA, RBCUA, MUCUS, TRICHOMONAS, YEAST, BACTERIA, CLARITYU, SPECGRAV, LEUKOCYTESUR, UROBILINOGEN, BILIRUBINUR, BLOODU, GLUCOSEU, AMORPHOUS in the last 72 hours.     Invalid input(s): Malinda Vega  -----------------------------------------------------------------   CT CHEST WO CONTRAST    Result Date: 6/7/2021  EXAMINATION: CT CHEST WO CONTRAST, 6/7/2021 5:35 PM CLINICAL HISTORY:  rule out pneumonia COMPARISON: Chest CT from January 10, 6756 TECHNIQUE: Helical CT was performed through the chest utilizing 100-mL of Optiray IV contrast, All CT scans at this facility use dose modulation, iterative reconstruction, and/or weight based dosing when appropriate to reduce radiation dose to as low as reasonably achievable. FINDINGS There is a cardiac pacemaker on the left The thyroid gland is within normal limits. The axillary regions demonstrate no significant lymphadenopathy or solid or cystic lesions. There is no mediastinal lymphadenopathy. The heart is enlarged, cardiomegaly without pericardial effusion. The great vessels of the chest are normal in course and caliber. There are no infiltrates. There is atelectasis of the dependent portions of both bases and there are bilateral layering pleural effusions greater on the left. Maximum depth of the right effusion is approximately 1.9 cm. Maximum depth of the left effusion  measures 5.4 cm. There are no acute bony abnormalities. The visualized portions of the upper abdomen are within normal limits. There are bilateral layering pleural effusions greater on the left. There are no areas of consolidation, infiltrates. Cardiomegaly     CT ABDOMEN PELVIS W IV CONTRAST Additional Contrast? None    Result Date: 6/7/2021  CT of the Abdomen and Pelvis with intravenous contrast medium History:  Pneumoperitoneum. Right abdominal pain Technical Factors: CT imaging of the abdomen and pelvis were obtained and formatted as 5 mm contiguous axial images from the domes of the diaphragm to the symphysis pubis. Sagittal and coronal reconstructions were also obtained. Oral contrast medium:  None. Intravenous contrast medium:  Isovue-370, 100 mL.  Comparison:  CT abdomen pelvis, November 18, 2020, August 8, 2020. Findings: Lungs: Moderate left pleural effusion. Consolidation left lower lobe. Pacemaker wires, right atrium, and right ventricle. Liver:  Normal in size, shape, and decreased in attenuation. Bile Ducts:  Normal in caliber. Gallbladder:  No stones or wall thickening. Pancreas:  Normal without masses, cysts, ductal dilatation or calcification. Spleen:  Normal in size without masses or calcifications. 8 mm splenule anterior and medial to spleen. Kidneys:  Normal in size and enhancement. No hydronephrosis, masses, or stones. Adrenals:  Normal. Small bowel:  Normal in caliber. Fluid and air-filled small bowel loops are identified in the right perihepatic space, and cephalad to the right hepatic lobe (series 601, image 36). Appendix:  Normal. Colon:  Normal in caliber. Diverticular change, sigmoid colon. Peritoneum:  No ascites, free air, or fluid collections. Vessels: Aorta normal in course and caliber. Portal vein, splenic vein, superior mesenteric vein are patent. Lymph nodes:  Retroperitoneal:  No enlarged retroperitoneal lymph nodes. Mesenteric:  No enlarged mesenteric lymph nodes. Pelvic: No enlarged pelvic lymph nodes. Ureters: Normal in course and caliber. No calcifications. Bladder: No wall thickening Abdominal Wall:  No hernia identified. Bones:  No bone lesions. Diffuse anterior osteophytes, lumbar spine. No post operative changes. No pneumoperitoneum. Air and fluid-filled small bowel loops identified within right perihepatic space and cephalad to the right hepatic lobe. Moderate left pleural effusion. Atelectasis/pneumonia, left lower lobe. Hepatic steatosis. Sigmoid diverticulosis. All CT scans at this facility use dose modulation, iterative reconstruction, and/or weight based dosing when appropriate to reduce radiation dose to as low as reasonably achievable.      XR CHEST PORTABLE    Result Date: 6/7/2021  Exam: XR CHEST PORTABLE History:  short of breath Technique: AP portable view of the chest obtained. Comparison: Chest x-ray from January 12, 2021 Chest x-ray portable Findings: There is a single lead cardiac pacemaker on the left side The cardiomediastinal silhouette is within normal limits. The right lung is unremarkable. There is a left lower lobe opacity which may represent an infiltrate and pleural effusion. There is lucency of the right hemidiaphragm which may represent superimposed loops of bowel versus free air under the diaphragm. There is left lower lobe consolidation and/or effusion. There is lucency in both the right hemidiaphragm which may reflect free intraperitoneal air. The findings of possible free intraperitoneal air were called to ER at 1124 hours. Assessment and Plan   1. Subjective shortness of breath, on room air. Able to lay down on the bed completely flat without a pillow. At the time of interview, patient did not feel short of breath. 2. Atrial fibrillation history  3. History of Covid on a previous encounter  4. DVT ppx  5. Disposition: Dependent on hospital course. Will discharge once medically stable. SW on board for discharge planning.      Patient Active Problem List   Diagnosis Code    New onset a-fib ([facility] I48.91    Chronic atrial fibrillation (HCC) I48.20    SSS (sick sinus syndrome) ([facility] I49.5    Cardiomyopathy (HCC) I42.9    SOB (shortness of breath) R06.02    Pleural effusion on right J90    Atelectasis of right lung J98.11    CHF (congestive heart failure), NYHA class I, acute on chronic, combined ([facility] I50.43       Claudia Sam MD, MD

## 2021-06-08 NOTE — CONSULTS
Pulmonary and Critical Care Medicine  Consult Note  Encounter Date: 2021 9:56 AM    Mr. Mouna Bird is a 80 y.o. male  : 1929  Requesting Provider: Emily Moore MD    Reason for request: Pleural effusions            HISTORY OF PRESENT ILLNESS:    Patient is 80 y.o. presents with worsening shortness of breath, patient Uzbek-speaking only, I used an online translation service to communicate, he essentially denied any symptoms, he does report slight shortness of breath but feels it at baseline, he report increased lower extremity edema but feels today is better, denies chest pain, no coughing, no fever no chills, no abdominal pain, no nausea no vomiting and no sick contact. Past Medical History:        Diagnosis Date    At[facility] ([facility]     Cardiomyopathy ([facility] 2020    Diabetes mellitus ([facility]     Hypertension        Past Surgical History:        Procedure Laterality Date    BACK SURGERY      CARDIAC PACEMAKER PLACEMENT      HERNIA REPAIR         Social History:     reports that he has quit smoking. He has never used smokeless tobacco. He reports that he does not drink alcohol and does not use drugs. Family History:   No family history on file. Allergies:  Patient has no known allergies.         MEDICATIONS during current hospitalization:    Continuous Infusions:   sodium chloride         Scheduled Meds:   magnesium hydroxide  30 mL Oral Q6H    [START ON 2021] apixaban  2.5 mg Oral BID    metoprolol tartrate  25 mg Oral BID    sodium chloride flush  5-40 mL Intravenous 2 times per day    levofloxacin  250 mg Intravenous Q24H       PRN Meds:sodium chloride flush, sodium chloride, ondansetron **OR** ondansetron, polyethylene glycol, acetaminophen **OR** acetaminophen        REVIEW OF SYSTEMS:  ROS: 10 organs review of system is done including general, psychological, ENT, hematological, endocrine, respiratory, cardiovascular, gastrointestinal, musculoskeletal, neurological,  allergy and Immunology is done and is otherwise negative. PHYSICAL EXAM:    Vitals:  /82   Pulse 50   Temp 97.5 °F (36.4 °C) (Oral)   Resp 18   Ht 5' 7\" (1.702 m)   Wt 150 lb (68 kg)   SpO2 96%   BMI 23.49 kg/m²     General: alert, cooperative, no distress  Head: normocephalic, atraumatic  Eyes:No gross abnormalities. ENT:  MMM no lesions  Neck:  supple and no masses  Chest : Good air movement, no wheezing, minimal basal rales, nontender, tympanic  Heart[de-identified] Heart sounds are normal.  Regular rate and rhythm without murmur, gallop or rub. ABD:  symmetric, soft, non-tender, no guarding or rebound  Musculoskeletal : no cyanosis, no clubbing and trace + edema-  bilateral lower extremities, worse on the right  Neuro:  Grossly normal  Skin: No rashes or nodules noted. Lymph node:  no cervical nodes  Urology: No Deng   Psychiatric: appropriate        Data Review  Recent Labs     06/07/21  1045   WBC 6.9   HGB 12.5*   HCT 37.1*   PLT 80*      Recent Labs     06/07/21  1045      K 4.6      CO2 21   BUN 20   CREATININE 1.01   GLUCOSE 112*       MV Settings: ABGs: No results for input(s): PHART, XVC6RMV, PO2ART, GPE8VVJ, BEART, S6JDRPDQ, MEB7HUK in the last 72 hours. O2 Device: None (Room air)  Lab Results   Component Value Date    LACTA 2.9 01/12/2021    LACTA 3.0 12/02/2020    LACTA 2.1 08/08/2020       Radiology  I personally reviewed imaging studies and CT chest shows bilateral pleural effusion, worse on the left, with left-sided atelectasis and possible infiltrate.   No endobronchial lesion, no mass        Assessment, plan:   Patient is at risk due to    · Worsening shortness of breath, likely volume overload patient improved today and denies symptoms  · Bilateral pleural effusion, likely volume overload  · Left lower lobe atelectasis versus infiltrate, rule out parapneumonic effusion, procalcitonin is negative  · Congestive heart failure with acute decompensation     Recommendation  · IR consult for thoracentesis  · Continue levofloxacin complete 7 days of treatment  · Speech eval  · Incentive spirometry  · Monitor urine output and renal function      Thank you for consultation    Electronically signed by Fide Clifton MD, [facility],  on 6/8/2021 at 9:56 AM

## 2021-06-09 DIAGNOSIS — J90 PLEURAL EFFUSION ON LEFT: Primary | ICD-10-CM

## 2021-06-09 DIAGNOSIS — R93.89 ABNORMAL CHEST CT: ICD-10-CM

## 2021-06-09 NOTE — PROGRESS NOTES
Physician Progress Note      PATIENTLarokaci Kingman  CSN #:                  [id number]  :                       1929  ADMIT DATE:       2021 9:58 AM  DISCH DATE:        2021 1:05 PM  RESPONDING  PROVIDER #:        Josee Rodas MD          QUERY TEXT:    Patient admitted with sob and 2+ BLE edema, b/l pleural effusion per CT. Noted documentation of \"chronic combined CHF-stable, no overt decompensation\"   by Dr. Shanae Rich on  and \"CHF with acute decompensation\" by Dr. Nasrin Ortega on . No BNP. EF 35% per  echo. If possible, please document in progress notes   and discharge summary if you are evaluating and /or treating any of the   following: The medical record reflects the following:  Risk Factors: HTN, CM  Clinical Indicators:  echo shows EF 35%; mild to moderate AR, no current   BNP  CT chest shows bilateral layering pleural effusions greater on the left. Pt presents with c/o sob, BLE edema. ED provider notes LS rales and rhonchi,   clear LS per H&P  Ed provider-\"Patient is in obvious congestive heart failure on my initial   evaluation\"   Dr. WILKERSON-\"Congestive heart failure with acute decompensation, worsening sob,   likely volume overload, improved today\"   Dr. 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 \"  Treatment: 60 mg IV Lasix x1 in ED, Lopressor, low Na diet, consult to   cardiology/pulmonology, IR for thoracentesis, I&O's. tele    Thank you, Doug Alegre RN  BSN [facility]  [phone]  Options provided:  -- Chronic combined CHF confirmed  -- Acute on chronic combined CHF confirmed  -- Other - I will add my own diagnosis  -- Disagree - Not applicable / Not valid  -- Disagree - Clinically unable to determine / Unknown  -- Refer to Clinical Documentation Reviewer    PROVIDER RESPONSE TEXT:    After study, chronic combined CHF confirmed.     Query created by: Caroline Cordero on 2021 11:32 AM      Electronically signed by:  Josee Rodas MD 6/8/2021 10:04 PM

## 2021-06-10 NOTE — DISCHARGE SUMMARY
noted Aorta \ Miscellaneous The aorta is within normal limits. M-Mode Measurements (cm)   LVIDd: 6.69 cm                         LVIDs: 5.56 cm  IVSd: 0.97 cm                          IVSs: 1.18 cm  LVPWd: 1 cm                            AO Root Dimension: 3.84 cm  Rt. Vent.  Dimension: 3.35 cm                                         LVOT: 2.21 cm  Doppler Measurements:   TR Velocity:3.25 m/s  TR Gradient:42.32 mmHg                                     Estimated RAP:10 mmHg                                     RVSP:52.31 mmHg  Valves  Tricuspid Valve   Estimated RVSP: 52.31 mmHg              Estimated RAP: 10 mmHg  TR Velocity: 3.25 m/s                   TR Gradient: 42.32 mmHg   Pulmonic Valve            Estimated PASP: 52.32 mmHg   LVOT   LVOT Diameter: 2.21 cm  Structures  Left Atrium   LA Volume/Index: 96.96 ml /54 m^2             LA Area: 24.78 cm^2   Left Ventricle   Diastolic Dimension: 8.16 cm          Systolic Dimension: 4.83 cm  Septum Diastolic: 1.36 cm             Septum Systolic: 6.97 cm  PW Diastolic: 1 cm                                        FS: 16.9 %  LV EDV/LV EDV Index: 230.31 ml/129    LV ESV/LV ESV Index: 151.3 ml/85 m^2  m^2                                   LV Length: 8.08 cm  EF Calculated: 34.3 %   LVOT Diameter: 2.21 cm   Right Atrium   RA Systolic Pressure: 10 mmHg   Right Ventricle   Diastolic Dimension: 5.47 cm                                    RV Systolic Pressure: 40.36 mmHg  Aorta/ Miscellaneous Aorta   Aortic Root: 3.84 cm  LVOT Diameter: 2.21 cm      CT CHEST WO CONTRAST    Result Date: 6/7/2021  EXAMINATION: CT CHEST WO CONTRAST, 6/7/2021 5:35 PM CLINICAL HISTORY:  rule out pneumonia COMPARISON: Chest CT from January 10, 8269 TECHNIQUE: Helical CT was performed through the chest utilizing 100-mL of Optiray IV contrast, All CT scans at this facility use dose modulation, iterative reconstruction, and/or weight based dosing when appropriate to reduce radiation dose to as low as reasonably achievable. FINDINGS There is a cardiac pacemaker on the left The thyroid gland is within normal limits. The axillary regions demonstrate no significant lymphadenopathy or solid or cystic lesions. There is no mediastinal lymphadenopathy. The heart is enlarged, cardiomegaly without pericardial effusion. The great vessels of the chest are normal in course and caliber. There are no infiltrates. There is atelectasis of the dependent portions of both bases and there are bilateral layering pleural effusions greater on the left. Maximum depth of the right effusion is approximately 1.9 cm. Maximum depth of the left effusion  measures 5.4 cm. There are no acute bony abnormalities. The visualized portions of the upper abdomen are within normal limits. There are bilateral layering pleural effusions greater on the left. There are no areas of consolidation, infiltrates. Cardiomegaly     CT ABDOMEN PELVIS W IV CONTRAST Additional Contrast? None    Result Date: 6/7/2021  CT of the Abdomen and Pelvis with intravenous contrast medium History:  Pneumoperitoneum. Right abdominal pain Technical Factors: CT imaging of the abdomen and pelvis were obtained and formatted as 5 mm contiguous axial images from the domes of the diaphragm to the symphysis pubis. Sagittal and coronal reconstructions were also obtained. Oral contrast medium:  None. Intravenous contrast medium:  Isovue-370, 100 mL. Comparison:  CT abdomen pelvis, November 18, 2020, August 8, 2020. Findings: Lungs: Moderate left pleural effusion. Consolidation left lower lobe. Pacemaker wires, right atrium, and right ventricle. Liver:  Normal in size, shape, and decreased in attenuation. Bile Ducts:  Normal in caliber. Gallbladder:  No stones or wall thickening. Pancreas:  Normal without masses, cysts, ductal dilatation or calcification. Spleen:  Normal in size without masses or calcifications. 8 mm splenule anterior and medial to spleen.  Kidneys:  Normal in size and enhancement. No hydronephrosis, masses, or stones. Adrenals:  Normal. Small bowel:  Normal in caliber. Fluid and air-filled small bowel loops are identified in the right perihepatic space, and cephalad to the right hepatic lobe (series 601, image 36). Appendix:  Normal. Colon:  Normal in caliber. Diverticular change, sigmoid colon. Peritoneum:  No ascites, free air, or fluid collections. Vessels: Aorta normal in course and caliber. Portal vein, splenic vein, superior mesenteric vein are patent. Lymph nodes:  Retroperitoneal:  No enlarged retroperitoneal lymph nodes. Mesenteric:  No enlarged mesenteric lymph nodes. Pelvic: No enlarged pelvic lymph nodes. Ureters: Normal in course and caliber. No calcifications. Bladder: No wall thickening Abdominal Wall:  No hernia identified. Bones:  No bone lesions. Diffuse anterior osteophytes, lumbar spine. No post operative changes. No pneumoperitoneum. Air and fluid-filled small bowel loops identified within right perihepatic space and cephalad to the right hepatic lobe. Moderate left pleural effusion. Atelectasis/pneumonia, left lower lobe. Hepatic steatosis. Sigmoid diverticulosis. All CT scans at this facility use dose modulation, iterative reconstruction, and/or weight based dosing when appropriate to reduce radiation dose to as low as reasonably achievable. XR CHEST PORTABLE    Result Date: 6/7/2021  Exam: XR CHEST PORTABLE History:  short of breath Technique: AP portable view of the chest obtained. Comparison: Chest x-ray from January 12, 2021 Chest x-ray portable Findings: There is a single lead cardiac pacemaker on the left side The cardiomediastinal silhouette is within normal limits. The right lung is unremarkable. There is a left lower lobe opacity which may represent an infiltrate and pleural effusion. There is lucency of the right hemidiaphragm which may represent superimposed loops of bowel versus free air under the diaphragm.      There is left

## 2021-06-12 LAB
BLOOD CULTURE, ROUTINE: NORMAL
CULTURE, BLOOD 2: NORMAL

## 2021-06-17 ENCOUNTER — TELEPHONE (OUTPATIENT)
Dept: INTERVENTIONAL RADIOLOGY/VASCULAR | Age: 86
End: 2021-06-17

## 2021-06-17 ENCOUNTER — HOSPITAL ENCOUNTER (OUTPATIENT)
Dept: CARDIOLOGY | Age: 86
Discharge: HOME | End: 2021-06-17
Payer: MEDICARE

## 2021-06-17 PROCEDURE — 93279 PRGRMG DEV EVAL PM/LDLS PM: CPT

## 2021-06-17 NOTE — TELEPHONE ENCOUNTER
PATIENT WAS DISCHARGED 6/8/2021. DR. Christiane Jackman CONSULTED IR FOR A THORACENTESIS.    DR. Christiane Jackman, DOES PATIENT STILL NEED TO HAVE A CONSULT FOR A BILATERAL THORACENTESIS? PLEASE ADVISE. THANK YOU.

## 2021-06-18 NOTE — TELEPHONE ENCOUNTER
Yes please I would like thoracentesis of the left side pleural effusion if patient still have any.   Patient needs to follow-up with me after the thoracentesis with a chest x-ray  Please send fluid for LDH, protein, glucose, and cultures

## 2021-06-21 ENCOUNTER — HOSPITAL ENCOUNTER (INPATIENT)
Age: 86
LOS: 4 days | Discharge: HOME HEALTH | DRG: 292 | End: 2021-06-25
Attending: INTERNAL MEDICINE | Admitting: INTERNAL MEDICINE
Payer: MEDICARE

## 2021-06-21 ENCOUNTER — TELEPHONE (OUTPATIENT)
Dept: OTHER | Facility: CLINIC | Age: 86
End: 2021-06-21

## 2021-06-21 ENCOUNTER — APPOINTMENT (OUTPATIENT)
Dept: GENERAL RADIOLOGY | Age: 86
DRG: 292 | End: 2021-06-21
Payer: MEDICARE

## 2021-06-21 DIAGNOSIS — I50.9 ACUTE ON CHRONIC CONGESTIVE HEART FAILURE, UNSPECIFIED HEART FAILURE TYPE (HCC): Primary | ICD-10-CM

## 2021-06-21 DIAGNOSIS — M79.89 LEG SWELLING: ICD-10-CM

## 2021-06-21 LAB
ALBUMIN SERPL-MCNC: 3.9 G/DL (ref 3.5–4.6)
ALP BLD-CCNC: 100 U/L (ref 35–104)
ALT SERPL-CCNC: 24 U/L (ref 0–41)
ANION GAP SERPL CALCULATED.3IONS-SCNC: 14 MEQ/L (ref 9–15)
AST SERPL-CCNC: 25 U/L (ref 0–40)
BASOPHILS ABSOLUTE: 0.1 K/UL (ref 0–0.2)
BASOPHILS RELATIVE PERCENT: 1 %
BILIRUB SERPL-MCNC: 1.1 MG/DL (ref 0.2–0.7)
BUN BLDV-MCNC: 25 MG/DL (ref 8–23)
CALCIUM SERPL-MCNC: 9.3 MG/DL (ref 8.5–9.9)
CHLORIDE BLD-SCNC: 107 MEQ/L (ref 95–107)
CO2: 19 MEQ/L (ref 20–31)
CREAT SERPL-MCNC: 0.88 MG/DL (ref 0.7–1.2)
EOSINOPHILS ABSOLUTE: 0.1 K/UL (ref 0–0.7)
EOSINOPHILS RELATIVE PERCENT: 1.3 %
GFR AFRICAN AMERICAN: >60
GFR NON-AFRICAN AMERICAN: >60
GLOBULIN: 3.3 G/DL (ref 2.3–3.5)
GLUCOSE BLD-MCNC: 119 MG/DL (ref 70–99)
HCT VFR BLD CALC: 38.2 % (ref 42–52)
HEMOGLOBIN: 12.8 G/DL (ref 14–18)
LYMPHOCYTES ABSOLUTE: 2 K/UL (ref 1–4.8)
LYMPHOCYTES RELATIVE PERCENT: 32.4 %
MCH RBC QN AUTO: 32.4 PG (ref 27–31.3)
MCHC RBC AUTO-ENTMCNC: 33.6 % (ref 33–37)
MCV RBC AUTO: 96.4 FL (ref 80–100)
MONOCYTES ABSOLUTE: 0.6 K/UL (ref 0.2–0.8)
MONOCYTES RELATIVE PERCENT: 9.9 %
NEUTROPHILS ABSOLUTE: 3.4 K/UL (ref 1.4–6.5)
NEUTROPHILS RELATIVE PERCENT: 55.4 %
PDW BLD-RTO: 14.5 % (ref 11.5–14.5)
PLATELET # BLD: 185 K/UL (ref 130–400)
POTASSIUM SERPL-SCNC: 4.2 MEQ/L (ref 3.4–4.9)
PRO-BNP: NORMAL PG/ML
PROCALCITONIN: 0.06 NG/ML (ref 0–0.15)
RBC # BLD: 3.96 M/UL (ref 4.7–6.1)
SODIUM BLD-SCNC: 140 MEQ/L (ref 135–144)
TOTAL CK: 53 U/L (ref 0–190)
TOTAL PROTEIN: 7.2 G/DL (ref 6.3–8)
TROPONIN: <0.01 NG/ML (ref 0–0.01)
WBC # BLD: 6.1 K/UL (ref 4.8–10.8)

## 2021-06-21 PROCEDURE — 6370000000 HC RX 637 (ALT 250 FOR IP): Performed by: INTERNAL MEDICINE

## 2021-06-21 PROCEDURE — 99284 EMERGENCY DEPT VISIT MOD MDM: CPT

## 2021-06-21 PROCEDURE — 84145 PROCALCITONIN (PCT): CPT

## 2021-06-21 PROCEDURE — 2580000003 HC RX 258: Performed by: PHYSICIAN ASSISTANT

## 2021-06-21 PROCEDURE — 85025 COMPLETE CBC W/AUTO DIFF WBC: CPT

## 2021-06-21 PROCEDURE — 6360000002 HC RX W HCPCS: Performed by: PHYSICIAN ASSISTANT

## 2021-06-21 PROCEDURE — 51702 INSERT TEMP BLADDER CATH: CPT

## 2021-06-21 PROCEDURE — 83880 ASSAY OF NATRIURETIC PEPTIDE: CPT

## 2021-06-21 PROCEDURE — 82550 ASSAY OF CK (CPK): CPT

## 2021-06-21 PROCEDURE — 2060000000 HC ICU INTERMEDIATE R&B

## 2021-06-21 PROCEDURE — 84484 ASSAY OF TROPONIN QUANT: CPT

## 2021-06-21 PROCEDURE — 99223 1ST HOSP IP/OBS HIGH 75: CPT | Performed by: INTERNAL MEDICINE

## 2021-06-21 PROCEDURE — 93005 ELECTROCARDIOGRAM TRACING: CPT | Performed by: EMERGENCY MEDICINE

## 2021-06-21 PROCEDURE — 80053 COMPREHEN METABOLIC PANEL: CPT

## 2021-06-21 PROCEDURE — 96374 THER/PROPH/DIAG INJ IV PUSH: CPT

## 2021-06-21 PROCEDURE — 71045 X-RAY EXAM CHEST 1 VIEW: CPT

## 2021-06-21 PROCEDURE — 6360000002 HC RX W HCPCS: Performed by: INTERNAL MEDICINE

## 2021-06-21 PROCEDURE — 36415 COLL VENOUS BLD VENIPUNCTURE: CPT

## 2021-06-21 RX ORDER — FUROSEMIDE 10 MG/ML
20 INJECTION INTRAMUSCULAR; INTRAVENOUS ONCE
Status: COMPLETED | OUTPATIENT
Start: 2021-06-21 | End: 2021-06-21

## 2021-06-21 RX ORDER — BUDESONIDE AND FORMOTEROL FUMARATE DIHYDRATE 80; 4.5 UG/1; UG/1
2 AEROSOL RESPIRATORY (INHALATION) 2 TIMES DAILY
Status: DISCONTINUED | OUTPATIENT
Start: 2021-06-21 | End: 2021-06-25 | Stop reason: HOSPADM

## 2021-06-21 RX ORDER — SODIUM CHLORIDE 9 MG/ML
25 INJECTION, SOLUTION INTRAVENOUS PRN
Status: DISCONTINUED | OUTPATIENT
Start: 2021-06-21 | End: 2021-06-25 | Stop reason: HOSPADM

## 2021-06-21 RX ORDER — FUROSEMIDE 10 MG/ML
40 INJECTION INTRAMUSCULAR; INTRAVENOUS 2 TIMES DAILY
Status: DISCONTINUED | OUTPATIENT
Start: 2021-06-21 | End: 2021-06-23

## 2021-06-21 RX ORDER — LISINOPRIL 10 MG/1
5 TABLET ORAL DAILY
Status: CANCELLED | OUTPATIENT
Start: 2021-06-21

## 2021-06-21 RX ORDER — ONDANSETRON 4 MG/1
4 TABLET, ORALLY DISINTEGRATING ORAL EVERY 8 HOURS PRN
Status: DISCONTINUED | OUTPATIENT
Start: 2021-06-21 | End: 2021-06-25 | Stop reason: HOSPADM

## 2021-06-21 RX ORDER — ACETAMINOPHEN 650 MG/1
650 SUPPOSITORY RECTAL EVERY 6 HOURS PRN
Status: DISCONTINUED | OUTPATIENT
Start: 2021-06-21 | End: 2021-06-25 | Stop reason: HOSPADM

## 2021-06-21 RX ORDER — SODIUM CHLORIDE 0.9 % (FLUSH) 0.9 %
5-40 SYRINGE (ML) INJECTION PRN
Status: DISCONTINUED | OUTPATIENT
Start: 2021-06-21 | End: 2021-06-25 | Stop reason: HOSPADM

## 2021-06-21 RX ORDER — POTASSIUM CHLORIDE 20 MEQ/1
20 TABLET, EXTENDED RELEASE ORAL 2 TIMES DAILY WITH MEALS
Status: DISCONTINUED | OUTPATIENT
Start: 2021-06-21 | End: 2021-06-23

## 2021-06-21 RX ORDER — ONDANSETRON 2 MG/ML
4 INJECTION INTRAMUSCULAR; INTRAVENOUS EVERY 6 HOURS PRN
Status: DISCONTINUED | OUTPATIENT
Start: 2021-06-21 | End: 2021-06-25 | Stop reason: HOSPADM

## 2021-06-21 RX ORDER — SODIUM CHLORIDE 0.9 % (FLUSH) 0.9 %
5-40 SYRINGE (ML) INJECTION EVERY 12 HOURS SCHEDULED
Status: DISCONTINUED | OUTPATIENT
Start: 2021-06-21 | End: 2021-06-25 | Stop reason: HOSPADM

## 2021-06-21 RX ORDER — ACETAMINOPHEN 325 MG/1
650 TABLET ORAL EVERY 6 HOURS PRN
Status: DISCONTINUED | OUTPATIENT
Start: 2021-06-21 | End: 2021-06-25 | Stop reason: HOSPADM

## 2021-06-21 RX ORDER — POLYETHYLENE GLYCOL 3350 17 G/17G
17 POWDER, FOR SOLUTION ORAL DAILY PRN
Status: DISCONTINUED | OUTPATIENT
Start: 2021-06-21 | End: 2021-06-25 | Stop reason: HOSPADM

## 2021-06-21 RX ADMIN — APIXABAN 2.5 MG: 2.5 TABLET, FILM COATED ORAL at 20:17

## 2021-06-21 RX ADMIN — FUROSEMIDE 20 MG: 10 INJECTION, SOLUTION INTRAVENOUS at 12:24

## 2021-06-21 RX ADMIN — METOPROLOL TARTRATE 25 MG: 25 TABLET, FILM COATED ORAL at 20:17

## 2021-06-21 RX ADMIN — FUROSEMIDE 20 MG: 10 INJECTION, SOLUTION INTRAVENOUS at 12:53

## 2021-06-21 RX ADMIN — SODIUM CHLORIDE, PRESERVATIVE FREE 10 ML: 5 INJECTION INTRAVENOUS at 20:17

## 2021-06-21 RX ADMIN — FUROSEMIDE 40 MG: 10 INJECTION, SOLUTION INTRAVENOUS at 20:17

## 2021-06-21 RX ADMIN — POTASSIUM CHLORIDE 20 MEQ: 1500 TABLET, EXTENDED RELEASE ORAL at 16:40

## 2021-06-21 ASSESSMENT — ENCOUNTER SYMPTOMS
CONSTIPATION: 0
COUGH: 0
NAUSEA: 0
STRIDOR: 0
CHEST TIGHTNESS: 0
EYE DISCHARGE: 0
SHORTNESS OF BREATH: 1
WHEEZING: 0
RHINORRHEA: 0
EYES NEGATIVE: 1
BLOOD IN STOOL: 0
ABDOMINAL PAIN: 0
ABDOMINAL DISTENTION: 0
SORE THROAT: 0
GASTROINTESTINAL NEGATIVE: 1
COLOR CHANGE: 0

## 2021-06-21 NOTE — H&P
History and Physical  Patient: [facility]  Unit/Bed: 05/05  YOB: 1929  MRN: [id number]  Acct: [de-identified]   Admitting Diagnosis: CHF (congestive heart failure), NYHA class I, acute on chronic, combined ([facility] [I50.43]  Admit Date:  6/21/2021  Hospital Day: 0      Chief Complaint: SOB      History of Present Illness:  Recently admitted for CHF. He ran out of diuretics for past week. He was supposed to f/u but he did not. He is in ER with Son who is translating. Has been swelling and SOB wrse for couple days now. He was really SOB this am.     He does have CMP but etiology unclear. He does not have CP. No prior LHC history. He has Single chmaber PPM for SSS. He has persistent AF    EKG:  V paced with underlying AF 92  PMHx:  Past Medical History:   Diagnosis Date    [facility]     Cardiomyopathy ([facility] 9/18/2020    Diabetes mellitus ([facility]     Hypertension        PSHx:  Past Surgical History:   Procedure Laterality Date    BACK SURGERY      CARDIAC PACEMAKER PLACEMENT  2020    HERNIA REPAIR         Social Hx:  Social History     Socioeconomic History    Marital status:      Spouse name: None    Number of children: None    Years of education: None    Highest education level: None   Occupational History    None   Tobacco Use    Smoking status: Former Smoker    Smokeless tobacco: Never Used    Tobacco comment: quit 30 years ago   Substance and Sexual Activity    Alcohol use: No    Drug use: No    Sexual activity: None   Other Topics Concern    None   Social History Narrative    None     Social Determinants of Health     Financial Resource Strain:     Difficulty of Paying Living Expenses:    Food Insecurity:     Worried About Running Out of Food in the Last Year:     Ran Out of Food in the Last Year:    Transportation Needs:     Lack of Transportation (Medical):      Lack of Transportation (Non-Medical):    Physical Activity:     Days of Exercise per Week:  Minutes of Exercise per Session:    Stress:     Feeling of Stress :    Social Connections:     Frequency of Communication with Friends and Family:     Frequency of Social Gatherings with Friends and Family:     Attends Rastafarian Services:     Active Member of Clubs or Organizations:     Attends Club or Organization Meetings:     Marital Status:    Intimate Partner Violence:     Fear of Current or Ex-Partner:     Emotionally Abused:     Physically Abused:     Sexually Abused:        Family Hx:  History reviewed. No pertinent family history.     No Known Allergies    Current Facility-Administered Medications   Medication Dose Route Frequency Provider Last Rate Last Admin    tiotropium (SPIRIVA RESPIMAT) 2.5 MCG/ACT inhaler 2 puff  2 puff Inhalation Daily Marbella Vaughn MD        apixaban [facility]) tablet 2.5 mg  2.5 mg Oral BID Marbella Vaughn MD        metoprolol tartrate (LOPRESSOR) tablet 25 mg  25 mg Oral BID Marbella Vaughn MD        budesonide-formoterol (SYMBICORT) 80-4.5 MCG/ACT inhaler 2 puff  2 puff Inhalation BID Marbella Vaughn MD        furosemide (LASIX) injection 40 mg  40 mg Intravenous BID Marbella Vaughn MD        potassium chloride (KLOR-CON M) extended release tablet 20 mEq  20 mEq Oral BID WC Marbella Vaughn MD         Current Outpatient Medications   Medication Sig Dispense Refill    tiotropium (SPIRIVA RESPIMAT) 2.5 MCG/ACT AERS inhaler Inhale 2 puffs into the lungs daily      dilTIAZem (CARDIZEM CD) 120 MG extended release capsule Take 1 capsule by mouth daily 30 capsule 5    apixaban (ELIQUIS) 2.5 MG TABS tablet Take 1 tablet by mouth 2 times daily 60 tablet 5    metoprolol tartrate (LOPRESSOR) 25 MG tablet TAKE 1 TABLET BY MOUTH TWICE DAILY 60 tablet 5    lisinopril (PRINIVIL;ZESTRIL) 5 MG tablet Take 1 tablet by mouth daily 30 tablet 5    fluticasone-salmeterol (ADVAIR DISKUS) 250-50 MCG/DOSE AEPB Inhale 1 puff into the lungs every 12 hours for 14 days 1 Inhaler 0 Review of Systems:   Review of Systems   Constitutional: Negative. Negative for diaphoresis and fatigue. HENT: Negative. Eyes: Negative. Respiratory: Positive for shortness of breath. Negative for cough, chest tightness, wheezing and stridor. Cardiovascular: Positive for leg swelling. Negative for chest pain and palpitations. Gastrointestinal: Negative. Negative for blood in stool and nausea. Genitourinary: Negative. Musculoskeletal: Negative. Skin: Negative. Neurological: Positive for weakness. Negative for dizziness, syncope and light-headedness. Hematological: Negative. Psychiatric/Behavioral: Negative. Physical Examination:    BP (!) 126/99   Pulse 100   Temp 97.4 °F (36.3 °C) (Temporal)   Resp (!) 37   Ht 5' 7\" (1.702 m)   Wt 160 lb (72.6 kg)   SpO2 95%   BMI 25.06 kg/m²    Physical Exam   Constitutional: No distress. He appears chronically ill and acutely ill. HENT:   Normal cephalic and Atraumatic   Eyes: Pupils are equal, round, and reactive to light. Neck: Thyroid normal. No JVD present. No neck adenopathy. No thyromegaly present. Cardiovascular: Normal rate. An irregularly irregular rhythm present. Exam reveals decreased pulses. Murmur heard. Pulmonary/Chest: Effort normal and breath sounds normal. He has no wheezes. He has no rales. He exhibits no tenderness. Abdominal: Soft. Bowel sounds are normal. There is no abdominal tenderness. Musculoskeletal:         General: Edema (2+) present. No tenderness. Normal range of motion. Cervical back: Normal range of motion and neck supple. Neurological: He is alert and oriented to person, place, and time. Skin: Skin is warm. No cyanosis. Nails show no clubbing.          LABS:  CBC:   Lab Results   Component Value Date    WBC 6.1 06/21/2021    RBC 3.96 06/21/2021    HGB 12.8 06/21/2021    HCT 38.2 06/21/2021    MCV 96.4 06/21/2021    MCH 32.4 06/21/2021    MCHC 33.6 06/21/2021    RDW 14.5 06/21/2021     06/21/2021     CBC with Differential:    Lab Results   Component Value Date    WBC 6.1 06/21/2021    RBC 3.96 06/21/2021    HGB 12.8 06/21/2021    HCT 38.2 06/21/2021     06/21/2021    MCV 96.4 06/21/2021    MCH 32.4 06/21/2021    MCHC 33.6 06/21/2021    RDW 14.5 06/21/2021    LYMPHOPCT 32.4 06/21/2021    MONOPCT 9.9 06/21/2021    BASOPCT 1.0 06/21/2021    MONOSABS 0.6 06/21/2021    LYMPHSABS 2.0 06/21/2021    EOSABS 0.1 06/21/2021    BASOSABS 0.1 06/21/2021     CMP:    Lab Results   Component Value Date     06/21/2021    K 4.2 06/21/2021    K 3.9 11/22/2020     06/21/2021    CO2 19 06/21/2021    BUN 25 06/21/2021    CREATININE 0.88 06/21/2021    GFRAA >60.0 06/21/2021    LABGLOM >60.0 06/21/2021    GLUCOSE 119 06/21/2021    PROT 7.2 06/21/2021    LABALBU 3.9 06/21/2021    CALCIUM 9.3 06/21/2021    BILITOT 1.1 06/21/2021    ALKPHOS 100 06/21/2021    AST 25 06/21/2021    ALT 24 06/21/2021     BMP:    Lab Results   Component Value Date     06/21/2021    K 4.2 06/21/2021    K 3.9 11/22/2020     06/21/2021    CO2 19 06/21/2021    BUN 25 06/21/2021    LABALBU 3.9 06/21/2021    CREATININE 0.88 06/21/2021    CALCIUM 9.3 06/21/2021    GFRAA >60.0 06/21/2021    LABGLOM >60.0 06/21/2021    GLUCOSE 119 06/21/2021     Magnesium:    Lab Results   Component Value Date    MG 2.2 01/12/2021     Troponin:    Lab Results   Component Value Date    TROPONINI <0.010 06/21/2021       Active Hospital Problems    Diagnosis Date Noted    CHF (congestive heart failure), NYHA class I, acute on chronic, [facility] ([facility] [I50.43] 06/07/2021     Priority: Low        Assessment/Plan:  1. AF- rate control and OAC  2. Acute on chronic Combined HF- iv Lasix. + KCL. Add Entresto. Continue BB. 3. CMP EF 35  4. 1-2+ AR  5. PPM - it was felt at the time of implant that he wasnot a good candidate for ICD. 6. CHF teaching  7. Fluid restrict.       Electronically signed by Margarita Chandler MD on 6/21/2021 at 4:21 PM

## 2021-06-21 NOTE — ED PROVIDER NOTES
urinating and dysuria. Musculoskeletal: Negative for arthralgias. Skin: Negative for color change. Neurological: Negative for dizziness, syncope, numbness and headaches. Psychiatric/Behavioral: Negative for agitation and confusion. Except as noted above the remainder of the review of systems was reviewed and negative. PAST MEDICAL HISTORY     Past Medical History:   Diagnosis Date    At[facility] ([facility]     Cardiomyopathy ([facility] 9/18/2020    [facility]     Hypertension          SURGICALHISTORY       Past Surgical History:   Procedure Laterality Date    BACK SURGERY      CARDIAC PACEMAKER PLACEMENT  2020    HERNIA REPAIR           CURRENT MEDICATIONS       Previous Medications    APIXABAN (ELIQUIS) 2.5 MG TABS TABLET    Take 1 tablet by mouth 2 times daily    DILTIAZEM (CARDIZEM CD) 120 MG EXTENDED RELEASE CAPSULE    Take 1 capsule by mouth daily    FLUTICASONE-SALMETEROL (ADVAIR DISKUS) 250-50 MCG/DOSE AEPB    Inhale 1 puff into the lungs every 12 hours for 14 days    LISINOPRIL (PRINIVIL;ZESTRIL) 5 MG TABLET    Take 1 tablet by mouth daily    METOPROLOL TARTRATE (LOPRESSOR) 25 MG TABLET    TAKE 1 TABLET BY MOUTH TWICE DAILY    TIOTROPIUM (SPIRIVA RESPIMAT) 2.5 MCG/ACT AERS INHALER    Inhale 2 puffs into the lungs daily       ALLERGIES     Patient has no known allergies. FAMILY HISTORY     History reviewed. No pertinent family history.        SOCIAL HISTORY       Social History     Socioeconomic History    Marital status:      Spouse name: None    Number of children: None    Years of education: None    Highest education level: None   Occupational History    None   Tobacco Use    Smoking status: Former Smoker    Smokeless tobacco: Never Used    Tobacco comment: quit 30 years ago   Substance and Sexual Activity    Alcohol use: No    Drug use: No    Sexual activity: None   Other Topics Concern    None   Social History Narrative    None     Social Determinants of Health     Financial Resource Strain:     Difficulty of Paying Living Expenses:    Food Insecurity:     Worried About Running Out of Food in the Last Year:     [address] in the Last Year:    Transportation Needs:     Lack of Transportation (Medical):  Lack of Transportation (Non-Medical):    Physical Activity:     Days of Exercise per Week:     Minutes of Exercise per Session:    Stress:     Feeling of Stress :    Social Connections:     Frequency of Communication with Friends and Family:     Frequency of Social Gatherings with Friends and Family:     Attends Restorationist Services:     Active Member of Clubs or Organizations:     Attends Club or Organization Meetings:     Marital Status:    Intimate Partner Violence:     Fear of Current or Ex-Partner:     Emotionally Abused:     Physically Abused:     Sexually Abused:        SCREENINGS      @FLOW([id number])@      PHYSICAL EXAM    (up to 7 for level 4, 8 or more for level 5)     ED Triage Vitals   BP Temp Temp Source Pulse Resp SpO2 Height Weight   06/21/21 0936 06/21/21 0936 06/21/21 0936 06/21/21 0936 06/21/21 0936 06/21/21 0936 06/21/21 0941 06/21/21 0941   128/85 97.4 °F (36.3 °C) Temporal 80 20 97 % 5' 7\" (1.702 m) 160 lb (72.6 kg)       Physical Exam  Vitals and nursing note reviewed. Constitutional:       General: He is not in acute distress. Appearance: He is well-developed. He is not ill-appearing, toxic-appearing or diaphoretic. HENT:      Head: Normocephalic. Nose: No congestion. Mouth/Throat:      Mouth: Mucous membranes are moist.      Pharynx: No oropharyngeal exudate or posterior oropharyngeal erythema. Eyes:      Extraocular Movements: Extraocular movements intact. Conjunctiva/sclera: Conjunctivae normal.      Pupils: Pupils are equal, round, and reactive to light. Neck:      Vascular: No JVD. Trachea: No tracheal deviation. Cardiovascular:      Rate and Rhythm: Normal rate. Pulses: Normal pulses. Heart sounds: Normal heart sounds. No murmur heard. No friction rub. No gallop. Pulmonary:      Effort: Pulmonary effort is normal. No tachypnea, accessory muscle usage, respiratory distress or retractions. Breath sounds: No stridor. No wheezing, rhonchi or rales. Comments: Lung sounds are clear in all fields, there is no wheezes rales or rhonchi, no excess muscle use, retractions, saturations are 9798% on 2 L nasal cannula oxygen. Chest:      Chest wall: No tenderness. Abdominal:      General: Abdomen is flat. Bowel sounds are normal. There is no distension or abdominal bruit. Palpations: There is no shifting dullness, fluid wave, hepatomegaly, splenomegaly, mass or pulsatile mass. Tenderness: There is no abdominal tenderness. There is no right CVA tenderness, left CVA tenderness, guarding or rebound. Negative signs include Strong's sign, Rovsing's sign and McBurney's sign. Musculoskeletal:         General: No deformity. Cervical back: Normal range of motion and neck supple. No rigidity. Right lower leg: No edema. Left lower leg: No edema. Skin:     General: Skin is warm and dry. Capillary Refill: Capillary refill takes less than 2 seconds. Coloration: Skin is not jaundiced. Neurological:      General: No focal deficit present. Mental Status: He is alert and oriented to person, place, and time. Mental status is at baseline. Cranial Nerves: No cranial nerve deficit. Sensory: No sensory deficit. Motor: No weakness.       Coordination: Coordination normal.   Psychiatric:         Mood and Affect: Mood normal.         DIAGNOSTIC RESULTS     EKG: All EKG's are interpreted by the Emergency Department Physician who either signs or Co-signsthis chart in the absence of a cardiologist.    EKG shows wide complexes paced rhythm with occasional premature ventricular complexes at 93 bpm QTC is 525 ms    RADIOLOGY:   Non-plain filmimages such as CT, Ultrasound and MRI are read by the radiologist. Plain radiographic images are visualized and preliminarily interpreted by the emergency physician with the below findings:        Interpretation per the Radiologist below, if available at the time ofthis note:    XR CHEST PORTABLE   Final Result   Left pleural effusion with underlying atelectasis versus infiltrate. ED BEDSIDE ULTRASOUND:   Performed by ED Physician - none    LABS:  Labs Reviewed   COMPREHENSIVE METABOLIC PANEL - Abnormal; Notable for the following components:       Result Value    CO2 19 (*)     Glucose 119 (*)     BUN 25 (*)     Total Bilirubin 1.1 (*)     All other components within normal limits   CBC WITH AUTO DIFFERENTIAL - Abnormal; Notable for the following components:    RBC 3.96 (*)     Hemoglobin 12.8 (*)     Hematocrit 38.2 (*)     MCH 32.4 (*)     All other components within normal limits   TROPONIN   CK   BRAIN NATRIURETIC PEPTIDE   PROCALCITONIN       All other labs were within normal range or not returned as of this dictation. EMERGENCY DEPARTMENT COURSE and DIFFERENTIAL DIAGNOSIS/MDM:   Vitals:    Vitals:    06/21/21 1030 06/21/21 1100 06/21/21 1130 06/21/21 1200   BP: 126/76 (!) 125/90 (!) 136/93 (!) 135/101   Pulse: 88 90 87 96   Resp: 13 22 (!) 34 26   Temp:       TempSrc:       SpO2:  98% 99% 94%   Weight:       Height:              MDM  Number of Diagnoses or Management Options  Acute on chronic congestive heart failure, unspecified heart failure type (HCC)  Leg swelling  Diagnosis management comments: Patient presented ED with complaint of shortness of breath, and edema to his ankles which is been ongoing for approximately a week.   Patient states he had recently been admission to the hospital 6/7/2021 for similar issues he states that he was discharged home with Lasix for 1 week, he states that after completing the Lasix, he began to feel short of breath again had increasing peripheral signed)  Attending Emergency Physician         Libetrad Gallo PA-C  06/21/21 8201

## 2021-06-21 NOTE — TELEPHONE ENCOUNTER
Writer contacted ED provider Dr Ej Henning to inform of 30 day readmission risk via text.      Attending:    Natalie Loo PA-C

## 2021-06-21 NOTE — ED TRIAGE NOTES
Pt has been feeling sob and ble swelling for the last 2 weeks. Pt is out of his furosemide for the last week and half.  Pt denies pain at this time

## 2021-06-22 LAB
ANION GAP SERPL CALCULATED.3IONS-SCNC: 22 MEQ/L (ref 9–15)
BUN BLDV-MCNC: 31 MG/DL (ref 8–23)
CALCIUM SERPL-MCNC: 9.7 MG/DL (ref 8.5–9.9)
CHLORIDE BLD-SCNC: 102 MEQ/L (ref 95–107)
CHOLESTEROL, TOTAL: 123 MG/DL (ref 0–199)
CO2: 16 MEQ/L (ref 20–31)
CREAT SERPL-MCNC: 1.16 MG/DL (ref 0.7–1.2)
EKG ATRIAL RATE: 94 BPM
EKG Q-T INTERVAL: 420 MS
EKG QRS DURATION: 140 MS
EKG QTC CALCULATION (BAZETT): 525 MS
EKG R AXIS: -33 DEGREES
EKG T AXIS: 100 DEGREES
EKG VENTRICULAR RATE: 94 BPM
GFR AFRICAN AMERICAN: >60
GFR NON-AFRICAN AMERICAN: 58.8
GLUCOSE BLD-MCNC: 86 MG/DL (ref 70–99)
HCT VFR BLD CALC: 43.1 % (ref 42–52)
HDLC SERPL-MCNC: 38 MG/DL (ref 40–59)
HEMOGLOBIN: 13.7 G/DL (ref 14–18)
LDL CHOLESTEROL CALCULATED: 65 MG/DL (ref 0–129)
MAGNESIUM: 2.3 MG/DL (ref 1.7–2.4)
MCH RBC QN AUTO: 32 PG (ref 27–31.3)
MCHC RBC AUTO-ENTMCNC: 31.8 % (ref 33–37)
MCV RBC AUTO: 100.6 FL (ref 80–100)
PDW BLD-RTO: 15.5 % (ref 11.5–14.5)
PLATELET # BLD: 181 K/UL (ref 130–400)
POTASSIUM SERPL-SCNC: 4.9 MEQ/L (ref 3.4–4.9)
RBC # BLD: 4.29 M/UL (ref 4.7–6.1)
REASON FOR REJECTION: NORMAL
REJECTED TEST: NORMAL
SODIUM BLD-SCNC: 140 MEQ/L (ref 135–144)
TRIGL SERPL-MCNC: 99 MG/DL (ref 0–150)
WBC # BLD: 11.7 K/UL (ref 4.8–10.8)

## 2021-06-22 PROCEDURE — 80048 BASIC METABOLIC PNL TOTAL CA: CPT

## 2021-06-22 PROCEDURE — 85027 COMPLETE CBC AUTOMATED: CPT

## 2021-06-22 PROCEDURE — 83735 ASSAY OF MAGNESIUM: CPT

## 2021-06-22 PROCEDURE — 93010 ELECTROCARDIOGRAM REPORT: CPT | Performed by: INTERNAL MEDICINE

## 2021-06-22 PROCEDURE — 99233 SBSQ HOSP IP/OBS HIGH 50: CPT | Performed by: INTERNAL MEDICINE

## 2021-06-22 PROCEDURE — 2700000000 HC OXYGEN THERAPY PER DAY

## 2021-06-22 PROCEDURE — 99223 1ST HOSP IP/OBS HIGH 75: CPT | Performed by: RADIOLOGY

## 2021-06-22 PROCEDURE — 80061 LIPID PANEL: CPT

## 2021-06-22 PROCEDURE — 94761 N-INVAS EAR/PLS OXIMETRY MLT: CPT

## 2021-06-22 PROCEDURE — 36415 COLL VENOUS BLD VENIPUNCTURE: CPT

## 2021-06-22 PROCEDURE — 6370000000 HC RX 637 (ALT 250 FOR IP): Performed by: INTERNAL MEDICINE

## 2021-06-22 PROCEDURE — 94640 AIRWAY INHALATION TREATMENT: CPT

## 2021-06-22 PROCEDURE — 6360000002 HC RX W HCPCS: Performed by: INTERNAL MEDICINE

## 2021-06-22 PROCEDURE — 97162 PT EVAL MOD COMPLEX 30 MIN: CPT

## 2021-06-22 PROCEDURE — 2580000003 HC RX 258: Performed by: PHYSICIAN ASSISTANT

## 2021-06-22 PROCEDURE — 2060000000 HC ICU INTERMEDIATE R&B

## 2021-06-22 RX ADMIN — FUROSEMIDE 40 MG: 10 INJECTION, SOLUTION INTRAVENOUS at 19:11

## 2021-06-22 RX ADMIN — POTASSIUM CHLORIDE 20 MEQ: 1500 TABLET, EXTENDED RELEASE ORAL at 08:12

## 2021-06-22 RX ADMIN — BUDESONIDE AND FORMOTEROL FUMARATE DIHYDRATE 2 PUFF: 80; 4.5 AEROSOL RESPIRATORY (INHALATION) at 07:53

## 2021-06-22 RX ADMIN — BUDESONIDE AND FORMOTEROL FUMARATE DIHYDRATE 2 PUFF: 80; 4.5 AEROSOL RESPIRATORY (INHALATION) at 20:00

## 2021-06-22 RX ADMIN — SODIUM CHLORIDE, PRESERVATIVE FREE 10 ML: 5 INJECTION INTRAVENOUS at 08:12

## 2021-06-22 RX ADMIN — APIXABAN 2.5 MG: 2.5 TABLET, FILM COATED ORAL at 08:12

## 2021-06-22 RX ADMIN — METOPROLOL TARTRATE 25 MG: 25 TABLET, FILM COATED ORAL at 08:12

## 2021-06-22 RX ADMIN — FUROSEMIDE 40 MG: 10 INJECTION, SOLUTION INTRAVENOUS at 08:11

## 2021-06-22 RX ADMIN — TIOTROPIUM BROMIDE INHALATION SPRAY 2 PUFF: 3.12 SPRAY, METERED RESPIRATORY (INHALATION) at 07:53

## 2021-06-22 RX ADMIN — SODIUM CHLORIDE, PRESERVATIVE FREE 10 ML: 5 INJECTION INTRAVENOUS at 21:23

## 2021-06-22 RX ADMIN — METOPROLOL TARTRATE 25 MG: 25 TABLET, FILM COATED ORAL at 21:23

## 2021-06-22 ASSESSMENT — ENCOUNTER SYMPTOMS
ABDOMINAL PAIN: 0
CHEST TIGHTNESS: 0
COUGH: 0
WHEEZING: 0
EYES NEGATIVE: 1
PHOTOPHOBIA: 0
VOMITING: 0
SHORTNESS OF BREATH: 1
CONSTIPATION: 0
DIARRHEA: 0
GASTROINTESTINAL NEGATIVE: 1
BLOOD IN STOOL: 0
NAUSEA: 0
STRIDOR: 0
BACK PAIN: 0

## 2021-06-22 NOTE — PROGRESS NOTES
Physical Therapy Med Surg Initial Assessment  Facility/Department: [address] [facility]  [facility]       NAME: Martinez Carballo  : 1929 (80 y.o.)  MRN: [id number]  CODE STATUS: Full Code    Date of Service: 2021    Patient Diagnosis(es): CHF (congestive heart failure), NYHA class I, acute on chronic, combined ([address] [I50.43]   Chief Complaint   Patient presents with    Shortness of Breath     sob and ble swelling for 2 weeks     Leg Swelling     Patient Active Problem List    Diagnosis Date Noted    SSS (sick sinus syndrome) ([address]     CHF (congestive heart failure), NYHA class I, acute on chronic, combined ([address] 2021    Atelectasis of right lung     Pleural effusion on right     SOB (shortness of breath) 2020    Cardiomyopathy ([address] 2020    Chronic atrial fibrillation ([address] 2020    New onset a-fib ([address] 10/03/2019        Past Medical History:   Diagnosis Date    Atrial fibrillation ([address]     Cardiomyopathy ([address] 2020    Diabetes mellitus ([address]     Hypertension      Past Surgical History:   Procedure Laterality Date    BACK SURGERY      CARDIAC PACEMAKER PLACEMENT      HERNIA REPAIR         Chart Reviewed: Yes  Family / Caregiver Present: Yes (son)    Restrictions:  Restrictions/Precautions: Fall Risk     SUBJECTIVE:      Pain  Pre Treatment Pain Screening  Pain at present: 0    Post Treatment Pain Screening:   Pain Screening  Patient Currently in Pain: Denies    Prior Level of Function:  Social/Functional History  Lives With: Son, Spouse (son reports someone is home with at all times)  Type of Home: Apartment  Home Layout: One level  Bathroom Shower/Tub: Tub/Shower unit  Home Equipment: Cane (rollator)  ADL Assistance: Independent  Ambulation Assistance: Independent (generally with cane)  Transfer Assistance: Independent  Active : No    OBJECTIVE:        Cognition:  Follows Commands: Within Functional Limits ( [id number]) ROM:       Strength:       Neuro:  Balance  Posture: Fair  Sitting - Static: Good  Sitting - Dynamic: Good  Standing - Static: Poor  Standing - Dynamic: Poor  Comments: Pt utilized UE support for balance maintenance - SOB limited time standing - min assist to ensure safety also required             Bed mobility  Bridging: Independent  Rolling to Left: Independent  Rolling to Right: Independent  Supine to Sit: Supervision  Sit to Supine: Supervision  Scooting: Independent    Transfers  Sit to Stand: Stand by assistance;Contact guard assistance  Stand to sit: Stand by assistance;Contact guard assistance    Ambulation  Ambulation?: Yes  Ambulation 1  Surface: level tile  Device: Rolling Walker  Quality of Gait: knees stable, trunk flexed, sat down after 2 steps due to SOB and apprehension  Distance: 2 steps  Comments: pt reports he normally uses cane for walking - pts nurse reports he ambulated to the bathroom with min assist +2 earlier this date    Stairs/Curb  Stairs?: No         Activity Tolerance  Activity Tolerance: Patient limited by endurance  Activity Tolerance: Pt demonstrated SOB throughout the session with O2 sat at 100% during these times with O2 in place - pt with apprehensive behaviors          PT Education  PT Education: Goals;PT Role;Plan of Care    ASSESSMENT:   Body structures, Functions, Activity limitations: Decreased functional mobility ; Decreased safe awareness;Decreased balance;Decreased endurance;Decreased strength;Decreased posture  Decision Making: Medium Complexity  History: high  Exam: med  Clinical Presentation: med    Barriers to Learning: ?memory    DISCHARGE RECOMMENDATIONS:  Discharge Recommendations: Continue to assess pending progress    Assessment: Pt demonstrates deficits as listed. He is requiring assistance with moiblity at this time.  pt would benefit from follow up PT at this level prior to safe return to home  REQUIRES PT FOLLOW UP: Yes      PLAN OF CARE:  Plan  Times per week: 3-6  Current Treatment Recommendations: Strengthening, Transfer Training, Endurance Training, Neuromuscular Re-education, Patient/Caregiver Education & Training, Balance Training, Gait Training, Home Exercise Program, Functional Mobility Training, Safety Education & Training, Equipment Evaluation, Education, & procurement  Plan Comment: start with ww - progress to cane  Safety Devices  Type of devices: Bed alarm in place, Call light within reach, Left in bed    Goals:  Long term goals  Long term goal 1: indep sit to stand  Long term goal 2: indep bed mobility  Long term goal 3: indep gait with ww or st cane 25 feet - supervision 50 feet  Long term goal 4: Pt able to stand 2 min with BUE support    [facility] (6 CLICK) [address] Mobility Raw Score : 16     Therapy Time:   Individual   Time In 1520   Time Out 1550   Minutes [address] [place] [address], 06/22/21 at 3:54 PM         Definitions for assistance levels  Independent = pt does not require any physical supervision or assistance from another person for activity completion. Device may be needed.   Stand by assistance = pt requires verbal cues or instructions from another person, close to but not touching, to perform the activity  Minimal assistance= pt performs 75% or more of the activity; assistance is required to complete the activity  Moderate assistance= pt performs 50% of the activity; assistance is required to complete the activity  Maximal assistance = pt performs 25% of the activity; assistance is required to complete the activity  Dependent = pt requires total physical assistance to accomplish the task

## 2021-06-22 NOTE — FLOWSHEET NOTE
Pt keeps moaning and groaning nonstop. V/S check, stable and O2 sats >95% at 2L NC, afib control in the monitor. Called and spoke to son Moraima Connolly, unable to communicate to son as well for he is Yemeni speaking.  utilized, per conversation, pt denies any needs or complaints. Pt stated he is ok, denies any SOB/CP. Also explained to pt that he has a garibay and that he doesn't need a urinal. Also per , pt wanted to go home, expained unable for he is just admitted today. Ramón Beckman granddaughter called and spoke to her, per meng, pt is on early stage of alzheimers and that moaning/groaning/yelling is his baseline at home.

## 2021-06-22 NOTE — CARE COORDINATION
[facility] Case Management Initial Discharge Assessment    Met with Patient to discuss discharge plan. PCP: Billee Burkitt                                Date of Last Visit: TUES LAST WEEK    If no PCP, list provided? N/A    Discharge Planning    Living Arrangements: at home dependent on family care    Who do you live with? SON, FAMILY    Who helps you with your care:  self or family    If lives at home:     Do you have any barriers navigating in your home? no    Patient can perform ADL? Yes    Current Services (outpatient and in home) :  None    Dialysis: No    Is transportation available to get to your appointments? Yes- SON    DME Equipment:  yes - CANE AND WALKER    Respiratory equipment: None    Respiratory provider:  no     Pharmacy:  yes [facility] [address]    Consult with Medication Assistance Program?  No      Patient agreeable to [facility]? Yes, [address] [place] [address]     Patient agreeable to SNF/Rehab? Declined    Other discharge needs identified? Other NEEDS SHOWER CHAIR      Initial Discharge Plan? (Note: please see concurrent daily documentation for any updates after initial note). SPOKE WITH PATIENT VIA VIDEO  [facility][id number]. ALSO WAS GIVEN PERMISSION TO SPEAK WITH SON AND GRAND DAUGHTER, SPOKE WITH THEM AND PATIENT DOES NOT HAVE [facility] BUT WOULD LIKE. THEY USE [facility] FOR THE GRANDMA AND WOULD LIKE THEM IF POSSIBLE. ALSO NEED SHOWER CHAIR. DENIES OTHER NEEDS.      Readmission Risk              Risk of Unplanned Readmission:  18         Electronically signed by German Donald RN on 6/22/2021 at 4:01 PM

## 2021-06-22 NOTE — PROGRESS NOTES
Progress Note  Patient: Rik Aviles  Unit/Bed: B101/J970-11  YOB: 1929  MRN: [id number]  Acct: [de-identified]   Admitting Diagnosis: CHF (congestive heart failure), NYHA class I, acute on chronic, combined ([facility] [I50.43]  Admit Date:  6/21/2021  Hospital Day: 1    Chief Complaint: CHF Noncompliance    Histories:  Past Medical History:   Diagnosis Date    Atrial fibrillation ([facility]     Cardiomyopathy ([facility] 9/18/2020    Diabetes mellitus ([facility]     Hypertension      Past Surgical History:   Procedure Laterality Date    BACK SURGERY      CARDIAC PACEMAKER PLACEMENT  2020    HERNIA REPAIR       History reviewed. No pertinent family history. Social History     Socioeconomic History    Marital status:      Spouse name: None    Number of children: None    Years of education: None    Highest education level: None   Occupational History    None   Tobacco Use    Smoking status: Former Smoker    Smokeless tobacco: Never Used    Tobacco comment: quit 30 years ago   Substance and Sexual Activity    Alcohol use: No    Drug use: No    Sexual activity: None   Other Topics Concern    None   Social History Narrative    None     Social Determinants of Health     Financial Resource Strain:     Difficulty of Paying Living Expenses:    Food Insecurity:     Worried About Running Out of Food in the Last Year:     Ran Out of Food in the Last Year:    Transportation Needs:     Lack of Transportation (Medical):      Lack of Transportation (Non-Medical):    Physical Activity:     Days of Exercise per Week:     Minutes of Exercise per Session:    Stress:     Feeling of Stress :    Social Connections:     Frequency of Communication with Friends and Family:     Frequency of Social Gatherings with Friends and Family:     Attends Quaker Services:     Active Member of Clubs or Organizations:     Attends Club or Organization Meetings:     Marital Status:    Intimate Partner Violence:     Fear of Current or Ex-Partner:     Emotionally Abused:     Physically Abused:     Sexually Abused:        Subjective/HPI pt feels better today. Wants to go home. Had discussions with grand daughter. Pt's base line is moderate confusion. EKG: AF 81        Review of Systems:   Review of Systems   Constitutional: Negative. Negative for diaphoresis and fatigue. HENT: Negative. Eyes: Negative. Respiratory: Positive for shortness of breath. Negative for cough, chest tightness, wheezing and stridor. Cardiovascular: Positive for leg swelling. Negative for chest pain and palpitations. Gastrointestinal: Negative. Negative for blood in stool and nausea. Genitourinary: Negative. Musculoskeletal: Negative. Skin: Negative. Neurological: Positive for weakness. Negative for dizziness, syncope and light-headedness. Hematological: Negative. Psychiatric/Behavioral: Negative. Physical Examination:    BP (!) 110/92   Pulse 94   Temp 98 °F (36.7 °C) (Oral)   Resp 18   Ht 5' 7\" (1.702 m)   Wt 153 lb (69.4 kg)   SpO2 99%   BMI 23.96 kg/m²    Physical Exam   Constitutional: No distress. He appears chronically ill and acutely ill. HENT:   Normal cephalic and Atraumatic   Eyes: Pupils are equal, round, and reactive to light. Neck: Thyroid normal. No JVD present. No neck adenopathy. No thyromegaly present. Cardiovascular: Normal rate and regular rhythm. Exam reveals decreased pulses. Murmur heard. Pulmonary/Chest: Effort normal and breath sounds normal. He has no wheezes. He has no rales. He exhibits no tenderness. Abdominal: Soft. Bowel sounds are normal. There is no abdominal tenderness. Musculoskeletal:         General: Edema (1+) present. No tenderness. Normal range of motion. Cervical back: Normal range of motion and neck supple. Neurological: He is alert and oriented to person, place, and time. Skin: Skin is warm. No cyanosis. Nails show no clubbing. LABS:  CBC:   Lab Results   Component Value Date    WBC 6.1 06/21/2021    RBC 3.96 06/21/2021    HGB 12.8 06/21/2021    HCT 38.2 06/21/2021    MCV 96.4 06/21/2021    MCH 32.4 06/21/2021    MCHC 33.6 06/21/2021    RDW 14.5 06/21/2021     06/21/2021     CBC with Differential:    Lab Results   Component Value Date    WBC 6.1 06/21/2021    RBC 3.96 06/21/2021    HGB 12.8 06/21/2021    HCT 38.2 06/21/2021     06/21/2021    MCV 96.4 06/21/2021    MCH 32.4 06/21/2021    MCHC 33.6 06/21/2021    RDW 14.5 06/21/2021    LYMPHOPCT 32.4 06/21/2021    MONOPCT 9.9 06/21/2021    BASOPCT 1.0 06/21/2021    MONOSABS 0.6 06/21/2021    LYMPHSABS 2.0 06/21/2021    EOSABS 0.1 06/21/2021    BASOSABS 0.1 06/21/2021     CMP:    Lab Results   Component Value Date     06/21/2021    K 4.2 06/21/2021    K 3.9 11/22/2020     06/21/2021    CO2 19 06/21/2021    BUN 25 06/21/2021    CREATININE 0.88 06/21/2021    GFRAA >60.0 06/21/2021    LABGLOM >60.0 06/21/2021    GLUCOSE 119 06/21/2021    PROT 7.2 06/21/2021    LABALBU 3.9 06/21/2021    CALCIUM 9.3 06/21/2021    BILITOT 1.1 06/21/2021    ALKPHOS 100 06/21/2021    AST 25 06/21/2021    ALT 24 06/21/2021     BMP:    Lab Results   Component Value Date     06/21/2021    K 4.2 06/21/2021    K 3.9 11/22/2020     06/21/2021    CO2 19 06/21/2021    BUN 25 06/21/2021    LABALBU 3.9 06/21/2021    CREATININE 0.88 06/21/2021    CALCIUM 9.3 06/21/2021    GFRAA >60.0 06/21/2021    LABGLOM >60.0 06/21/2021    GLUCOSE 119 06/21/2021     Magnesium:    Lab Results   Component Value Date    MG 2.2 01/12/2021     Troponin:    Lab Results   Component Value Date    TROPONINI <0.010 06/21/2021        Active Hospital Problems    Diagnosis Date Noted    CHF (congestive heart failure), NYHA class I, acute on chronic, combined ([facility] [I50.43] 06/07/2021     Priority: Low        Assessment/Plan:  1. AF- rate control and OAC  2. Acute on chronic Combined HF- iv Lasix. Hold KCL.  K 4.9 this am.   Add Entresto. Continue BB. 3. CMP EF 35  4. 1-2+ AR  5. PPM - it was felt at the time of implant that he wasnot a good candidate for ICD. 6. CHF teaching  7. Fluid restrict.    8. PTOT       Electronically signed by Ankit Rivera MD on 6/22/2021 at 7:58 AM

## 2021-06-22 NOTE — CARE COORDINATION
Definition of CHF discussed with patient's son \"Emmanuel\" via [facility] . Symptoms of heart failure and decompensation discussed. Weight gain of >3 #, edema, difficulty breathing, cough, issues with appetite, fatigue, or difficulty with sleep. Causes of CHF reviewed. CAD, MI, HTN, valve dz., infection,  ETOH or drug abuse, or genetic problems. Importance of daily weight and B/P monitoring discussed. Pt to use a calender or notebook to record daily weight. Low sodium diet and fluid intake discussed. Pt's son taught about a fluid restriction. Shown how to read labels for sodium levels, recommended food list provided. Importance of following the physician orders for medications reinforced. Importance of flu and pneumonia vaccinations reinforced. Common CHF medications reviewed as well as avoiding certain other meds (decongestants, NSAIDS)  Instructed to discuss activity recommendations with physician. Pt. quit smoking a long time ago. Sample CHF weight documentation form provided. CHF Zones discussed. Importance of staying in \"green\" area stressed. Pt's son verbalized understanding to call MD ASAP when his father reaches the yellow zone, and to call 911 when reaching the red zone. Booklet and zone pamphlet provided to the pt. Patient denies any further questions at this time.    Electronically signed by Bennett Camarena RN on 6/22/2021 at 2:11 PM

## 2021-06-23 LAB
ANION GAP SERPL CALCULATED.3IONS-SCNC: 20 MEQ/L (ref 9–15)
BUN BLDV-MCNC: 57 MG/DL (ref 8–23)
CALCIUM SERPL-MCNC: 9.2 MG/DL (ref 8.5–9.9)
CHLORIDE BLD-SCNC: 104 MEQ/L (ref 95–107)
CO2: 19 MEQ/L (ref 20–31)
CREAT SERPL-MCNC: 1.49 MG/DL (ref 0.7–1.2)
GFR AFRICAN AMERICAN: 53.3
GFR NON-AFRICAN AMERICAN: 44.1
GLUCOSE BLD-MCNC: 119 MG/DL (ref 70–99)
HCT VFR BLD CALC: 44 % (ref 42–52)
HEMOGLOBIN: 15 G/DL (ref 14–18)
MCH RBC QN AUTO: 32.8 PG (ref 27–31.3)
MCHC RBC AUTO-ENTMCNC: 34.1 % (ref 33–37)
MCV RBC AUTO: 96.4 FL (ref 80–100)
PDW BLD-RTO: 14.4 % (ref 11.5–14.5)
PLATELET # BLD: 198 K/UL (ref 130–400)
POTASSIUM SERPL-SCNC: 5.2 MEQ/L (ref 3.4–4.9)
RBC # BLD: 4.56 M/UL (ref 4.7–6.1)
SODIUM BLD-SCNC: 143 MEQ/L (ref 135–144)
WBC # BLD: 10.1 K/UL (ref 4.8–10.8)

## 2021-06-23 PROCEDURE — 36415 COLL VENOUS BLD VENIPUNCTURE: CPT

## 2021-06-23 PROCEDURE — 2060000000 HC ICU INTERMEDIATE R&B

## 2021-06-23 PROCEDURE — 97166 OT EVAL MOD COMPLEX 45 MIN: CPT

## 2021-06-23 PROCEDURE — 2580000003 HC RX 258: Performed by: PHYSICIAN ASSISTANT

## 2021-06-23 PROCEDURE — 99233 SBSQ HOSP IP/OBS HIGH 50: CPT | Performed by: INTERNAL MEDICINE

## 2021-06-23 PROCEDURE — 94761 N-INVAS EAR/PLS OXIMETRY MLT: CPT

## 2021-06-23 PROCEDURE — 85027 COMPLETE CBC AUTOMATED: CPT

## 2021-06-23 PROCEDURE — 6370000000 HC RX 637 (ALT 250 FOR IP): Performed by: INTERNAL MEDICINE

## 2021-06-23 PROCEDURE — 94640 AIRWAY INHALATION TREATMENT: CPT

## 2021-06-23 PROCEDURE — 80048 BASIC METABOLIC PNL TOTAL CA: CPT

## 2021-06-23 PROCEDURE — 2700000000 HC OXYGEN THERAPY PER DAY

## 2021-06-23 PROCEDURE — 90792 PSYCH DIAG EVAL W/MED SRVCS: CPT | Performed by: PSYCHIATRY & NEUROLOGY

## 2021-06-23 PROCEDURE — 6360000002 HC RX W HCPCS: Performed by: INTERNAL MEDICINE

## 2021-06-23 PROCEDURE — 99232 SBSQ HOSP IP/OBS MODERATE 35: CPT | Performed by: RADIOLOGY

## 2021-06-23 RX ORDER — FUROSEMIDE 10 MG/ML
40 INJECTION INTRAMUSCULAR; INTRAVENOUS DAILY
Status: DISCONTINUED | OUTPATIENT
Start: 2021-06-24 | End: 2021-06-25 | Stop reason: HOSPADM

## 2021-06-23 RX ORDER — QUETIAPINE FUMARATE 50 MG/1
25 TABLET, FILM COATED ORAL 2 TIMES DAILY
Status: DISCONTINUED | OUTPATIENT
Start: 2021-06-23 | End: 2021-06-25 | Stop reason: HOSPADM

## 2021-06-23 RX ADMIN — BUDESONIDE AND FORMOTEROL FUMARATE DIHYDRATE 2 PUFF: 80; 4.5 AEROSOL RESPIRATORY (INHALATION) at 07:27

## 2021-06-23 RX ADMIN — SODIUM CHLORIDE, PRESERVATIVE FREE 10 ML: 5 INJECTION INTRAVENOUS at 07:53

## 2021-06-23 RX ADMIN — SODIUM CHLORIDE, PRESERVATIVE FREE 10 ML: 5 INJECTION INTRAVENOUS at 21:59

## 2021-06-23 RX ADMIN — QUETIAPINE FUMARATE 25 MG: 50 TABLET ORAL at 21:59

## 2021-06-23 RX ADMIN — FUROSEMIDE 40 MG: 10 INJECTION, SOLUTION INTRAVENOUS at 07:53

## 2021-06-23 RX ADMIN — QUETIAPINE FUMARATE 25 MG: 50 TABLET ORAL at 16:50

## 2021-06-23 RX ADMIN — METOPROLOL TARTRATE 25 MG: 25 TABLET, FILM COATED ORAL at 07:53

## 2021-06-23 RX ADMIN — TIOTROPIUM BROMIDE INHALATION SPRAY 2 PUFF: 3.12 SPRAY, METERED RESPIRATORY (INHALATION) at 07:28

## 2021-06-23 ASSESSMENT — ENCOUNTER SYMPTOMS
WHEEZING: 0
CHEST TIGHTNESS: 0
SHORTNESS OF BREATH: 1
COUGH: 0
GASTROINTESTINAL NEGATIVE: 1
NAUSEA: 0
BLOOD IN STOOL: 0
STRIDOR: 0
EYES NEGATIVE: 1

## 2021-06-23 ASSESSMENT — PAIN SCALES - GENERAL
PAINLEVEL_OUTOF10: 0

## 2021-06-23 NOTE — CONSULTS
Transportation (Medical):  Lack of Transportation (Non-Medical):    Physical Activity:     Days of Exercise per Week:     Minutes of Exercise per Session:    Stress:     Feeling of Stress :    Social Connections:     Frequency of Communication with Friends and Family:     Frequency of Social Gatherings with Friends and Family:     Attends Zoroastrian Services:     Active Member of Clubs or Organizations:     Attends Club or Organization Meetings:     Marital Status:    Intimate Partner Violence:     Fear of Current or Ex-Partner:     Emotionally Abused:     Physically Abused:     Sexually Abused:        No Known Allergies    No current facility-administered medications on file prior to encounter. Current Outpatient Medications on File Prior to Encounter   Medication Sig Dispense Refill    tiotropium (SPIRIVA RESPIMAT) 2.5 MCG/ACT AERS inhaler Inhale 2 puffs into the lungs daily      dilTIAZem (CARDIZEM CD) 120 MG extended release capsule Take 1 capsule by mouth daily 30 capsule 5    apixaban (ELIQUIS) 2.5 MG TABS tablet Take 1 tablet by mouth 2 times daily 60 tablet 5    metoprolol tartrate (LOPRESSOR) 25 MG tablet TAKE 1 TABLET BY MOUTH TWICE DAILY 60 tablet 5    [DISCONTINUED] furosemide (LASIX) 40 MG tablet Take 1 tablet by mouth daily for 7 days 7 tablet 0    lisinopril (PRINIVIL;ZESTRIL) 5 MG tablet Take 1 tablet by mouth daily 30 tablet 5    fluticasone-salmeterol (ADVAIR DISKUS) 250-50 MCG/DOSE AEPB Inhale 1 puff into the lungs every 12 hours for 14 days 1 Inhaler 0       Review of Systems   Constitutional: Negative. Negative for chills, diaphoresis and fever. HENT: Negative. Negative for congestion, ear pain, hearing loss and tinnitus. Eyes: Negative. Negative for photophobia. Respiratory: Positive for shortness of breath. Negative for cough and wheezing. Cardiovascular: Positive for leg swelling. Negative for chest pain and palpitations. Gastrointestinal: Negative. Negative for abdominal pain, constipation, diarrhea, nausea and vomiting. Genitourinary: Negative. Negative for dysuria, flank pain, frequency, hematuria and urgency. Musculoskeletal: Negative. Negative for back pain and neck pain. Skin: Negative. Negative for rash. Allergic/Immunologic: Negative for environmental allergies. Neurological: Negative. Negative for dizziness, tremors, weakness and headaches. Hematological: Does not bruise/bleed easily. Psychiatric/Behavioral: Negative. Negative for hallucinations and suicidal ideas. The patient is not nervous/anxious. OBJECTIVE:  BP (!) 127/102   Pulse 67   Temp 97 °F (36.1 °C)   Resp 18   Ht 5' 7\" (1.702 m)   Wt 153 lb (69.4 kg)   SpO2 100%   BMI 23.96 kg/m²     Physical Exam  Constitutional:       General: He is not in acute distress. Appearance: He is well-developed. He is ill-appearing. He is not diaphoretic. HENT:      Head: Normocephalic and atraumatic. Nose: Nose normal.      Mouth/Throat:      Pharynx: No oropharyngeal exudate. Eyes:      General: No scleral icterus. Right eye: No discharge. Left eye: No discharge. Conjunctiva/sclera: Conjunctivae normal.   Neck:      Thyroid: No thyromegaly. Vascular: No JVD. Trachea: No tracheal deviation. Cardiovascular:      Rate and Rhythm: Normal rate. Rhythm irregularly irregular. Heart sounds: Murmur heard. No friction rub. No gallop. Pulmonary:      Effort: No respiratory distress. Breath sounds: No stridor. Examination of the left-lower field reveals decreased breath sounds. Decreased breath sounds, wheezing and rales present. Chest:      Chest wall: No tenderness. Abdominal:      General: Bowel sounds are normal. There is no distension. Palpations: Abdomen is soft. There is no mass. Tenderness: There is no abdominal tenderness. There is no guarding or rebound. Hernia: No hernia is present.    Musculoskeletal: General: No tenderness or deformity. Cervical back: Neck supple. Skin:     General: Skin is dry. Coloration: Skin is not pale. Findings: No erythema or rash. Neurological:      Mental Status: He is alert and oriented to person, place, and time. Cranial Nerves: No cranial nerve deficit. Psychiatric:         Behavior: Behavior normal.         Thought Content: Thought content normal.         Judgment: Judgment normal.             XR CHEST PORTABLE    Result Date: 6/21/2021  Exam: XR CHEST PORTABLE History:  sob Technique: AP portable view of the chest obtained. Comparison: none Chest x-ray portable Findings: There is left a single lead cardiac pacemaker in place The cardiomediastinal silhouette is within normal limits. The right lung is unremarkable. There is a moderate left pleural effusion with underlying atelectasis versus infiltrate. Bones of the thorax appear intact. Left pleural effusion with underlying atelectasis versus infiltrate. ASSESSMENT ANDPLAN:    Assessment: Patient with new left pleural effusion, which is likely secondary to congestive heart failure decompensation and exacerbation. Patient's Eliquis has been stopped. Plan: Planning diagnostic and therapeutic left-sided ultrasound-guided thoracentesis in 2 days as Eliquis was held today.     Pro Sy MD, Jam Rivera

## 2021-06-23 NOTE — CARE COORDINATION
Per the , [facility] is unable to the referral due to staffing. The LSW called [facility] ([phone]) and talked to Chase Domínguez. Per Woody Jones [facility] does not have Fijian speaking staff at this time. The LSW called and talked to the patient's grand daughter, Luis Felipe Brown. Luis Felipe Brown states she has a friend that works at [facility] and she will call her friend, then call the LSW back.   Electronically signed by Miriam Briceno on 6/23/21 at 2:11 PM EDT

## 2021-06-23 NOTE — CONSULTS
Pt was seen and evaluated, primary team consulted me for hallucination, pt is poor historian all the information was obtained through  from his son at bed side, hallucination started today, visual and auditory hallucination, he thinks he is at home, pt denies ever this happening , denies CP, SOB, diarrhea, or abd pain        Past Medical History:   Diagnosis Date    [facility] fibrillation ([facility]     Cardiomyopathy ([facility] 9/18/2020    Diabetes mellitus ([facility]     Hypertension      Past Surgical History:   Procedure Laterality Date    BACK SURGERY      CARDIAC PACEMAKER PLACEMENT  2020    HERNIA REPAIR       Social History     Socioeconomic History    Marital status:      Spouse name: Not on file    Number of children: Not on file    Years of education: Not on file    Highest education level: Not on file   Occupational History    Not on file   Tobacco Use    Smoking status: Former Smoker    Smokeless tobacco: Never Used    Tobacco comment: quit 30 years ago   Substance and Sexual Activity    Alcohol use: No    Drug use: No    Sexual activity: Not on file   Other Topics Concern    Not on file   Social History Narrative    Not on file     Social Determinants of Health     Financial Resource Strain:     Difficulty of Paying Living Expenses:    Food Insecurity:     Worried About Running Out of Food in the Last Year:     [address] in the Last Year:    Transportation Needs:     Lack of Transportation (Medical):      Lack of Transportation (Non-Medical):    Physical Activity:     Days of Exercise per Week:     Minutes of Exercise per Session:    Stress:     Feeling of Stress :    Social Connections:     Frequency of Communication with Friends and Family:     Frequency of Social Gatherings with Friends and Family:     Attends Faith Services:     Active Member of Clubs or Organizations:     Attends Club or Organization Meetings:     Marital Status:    Intimate Partner Violence:     Fear of Current or Ex-Partner:     Emotionally Abused:     Physically Abused:     Sexually Abused:      History reviewed. No pertinent family history.   ROS: 12 poin ROS is negative except whats states in HPI  HEENT: AT/NC, PERRLA, no JVD  HEART: s1/s2 wnl w/o s3  LUNG: clear  ABD: soft, NT  EXT: no edema  SKin : no rash  Neuro:no focal deficits  1) Hospital acquired delirium  Start Seroquel  Psyche eval  2) aib   Rate controlled  On OAC  3) hyperkalemia  Monitor  4) chronic CHF euvolemic  Thank you for your consult will follow the pt with you

## 2021-06-23 NOTE — PROGRESS NOTES
0800 Assessment completed and morning meds administered per MAR. Deng draining clear yellow urine. Refusing breakfast. Denies pain. SOB with exertion. 1035 Family at bedside states patient is hallucinating. This does not happen at home but son states it happened last time he was here as well. Notified Dr. Jad Nationor.    56 Set up video  for Dr. Cj Watts to use with patient's son in room. 1655 Patient confused and hallucinating again. Had PCA call patient's son to help redirect him, also gave PO seroquel as ordered. Pt. Relaxing in bed now drinking pepsi as requested.

## 2021-06-23 NOTE — CONSULTS
[address] [place] [address] Department of Psychiatry  Behavioral Health Consult    REASON FOR CONSULT: AMS    CONSULTING PHYSICIAN:     History obtained from: pateint    HISTORY OF PRESENT ILLNESS:     The patient is a 80 y.o. male with CHF, presented with altered mental status  Patient was sundowning with visual and auditory hallucinations  Patient is Bel[place]ian-speaking and so I attempted to evaluate him with   Patient is a poor historian secondary to current mental status  Minimal information obtained          The patient is not currently receiving care for the above psychiatric illness. Psychiatric Review of Systems    Unable to obtain        Past Psychiatric History:  No past psychiatric history      Past Medical History:        Diagnosis Date    Atrial fibrillation ([facility]     Cardiomyopathy ([facility] 9/18/2020    [email or web address]     Hypertension        Past Surgical History:        Procedure Laterality Date    BACK SURGERY      CARDIAC PACEMAKER PLACEMENT  2020    HERNIA REPAIR         Medications Prior to Admission:   Medications Prior to Admission: tiotropium (SPIRIVA RESPIMAT) 2.5 MCG/ACT AERS inhaler, Inhale 2 puffs into the lungs daily  dilTIAZem (CARDIZEM CD) 120 MG extended release capsule, Take 1 capsule by mouth daily  apixaban (ELIQUIS) 2.5 MG TABS tablet, Take 1 tablet by mouth 2 times daily  metoprolol tartrate (LOPRESSOR) 25 MG tablet, TAKE 1 TABLET BY MOUTH TWICE DAILY  lisinopril (PRINIVIL;ZESTRIL) 5 MG tablet, Take 1 tablet by mouth daily  fluticasone-salmeterol (ADVAIR DISKUS) 250-50 MCG/DOSE AEPB, Inhale 1 puff into the lungs every 12 hours for 14 days    Allergies:  Patient has no known allergies. FAMILY/SOCIAL HISTORY:  History reviewed. No pertinent family history.   Social History     Socioeconomic History    Marital status:      Spouse name: Not on file    Number of children: Not on file    Years of education: Not on file    Highest education level: Not on file   Occupational History    Not on file   Tobacco Use    Smoking status: Former Smoker    Smokeless tobacco: Never Used    Tobacco comment: quit 30 years ago   Substance and Sexual Activity    Alcohol use: No    Drug use: No    Sexual activity: Not on file   Other Topics Concern    Not on file   Social History Narrative    Not on file     Social Determinants of Health     Financial Resource Strain:     Difficulty of Paying Living Expenses:    Food Insecurity:     Worried About Running Out of Food in the Last Year:     [address] in the Last Year:    Transportation Needs:     Lack of Transportation (Medical):      Lack of Transportation (Non-Medical):    Physical Activity:     Days of Exercise per Week:     Minutes of Exercise per Session:    Stress:     Feeling of Stress :    Social Connections:     Frequency of Communication with Friends and Family:     Frequency of Social Gatherings with Friends and Family:     Attends Holiness Services:     Active Member of Clubs or Organizations:     Attends Club or Organization Meetings:     Marital Status:    Intimate Partner Violence:     Fear of Current or Ex-Partner:     Emotionally Abused:     Physically Abused:     Sexually Abused:        REVIEW OF SYSTEMS    Constitutional: [] fever  [] chills  [] weight loss  []weakness [] Other:  Eyes:  [] photophobia  [] discharge [] acuity change   [] Diplopia   [] Other:  HENT:  [] sore throat  [] ear pain [] Tinnitus   [] Other  Respiratory:  [] Cough  [] Shortness of breath   [] Sputum   [] Other:   Cardiac: []Chest pain   []Palpitations []Edema  []PND  [] Other:  GI:  []Abdominal pain   []Nausea  []Vomiting  []Diarrhea  [] Other:  :  [] Dysuria   []Frequency  []Hematuria  []Discharge  [] Other:  Possible Pregnancy: []Yes   []No   LMP:   Musculoskeletal:  []Back pain  []Neck pain  []Recent Injury   Skin:  []Rash  [] Itching  [] Other:  Neurologic:  [] Headache  [] Focal weakness  [] Sensory changes []Other:  Endocrine:  [] Polyuria  [] Polydipsia  [] Hair Loss  [] Other:  Lymphatic:   [] Swollen glands   Psychiatric:  As per HPI      All other systems negative except as marked or mentioned/indicated in the HPI. Radha Britt      PHYSICAL EXAM:  Vitals:  /78   Pulse 66   Temp 97.5 °F (36.4 °C) (Oral)   Resp 18   Ht 5' 7\" (1.702 m)   Wt 153 lb (69.4 kg)   SpO2 98%   BMI 23.96 kg/m²      Neuro Exam:   Muscle Strength & Tone: full ROM  Gait: in bed   Involuntary Movements: No    Mental Status Examination:    Level of consciousness:  within normal limits   Appearance:  ill-appearing  Behavior/Motor:  psychomotor retardation  Attitude toward examiner:  withdrawn  Speech:  slow   Mood: dysthymic  Affect:  anxious  Thought processes:  slow   Thought content:  Perceptual Disturbance:  visual  Cognition:  disoriented   Concentration poor  Memory unable to assess  Mini Mental Status   Insight poor   Judgement poor   Fund of Knowledge limited      DIAGNOSIS:     Delirium- acute         LABS: REVIEWED TODAY:  Recent Labs     06/21/21  1030 06/22/21  0839 06/23/21  0657   WBC 6.1 11.7* 10.1   HGB 12.8* 13.7* 15.0    181 198     Recent Labs     06/21/21  1030 06/22/21  0800 06/23/21  0657    140 143   K 4.2 4.9 5.2*    102 104   CO2 19* 16* 19*   BUN 25* 31* 57*   CREATININE 0.88 1.16 1.49*   GLUCOSE 119* 86 119*     Recent Labs     06/21/21  1030   BILITOT 1.1*   ALKPHOS 100   AST 25   ALT 24     No results found for: LABAMPH, BARBSCNU, LABBENZ, CANNAB, COCAINESCRN, LABMETH, OPIATESCREENURINE, PHENCYCLIDINESCREENURINE, PPXUR, ETOH  Lab Results   Component Value Date    TSH 2.490 01/12/2021     No results found for: LITHIUM  No results found for: VALPROATE, CBMZ  No results found for: LITHIUM, VALPROATE    FURTHER LABS ORDERED :      Radiology   ECHO Limited    Result Date: 6/8/2021  Transthoracic Echocardiography Report (TTE)  Demographics   Patient Name    SYLWIA          Gender Male                  [facility]   Patient Number  [id number]       Race                 Other                                  Ethnicity             or    Visit Number    [id number]      Room Number          W168   Corporate ID                   Date of Study        06/08/2021   Accession       [id number]     Referring Physician  Number   Date of Birth   02/12/1929     Sonographer          Zahraa Myles   Age             80 year(s)     Interpreting         [facility])                                 Physician            Cardiology                                                      Sheryl Brown MD  Procedure Type of Study   TTE procedure:ECHOCARDIOGRAM LIMITED. Procedure Date Date: 06/08/2021 Start: 08:27 AM Study Location: Portable Technical Quality: Adequate visualization Indications:Congestive heart failure. Patient Status: Routine Height: 67 inches Weight: 150 pounds BSA: 1.79 m^2 BMI: 23.49 kg/m^2 BP: 138/87 mmHg  Conclusions   Summary  Normal mitral valve structure and function. 2+ MR  LV is mildly Dilated  Left ventricular ejection fraction is visually estimated at 20-25%. Large Pleural Effusion noted   Signature   ----------------------------------------------------------------  Electronically signed by Sheryl Brown MD(Interpreting  physician) on 06/08/2021 11:53 AM  ----------------------------------------------------------------   Findings  Left Ventricle LV is mildly Dilated Left ventricular ejection fraction is visually estimated at 20-25%. Right Ventricle Normal right ventricle structure and function. Left Atrium Mildly dilated left atrium. Right Atrium Normal right atrium. Mitral Valve Normal mitral valve structure and function. 2+ MR Tricuspid Valve Normal tricuspid valve structure and function. 1+ TR RVSP 52 mmHg Aortic Valve Normal aortic valve structure and function. Pulmonic Valve The pulmonic valve was not well visualized . Pericardial Effusion No evidence of pericardial effusion. Pleural Effusion Large Pleural Effusion noted Aorta \ Miscellaneous The aorta is within normal limits. M-Mode Measurements (cm)   LVIDd: 6.69 cm                         LVIDs: 5.56 cm  IVSd: 0.97 cm                          IVSs: 1.18 cm  LVPWd: 1 cm                            AO Root Dimension: 3.84 cm  Rt. Vent.  Dimension: 3.35 cm                                         LVOT: 2.21 cm  Doppler Measurements:   TR Velocity:3.25 m/s  TR Gradient:42.32 mmHg                                     Estimated RAP:10 mmHg                                     RVSP:52.31 mmHg  Valves  Tricuspid Valve   Estimated RVSP: 52.31 mmHg              Estimated RAP: 10 mmHg  TR Velocity: 3.25 m/s                   TR Gradient: 42.32 mmHg   Pulmonic Valve            Estimated PASP: 52.32 mmHg   LVOT   LVOT Diameter: 2.21 cm  Structures  Left Atrium   LA Volume/Index: 96.96 ml /54 m^2             LA Area: 24.78 cm^2   Left Ventricle   Diastolic Dimension: 8.20 cm          Systolic Dimension: 4.31 cm  Septum Diastolic: 4.75 cm             Septum Systolic: 8.49 cm  PW Diastolic: 1 cm                                        FS: 16.9 %  LV EDV/LV EDV Index: 230.31 ml/129    LV ESV/LV ESV Index: 151.3 ml/85 m^2  m^2                                   LV Length: 8.08 cm  EF Calculated: 34.3 %   LVOT Diameter: 2.21 cm   Right Atrium   RA Systolic Pressure: 10 mmHg   Right Ventricle   Diastolic Dimension: 6.62 cm                                    RV Systolic Pressure: 60.04 mmHg  Aorta/ Miscellaneous Aorta   Aortic Root: 3.84 cm  LVOT Diameter: 2.21 cm      CT CHEST WO CONTRAST    Result Date: 6/7/2021  EXAMINATION: CT CHEST WO CONTRAST, 6/7/2021 5:35 PM CLINICAL HISTORY:  rule out pneumonia COMPARISON: Chest CT from January 10, 5884 TECHNIQUE: Helical CT was performed through the chest utilizing 100-mL of Optiray IV contrast, All CT scans at this facility use dose modulation, iterative reconstruction, and/or weight based dosing when appropriate to reduce radiation dose to as low as reasonably achievable. FINDINGS There is a cardiac pacemaker on the left The thyroid gland is within normal limits. The axillary regions demonstrate no significant lymphadenopathy or solid or cystic lesions. There is no mediastinal lymphadenopathy. The heart is enlarged, cardiomegaly without pericardial effusion. The great vessels of the chest are normal in course and caliber. There are no infiltrates. There is atelectasis of the dependent portions of both bases and there are bilateral layering pleural effusions greater on the left. Maximum depth of the right effusion is approximately 1.9 cm. Maximum depth of the left effusion  measures 5.4 cm. There are no acute bony abnormalities. The visualized portions of the upper abdomen are within normal limits. There are bilateral layering pleural effusions greater on the left. There are no areas of consolidation, infiltrates. Cardiomegaly     CT ABDOMEN PELVIS W IV CONTRAST Additional Contrast? None    Result Date: 6/7/2021  CT of the Abdomen and Pelvis with intravenous contrast medium History:  Pneumoperitoneum. Right abdominal pain Technical Factors: CT imaging of the abdomen and pelvis were obtained and formatted as 5 mm contiguous axial images from the domes of the diaphragm to the symphysis pubis. Sagittal and coronal reconstructions were also obtained. Oral contrast medium:  None. Intravenous contrast medium:  Isovue-370, 100 mL. Comparison:  CT abdomen pelvis, November 18, 2020, August 8, 2020. Findings: Lungs: Moderate left pleural effusion. Consolidation left lower lobe. Pacemaker wires, right atrium, and right ventricle. Liver:  Normal in size, shape, and decreased in attenuation. Bile Ducts:  Normal in caliber. Gallbladder:  No stones or wall thickening. Pancreas:  Normal without masses, cysts, ductal dilatation or calcification. Spleen:  Normal in size without masses or calcifications.  8 mm splenule anterior and medial 6/7/2021  Exam: XR CHEST PORTABLE History:  short of breath Technique: AP portable view of the chest obtained. Comparison: Chest x-ray from January 12, 2021 Chest x-ray portable Findings: There is a single lead cardiac pacemaker on the left side The cardiomediastinal silhouette is within normal limits. The right lung is unremarkable. There is a left lower lobe opacity which may represent an infiltrate and pleural effusion. There is lucency of the right hemidiaphragm which may represent superimposed loops of bowel versus free air under the diaphragm. There is left lower lobe consolidation and/or effusion. There is lucency in both the right hemidiaphragm which may reflect free intraperitoneal air. The findings of possible free intraperitoneal air were called to ER at 1124 hours. EKG: TRACING REVIEWED    RECOMMENDATIONS    Risk Management:  routine:  no special precautions necessary    Medications: Agree with Seroquel prescription of 25 mg twice daily which will help control the ongoing confusion and agitation  Keep follow  Discussed with the treating physician/ team about the patient and treatment plan  Reviewed the chart    Discussed with the patient risk, benefit, alternative and common side effects for the  proposed medication treatment. Patient is consenting to the treatment. Thanks for the consult. Please call me if needed.     Electronically signed by Viviana Law MD on 6/23/2021 at 6:10 PM

## 2021-06-23 NOTE — PROGRESS NOTES
[facility] OCCUPATIONAL THERAPY EVALUATION - ACUTE     NAME: Daniela Hobbs  : 1929 (80 y.o.)  MRN: [id number]  CODE STATUS: Full Code  Room: N949/F032-39    Date of Service: 2021    Patient Diagnosis(es): CHF (congestive heart failure), NYHA class I, acute on chronic, combined ([address] [I50.43]   Chief Complaint   Patient presents with    Shortness of Breath     sob and ble swelling for 2 weeks     Leg Swelling     Patient Active Problem List    Diagnosis Date Noted    SSS (sick sinus syndrome) ([address]     CHF (congestive heart failure), NYHA class I, acute on chronic, combined ([address] 2021    Atelectasis of right lung     Pleural effusion on right     SOB (shortness of breath) 2020    Cardiomyopathy ([address] 2020    Chronic atrial fibrillation ([address] 2020    New onset a-fib ([address] 10/03/2019        Past Medical History:   Diagnosis Date    Atrial fibrillation ([address]     Cardiomyopathy ([address] 2020    Diabetes mellitus [facility]     Hypertension      Past Surgical History:   Procedure Laterality Date    BACK SURGERY      CARDIAC PACEMAKER PLACEMENT      HERNIA REPAIR          Restrictions  Restrictions/Precautions: Fall Risk     Safety Devices: Safety Devices  Safety Devices in place: Yes  Type of devices:  All fall risk precautions in place        Subjective  Pre Treatment Pain Screening  Pain at present: 0  Scale Used: Numeric Score  Intervention List: Patient able to continue with treatment, Patient declined any intervention    Pain Reassessment:   Pain Assessment  Patient Currently in Pain: Denies  Pain Assessment: 0-10  Pain Level: 0       Prior Level of Function:  Social/Functional History  Lives With: Son, Spouse (son reports someone is home with at all times)  Type of Home: Apartment  Home Layout: One level (15 steps)  Bathroom Shower/Tub: Tub/Shower unit  Bathroom Equipment: Shower chair  Home Equipment: Cane (rollator)  ADL Assistance: Lauren Norman Assistance:  (Family)  Ambulation Assistance: Independent (generally with cane, but uses both)  Transfer Assistance: Independent  Active : No  Patient's  Info: Son  Additional Comments: Ijeoma Jammie interpreted, pt. appeared more confused than in PT eval, difficulty confirming PLOF    OBJECTIVE:     Orientation Status:  Orientation  Overall Orientation Status: Within Functional Limits (Mildly confused but redirectible)    Observation:  Observation/Palpation  Posture: Fair  Observation: Pt alert, mildly agitated but redirectible    Cognition Status:  Cognition  Overall Cognitive Status: Exceptions  Arousal/Alertness: Delayed responses to stimuli  Following Commands:  Follows one step commands with increased time  Attention Span: Difficulty dividing attention  Memory: Decreased recall of precautions, Decreased recall of recent events, Decreased short term memory, Decreased long term memory  Safety Judgement: Decreased awareness of need for assistance, Decreased awareness of need for safety  Problem Solving: Assistance required to generate solutions, Assistance required to identify errors made, Assistance required to correct errors made, Assistance required to implement solutions  Insights: Decreased awareness of deficits  Initiation: Requires cues for some  Sequencing: Requires cues for some  Cognition Comment: Verbal cues for safety and sequencing    Perception Status:  Perception  Overall Perceptual Status: WFL    Sensation Status:  Sensation  Overall Sensation Status: WFL    Vision and Hearing Status:  Vision  Vision: Within Functional Limits  Hearing  Hearing: Exceptions to [facility]  Hearing Exceptions: Hard of hearing/hearing concerns, No hearing aid     ROM:   LUE AROM (degrees)  LUE AROM : WFL  Left Hand AROM (degrees)  Left Hand AROM: WFL  RUE AROM (degrees)  RUE AROM : WFL  Right Hand AROM (degrees)  Right Hand AROM: WFL    Strength:  LUE Strength  Gross LUE Strength: Exceptions to [facility]  L Hand General: 3/5  LUE Strength Comment: 3/5 all planes, limited ability to follow commands for MMT  RUE Strength  Gross RUE Strength: Exceptions to [facility]  R Hand General: 3/5  RUE Strength Comment: 3/5 all planes, limited ability to follow commands for MMT    Coordination, Tone, Quality of Movement: Tone RUE  RUE Tone: Normotonic  Tone LUE  LUE Tone: Normotonic  Coordination  Movements Are Fluid And Coordinated: No  Coordination and Movement description: Fine motor impairments, Decreased speed, Decreased accuracy, Right UE, Left UE    Hand Dominance:  Hand Dominance  Hand Dominance: Right    ADL Status:  ADL  Feeding: Setup  Grooming: Minimal assistance  UE Bathing: Moderate assistance  LE Bathing: Maximum assistance  UE Dressing: Moderate assistance  LE Dressing: Maximum assistance  Toileting: Maximum assistance  Additional Comments: Simulated ADLs as above. Pt with difficulty initiating and decreased sequencing noted. Decreased static standing balance and tolerance. Toilet Transfers  Toilet Transfer: Unable to assess  Toilet Transfers Comments: Anticipate min-mod A     Therapy key for assistance levels -   Independent = Pt. is able to perform task with no assistance but may require a device   Stand by assistance = Pt. does not perform task at an independent level but does not need physical assistance, requires verbal cues  Minimal, Moderate, Maximal Assistance = Pt. requires physical assistance (25%, 50%, 75% assist from helper) for task but is able to actively participate in task   Dependent = Pt. requires total assistance with task and is not able to actively participate with task completion     Functional Mobility:  Functional Mobility  Functional Mobility Comments: Pivot to chair only  Transfers  Sit to stand:  Moderate assistance, 2 Person assistance  Stand to sit: 2 Person assistance, Moderate assistance  Transfer Comments: Poor safety awareness, requires encouragement and increased time    Bed Mobility  Bed mobility  Rolling to Left: Moderate assistance  Rolling to Right: Dependent/Total (Pt does not participate, lets arm flop to the bed)  Supine to Sit: Minimal assistance  Sit to Supine: Unable to assess  Scooting: Minimal assistance  Comment: Pt initially resistant to bed mobility, with limited participation. Pt. then begins to initiate transfers to EOB independently but demonstrates increased agitation. Seated and Standing Balance:  Balance  Sitting Balance: Supervision  Standing Balance: Moderate assistance    Functional Endurance:  Activity Tolerance  Activity Tolerance: Patient limited by fatigue, Treatment limited secondary to decreased cognition    D/C Recommendations:  OT D/C RECOMMENDATIONS  REQUIRES OT FOLLOW UP: Yes    Equipment Recommendations:  OT Equipment Recommendations  Other: Continue to assess    OT Education:   OT Education  OT Education: OT Role, Plan of Care  Patient Education: Educated pt. on role of acute care OT  Barriers to Learning: Agitation, confusion    OT Follow Up:  OT D/C RECOMMENDATIONS  REQUIRES OT FOLLOW UP: Yes       Assessment/Discharge Disposition:  Assessment: Pt is a 80year old man from home with family who presents to [facility] with the above deficits which impact his ability to perform ADLs and IADLs. Pt. limited d/t confusion and agitation as well as weakness. Pt. would benefit from continued OT to maximize independence and safety with ADL tasks.   Performance deficits / Impairments: Decreased functional mobility , Decreased ADL status, Decreased strength, Decreased safe awareness, Decreased cognition, Decreased endurance, Decreased high-level IADLs, Decreased fine motor control, Decreased coordination, Decreased balance  Prognosis: Good  Discharge Recommendations: Continue to assess pending progress  Decision Making: Medium Complexity  History: Pt's medical history is moderately complex  Exam: Pt. has 10 performance deficits  Assistance / Modification: Pt. requires max

## 2021-06-23 NOTE — CARE COORDINATION
SPOKE WITH [facility] AND THEY DID RECEIVE REFERRAL. AWARE [place] IS NOT TODAY.  PER [facility] THEY WILL CALL ME BACK AND LET ME KNOW IF ACCEPTED

## 2021-06-23 NOTE — PLAN OF CARE
See OT evaluation for all goals and OT POC.  Electronically signed by GUSTABO Nielsen/L on 6/23/2021 at 12:50 PM

## 2021-06-23 NOTE — CARE COORDINATION
CALL FROM GEOVANI MEEKS [facility] AND THEY DO NOT HAVE STAFF TO TAKE THE REFERRAL.  WILL NEED TO FIND ANOTHER [place] [phone] WHEN ABLE

## 2021-06-23 NOTE — PROGRESS NOTES
Progress Note  Patient: Mouna Apt  Unit/Bed: V653/Q341-93  YOB: 1929  MRN: [id number]  Acct: [de-identified]   Admitting Diagnosis: CHF (congestive heart failure), NYHA class I, acute on chronic, combined ([facility] [I50.43]  Admit Date:  6/21/2021  Hospital Day: 2    Chief Complaint: CHF Noncompliance    Histories:  Past Medical History:   Diagnosis Date    Atrial fibrillation ([facility]     Cardiomyopathy ([facility] 9/18/2020    Diabetes mellitus ([facility]     Hypertension      Past Surgical History:   Procedure Laterality Date    BACK SURGERY      CARDIAC PACEMAKER PLACEMENT  2020    HERNIA REPAIR       History reviewed. No pertinent family history. Social History     Socioeconomic History    Marital status:      Spouse name: None    Number of children: None    Years of education: None    Highest education level: None   Occupational History    None   Tobacco Use    Smoking status: Former Smoker    Smokeless tobacco: Never Used    Tobacco comment: quit 30 years ago   Substance and Sexual Activity    Alcohol use: No    Drug use: No    Sexual activity: None   Other Topics Concern    None   Social History Narrative    None     Social Determinants of Health     Financial Resource Strain:     Difficulty of Paying Living Expenses:    Food Insecurity:     Worried About Running Out of Food in the Last Year:     Ran Out of Food in the Last Year:    Transportation Needs:     Lack of Transportation (Medical):      Lack of Transportation (Non-Medical):    Physical Activity:     Days of Exercise per Week:     Minutes of Exercise per Session:    Stress:     Feeling of Stress :    Social Connections:     Frequency of Communication with Friends and Family:     Frequency of Social Gatherings with Friends and Family:     Attends Jew Services:     Active Member of Clubs or Organizations:     Attends Club or Organization Meetings:     Marital Status:    Intimate Partner Violence:     Fear of Current or Ex-Partner:     Emotionally Abused:     Physically Abused:     Sexually Abused:        Subjective/HPI no acute events overnight. Denies CP. Breathing is easier. Lying flat comfortable with 2L O2. EKG: AF 81        Review of Systems:   Review of Systems   Constitutional: Negative. Negative for diaphoresis and fatigue. HENT: Negative. Eyes: Negative. Respiratory: Positive for shortness of breath. Negative for cough, chest tightness, wheezing and stridor. Cardiovascular: Positive for leg swelling. Negative for chest pain and palpitations. Gastrointestinal: Negative. Negative for blood in stool and nausea. Genitourinary: Negative. Musculoskeletal: Negative. Skin: Negative. Neurological: Positive for weakness. Negative for dizziness, syncope and light-headedness. Hematological: Negative. Psychiatric/Behavioral: Negative. Physical Examination:    BP (!) 127/102   Pulse 67   Temp 97 °F (36.1 °C)   Resp 18   Ht 5' 7\" (1.702 m)   Wt 153 lb (69.4 kg)   SpO2 100%   BMI 23.96 kg/m²    Physical Exam   Constitutional: No distress. He appears chronically ill and acutely ill. HENT:   Normal cephalic and Atraumatic   Eyes: Pupils are equal, round, and reactive to light. Neck: Thyroid normal. No JVD present. No neck adenopathy. No thyromegaly present. Cardiovascular: Normal rate and regular rhythm. Exam reveals decreased pulses. Murmur heard. Pulmonary/Chest: Effort normal and breath sounds normal. He has no wheezes. He has no rales. He exhibits no tenderness. Abdominal: Soft. Bowel sounds are normal. There is no abdominal tenderness. Musculoskeletal:         General: Edema (1+) present. No tenderness. Normal range of motion. Cervical back: Normal range of motion and neck supple. Neurological: He is alert and oriented to person, place, and time. Skin: Skin is warm. No cyanosis. Nails show no clubbing.        LABS:  CBC:   Lab Results   Component Value Date    WBC 11.7 06/22/2021    RBC 4.29 06/22/2021    HGB 13.7 06/22/2021    HCT 43.1 06/22/2021    .6 06/22/2021    MCH 32.0 06/22/2021    MCHC 31.8 06/22/2021    RDW 15.5 06/22/2021     06/22/2021     CBC with Differential:    Lab Results   Component Value Date    WBC 11.7 06/22/2021    RBC 4.29 06/22/2021    HGB 13.7 06/22/2021    HCT 43.1 06/22/2021     06/22/2021    .6 06/22/2021    MCH 32.0 06/22/2021    MCHC 31.8 06/22/2021    RDW 15.5 06/22/2021    LYMPHOPCT 32.4 06/21/2021    MONOPCT 9.9 06/21/2021    BASOPCT 1.0 06/21/2021    MONOSABS 0.6 06/21/2021    LYMPHSABS 2.0 06/21/2021    EOSABS 0.1 06/21/2021    BASOSABS 0.1 06/21/2021     CMP:    Lab Results   Component Value Date     06/22/2021    K 4.9 06/22/2021    K 3.9 11/22/2020     06/22/2021    CO2 16 06/22/2021    BUN 31 06/22/2021    CREATININE 1.16 06/22/2021    GFRAA >60.0 06/22/2021    LABGLOM 58.8 06/22/2021    GLUCOSE 86 06/22/2021    PROT 7.2 06/21/2021    LABALBU 3.9 06/21/2021    CALCIUM 9.7 06/22/2021    BILITOT 1.1 06/21/2021    ALKPHOS 100 06/21/2021    AST 25 06/21/2021    ALT 24 06/21/2021     BMP:    Lab Results   Component Value Date     06/22/2021    K 4.9 06/22/2021    K 3.9 11/22/2020     06/22/2021    CO2 16 06/22/2021    BUN 31 06/22/2021    LABALBU 3.9 06/21/2021    CREATININE 1.16 06/22/2021    CALCIUM 9.7 06/22/2021    GFRAA >60.0 06/22/2021    LABGLOM 58.8 06/22/2021    GLUCOSE 86 06/22/2021     Magnesium:    Lab Results   Component Value Date    MG 2.3 06/22/2021     Troponin:    Lab Results   Component Value Date    TROPONINI <0.010 06/21/2021        Active Hospital Problems    Diagnosis Date Noted    CHF (congestive heart failure), NYHA class I, acute on chronic, combined ([facility] [I50.43] 06/07/2021     Priority: Low        Assessment/Plan:  1. AF- rate control and OAC -On hold for Thoracentesis tomorrow  2. Acute on chronic Combined HF- iv Lasix. Added Entresto. Continue BB. Lower Lasix due to Prerenal. Follow Labs   3. HyperK - lower K supplement  4. CMP EF 35  5. 1-2+ AR  6. PPM - it was felt at the time of implant that he wasnot a good candidate for ICD. 7. CHF teaching  8. Fluid restrict.    9. PTOT       Electronically signed by Branden Rivas MD on 6/23/2021 at 6:09 AM

## 2021-06-24 ENCOUNTER — APPOINTMENT (OUTPATIENT)
Dept: GENERAL RADIOLOGY | Age: 86
DRG: 292 | End: 2021-06-24
Payer: MEDICARE

## 2021-06-24 ENCOUNTER — HOSPITAL ENCOUNTER (INPATIENT)
Dept: INTERVENTIONAL RADIOLOGY/VASCULAR | Age: 86
Discharge: HOME | DRG: 292 | End: 2021-06-26
Payer: MEDICARE

## 2021-06-24 LAB
ALBUMIN FLUID: 1.2 G/DL
AMYLASE FLUID: 13 U/L
ANION GAP SERPL CALCULATED.3IONS-SCNC: 15 MEQ/L (ref 9–15)
APPEARANCE FLUID: CLEAR
BUN BLDV-MCNC: 60 MG/DL (ref 8–23)
CALCIUM SERPL-MCNC: 8.3 MG/DL (ref 8.5–9.9)
CELL COUNT FLUID TYPE: NORMAL
CHLORIDE BLD-SCNC: 104 MEQ/L (ref 95–107)
CLOT EVALUATION: NORMAL
CO2: 20 MEQ/L (ref 20–31)
COLOR FLUID: YELLOW
CREAT SERPL-MCNC: 1.1 MG/DL (ref 0.7–1.2)
FLUID TYPE: NORMAL
GFR AFRICAN AMERICAN: >60
GFR NON-AFRICAN AMERICAN: >60
GLUCOSE BLD-MCNC: 94 MG/DL (ref 70–99)
GLUCOSE, FLUID: 118.2 MG/DL
HCT VFR BLD CALC: 45.5 % (ref 42–52)
HEMOGLOBIN: 13.9 G/DL (ref 14–18)
LD, FLUID: 277 U/L
LYMPHOCYTES, BODY FLUID: 92 %
MCH RBC QN AUTO: 32.1 PG (ref 27–31.3)
MCHC RBC AUTO-ENTMCNC: 30.7 % (ref 33–37)
MCV RBC AUTO: 104.6 FL (ref 80–100)
MONOCYTE, FLUID: 3 %
NEUTROPHIL, FLUID: 5 %
NUCLEATED CELLS FLUID: 1008 /CUMM
NUMBER OF CELLS COUNTED FLUID: 100
PDW BLD-RTO: 16.4 % (ref 11.5–14.5)
PLATELET # BLD: 164 K/UL (ref 130–400)
POTASSIUM SERPL-SCNC: 4.2 MEQ/L (ref 3.4–4.9)
PRO-BNP: NORMAL PG/ML
PROTEIN FLUID: 1.6 G/DL
RBC # BLD: 4.35 M/UL (ref 4.7–6.1)
RBC FLUID: 1067 /CUMM
SODIUM BLD-SCNC: 139 MEQ/L (ref 135–144)
WBC # BLD: 8.7 K/UL (ref 4.8–10.8)

## 2021-06-24 PROCEDURE — 94761 N-INVAS EAR/PLS OXIMETRY MLT: CPT

## 2021-06-24 PROCEDURE — 97110 THERAPEUTIC EXERCISES: CPT

## 2021-06-24 PROCEDURE — 2580000003 HC RX 258: Performed by: PHYSICIAN ASSISTANT

## 2021-06-24 PROCEDURE — 80048 BASIC METABOLIC PNL TOTAL CA: CPT

## 2021-06-24 PROCEDURE — 88305 TISSUE EXAM BY PATHOLOGIST: CPT

## 2021-06-24 PROCEDURE — 2700000000 HC OXYGEN THERAPY PER DAY

## 2021-06-24 PROCEDURE — 85027 COMPLETE CBC AUTOMATED: CPT

## 2021-06-24 PROCEDURE — 82945 GLUCOSE OTHER FLUID: CPT

## 2021-06-24 PROCEDURE — 99232 SBSQ HOSP IP/OBS MODERATE 35: CPT | Performed by: INTERNAL MEDICINE

## 2021-06-24 PROCEDURE — 89051 BODY FLUID CELL COUNT: CPT

## 2021-06-24 PROCEDURE — 97116 GAIT TRAINING THERAPY: CPT

## 2021-06-24 PROCEDURE — 82150 ASSAY OF AMYLASE: CPT

## 2021-06-24 PROCEDURE — 88342 IMHCHEM/IMCYTCHM 1ST ANTB: CPT

## 2021-06-24 PROCEDURE — 32555 ASPIRATE PLEURA W/ IMAGING: CPT | Performed by: RADIOLOGY

## 2021-06-24 PROCEDURE — 6370000000 HC RX 637 (ALT 250 FOR IP): Performed by: INTERNAL MEDICINE

## 2021-06-24 PROCEDURE — 99232 SBSQ HOSP IP/OBS MODERATE 35: CPT | Performed by: RADIOLOGY

## 2021-06-24 PROCEDURE — 87205 SMEAR GRAM STAIN: CPT

## 2021-06-24 PROCEDURE — 2709999900 IR GUIDED THORACENTESIS PLEURAL

## 2021-06-24 PROCEDURE — 82042 OTHER SOURCE ALBUMIN QUAN EA: CPT

## 2021-06-24 PROCEDURE — 83615 LACTATE (LD) (LDH) ENZYME: CPT

## 2021-06-24 PROCEDURE — 36415 COLL VENOUS BLD VENIPUNCTURE: CPT

## 2021-06-24 PROCEDURE — 2500000003 HC RX 250 WO HCPCS: Performed by: RADIOLOGY

## 2021-06-24 PROCEDURE — 94640 AIRWAY INHALATION TREATMENT: CPT

## 2021-06-24 PROCEDURE — 87070 CULTURE OTHR SPECIMN AEROBIC: CPT

## 2021-06-24 PROCEDURE — 88341 IMHCHEM/IMCYTCHM EA ADD ANTB: CPT

## 2021-06-24 PROCEDURE — 71046 X-RAY EXAM CHEST 2 VIEWS: CPT

## 2021-06-24 PROCEDURE — 94760 N-INVAS EAR/PLS OXIMETRY 1: CPT

## 2021-06-24 PROCEDURE — 84157 ASSAY OF PROTEIN OTHER: CPT

## 2021-06-24 PROCEDURE — 6360000002 HC RX W HCPCS: Performed by: INTERNAL MEDICINE

## 2021-06-24 PROCEDURE — 2060000000 HC ICU INTERMEDIATE R&B

## 2021-06-24 PROCEDURE — 88112 CYTOPATH CELL ENHANCE TECH: CPT

## 2021-06-24 PROCEDURE — 83880 ASSAY OF NATRIURETIC PEPTIDE: CPT

## 2021-06-24 RX ORDER — BUDESONIDE AND FORMOTEROL FUMARATE DIHYDRATE 80; 4.5 UG/1; UG/1
AEROSOL RESPIRATORY (INHALATION)
Status: DISPENSED
Start: 2021-06-24 | End: 2021-06-24

## 2021-06-24 RX ORDER — LIDOCAINE HYDROCHLORIDE 20 MG/ML
INJECTION, SOLUTION INFILTRATION; PERINEURAL
Status: COMPLETED | OUTPATIENT
Start: 2021-06-24 | End: 2021-06-24

## 2021-06-24 RX ADMIN — QUETIAPINE FUMARATE 25 MG: 50 TABLET ORAL at 08:48

## 2021-06-24 RX ADMIN — SODIUM CHLORIDE, PRESERVATIVE FREE 10 ML: 5 INJECTION INTRAVENOUS at 08:48

## 2021-06-24 RX ADMIN — METOPROLOL TARTRATE 25 MG: 25 TABLET, FILM COATED ORAL at 08:48

## 2021-06-24 RX ADMIN — SODIUM CHLORIDE, PRESERVATIVE FREE 10 ML: 5 INJECTION INTRAVENOUS at 21:11

## 2021-06-24 RX ADMIN — METOPROLOL TARTRATE 25 MG: 25 TABLET, FILM COATED ORAL at 21:11

## 2021-06-24 RX ADMIN — LIDOCAINE HYDROCHLORIDE 5 ML: 20 INJECTION, SOLUTION INFILTRATION; PERINEURAL at 14:37

## 2021-06-24 RX ADMIN — QUETIAPINE FUMARATE 25 MG: 50 TABLET ORAL at 21:11

## 2021-06-24 RX ADMIN — BUDESONIDE AND FORMOTEROL FUMARATE DIHYDRATE 2 PUFF: 80; 4.5 AEROSOL RESPIRATORY (INHALATION) at 19:26

## 2021-06-24 RX ADMIN — TIOTROPIUM BROMIDE INHALATION SPRAY 2 PUFF: 3.12 SPRAY, METERED RESPIRATORY (INHALATION) at 08:18

## 2021-06-24 RX ADMIN — FUROSEMIDE 40 MG: 10 INJECTION, SOLUTION INTRAMUSCULAR; INTRAVENOUS at 08:48

## 2021-06-24 RX ADMIN — BUDESONIDE AND FORMOTEROL FUMARATE DIHYDRATE 2 PUFF: 80; 4.5 AEROSOL RESPIRATORY (INHALATION) at 08:18

## 2021-06-24 ASSESSMENT — ENCOUNTER SYMPTOMS
WHEEZING: 0
COUGH: 0
STRIDOR: 0
BLOOD IN STOOL: 0
EYES NEGATIVE: 1
GASTROINTESTINAL NEGATIVE: 1
SHORTNESS OF BREATH: 0
NAUSEA: 0
VOMITING: 0
CHEST TIGHTNESS: 0
SHORTNESS OF BREATH: 1
DIARRHEA: 0

## 2021-06-24 ASSESSMENT — PAIN SCALES - GENERAL
PAINLEVEL_OUTOF10: 0

## 2021-06-24 NOTE — PROGRESS NOTES
Progress Note  Date:2021       UBEQ:R565/H012-78  Patient Leatha Bell     YOB: 1929     Age:89 y.o. Subjective    Subjective:  Symptoms:  No shortness of breath, malaise, cough, chest pain, weakness, headache, chest pressure, anorexia, diarrhea or anxiety. Diet:  No nausea or vomiting. Review of Systems   Respiratory: Negative for cough and shortness of breath. Cardiovascular: Negative for chest pain. Gastrointestinal: Negative for anorexia, diarrhea, nausea and vomiting. Neurological: Negative for weakness. Objective         Vitals Last 24 Hours:  TEMPERATURE:  Temp  Av.8 °F (36.6 °C)  Min: 97.5 °F (36.4 °C)  Max: 98.1 °F (36.7 °C)  RESPIRATIONS RANGE: Resp  Av.5  Min: 18  Max: 20  PULSE OXIMETRY RANGE: SpO2  Av.5 %  Min: 98 %  Max: 100 %  PULSE RANGE: Pulse  Av.8  Min: 59  Max: 70  BLOOD PRESSURE RANGE: Systolic (10WVS), MZJ:647 , Min:99 , JMK:410   ; Diastolic (81VGM), UJJ:72, Min:62, Max:78    I/O (24Hr): Intake/Output Summary (Last 24 hours) at 2021 1146  Last data filed at 2021 1987  Gross per 24 hour   Intake 40 ml   Output 3700 ml   Net -3660 ml     Objective:  General Appearance:  Comfortable, well-appearing and in no acute distress. Vital signs: (most recent): Blood pressure 104/73, pulse 70, temperature 97.7 °F (36.5 °C), temperature source Oral, resp. rate 18, height 5' 7\" (1.702 m), weight 151 lb 12.8 oz (68.9 kg), SpO2 100 %. HEENT: Normal HEENT exam.    Lungs:  Normal effort. Heart: Normal rate. S1 normal.    Abdomen: Abdomen is soft. There is no epigastric area or suprapubic area tenderness. Pulses: Distal pulses are intact. Neurological: Patient is alert. Skin:  Warm and dry.       Labs/Imaging/Diagnostics    Labs:  CBC:  Recent Labs     21  0839 21  0657 21  0725   WBC 11.7* 10.1 8.7   RBC 4.29* 4.56* 4.35*   HGB 13.7* 15.0 13.9*   HCT 43.1 44.0 45.5   .6* 96.4 104.6*

## 2021-06-24 NOTE — SEDATION DOCUMENTATION
NO SEDATION      Ultrasound Guided Thoracentesis  VSS. Patient sitting on cart side leaning over tray table. Posterior lung fields scanned with ultrasound by Ultrasound technician. Images reviewed by performing Radiologist.     1263 Procedure explained using  [facility] #[id number], consent confirmed. Time out completed for Thoracentesis. Site marked by Dr. Krystina Bueno, then procedure site prepped with chloraprep, and draped with sterile drape and towels. 1437 Left posterior chest site then numbed with lidocaine 2% by Dr Krystina Bueno, [id number] Centesis 5F catheter placed into pleural space using US guidance. Fluid draining appears clear yellow. Sample of fluid obtained and placed into specimen containers to be sent for testing as ordered. Catheter attached to [facility] machine to remove fluid. 1444 Total 450 ml removed. Catheter removed. Bandaid applied. VSS. Pt tolerated well. Verbal and tactile reassurance given throughout. 1455 Patient taken for CXR and specimen fluid taken to lab.    1513 CXR done, Dr Krystina Bueno aware. Report given to [facility].

## 2021-06-24 NOTE — PROGRESS NOTES
Progress Note  Patient: Richard Ramírez  Unit/Bed: H370/U853-68  YOB: 1929  MRN: [id number]  Acct: [de-identified]   Admitting Diagnosis: CHF (congestive heart failure), NYHA class I, acute on chronic, combined ([facility] [I50.43]  Admit Date:  6/21/2021  Hospital Day: 3    Chief Complaint: CHF Noncompliance    S: resting comfortably. Planning for thoracentesis. ELiquis on hold. HD stable. afib on tele. -6L total fluid balance. Histories:  Past Medical History:   Diagnosis Date    Atrial fibrillation ([facility]     [facility] 9/18/2020    Diabetes mellitus ([facility]     Hypertension      Past Surgical History:   Procedure Laterality Date    BACK SURGERY      CARDIAC PACEMAKER PLACEMENT  4409    HERNIA REPAIR       History reviewed. No pertinent family history. Social History     Socioeconomic History    Marital status:      Spouse name: None    Number of children: None    Years of education: None    Highest education level: None   Occupational History    None   Tobacco Use    Smoking status: Former Smoker    Smokeless tobacco: Never Used    Tobacco comment: quit 30 years ago   Substance and Sexual Activity    Alcohol use: No    Drug use: No    Sexual activity: None   Other Topics Concern    None   Social History Narrative    None     Social Determinants of Health     Financial Resource Strain:     Difficulty of Paying Living Expenses:    Food Insecurity:     Worried About Running Out of Food in the Last Year:     Ran Out of Food in the Last Year:    Transportation Needs:     Lack of Transportation (Medical):      Lack of Transportation (Non-Medical):    Physical Activity:     Days of Exercise per Week:     Minutes of Exercise per Session:    Stress:     Feeling of Stress :    Social Connections:     Frequency of Communication with Friends and Family:     Frequency of Social Gatherings with Friends and Family:     Attends [employer] Services:     Active Member of Clubs or Organizations:     Attends Club or Organization Meetings:     Marital Status:    Intimate Partner Violence:     Fear of Current or Ex-Partner:     Emotionally Abused:     Physically Abused:     Sexually Abused:          Review of Systems:   Review of Systems   Constitutional: Negative. Negative for diaphoresis and fatigue. HENT: Negative. Eyes: Negative. Respiratory: Positive for shortness of breath. Negative for cough, chest tightness, wheezing and stridor. Cardiovascular: Positive for leg swelling. Negative for chest pain and palpitations. Gastrointestinal: Negative. Negative for blood in stool and nausea. Genitourinary: Negative. Musculoskeletal: Negative. Skin: Negative. Neurological: Positive for weakness. Negative for dizziness, syncope and light-headedness. Hematological: Negative. Psychiatric/Behavioral: Negative. Physical Examination:    /73   Pulse 70   Temp 97.7 °F (36.5 °C) (Oral)   Resp 18   Ht 5' 7\" (1.702 m)   Wt 151 lb 12.8 oz (68.9 kg)   SpO2 100%   BMI 23.78 kg/m²    Physical Exam   Constitutional: No distress. He appears chronically ill and acutely ill. HENT:   Normal cephalic and Atraumatic   Eyes: Pupils are equal, round, and reactive to light. Neck: Thyroid normal. No JVD present. No neck adenopathy. No thyromegaly present. Cardiovascular: Normal rate and regular rhythm. Exam reveals decreased pulses. Murmur heard. Pulmonary/Chest: Effort normal and breath sounds normal. He has no wheezes. He has no rales. He exhibits no tenderness. Abdominal: Soft. Bowel sounds are normal. There is no abdominal tenderness. Musculoskeletal:         General: Edema (1+) present. No tenderness. Normal range of motion. Cervical back: Normal range of motion and neck supple. Neurological: He is alert and oriented to person, place, and time. Skin: Skin is warm. No cyanosis. Nails show no clubbing.        LABS:  CBC:   Lab Results 3. HyperK  4. CMP EF 35  5. 1-2+ AR  6. PPM - it was felt at the time of implant that he wasnot a good candidate for ICD. 7. CHF teaching  8. Fluid restrict.    9. PTOT       Electronically signed by Fernando Teran DO on 6/24/2021 at 9:22 AM

## 2021-06-24 NOTE — CARE COORDINATION
The  talked to Melvin Lema at [facility] 6/23/2021 and was told they do not have staff for the referral. The LSW called the patient's grand daughter, Ching Miller, back. Ching Miller states she talked to her friend Jerica Partida) that works at [facility] and they have openings.   The LSW left a message at [place] [phone]. regarding a new referral. Electronically signed by YENY Hilario on 6/24/21 at 4:37 PM EDT

## 2021-06-24 NOTE — FLOWSHEET NOTE
Pt awakens easily. Accepted AM meds without problem. Assisted with breakfast, pt ate good. Respirations are even and nonlabored. 2LNC in use. LS diminished. Tele VPM/AF. Pt follows commands. Mild generalized weakness noted. Call light in reach. Bed alarm engaged. 1420 Pt off unit to thoracentesis. 1430 Report given to Ainsley Enamorado RN.

## 2021-06-24 NOTE — PROGRESS NOTES
Vascular and Interventional Radiology   Delvin Harley. Kervin Martino M.D. [facility]      Chief Complaint:   Chief Complaint   Patient presents with    Shortness of Breath     sob and ble swelling for 2 weeks     Leg Swelling        Subjective: Monisha Mcguire is a 80 y.o. male with a pleural effusion, was previously on Eliquis. Eliquis was stopped, and thoracentesis planned for tomorrow under ultrasound guidance. Patient is sleeping in bed accompanied by family his wife, and his son at the bedside.  used for [employer]. Objective:   BP 99/62   Pulse 59   Temp 98.1 °F (36.7 °C) (Oral)   Resp 20   Ht 5' 7\" (1.702 m)   Wt 153 lb (69.4 kg)   SpO2 100%   BMI 23.96 kg/m²     Physical:   not in acute distress    Normocephalic, without obvious abnormality, atraumatic    Neck supple. No adenopathy. Thyroid symmetric, normal size, and without nodularity    normal rate and regular rhythm    chest clear, no wheezing, rales, normal symmetric air entry    Leg edema    Lab:  Recent Labs     06/23/21  0657   WBC 10.1   RBC 4.56*   HGB 15.0   HCT 44.0   MCV 96.4   MCH 32.8*   MCHC 34.1   RDW 14.4          No results for input(s): INR, PROTIME in the last 72 hours. Recent Labs     06/21/21  1030 06/22/21  0800 06/23/21  0657   CREATININE 0.88 1.16 1.49*       Assessment: Monisha Mcguire is a 80 y.o. male with a right pleural effusion, was on Eliquis, stopped yesterday, plan for thoracentesis tomorrow. Plan: Plan for thoracentesis under ultrasound guidance tomorrow. Eliquis stopped yesterday for procedure tomorrow. Delvin Harley.  Kervin Martino M.D. [facility]  6/23/2021

## 2021-06-24 NOTE — PROGRESS NOTES
Physical Therapy Med Surg Daily Treatment Note  [facility]Department[facility]  [facility]       NAME: Korina Downs  : 1929 (80 y.o.)  MRN: [id number]  CODE STATUS: Full Code    Date of Service: 2021    Patient Diagnosis(es): CHF (congestive heart failure), NYHA class I, acute on chronic, combined ([address] [I50.43]   Chief Complaint   Patient presents with    Shortness of Breath     sob and ble swelling for 2 weeks     Leg Swelling     Patient Active Problem List    Diagnosis Date Noted    SSS (sick sinus syndrome) ([address]     CHF (congestive heart failure), NYHA class I, acute on chronic, combined ([address] 2021    Atelectasis of right lung     Pleural effusion on right     SOB (shortness of breath) 2020    Cardiomyopathy ([address] 2020    Chronic atrial fibrillation ([address] 2020    New onset a-fib ([address] 10/03/2019        Past Medical History:   Diagnosis Date    Atrial fibrillation ([address]     Cardiomyopathy ([address] 2020    Diabetes mellitus ([address]     Hypertension      Past Surgical History:   Procedure Laterality Date    BACK SURGERY      CARDIAC PACEMAKER PLACEMENT      HERNIA REPAIR         Restrictions  Restrictions/Precautions: Fall Risk    SUBJECTIVE   General  Chart Reviewed: Yes  Family / Caregiver Present: No  Subjective  Subjective: im okay    Pre-Session Pain Report     Pain Screening  Patient Currently in Pain: Denies  Pre Treatment Pain Screening  Intervention List: Patient able to continue with treatment    Post-Session Pain Report   no pain but reports fatigue          OBJECTIVE        Bed mobility  Supine to Sit: Contact guard assistance  Sit to Supine: Contact guard assistance  Scooting: Contact guard assistance  Comment: increased time and effort to perform and SOB following, pt able to get BLE off and onto bed when given time. Pt able to use hand rail to come to EOB position.  while sitting EOB pt fatigues quickly and demos multiple

## 2021-06-25 VITALS
TEMPERATURE: 97.7 F | SYSTOLIC BLOOD PRESSURE: 96 MMHG | HEART RATE: 75 BPM | DIASTOLIC BLOOD PRESSURE: 67 MMHG | OXYGEN SATURATION: 100 % | BODY MASS INDEX: 23.83 KG/M2 | HEIGHT: 67 IN | WEIGHT: 151.8 LBS | RESPIRATION RATE: 16 BRPM

## 2021-06-25 LAB
ANION GAP SERPL CALCULATED.3IONS-SCNC: 14 MEQ/L (ref 9–15)
BUN BLDV-MCNC: 43 MG/DL (ref 8–23)
CALCIUM SERPL-MCNC: 8.5 MG/DL (ref 8.5–9.9)
CHLORIDE BLD-SCNC: 103 MEQ/L (ref 95–107)
CO2: 23 MEQ/L (ref 20–31)
CREAT SERPL-MCNC: 0.94 MG/DL (ref 0.7–1.2)
GFR AFRICAN AMERICAN: >60
GFR NON-AFRICAN AMERICAN: >60
GLUCOSE BLD-MCNC: 122 MG/DL (ref 70–99)
GRAM STAIN RESULT: NORMAL
HCT VFR BLD CALC: 41.5 % (ref 42–52)
HEMOGLOBIN: 13.7 G/DL (ref 14–18)
MCH RBC QN AUTO: 31.7 PG (ref 27–31.3)
MCHC RBC AUTO-ENTMCNC: 33.1 % (ref 33–37)
MCV RBC AUTO: 95.7 FL (ref 80–100)
PDW BLD-RTO: 14.5 % (ref 11.5–14.5)
PLATELET # BLD: 189 K/UL (ref 130–400)
POTASSIUM SERPL-SCNC: 3.8 MEQ/L (ref 3.4–4.9)
RBC # BLD: 4.33 M/UL (ref 4.7–6.1)
SODIUM BLD-SCNC: 140 MEQ/L (ref 135–144)
WBC # BLD: 6 K/UL (ref 4.8–10.8)

## 2021-06-25 PROCEDURE — 6360000002 HC RX W HCPCS: Performed by: INTERNAL MEDICINE

## 2021-06-25 PROCEDURE — 99239 HOSP IP/OBS DSCHRG MGMT >30: CPT | Performed by: INTERNAL MEDICINE

## 2021-06-25 PROCEDURE — 2700000000 HC OXYGEN THERAPY PER DAY

## 2021-06-25 PROCEDURE — 6370000000 HC RX 637 (ALT 250 FOR IP): Performed by: INTERNAL MEDICINE

## 2021-06-25 PROCEDURE — 51702 INSERT TEMP BLADDER CATH: CPT

## 2021-06-25 PROCEDURE — 94761 N-INVAS EAR/PLS OXIMETRY MLT: CPT

## 2021-06-25 PROCEDURE — 2580000003 HC RX 258: Performed by: PHYSICIAN ASSISTANT

## 2021-06-25 PROCEDURE — 97116 GAIT TRAINING THERAPY: CPT

## 2021-06-25 PROCEDURE — 80048 BASIC METABOLIC PNL TOTAL CA: CPT

## 2021-06-25 PROCEDURE — 85027 COMPLETE CBC AUTOMATED: CPT

## 2021-06-25 PROCEDURE — 94640 AIRWAY INHALATION TREATMENT: CPT

## 2021-06-25 PROCEDURE — 36415 COLL VENOUS BLD VENIPUNCTURE: CPT

## 2021-06-25 RX ORDER — FUROSEMIDE 40 MG/1
40 TABLET ORAL DAILY
Qty: 7 TABLET | Refills: 0 | Status: SHIPPED | OUTPATIENT
Start: 2021-06-25 | End: 2021-07-07 | Stop reason: SDUPTHER

## 2021-06-25 RX ADMIN — BUDESONIDE AND FORMOTEROL FUMARATE DIHYDRATE 2 PUFF: 80; 4.5 AEROSOL RESPIRATORY (INHALATION) at 07:27

## 2021-06-25 RX ADMIN — TIOTROPIUM BROMIDE INHALATION SPRAY 2 PUFF: 3.12 SPRAY, METERED RESPIRATORY (INHALATION) at 07:27

## 2021-06-25 RX ADMIN — FUROSEMIDE 40 MG: 10 INJECTION, SOLUTION INTRAMUSCULAR; INTRAVENOUS at 08:08

## 2021-06-25 RX ADMIN — METOPROLOL TARTRATE 25 MG: 25 TABLET, FILM COATED ORAL at 08:08

## 2021-06-25 RX ADMIN — APIXABAN 2.5 MG: 2.5 TABLET, FILM COATED ORAL at 10:21

## 2021-06-25 RX ADMIN — QUETIAPINE FUMARATE 25 MG: 50 TABLET ORAL at 08:08

## 2021-06-25 RX ADMIN — SODIUM CHLORIDE, PRESERVATIVE FREE 10 ML: 5 INJECTION INTRAVENOUS at 08:08

## 2021-06-25 ASSESSMENT — ENCOUNTER SYMPTOMS
SHORTNESS OF BREATH: 0
VOMITING: 0
BLOOD IN STOOL: 0
CHEST TIGHTNESS: 0
STRIDOR: 0
GASTROINTESTINAL NEGATIVE: 1
SHORTNESS OF BREATH: 1
COUGH: 0
EYES NEGATIVE: 1
WHEEZING: 0
NAUSEA: 0
DIARRHEA: 0

## 2021-06-25 ASSESSMENT — PAIN SCALES - GENERAL
PAINLEVEL_OUTOF10: 0

## 2021-06-25 NOTE — DISCHARGE INSTR - COC
Continuity of Care Form    Patient Name: Rosibel Moody   :  1929  MRN:  [id number]    Admit date:  2021  Discharge date:  21    Code Status Order: Full Code   Advance Directives:   [facility] Documentation     Date/Time Healthcare Directive Type of Healthcare Directive Copy in [facility] [address] Agent's Name Healthcare Agent's Phone Number    21 0130  No, patient does not have an advance directive for healthcare treatment -- -- -- -- --          Admitting Physician:  Alyssa Andrade MD  PCP: Israel Redd    Discharging Nurse: Lalo Cr [facility] Unit/Room#: T190/T306-83  Discharging Unit Phone Number: [phone]    Emergency Contact:   Extended Emergency Contact Information  Primary Emergency Contact: [address] [place] [address] Phone: [phone]  Relation: Child  Secondary Emergency Contact: Inder Vilchis [address] Phone: [phone]  Mobile Phone: [phone]  Relation: Grandchild    Past Surgical History:  Past Surgical History:   Procedure Laterality Date    BACK SURGERY      CARDIAC PACEMAKER PLACEMENT      HERNIA REPAIR      THORACENTESIS Left 2021    450ml removed by Dr Daisy Lynn [phone]       Immunization History: There is no immunization history on file for this patient.     Active Problems:  Patient Active Problem List   Diagnosis Code    New onset a-fib ([facility] I48.91    Chronic atrial fibrillation (HCC) I48.20    SSS (sick sinus syndrome) ([facility]) I49.5    Cardiomyopathy ([address] I42.9    SOB (shortness of breath) R06.02    Pleural effusion on right J90    Atelectasis of right lung J98.11    CHF (congestive heart failure), NYHA class I, acute on chronic, combined ([address] I50.43       Isolation/Infection:   Isolation          No Isolation        Patient Infection Status     Infection Onset Added Last Indicated Last Indicated By Review Planned Expiration Resolved Resolved By    None active Resolved    COVID-19 21 COVID-19   21     COVID-19 Rule Out 21 COVID-19 (Ordered)   21 Rule-Out Test Resulted    COVID-19 Rule Out 20 COVID-19 (Ordered)   20 Rule-Out Test Resulted    Waiting on second test    COVID-19 Rule Out 20 COVID-19 (Ordered)   20 Rule-Out Test Resulted    COVID-19 Rule Out 20 COVID-19 (Ordered)   20 Rule-Out Test Resulted          Nurse Assessment:  Last Vital Signs: /69   Pulse 61   Temp 97.7 °F (36.5 °C) (Oral)   Resp 18   Ht 5' 7\" (1.702 m)   Wt 151 lb 12.8 oz (68.9 kg)   SpO2 100%   BMI 23.78 kg/m²     Last documented pain score (0-10 scale): Pain Level: 0  Last Weight:   Wt Readings from Last 1 Encounters:   21 151 lb 12.8 oz (68.9 kg)     Mental Status:  alert    IV Access:  - None    Nursing Mobility/ADLs:  Walking   Assisted  Transfer  Assisted  Bathing  Assisted  Dressing  Assisted  Toileting  Assisted  Feeding  Independent  Med Admin  Assisted  Med Delivery   whole and prefers mixed with applesauce    Wound Care Documentation and Therapy:        Elimination:  Continence:   · Bowel: Yes  · Bladder: Incont at times  Urinary Catheter: Removal Date 21   Colostomy/Ileostomy/Ileal Conduit: No       Date of Last BM: 21    Intake/Output Summary (Last 24 hours) at 2021 1253  Last data filed at 2021 1118  Gross per 24 hour   Intake 370 ml   Output 3100 ml   Net -2730 ml     I/O last 3 completed shifts: In: 370 [P.O.:360; I.V.:10]  Out: 2200 [Urine:2200]    Safety Concerns:     Sundowners Sundrome and At Risk for Falls    Impairments/Disabilities:      Language Barrier - Slovenian    Nutrition Therapy:  Current Nutrition Therapy:   - Oral Diet:  Cardiac    Routes of Feeding: Oral  Liquids:  Thin Liquids  Daily Fluid Restriction: {CHP DME Yes amt example:511182082}  Last Modified Barium Swallow with Video (Video Swallowing Test): not done    Treatments at the Time of Hospital Discharge:   Respiratory Treatments: ***  Oxygen Therapy:  is not on home oxygen therapy. Ventilator:    [address] CC Vent EMDZ:321806875}    Rehab Therapies: Physical Therapy and Occupational Therapy  Weight Bearing Status/Restrictions: No weight bearing restirctions  Other Medical Equipment (for information only, NOT a DME order):  {EQUIPMENT:463314939}  Other Treatments: ***    Patient's personal belongings (please select all that are sent with patient):  {P DME Belongings:209864427}    RN SIGNATURE:  Electronically signed by Mihai Duarte RN on 6/25/21 at 6:00 PM EDT    CASE MANAGEMENT/SOCIAL WORK SECTION    Inpatient Status Date: ***    Readmission Risk Assessment Score:  Readmission Risk              Risk of Unplanned Readmission:  20           Discharging to Facility/ Agency   · Name: [facility]  · Address:  · [phone]  · Fax:    Dialysis Facility (if applicable)   · Name:  · Address:  · Dialysis Schedule:  · Phone:  · Fax:    / signature: {Esignature:348637976}    PHYSICIAN SECTION    Prognosis: {Prognosis:8031253210}    Condition at Discharge: [address] Patient Condition:408895816}    Rehab Potential (if transferring to Rehab): {Prognosis:3060506432}    Recommended Labs or Other Treatments After Discharge: ***    Physician Certification: I certify the above information and transfer of aMrtinez Carballo  is necessary for the continuing treatment of the diagnosis listed and that he requires {Admit to Appropriate Level of Care:91531} for {GREATER/LESS:976955979} 30 days.      Update Admission H&P: {CHP DME Changes in MMRZT:146382135}    PHYSICIAN SIGNATURE:  {Esignature:079357441}

## 2021-06-25 NOTE — PROGRESS NOTES
Physical Therapy Med Surg Daily Treatment Note  Facility/Department: [address] [place] [address]  [facility]       NAME: Mike Pruitt  : 1929 (80 y.o.)  MRN: [id number]  CODE STATUS: Full Code    Date of Service: 2021    Patient Diagnosis(es): CHF (congestive heart failure), NYHA class I, acute on chronic, combined ([address] [I50.43]   Chief Complaint   Patient presents with    Shortness of Breath     sob and ble swelling for 2 weeks     Leg Swelling     Patient Active Problem List    Diagnosis Date Noted    SSS (sick sinus syndrome) ([address]     CHF (congestive heart failure), NYHA class I, acute on chronic, combined ([address] 2021    Atelectasis of right lung     Pleural effusion on right     SOB (shortness of breath) 2020    Cardiomyopathy ([address] 2020    Chronic atrial fibrillation ([address] 2020    New onset a-fib ([address] 10/03/2019        Past Medical History:   Diagnosis Date    Atrial fibrillation ([address]     Cardiomyopathy ([address] 2020    Diabetes mellitus ([address]     Hypertension      Past Surgical History:   Procedure Laterality Date    BACK SURGERY      CARDIAC PACEMAKER PLACEMENT  6171    HERNIA REPAIR      THORACENTESIS Left 2021    450ml removed by Dr Lion Hwang [phone]       Restrictions  Restrictions/Precautions: Fall Risk    SUBJECTIVE   General  Chart Reviewed: Yes  Family / Caregiver Present: Yes  Subjective  Subjective: im very tired  General Comment  Comments: SP02 WNL prior and post tx. pt c/o feeling tired throughout, pt becoming anxious and needing VC for breathing interventions.  HR 157BPM post gait but normalized when returned to supine with breathing interventions    Pre-Session Pain Report     Pain Screening  Patient Currently in Pain: No  Pre Treatment Pain Screening  Pain at present: 0  Scale Used: Numeric Score  Intervention List: Patient able to continue with treatment    Post-Session Pain Report  Pain Assessment  Pain Assessment: 0-10  Pain Level: 0         OBJECTIVE        Bed mobility  Rolling to Left: Minimal assistance  Rolling to Right: Minimal assistance  Supine to Sit: Minimal assistance  Sit to Supine: Contact guard assistance  Comment: increased time and effort to perform. slow moving and anxious with movement. very fatigued. uses hand rail to roll and sit EOB, good trunk control sitting EOB    Transfers  Sit to Stand: Stand by assistance;Contact guard assistance  Stand to sit: Stand by assistance;Contact guard assistance  Comment: VC for hand placement. pt moves quickly and is slightly impulsive    Ambulation  Ambulation?: Yes  More Ambulation?: No  Ambulation 1  Surface: level tile  Device: Rolling Walker  Assistance: Moderate assistance  Quality of Gait: SOB and fatigued following, FF posture, downward gaze, start and stopping, VC for proximity and use of WW, unsteady  Distance: 15'  Comments: very fatigued following and requested to lay down post gait                                         Activity Tolerance  Activity Tolerance: Patient limited by fatigue;Patient limited by endurance          ASSESSMENT     Pt demonstrated increased anxiety and fatigue this session and needed seated rest breaks as well as VC for deep breathing interventions for relaxation. Pt is impulsive and moves quickly with decreased safety awareness and poor WW control and placement. Discharge Recommendations:  Continue to assess pending progress    Goals  Long term goals  Long term goal 1: indep sit to stand  Long term goal 2: indep bed mobility  Long term goal 3: indep gait with ww or st cane 25 feet - supervision 50 feet  Long term goal 4: Pt able to stand 2 min with BUE support    PLAN    Times per week: 3-6  Safety Devices  Type of devices:  All fall risk precautions in place, Bed alarm in place, Call light within reach, Left in bed     AMPAC (6 CLICK) BASIC MOBILITY  AM-PAC Inpatient Mobility Raw Score : 17     Therapy Time   Individual   Time In 1410   Time Out 1426   Minutes 16         gait:10  Bm/transfer:6    Mariam Cooks, PTA, 06/25/21 at 2:44 PM         Definitions for assistance levels  Independent = pt does not require any physical supervision or assistance from another person for activity completion. Device may be needed.   Stand by assistance = pt requires verbal cues or instructions from another person, close to but not touching, to perform the activity  Minimal assistance= pt performs 75% or more of the activity; assistance is required to complete the activity  Moderate assistance= pt performs 50% of the activity; assistance is required to complete the activity  Maximal assistance = pt performs 25% of the activity; assistance is required to complete the activity  Dependent = pt requires total physical assistance to accomplish the task

## 2021-06-25 NOTE — FLOWSHEET NOTE
PM assessment completed. VSS. Patient denies complaints of pain. Patient resting comfortably in bed. Voices no needs at this time.

## 2021-06-25 NOTE — PROGRESS NOTES
Progress Note  Patient: Daniela Hobbs  Unit/Bed: O438/O258-19  YOB: 1929  MRN: [id number]  Acct: [de-identified]   Admitting Diagnosis: CHF (congestive heart failure), NYHA class I, acute on chronic, combined ([facility] [I50.43]  Admit Date:  6/21/2021  Hospital Day: 4    Chief Complaint: CHF Noncompliance    S: resting comfortably. S/p thoracentesis yesterday. ELiquis on hold. HD stable. afib on tele.  -8L total fluid balance. Histories:  Past Medical History:   Diagnosis Date    [facility]     [facility] 9/18/2020    Diabetes mellitus ([facility]     Hypertension      Past Surgical History:   Procedure Laterality Date    BACK SURGERY      CARDIAC PACEMAKER PLACEMENT  9543    HERNIA REPAIR      THORACENTESIS Left 06/24/2021    450ml removed by Dr Roberto Baumann [phone]     History reviewed. No pertinent family history. Social History     Socioeconomic History    Marital status:      Spouse name: None    Number of children: None    Years of education: None    Highest education level: None   Occupational History    None   Tobacco Use    Smoking status: Former Smoker    Smokeless tobacco: Never Used    Tobacco comment: quit 30 years ago   Substance and Sexual Activity    Alcohol use: No    Drug use: No    Sexual activity: None   Other Topics Concern    None   Social History Narrative    None     Social Determinants of Health     Financial Resource Strain:     Difficulty of Paying Living Expenses:    Food Insecurity:     Worried About Running Out of Food in the Last Year:     Ran Out of Food in the Last Year:    Transportation Needs:     Lack of Transportation (Medical):      Lack of Transportation (Non-Medical):    Physical Activity:     Days of Exercise per Week:     Minutes of Exercise per Session:    Stress:     Feeling of Stress :    Social Connections:     Frequency of Communication with Friends and Family:     Frequency of Social Gatherings with Friends and Family:     Attends Restorationism Services:     Active Member of Clubs or Organizations:     Attends Club or Organization Meetings:     Marital Status:    Intimate Partner Violence:     Fear of Current or Ex-Partner:     Emotionally Abused:     Physically Abused:     Sexually Abused:          Review of Systems:   Review of Systems   Constitutional: Negative. Negative for diaphoresis and fatigue. HENT: Negative. Eyes: Negative. Respiratory: Positive for shortness of breath. Negative for cough, chest tightness, wheezing and stridor. Cardiovascular: Positive for leg swelling. Negative for chest pain and palpitations. Gastrointestinal: Negative. Negative for blood in stool and nausea. Genitourinary: Negative. Musculoskeletal: Negative. Skin: Negative. Neurological: Positive for weakness. Negative for dizziness, syncope and light-headedness. Hematological: Negative. Psychiatric/Behavioral: Negative. Physical Examination:    /69   Pulse 61   Temp 97.7 °F (36.5 °C) (Oral)   Resp 18   Ht 5' 7\" (1.702 m)   Wt 151 lb 12.8 oz (68.9 kg)   SpO2 100%   BMI 23.78 kg/m²    Physical Exam   Constitutional: No distress. He appears chronically ill and acutely ill. HENT:   Normal cephalic and Atraumatic   Eyes: Pupils are equal, round, and reactive to light. Neck: Thyroid normal. No JVD present. No neck adenopathy. No thyromegaly present. Cardiovascular: Normal rate and regular rhythm. Exam reveals decreased pulses. Murmur heard. Pulmonary/Chest: Effort normal and breath sounds normal. He has no wheezes. He has no rales. He exhibits no tenderness. Abdominal: Soft. Bowel sounds are normal. There is no abdominal tenderness. Musculoskeletal:         General: Edema (1+) present. No tenderness. Normal range of motion. Cervical back: Normal range of motion and neck supple. Neurological: He is alert and oriented to person, place, and time. Skin: Skin is warm. No cyanosis. Nails show no clubbing. LABS:  CBC:   Lab Results   Component Value Date    WBC 8.7 06/24/2021    RBC 4.35 06/24/2021    HGB 13.9 06/24/2021    HCT 45.5 06/24/2021    .6 06/24/2021    MCH 32.1 06/24/2021    MCHC 30.7 06/24/2021    RDW 16.4 06/24/2021     06/24/2021     CBC with Differential:    Lab Results   Component Value Date    WBC 8.7 06/24/2021    RBC 4.35 06/24/2021    HGB 13.9 06/24/2021    HCT 45.5 06/24/2021     06/24/2021    .6 06/24/2021    MCH 32.1 06/24/2021    MCHC 30.7 06/24/2021    RDW 16.4 06/24/2021    LYMPHOPCT 32.4 06/21/2021    MONOPCT 9.9 06/21/2021    BASOPCT 1.0 06/21/2021    MONOSABS 0.6 06/21/2021    LYMPHSABS 2.0 06/21/2021    EOSABS 0.1 06/21/2021    BASOSABS 0.1 06/21/2021     CMP:    Lab Results   Component Value Date     06/24/2021    K 4.2 06/24/2021    K 3.9 11/22/2020     06/24/2021    CO2 20 06/24/2021    BUN 60 06/24/2021    CREATININE 1.10 06/24/2021    GFRAA >60.0 06/24/2021    LABGLOM >60.0 06/24/2021    GLUCOSE 94 06/24/2021    PROT 7.2 06/21/2021    LABALBU 3.9 06/21/2021    CALCIUM 8.3 06/24/2021    BILITOT 1.1 06/21/2021    ALKPHOS 100 06/21/2021    AST 25 06/21/2021    ALT 24 06/21/2021     BMP:    Lab Results   Component Value Date     06/24/2021    K 4.2 06/24/2021    K 3.9 11/22/2020     06/24/2021    CO2 20 06/24/2021    BUN 60 06/24/2021    LABALBU 3.9 06/21/2021    CREATININE 1.10 06/24/2021    CALCIUM 8.3 06/24/2021    GFRAA >60.0 06/24/2021    LABGLOM >60.0 06/24/2021    GLUCOSE 94 06/24/2021     Magnesium:    Lab Results   Component Value Date    MG 2.3 06/22/2021     Troponin:    Lab Results   Component Value Date    TROPONINI <0.010 06/21/2021        Active Hospital Problems    Diagnosis Date Noted    CHF (congestive heart failure), NYHA class I, acute on chronic, combined [facility] [I50.43] 06/07/2021     Priority: Low        Assessment/Plan:  1.  AF- rate control and [address] [place] [address] -On hold s/p thoracentesis. Resume when ok with IR. 2. Acute on chronic Combined HF- ENtresto/ACEI when able. 3. HyperK- stable. 4. CMP EF 35  5. 1-2+ AR  6. PPM - it was felt at the time of implant that he wasnot a good candidate for ICD. 7. CHF teaching  8. Fluid restrict. 9. PTOT  10. Conservative med rx from cardio standpoint. Stable for discharge.         Electronically signed by Juice Mejia DO on 6/25/2021 at 8:20 AM

## 2021-06-25 NOTE — PROGRESS NOTES
Vascular and Interventional Radiology   [place] Mode. Karol Arrington      Chief Complaint:   Chief Complaint   Patient presents with    Shortness of Breath     sob and ble swelling for 2 weeks     Leg Swelling        Subjective: Hamilton Gupta is a 80 y.o. male with a left-sided pleural effusion, for thoracentesis today. Patient is sleepy, slightly lethargic, denies any complaints. Objective:   BP 90/61   Pulse 60   Temp 98.3 °F (36.8 °C) (Oral)   Resp 18   Ht 5' 7\" (1.702 m)   Wt 151 lb 12.8 oz (68.9 kg)   SpO2 100%   BMI 23.78 kg/m²     Physical:  thin and  not in acute distress    Normocephalic, without obvious abnormality, atraumatic    Neck supple. No adenopathy. Thyroid symmetric, normal size, and without nodularity    normal rate and regular rhythm    air entry reduced AIDAN and LLL        Lab:  Recent Labs     06/24/21  0725   WBC 8.7   RBC 4.35*   HGB 13.9*   HCT 45.5   .6*   MCH 32.1*   MCHC 30.7*   RDW 16.4*          No results for input(s): INR, PROTIME in the last 72 hours. Recent Labs     06/22/21  0800 06/23/21  0657 06/24/21  0725   CREATININE 1.16 1.49* 1.10       Assessment: Hamilton Gupta is a 80 y.o. male with a left-sided pleural effusion, having thoracentesis today. Plan: Ultrasound-guided diagnostic and therapeutic thoracentesis. [place] Mode.  Karol Arrington  6/24/2021

## 2021-06-25 NOTE — FLOWSHEET NOTE
IR contacted via perfect serve to clarify restart of eliquis - response from Ban Hendricks NP ok to restart today.  Electronically signed by Aakash Ivy RN on 6/25/2021 at 3:59 PM

## 2021-06-25 NOTE — PROGRESS NOTES
Progress Note  Date:2021       ULNY:D335/G261-13  Patient Daria Saleh     YOB: 1929     Age:89 y.o. Subjective    Subjective:  Symptoms:  No shortness of breath, malaise, cough, chest pain, weakness, headache, chest pressure, anorexia, diarrhea or anxiety. Diet:  No nausea or vomiting. Review of Systems   Respiratory: Negative for cough and shortness of breath. Cardiovascular: Negative for chest pain. Gastrointestinal: Negative for anorexia, diarrhea, nausea and vomiting. Neurological: Negative for weakness. Objective         Vitals Last 24 Hours:  TEMPERATURE:  Temp  Av.9 °F (36.6 °C)  Min: 97.5 °F (36.4 °C)  Max: 98.3 °F (36.8 °C)  RESPIRATIONS RANGE: Resp  Av.7  Min: 16  Max: 22  PULSE OXIMETRY RANGE: SpO2  Av.6 %  Min: 97 %  Max: 100 %  PULSE RANGE: Pulse  Av.6  Min: 60  Max: 68  BLOOD PRESSURE RANGE: Systolic (57KML), ZBB:31 , Min:90 , UXW:257   ; Diastolic (80EWQ), YZX:00, Min:57, Max:70    I/O (24Hr): Intake/Output Summary (Last 24 hours) at 2021 2770  Last data filed at 2021 0654  Gross per 24 hour   Intake 370 ml   Output 2200 ml   Net -1830 ml     Objective:  General Appearance:  Comfortable, well-appearing and in no acute distress. Vital signs: (most recent): Blood pressure 105/69, pulse 61, temperature 97.7 °F (36.5 °C), temperature source Oral, resp. rate 18, height 5' 7\" (1.702 m), weight 151 lb 12.8 oz (68.9 kg), SpO2 100 %. HEENT: Normal HEENT exam.    Lungs:  Normal effort. Heart: Normal rate. S1 normal.    Abdomen: Abdomen is soft. There is no epigastric area or suprapubic area tenderness. Pulses: Distal pulses are intact. Neurological: Patient is alert. Skin:  Warm and dry.       Labs/Imaging/Diagnostics    Labs:  CBC:  Recent Labs     21  0657 21  0725 21  0812   WBC 10.1 8.7 6.0   RBC 4.56* 4.35* 4.33*   HGB 15.0 13.9* 13.7*   HCT 44.0 45.5 41.5*   MCV 96.4 104.6* 95.7 RDW 14.4 16.4* 14.5    164 189     CHEMISTRIES:  Recent Labs     06/23/21  0657 06/24/21  0725 06/25/21  0812    139 140   K 5.2* 4.2 3.8    104 103   CO2 19* 20 23   BUN 57* 60* 43*   CREATININE 1.49* 1.10 0.94   GLUCOSE 119* 94 122*     PT/INR:No results for input(s): PROTIME, INR in the last 72 hours. APTT:No results for input(s): APTT in the last 72 hours. LIVER PROFILE:No results for input(s): AST, ALT, BILIDIR, BILITOT, ALKPHOS in the last 72 hours. Imaging Last 24 Hours:  No results found. Assessment//Plan           Hospital Problems         Last Modified POA    CHF (congestive heart failure), NYHA class I, acute on chronic, combined ([facility] [address] 6/21/2021 Yes      delirium  afib  FOX  Hyperkalemia  Chronic CHF  Assessment & Plan    6/24: Mental status is a lot better. Acute kidney injury resolved. No overnight events. Continue Seroquel. Appreciate psych input. Pt is euvolemic, hyperkalemia resolved. 6/25: Patient had a status post  thorancenthesis yesterday, Eliquis on hold. No overnight events. Patient is for discharge. Mental status improved, no need seroquel for discharge unless psych recommends it.   Electronically signed by Mal Ware MD on 6/24/21 at 11:46 AM EDT

## 2021-06-25 NOTE — DISCHARGE SUMMARY
Discharge summary. Patient: Stephanie Cons  Unit/Bed: T511/Y089-43  YOB: 1929  MRN: [id number]  Acct: [de-identified]   Admitting Diagnosis: CHF (congestive heart failure), NYHA class I, acute on chronic, combined ([facility] [address] [I50.43]  Admit Date:  6/21/2021  Hospital Day: 4    Chief Complaint: CHF Noncompliance    S: resting comfortably. S/p thoracentesis yesterday. ELiquis on hold. HD stable. afib on tele.  -8L total fluid balance. Histories:  Past Medical History:   Diagnosis Date    [facility]     Cardiomyopathy ([facility] 9/18/2020    Diabetes mellitus ([facility]     Hypertension      Past Surgical History:   Procedure Laterality Date    BACK SURGERY      CARDIAC PACEMAKER PLACEMENT  8890    HERNIA REPAIR      THORACENTESIS Left 06/24/2021    450ml removed by Dr Janiya Choi [phone]     History reviewed. No pertinent family history. Social History     Socioeconomic History    Marital status:      Spouse name: None    Number of children: None    Years of education: None    Highest education level: None   Occupational History    None   Tobacco Use    Smoking status: Former Smoker    Smokeless tobacco: Never Used    Tobacco comment: quit 30 years ago   Substance and Sexual Activity    Alcohol use: No    Drug use: No    Sexual activity: None   Other Topics Concern    None   Social History Narrative    None     Social Determinants of Health     Financial Resource Strain:     Difficulty of Paying Living Expenses:    Food Insecurity:     Worried About Running Out of Food in the Last Year:     Ran Out of Food in the Last Year:    Transportation Needs:     Lack of Transportation (Medical):      Lack of Transportation (Non-Medical):    Physical Activity:     Days of Exercise per Week:     Minutes of Exercise per Session:    Stress:     Feeling of Stress :    Social Connections:     Frequency of Communication with Friends and Family:     Frequency of Social Gatherings with Friends and Family:     Attends Pentecostalism Services:     Active Member of Clubs or Organizations:     Attends Club or Organization Meetings:     Marital Status:    Intimate Partner Violence:     Fear of Current or Ex-Partner:     Emotionally Abused:     Physically Abused:     Sexually Abused:          Review of Systems:   Review of Systems   Constitutional: Negative. Negative for diaphoresis and fatigue. HENT: Negative. Eyes: Negative. Respiratory: Positive for shortness of breath. Negative for cough, chest tightness, wheezing and stridor. Cardiovascular: Positive for leg swelling. Negative for chest pain and palpitations. Gastrointestinal: Negative. Negative for blood in stool and nausea. Genitourinary: Negative. Musculoskeletal: Negative. Skin: Negative. Neurological: Positive for weakness. Negative for dizziness, syncope and light-headedness. Hematological: Negative. Psychiatric/Behavioral: Negative. Physical Examination:    /69   Pulse 61   Temp 97.7 °F (36.5 °C) (Oral)   Resp 18   Ht 5' 7\" (1.702 m)   Wt 151 lb 12.8 oz (68.9 kg)   SpO2 100%   BMI 23.78 kg/m²    Physical Exam   Constitutional: No distress. He appears chronically ill and acutely ill. HENT:   Normal cephalic and Atraumatic   Eyes: Pupils are equal, round, and reactive to light. Neck: Thyroid normal. No JVD present. No neck adenopathy. No thyromegaly present. Cardiovascular: Normal rate and regular rhythm. Exam reveals decreased pulses. Murmur heard. Pulmonary/Chest: Effort normal and breath sounds normal. He has no wheezes. He has no rales. He exhibits no tenderness. Abdominal: Soft. Bowel sounds are normal. There is no abdominal tenderness. Musculoskeletal:         General: Edema (1+) present. No tenderness. Normal range of motion. Cervical back: Normal range of motion and neck supple. Neurological: He is alert and oriented to person, place, and time. Skin: Skin is warm. No cyanosis. Nails show no clubbing. LABS:  CBC:   Lab Results   Component Value Date    WBC 8.7 06/24/2021    RBC 4.35 06/24/2021    HGB 13.9 06/24/2021    HCT 45.5 06/24/2021    .6 06/24/2021    MCH 32.1 06/24/2021    MCHC 30.7 06/24/2021    RDW 16.4 06/24/2021     06/24/2021     CBC with Differential:    Lab Results   Component Value Date    WBC 8.7 06/24/2021    RBC 4.35 06/24/2021    HGB 13.9 06/24/2021    HCT 45.5 06/24/2021     06/24/2021    .6 06/24/2021    MCH 32.1 06/24/2021    MCHC 30.7 06/24/2021    RDW 16.4 06/24/2021    LYMPHOPCT 32.4 06/21/2021    MONOPCT 9.9 06/21/2021    BASOPCT 1.0 06/21/2021    MONOSABS 0.6 06/21/2021    LYMPHSABS 2.0 06/21/2021    EOSABS 0.1 06/21/2021    BASOSABS 0.1 06/21/2021     CMP:    Lab Results   Component Value Date     06/24/2021    K 4.2 06/24/2021    K 3.9 11/22/2020     06/24/2021    CO2 20 06/24/2021    BUN 60 06/24/2021    CREATININE 1.10 06/24/2021    GFRAA >60.0 06/24/2021    LABGLOM >60.0 06/24/2021    GLUCOSE 94 06/24/2021    PROT 7.2 06/21/2021    LABALBU 3.9 06/21/2021    CALCIUM 8.3 06/24/2021    BILITOT 1.1 06/21/2021    ALKPHOS 100 06/21/2021    AST 25 06/21/2021    ALT 24 06/21/2021     BMP:    Lab Results   Component Value Date     06/24/2021    K 4.2 06/24/2021    K 3.9 11/22/2020     06/24/2021    CO2 20 06/24/2021    BUN 60 06/24/2021    LABALBU 3.9 06/21/2021    CREATININE 1.10 06/24/2021    CALCIUM 8.3 06/24/2021    GFRAA >60.0 06/24/2021    LABGLOM >60.0 06/24/2021    GLUCOSE 94 06/24/2021     Magnesium:    Lab Results   Component Value Date    MG 2.3 06/22/2021     Troponin:    Lab Results   Component Value Date    TROPONINI <0.010 06/21/2021        Active Hospital Problems    Diagnosis Date Noted    CHF (congestive heart failure), NYHA class I, acute on chronic[facility] [I50.43] 06/07/2021     Priority: Low        Assessment/Plan:  1.  AF- rate control and [address] [place] [address] -On hold s/p thoracentesis. Resume when ok with IR. 2. Acute on chronic Combined HF- ENtresto/ACEI when able. 3. HyperK- stable. 4. CMP EF 35  5. 1-2+ AR  6. PPM - it was felt at the time of implant that he wasnot a good candidate for ICD. 7. CHF teaching  8. Fluid restrict. 9. PTOT  10. Conservative med rx from cardio standpoint. Stable for discharge.         Electronically signed by DO Arie on 6/25/2021 at 8:20 AM

## 2021-06-25 NOTE — FLOWSHEET NOTE
Pt's family provided DME for shower chair / seat at discharge.  Electronically signed by Magali Alva RN on 6/25/2021 at 6:35 PM

## 2021-06-25 NOTE — FLOWSHEET NOTE
Left Thoracentesis follow up call: Per Ginette Penn RN x [phone] site WNL and pt doing well. Electronically signed by aLnette Garza RN on 6/25/2021 at 11:05 AM

## 2021-06-25 NOTE — PROGRESS NOTES
Physical Therapy Missed Treatment   Facility/Department: [facility] MED SURG L698/H842-69    NAME: Taylor Villanueva    : 1929 (80 y.o.)  MRN: [id number]    Account: [de-identified]  Gender: male    Chart reviewed, attempted PT at 1125. Patient unavailable 2° to:      [x] Pt. Unavailable pt is eating lunch. Will attempt PT treatment again at earliest convenience.       Electronically signed by Marian Watson PTA on 21 at 11:25 AM EDT

## 2021-06-26 NOTE — PROGRESS NOTES
Vascular and Interventional Radiology   Portillo Gallardo. Dawson Palumbo M.D. [facility]      Chief Complaint: Shortness of breath    Subjective: Alfa Burr is a 80 y.o. male status post thoracentesis. Patient feeling much better, denies any complaint. Denies any shortness of breath or other discomfort. Patient is being discharged today. Objective: There were no vitals taken for this visit. Physical:  thin and  not in acute distress    Normocephalic, without obvious abnormality, atraumatic    Neck supple. No adenopathy. Thyroid symmetric, normal size, and without nodularity    normal rate, regular rhythm and no gallops    chest clear, no wheezing, rales, normal symmetric air entry        Lab:  Recent Labs     06/25/21  0812   WBC 6.0   RBC 4.33*   HGB 13.7*   HCT 41.5*   MCV 95.7   MCH 31.7*   MCHC 33.1   RDW 14.5          No results for input(s): INR, PROTIME in the last 72 hours. Recent Labs     06/23/21  0657 06/24/21  0725 06/25/21  0812   CREATININE 1.49* 1.10 0.94       Assessment: Alfa Burr is a 80 y.o. male status post thoracentesis, improved breathing, no complaints today. Plan: Patient being discharged today. Patient will continue to follow with cardiology. Portillo Gallardo.  Dawson Palumbo M.D. [facility]  6/25/2021

## 2021-06-28 LAB — BODY FLUID CULTURE, STERILE: NORMAL

## 2021-07-04 ENCOUNTER — HOSPITAL ENCOUNTER (INPATIENT)
Age: 86
LOS: 3 days | Discharge: HOME HEALTH | DRG: 292 | End: 2021-07-07
Attending: INTERNAL MEDICINE | Admitting: INTERNAL MEDICINE
Payer: MEDICARE

## 2021-07-04 ENCOUNTER — APPOINTMENT (OUTPATIENT)
Dept: CT IMAGING | Age: 86
DRG: 292 | End: 2021-07-04
Payer: MEDICARE

## 2021-07-04 ENCOUNTER — APPOINTMENT (OUTPATIENT)
Dept: GENERAL RADIOLOGY | Age: 86
DRG: 292 | End: 2021-07-04
Payer: MEDICARE

## 2021-07-04 DIAGNOSIS — J90 PLEURAL EFFUSION ON LEFT: Primary | ICD-10-CM

## 2021-07-04 DIAGNOSIS — Z78.9 IMPAIRED MOBILITY AND ADLS: ICD-10-CM

## 2021-07-04 DIAGNOSIS — R06.00 DYSPNEA AND RESPIRATORY ABNORMALITIES: ICD-10-CM

## 2021-07-04 DIAGNOSIS — I50.43 ACUTE ON CHRONIC COMBINED SYSTOLIC AND DIASTOLIC CONGESTIVE HEART FAILURE (HCC): ICD-10-CM

## 2021-07-04 DIAGNOSIS — R06.89 DYSPNEA AND RESPIRATORY ABNORMALITIES: ICD-10-CM

## 2021-07-04 DIAGNOSIS — Z74.09 IMPAIRED MOBILITY AND ADLS: ICD-10-CM

## 2021-07-04 LAB
ALBUMIN SERPL-MCNC: 3.9 G/DL (ref 3.5–4.6)
ALP BLD-CCNC: 119 U/L (ref 35–104)
ALT SERPL-CCNC: 74 U/L (ref 0–41)
ANION GAP SERPL CALCULATED.3IONS-SCNC: 16 MEQ/L (ref 9–15)
AST SERPL-CCNC: 51 U/L (ref 0–40)
BASOPHILS ABSOLUTE: 0 K/UL (ref 0–0.2)
BASOPHILS RELATIVE PERCENT: 0.9 %
BILIRUB SERPL-MCNC: 1.4 MG/DL (ref 0.2–0.7)
BILIRUBIN URINE: NEGATIVE
BLOOD, URINE: NEGATIVE
BUN BLDV-MCNC: 19 MG/DL (ref 8–23)
CALCIUM SERPL-MCNC: 9.2 MG/DL (ref 8.5–9.9)
CHLORIDE BLD-SCNC: 99 MEQ/L (ref 95–107)
CHP ED QC CHECK: NORMAL
CLARITY: CLEAR
CO2: 21 MEQ/L (ref 20–31)
COLOR: YELLOW
CREAT SERPL-MCNC: 0.96 MG/DL (ref 0.7–1.2)
EOSINOPHILS ABSOLUTE: 0.1 K/UL (ref 0–0.7)
EOSINOPHILS RELATIVE PERCENT: 3 %
GFR AFRICAN AMERICAN: >60
GFR NON-AFRICAN AMERICAN: >60
GLOBULIN: 3.3 G/DL (ref 2.3–3.5)
GLUCOSE BLD-MCNC: 177 MG/DL
GLUCOSE BLD-MCNC: 177 MG/DL (ref 70–99)
GLUCOSE URINE: NEGATIVE MG/DL
HCT VFR BLD CALC: 40.3 % (ref 42–52)
HEMOGLOBIN: 13.1 G/DL (ref 14–18)
KETONES, URINE: NEGATIVE MG/DL
LACTIC ACID: 2.8 MMOL/L (ref 0.5–2.2)
LACTIC ACID: 2.8 MMOL/L (ref 0.5–2.2)
LEUKOCYTE ESTERASE, URINE: NEGATIVE
LIPASE: 29 U/L (ref 12–95)
LYMPHOCYTES ABSOLUTE: 2 K/UL (ref 1–4.8)
LYMPHOCYTES RELATIVE PERCENT: 41.4 %
MAGNESIUM: 2 MG/DL (ref 1.7–2.4)
MCH RBC QN AUTO: 31.2 PG (ref 27–31.3)
MCHC RBC AUTO-ENTMCNC: 32.6 % (ref 33–37)
MCV RBC AUTO: 95.8 FL (ref 80–100)
MONOCYTES ABSOLUTE: 0.6 K/UL (ref 0.2–0.8)
MONOCYTES RELATIVE PERCENT: 13.1 %
NEUTROPHILS ABSOLUTE: 2 K/UL (ref 1.4–6.5)
NEUTROPHILS RELATIVE PERCENT: 41.6 %
NITRITE, URINE: NEGATIVE
PDW BLD-RTO: 15.1 % (ref 11.5–14.5)
PH UA: 7.5 (ref 5–9)
PLATELET # BLD: 225 K/UL (ref 130–400)
PLATELET SLIDE REVIEW: ADEQUATE
POC CREATININE WHOLE BLOOD: 1
POTASSIUM SERPL-SCNC: 3.9 MEQ/L (ref 3.4–4.9)
PRO-BNP: NORMAL PG/ML
PROTEIN UA: NEGATIVE MG/DL
RBC # BLD: 4.2 M/UL (ref 4.7–6.1)
SODIUM BLD-SCNC: 136 MEQ/L (ref 135–144)
SPECIFIC GRAVITY UA: 1.01 (ref 1–1.03)
TOTAL CK: 41 U/L (ref 0–190)
TOTAL PROTEIN: 7.2 G/DL (ref 6.3–8)
TROPONIN: <0.01 NG/ML (ref 0–0.01)
URINE REFLEX TO CULTURE: NORMAL
UROBILINOGEN, URINE: 0.2 E.U./DL
WBC # BLD: 4.8 K/UL (ref 4.8–10.8)

## 2021-07-04 PROCEDURE — 99284 EMERGENCY DEPT VISIT MOD MDM: CPT

## 2021-07-04 PROCEDURE — 2060000000 HC ICU INTERMEDIATE R&B

## 2021-07-04 PROCEDURE — 83690 ASSAY OF LIPASE: CPT

## 2021-07-04 PROCEDURE — 71045 X-RAY EXAM CHEST 1 VIEW: CPT

## 2021-07-04 PROCEDURE — 84484 ASSAY OF TROPONIN QUANT: CPT

## 2021-07-04 PROCEDURE — 6360000002 HC RX W HCPCS: Performed by: NURSE PRACTITIONER

## 2021-07-04 PROCEDURE — 36415 COLL VENOUS BLD VENIPUNCTURE: CPT

## 2021-07-04 PROCEDURE — 6360000004 HC RX CONTRAST MEDICATION: Performed by: NURSE PRACTITIONER

## 2021-07-04 PROCEDURE — 2580000003 HC RX 258: Performed by: INTERNAL MEDICINE

## 2021-07-04 PROCEDURE — 83880 ASSAY OF NATRIURETIC PEPTIDE: CPT

## 2021-07-04 PROCEDURE — 2500000003 HC RX 250 WO HCPCS: Performed by: NURSE PRACTITIONER

## 2021-07-04 PROCEDURE — 6370000000 HC RX 637 (ALT 250 FOR IP): Performed by: INTERNAL MEDICINE

## 2021-07-04 PROCEDURE — 96375 TX/PRO/DX INJ NEW DRUG ADDON: CPT

## 2021-07-04 PROCEDURE — 80053 COMPREHEN METABOLIC PANEL: CPT

## 2021-07-04 PROCEDURE — 96374 THER/PROPH/DIAG INJ IV PUSH: CPT

## 2021-07-04 PROCEDURE — 93005 ELECTROCARDIOGRAM TRACING: CPT | Performed by: NURSE PRACTITIONER

## 2021-07-04 PROCEDURE — 83735 ASSAY OF MAGNESIUM: CPT

## 2021-07-04 PROCEDURE — 81003 URINALYSIS AUTO W/O SCOPE: CPT

## 2021-07-04 PROCEDURE — 83605 ASSAY OF LACTIC ACID: CPT

## 2021-07-04 PROCEDURE — 6360000002 HC RX W HCPCS: Performed by: INTERNAL MEDICINE

## 2021-07-04 PROCEDURE — 85025 COMPLETE CBC W/AUTO DIFF WBC: CPT

## 2021-07-04 PROCEDURE — 82550 ASSAY OF CK (CPK): CPT

## 2021-07-04 PROCEDURE — 2580000003 HC RX 258: Performed by: NURSE PRACTITIONER

## 2021-07-04 PROCEDURE — 74177 CT ABD & PELVIS W/CONTRAST: CPT

## 2021-07-04 RX ORDER — POLYETHYLENE GLYCOL 3350 17 G/17G
17 POWDER, FOR SOLUTION ORAL DAILY PRN
Status: DISCONTINUED | OUTPATIENT
Start: 2021-07-04 | End: 2021-07-07 | Stop reason: HOSPADM

## 2021-07-04 RX ORDER — ASPIRIN 81 MG/1
81 TABLET, CHEWABLE ORAL DAILY
Status: DISCONTINUED | OUTPATIENT
Start: 2021-07-04 | End: 2021-07-07 | Stop reason: HOSPADM

## 2021-07-04 RX ORDER — FUROSEMIDE 10 MG/ML
40 INJECTION INTRAMUSCULAR; INTRAVENOUS ONCE
Status: COMPLETED | OUTPATIENT
Start: 2021-07-04 | End: 2021-07-04

## 2021-07-04 RX ORDER — ONDANSETRON 2 MG/ML
4 INJECTION INTRAMUSCULAR; INTRAVENOUS EVERY 6 HOURS PRN
Status: DISCONTINUED | OUTPATIENT
Start: 2021-07-04 | End: 2021-07-07 | Stop reason: HOSPADM

## 2021-07-04 RX ORDER — ONDANSETRON 4 MG/1
4 TABLET, ORALLY DISINTEGRATING ORAL EVERY 8 HOURS PRN
Status: DISCONTINUED | OUTPATIENT
Start: 2021-07-04 | End: 2021-07-07 | Stop reason: HOSPADM

## 2021-07-04 RX ORDER — FUROSEMIDE 10 MG/ML
60 INJECTION INTRAMUSCULAR; INTRAVENOUS ONCE
Status: DISCONTINUED | OUTPATIENT
Start: 2021-07-04 | End: 2021-07-04 | Stop reason: DRUGHIGH

## 2021-07-04 RX ORDER — FUROSEMIDE 40 MG/1
40 TABLET ORAL DAILY
Status: ON HOLD | COMMUNITY
End: 2021-07-07 | Stop reason: HOSPADM

## 2021-07-04 RX ORDER — SODIUM CHLORIDE 9 MG/ML
25 INJECTION, SOLUTION INTRAVENOUS PRN
Status: DISCONTINUED | OUTPATIENT
Start: 2021-07-04 | End: 2021-07-07 | Stop reason: HOSPADM

## 2021-07-04 RX ORDER — BUDESONIDE AND FORMOTEROL FUMARATE DIHYDRATE 80; 4.5 UG/1; UG/1
2 AEROSOL RESPIRATORY (INHALATION) 2 TIMES DAILY
Status: DISCONTINUED | OUTPATIENT
Start: 2021-07-04 | End: 2021-07-07 | Stop reason: HOSPADM

## 2021-07-04 RX ORDER — METOPROLOL TARTRATE 50 MG/1
25 TABLET, FILM COATED ORAL 2 TIMES DAILY
COMMUNITY
End: 2021-07-08 | Stop reason: SDUPTHER

## 2021-07-04 RX ORDER — 0.9 % SODIUM CHLORIDE 0.9 %
500 INTRAVENOUS SOLUTION INTRAVENOUS ONCE
Status: COMPLETED | OUTPATIENT
Start: 2021-07-04 | End: 2021-07-04

## 2021-07-04 RX ORDER — ACETAMINOPHEN 650 MG/1
650 SUPPOSITORY RECTAL EVERY 6 HOURS PRN
Status: DISCONTINUED | OUTPATIENT
Start: 2021-07-04 | End: 2021-07-07 | Stop reason: HOSPADM

## 2021-07-04 RX ORDER — ONDANSETRON 2 MG/ML
4 INJECTION INTRAMUSCULAR; INTRAVENOUS ONCE
Status: COMPLETED | OUTPATIENT
Start: 2021-07-04 | End: 2021-07-04

## 2021-07-04 RX ORDER — MORPHINE SULFATE 2 MG/ML
2 INJECTION, SOLUTION INTRAMUSCULAR; INTRAVENOUS
Status: DISCONTINUED | OUTPATIENT
Start: 2021-07-04 | End: 2021-07-04 | Stop reason: HOSPADM

## 2021-07-04 RX ORDER — POTASSIUM CHLORIDE 20 MEQ/1
60 TABLET, EXTENDED RELEASE ORAL ONCE
Status: DISCONTINUED | OUTPATIENT
Start: 2021-07-04 | End: 2021-07-07 | Stop reason: HOSPADM

## 2021-07-04 RX ORDER — SODIUM CHLORIDE 0.9 % (FLUSH) 0.9 %
5-40 SYRINGE (ML) INJECTION PRN
Status: DISCONTINUED | OUTPATIENT
Start: 2021-07-04 | End: 2021-07-07 | Stop reason: HOSPADM

## 2021-07-04 RX ORDER — ACETAMINOPHEN 325 MG/1
650 TABLET ORAL EVERY 6 HOURS PRN
Status: DISCONTINUED | OUTPATIENT
Start: 2021-07-04 | End: 2021-07-07 | Stop reason: HOSPADM

## 2021-07-04 RX ORDER — SODIUM CHLORIDE 0.9 % (FLUSH) 0.9 %
5-40 SYRINGE (ML) INJECTION EVERY 12 HOURS SCHEDULED
Status: DISCONTINUED | OUTPATIENT
Start: 2021-07-04 | End: 2021-07-07 | Stop reason: HOSPADM

## 2021-07-04 RX ADMIN — IOPAMIDOL 100 ML: 755 INJECTION, SOLUTION INTRAVENOUS at 15:02

## 2021-07-04 RX ADMIN — SODIUM CHLORIDE 500 ML: 9 INJECTION, SOLUTION INTRAVENOUS at 14:36

## 2021-07-04 RX ADMIN — FUROSEMIDE 40 MG: 10 INJECTION, SOLUTION INTRAMUSCULAR; INTRAVENOUS at 23:38

## 2021-07-04 RX ADMIN — APIXABAN 2.5 MG: 2.5 TABLET, FILM COATED ORAL at 23:39

## 2021-07-04 RX ADMIN — FUROSEMIDE 40 MG: 10 INJECTION, SOLUTION INTRAMUSCULAR; INTRAVENOUS at 15:43

## 2021-07-04 RX ADMIN — ONDANSETRON 4 MG: 2 INJECTION INTRAMUSCULAR; INTRAVENOUS at 20:48

## 2021-07-04 RX ADMIN — ONDANSETRON 4 MG: 2 INJECTION INTRAMUSCULAR; INTRAVENOUS at 14:36

## 2021-07-04 RX ADMIN — METOPROLOL TARTRATE 25 MG: 25 TABLET, FILM COATED ORAL at 23:39

## 2021-07-04 RX ADMIN — FAMOTIDINE 20 MG: 10 INJECTION, SOLUTION INTRAVENOUS at 14:36

## 2021-07-04 RX ADMIN — Medication 10 ML: at 23:38

## 2021-07-04 ASSESSMENT — ENCOUNTER SYMPTOMS
BACK PAIN: 0
CHEST TIGHTNESS: 0
SINUS PAIN: 0
WHEEZING: 0
RECTAL PAIN: 0
SINUS PRESSURE: 0
VOMITING: 0
BLOOD IN STOOL: 0
CONSTIPATION: 0
SHORTNESS OF BREATH: 1
ANAL BLEEDING: 0
COLOR CHANGE: 0
DIARRHEA: 0
TROUBLE SWALLOWING: 0
ABDOMINAL DISTENTION: 0
SORE THROAT: 0
COUGH: 0
ABDOMINAL PAIN: 1
NAUSEA: 0
RHINORRHEA: 0

## 2021-07-04 ASSESSMENT — PAIN SCALES - WONG BAKER
WONGBAKER_NUMERICALRESPONSE: 0
WONGBAKER_NUMERICALRESPONSE: 0

## 2021-07-04 ASSESSMENT — PAIN SCALES - GENERAL
PAINLEVEL_OUTOF10: 0
PAINLEVEL_OUTOF10: 0

## 2021-07-04 ASSESSMENT — PAIN DESCRIPTION - DESCRIPTORS: DESCRIPTORS: ACHING

## 2021-07-04 ASSESSMENT — PAIN DESCRIPTION - LOCATION: LOCATION: ABDOMEN

## 2021-07-04 NOTE — ED PROVIDER NOTES
taking all of his medications as usual and states that he only takes 1 for blood pressure and one for water. Patient son at bedside providing additional information. He states that he has been followed by Dr. Jeff Holguin cardiology. His son has been monitoring vital signs and weight gain at home. His son states that he is the patient is becoming very short of breath with even minor activity and in the past 24 hours has gained 6 pounds. He states that he was on 7 days of 40 mg of Lasix but shortly after completing this he began to retain water weight again. He is concerned that even mild activity is causing severe shortness of breath. Patient is having difficulty with ambulation even short distances. Son reports that the ankles are more swollen than usual and in the past patient sprained his pain shortness of breath with increased fluid accumulation presentation today is the same as the previous admission. Nursing Notes were reviewed. REVIEW OF SYSTEMS    (2-9 systems for level 4, 10 or more for level 5)     Review of Systems   Constitutional: Positive for fatigue. Negative for activity change, appetite change, chills, diaphoresis and fever. HENT: Negative for congestion, ear pain, postnasal drip, rhinorrhea, sinus pressure, sinus pain, sore throat and trouble swallowing. Eyes: Negative for visual disturbance. Respiratory: Positive for shortness of breath. Negative for cough, chest tightness and wheezing. Cardiovascular: Negative for chest pain and palpitations. Gastrointestinal: Positive for abdominal pain. Negative for abdominal distention, anal bleeding, blood in stool, constipation, diarrhea, nausea, rectal pain and vomiting. Genitourinary: Negative for difficulty urinating, dysuria, flank pain, frequency, penile pain, penile swelling, scrotal swelling, testicular pain and urgency. Musculoskeletal: Negative for arthralgias, back pain, myalgias, neck pain and neck stiffness.    Skin: Negative for color change and rash. Neurological: Positive for weakness. Negative for dizziness, tremors, seizures, syncope, speech difficulty, light-headedness, numbness and headaches. Except as noted above the remainder of the review of systems was reviewed and negative. PAST MEDICAL HISTORY     Past Medical History:   Diagnosis Date    [facility]congestive heart failure) ([facility] [address]     Hypertension      History reviewed. No pertinent surgical history. Social History     Socioeconomic History    Marital status:      Spouse name: None    Number of children: None    Years of education: None    Highest education level: None   Occupational History    None   Tobacco Use    Smoking status: Never Smoker    Smokeless tobacco: Former User   Substance and Sexual Activity    Alcohol use: Not Currently    Drug use: Not Currently    Sexual activity: None   Other Topics Concern    None   Social History Narrative    None     Social Determinants of Health     Financial Resource Strain:     Difficulty of Paying Living Expenses:    Food Insecurity:     Worried About Running Out of Food in the Last Year:     Ran Out of Food in the Last Year:    Transportation Needs:     Lack of Transportation (Medical):      Lack of Transportation (Non-Medical):    Physical Activity:     Days of Exercise per Week:     Minutes of Exercise per Session:    Stress:     Feeling of Stress :    Social Connections:     Frequency of Communication with Friends and Family:     Frequency of Social Gatherings with Friends and Family:     Attends Gnosticism Services:     Active Member of Clubs or Organizations:     Attends Club or Organization Meetings:     Marital Status:    Intimate Partner Violence:     Fear of Current or Ex-Partner:     Emotionally Abused:     Physically Abused:     Sexually Abused:        SCREENINGS             PHYSICAL EXAM    (up to 7 for level 4, 8 or more for level 5)     ED Triage Vitals   BP Temp Temp src Pulse Resp SpO2 Height Weight   07/04/21 1423 07/04/21 1420 -- 07/04/21 1420 07/04/21 1420 07/04/21 1420 07/04/21 1420 07/04/21 1420   126/72 97.9 °F (36.6 °C)  76 19 100 % 5' 9\" (1.753 m) 156 lb (70.8 kg)       Physical Exam  Constitutional:       General: He is not in acute distress. Appearance: He is well-developed and normal weight. He is not ill-appearing, toxic-appearing or diaphoretic. HENT:      Head: Normocephalic and atraumatic. Right Ear: External ear normal.      Left Ear: External ear normal.      Nose: Nose normal. No congestion or rhinorrhea. Mouth/Throat:      Mouth: Mucous membranes are moist.      Pharynx: Oropharynx is clear. No oropharyngeal exudate or posterior oropharyngeal erythema. Eyes:      General:         Right eye: No discharge. Left eye: No discharge. Conjunctiva/sclera: Conjunctivae normal.      Pupils: Pupils are equal, round, and reactive to light. Cardiovascular:      Rate and Rhythm: Normal rate and regular rhythm. Pulses: Normal pulses. Pulmonary:      Effort: Pulmonary effort is normal.      Breath sounds: Examination of the right-lower field reveals decreased breath sounds. Examination of the left-lower field reveals decreased breath sounds. Decreased breath sounds present. Abdominal:      General: Bowel sounds are normal. There is no distension. Palpations: Abdomen is soft. There is no mass. Tenderness: There is abdominal tenderness. There is no guarding or rebound. Hernia: No hernia is present. Musculoskeletal:         General: No tenderness or signs of injury. Normal range of motion. Skin:     General: Skin is warm and dry. Capillary Refill: Capillary refill takes less than 2 seconds. Neurological:      General: No focal deficit present. Mental Status: He is alert and oriented to person, place, and time. Mental status is at baseline. Cranial Nerves: No cranial nerve deficit. Sensory: No sensory deficit. Motor: No weakness. Coordination: Coordination normal.         RESULTS     EKG: All EKG's are interpreted by the Emergency Department Physician who either signs or Co-signsthis chart in the absence of a cardiologist.    Ventricular paced rhythm 75 bpm no ectopy QTc 580 ms    RADIOLOGY:   Non-plain filmimages such as CT, Ultrasound and MRI are read by the radiologist. Plain radiographic images are visualized and preliminarily interpreted by the emergency physician with the below findings:    Portable chest x-ray - LLL effusion    Interpretation per the Radiologist below, if available at the time ofthis note:    XR CHEST PORTABLE   Final Result   CARDIOMEGALY. LEFT LOWER LUNG ATELECTASIS/PNEUMONIA. SMALL LEFT EFFUSION. DEGENERATIVE CHANGE LEFT SHOULDER. CT ABDOMEN PELVIS W IV CONTRAST Additional Contrast? None   Final Result      Left lower lobe atelectasis with small to moderate left pleural effusion. Cardiomegaly. Sigmoid diverticulosis. All CT scans at this facility use dose modulation, iterative reconstruction, and/or weight based dosing when appropriate to reduce radiation dose to as low as reasonably achievable.                      ED BEDSIDE ULTRASOUND:   Performed by ED Physician - none    LABS:  Labs Reviewed   CBC WITH AUTO DIFFERENTIAL - Abnormal; Notable for the following components:       Result Value    RBC 4.20 (*)     Hemoglobin 13.1 (*)     Hematocrit 40.3 (*)     MCHC 32.6 (*)     RDW 15.1 (*)     All other components within normal limits   COMPREHENSIVE METABOLIC PANEL - Abnormal; Notable for the following components:    Anion Gap 16 (*)     Glucose 177 (*)     Total Bilirubin 1.4 (*)     Alkaline Phosphatase 119 (*)     ALT 74 (*)     AST 51 (*)     All other components within normal limits   LACTIC ACID, PLASMA - Abnormal; Notable for the following components:    Lactic Acid 2.8 (*)     All other components within normal limits   POCT CREATININE - URINE - Normal   POCT GLUCOSE - Normal   LIPASE   TROPONIN   CK   MAGNESIUM   BRAIN NATRIURETIC PEPTIDE   URINE RT REFLEX TO CULTURE   LACTIC ACID, PLASMA       All other labs were within normal range or not returned as of this dictation. EMERGENCY DEPARTMENT COURSE and DIFFERENTIAL DIAGNOSIS/MDM:   Vitals:    Vitals:    07/04/21 1420 07/04/21 1423 07/04/21 1543   BP:  126/72 106/68   Pulse: 76  66   Resp: 19 19 19   Temp: 97.9 °F (36.6 °C)     SpO2: 100%  100%   Weight: 156 lb (70.8 kg)     Height: 5' 9\" (1.753 m)              MDM presents afebrile nontoxic no acute distress but noted to have increased respiratory rate appearance of shortness of breath. Lung sounds are diminished bilaterally. Patient complaining of no chest pain but he does have pain in the abdomen. His son has provide additional formation with concern of additional significant weight gain of 6 pounds the past 24 hours as well as severe shortness of breath difficulty with ambulation and ADLs. Patient does have a moderate left pleural effusion on evaluation of the chart shows that he has had this multiple times and multiple recent admissions for a recurrent left-sided pleural effusion. Patient does appear short of breath even mild exertion including moving around the bed. Patient given IV Lasix and has had urinary output. Patient's son states that he did do well after the thoracentesis and use of p.o. Lasix at home for a few days but now appears to be decompensating rapidly. Spoke to the patient and the son about admission which they are agreeable to stating that they do not feel safe going home son has a significant safety concern for the patient.   Spoke with [facility] cardiology Dr. Betty Rain he has agreed with this assessment states the patient should be admitted had provided with additional orders including 60 additional milligrams of Lasix for a total of 100 IV as well as 60 mEq of p.o. potassium Lovenox injection and cardiac admission orders with dietary restrictions including fluid restriction and low-sodium diet. I have updated the patient and his son about this. Patient will be admitted to cardiology orders have been placed. CRITICAL CARE TIME       CONSULTS:  None    PROCEDURES:  Unless otherwise noted below, none     Procedures    FINAL IMPRESSION      1. Pleural effusion on left    2. Acute on chronic combined systolic and diastolic congestive heart failure ([facility] [address] [address]    3. Dyspnea and respiratory abnormalities    4. Impaired mobility and ADLs          DISPOSITION/PLAN   DISPOSITION        PATIENT REFERRED TO:  No follow-up provider specified.     DISCHARGE MEDICATIONS:  New Prescriptions    No medications on file          (Please notethat portions of this note were completed with a voice recognition program.  Efforts were made to edit the dictations but occasionally words are mis-transcribed.)    ZACH Pitts CNP (electronically signed)  Attending Emergency Physician         ZACH Pitts CNP  07/04/21 7985

## 2021-07-04 NOTE — ED NOTES
Pt states feeling better. Pt care given. Pt has family at bedside.       Karly Lizama, DWAYNE  07/04/21 9116

## 2021-07-04 NOTE — ED TRIAGE NOTES
Pt alert and anxious. Pt is Kyrgyz speaking. Pt is on RA pt is 98% at this time. Pt c/o SOB and ABD pain 10/10. Pt is able to follow all commands.

## 2021-07-05 LAB
ALBUMIN SERPL-MCNC: 3.2 G/DL (ref 3.5–4.6)
ALP BLD-CCNC: 93 U/L (ref 35–104)
ALT SERPL-CCNC: 58 U/L (ref 0–41)
ANION GAP SERPL CALCULATED.3IONS-SCNC: 8 MEQ/L (ref 9–15)
AST SERPL-CCNC: 33 U/L (ref 0–40)
BILIRUB SERPL-MCNC: 1.1 MG/DL (ref 0.2–0.7)
BUN BLDV-MCNC: 18 MG/DL (ref 8–23)
CALCIUM SERPL-MCNC: 8.6 MG/DL (ref 8.5–9.9)
CHLORIDE BLD-SCNC: 105 MEQ/L (ref 95–107)
CO2: 25 MEQ/L (ref 20–31)
CREAT SERPL-MCNC: 1.01 MG/DL (ref 0.7–1.2)
GFR AFRICAN AMERICAN: >60
GFR AFRICAN AMERICAN: >60
GFR NON-AFRICAN AMERICAN: >60
GFR NON-AFRICAN AMERICAN: >60
GLOBULIN: 2.6 G/DL (ref 2.3–3.5)
GLUCOSE BLD-MCNC: 94 MG/DL (ref 70–99)
HCT VFR BLD CALC: 34.2 % (ref 42–52)
HEMOGLOBIN: 11.3 G/DL (ref 14–18)
MCH RBC QN AUTO: 31.6 PG (ref 27–31.3)
MCHC RBC AUTO-ENTMCNC: 32.9 % (ref 33–37)
MCV RBC AUTO: 96.1 FL (ref 80–100)
PDW BLD-RTO: 15.3 % (ref 11.5–14.5)
PERFORMED ON: NORMAL
PLATELET # BLD: 208 K/UL (ref 130–400)
POC CREATININE: 1 MG/DL (ref 0.8–1.3)
POC SAMPLE TYPE: NORMAL
POTASSIUM REFLEX MAGNESIUM: 4.1 MEQ/L (ref 3.4–4.9)
POTASSIUM SERPL-SCNC: 4.1 MEQ/L (ref 3.4–4.9)
RBC # BLD: 3.56 M/UL (ref 4.7–6.1)
SODIUM BLD-SCNC: 138 MEQ/L (ref 135–144)
TOTAL PROTEIN: 5.8 G/DL (ref 6.3–8)
TSH SERPL DL<=0.05 MIU/L-ACNC: 1.77 UIU/ML (ref 0.44–3.86)
WBC # BLD: 6.2 K/UL (ref 4.8–10.8)

## 2021-07-05 PROCEDURE — 84443 ASSAY THYROID STIM HORMONE: CPT

## 2021-07-05 PROCEDURE — 2580000003 HC RX 258: Performed by: INTERNAL MEDICINE

## 2021-07-05 PROCEDURE — 99223 1ST HOSP IP/OBS HIGH 75: CPT | Performed by: INTERNAL MEDICINE

## 2021-07-05 PROCEDURE — 94761 N-INVAS EAR/PLS OXIMETRY MLT: CPT

## 2021-07-05 PROCEDURE — 36415 COLL VENOUS BLD VENIPUNCTURE: CPT

## 2021-07-05 PROCEDURE — 6370000000 HC RX 637 (ALT 250 FOR IP): Performed by: INTERNAL MEDICINE

## 2021-07-05 PROCEDURE — 85027 COMPLETE CBC AUTOMATED: CPT

## 2021-07-05 PROCEDURE — 80053 COMPREHEN METABOLIC PANEL: CPT

## 2021-07-05 PROCEDURE — 2060000000 HC ICU INTERMEDIATE R&B

## 2021-07-05 PROCEDURE — 94640 AIRWAY INHALATION TREATMENT: CPT

## 2021-07-05 PROCEDURE — 6360000002 HC RX W HCPCS: Performed by: INTERNAL MEDICINE

## 2021-07-05 RX ORDER — POTASSIUM CHLORIDE 20 MEQ/1
20 TABLET, EXTENDED RELEASE ORAL 2 TIMES DAILY WITH MEALS
Status: DISCONTINUED | OUTPATIENT
Start: 2021-07-05 | End: 2021-07-07

## 2021-07-05 RX ORDER — FUROSEMIDE 10 MG/ML
40 INJECTION INTRAMUSCULAR; INTRAVENOUS 2 TIMES DAILY
Status: DISCONTINUED | OUTPATIENT
Start: 2021-07-05 | End: 2021-07-06

## 2021-07-05 RX ADMIN — POTASSIUM CHLORIDE 20 MEQ: 20 TABLET, EXTENDED RELEASE ORAL at 08:50

## 2021-07-05 RX ADMIN — Medication 10 ML: at 21:36

## 2021-07-05 RX ADMIN — APIXABAN 2.5 MG: 2.5 TABLET, FILM COATED ORAL at 21:35

## 2021-07-05 RX ADMIN — BUDESONIDE AND FORMOTEROL FUMARATE DIHYDRATE 2 PUFF: 80; 4.5 AEROSOL RESPIRATORY (INHALATION) at 19:42

## 2021-07-05 RX ADMIN — POTASSIUM CHLORIDE 20 MEQ: 20 TABLET, EXTENDED RELEASE ORAL at 17:11

## 2021-07-05 RX ADMIN — FUROSEMIDE 40 MG: 10 INJECTION, SOLUTION INTRAMUSCULAR; INTRAVENOUS at 17:11

## 2021-07-05 RX ADMIN — APIXABAN 2.5 MG: 2.5 TABLET, FILM COATED ORAL at 08:25

## 2021-07-05 RX ADMIN — METOPROLOL TARTRATE 25 MG: 25 TABLET, FILM COATED ORAL at 08:25

## 2021-07-05 RX ADMIN — METOPROLOL TARTRATE 25 MG: 25 TABLET, FILM COATED ORAL at 21:36

## 2021-07-05 RX ADMIN — FUROSEMIDE 40 MG: 10 INJECTION, SOLUTION INTRAMUSCULAR; INTRAVENOUS at 08:51

## 2021-07-05 RX ADMIN — ASPIRIN 81 MG: 81 TABLET, CHEWABLE ORAL at 08:25

## 2021-07-05 RX ADMIN — Medication 10 ML: at 08:25

## 2021-07-05 ASSESSMENT — ENCOUNTER SYMPTOMS
BLOOD IN STOOL: 0
RESPIRATORY NEGATIVE: 1
WHEEZING: 0
EYES NEGATIVE: 1
CHEST TIGHTNESS: 0
GASTROINTESTINAL NEGATIVE: 1
COUGH: 0
SHORTNESS OF BREATH: 0
STRIDOR: 0
NAUSEA: 0

## 2021-07-05 ASSESSMENT — PAIN SCALES - GENERAL
PAINLEVEL_OUTOF10: 0

## 2021-07-05 NOTE — CARE COORDINATION
[facility] [place] Case Management Initial Discharge Assessment    Met with Patient and Family to discuss discharge plan. PCP: Lula Cowart                                Date of Last Visit: yesterday    If no PCP, list provided? N/A    Discharge Planning    Living Arrangements: at home dependent on family care    Who do you live with? Son, wife    Who helps you with your care:  family or spouse    If lives at home:     Do you have any barriers navigating in your home? no    Patient can perform ADL? No    Current Services (outpatient and in home) :  [employer] ([employer]: sn/pt/ot)    Dialysis: No    Is transportation available to get to your appointments? Yes    DME Equipment:  yes - cane, walker, shower chair. Needs a bsc    Respiratory equipment: None    Respiratory provider:  no     Pharmacy:  yes - [facility]    Consult with Medication Assistance Program?  No    Does Patient Have a High-Risk for Readmission Diagnosis (CHF, PN, MI, COPD)? Yes was admitted by cardio    Initial Discharge Plan? (Note: please see concurrent daily documentation for any updates after initial note). I spoke with the patient and his son using  [id number]. Pt lives with his son and his wife and there are other family members that help as well. He would like to have a bsc. He does not have grab bars and I did suggest to his son that they check with their landlord about getting those installed. His son also states that MOW may be helpful. He plans to continue with [facility] sn/ot/pt after d/c and anticipated no other d/c needs.     Readmission Risk              Risk of Unplanned Readmission:  10         Electronically signed by Taylor Matute on 7/4/2021 at 8:17 PM

## 2021-07-05 NOTE — ACP (ADVANCE CARE PLANNING)
Advance Care Planning     Advance Care Planning Activator (Inpatient)  Conversation Note      Date of ACP Conversation: 7/4/2021     Conversation Conducted with: Patient with Decision Making Capacity    ACP Activator: Aniya Renteria 19 Decision Maker:     Current Designated Health Care Decision Maker:     Primary Decision Maker: Merlin Mayjavid - [phone]    Secondary Decision Maker: Michelle Engle - Child - [phone]      Care Preferences    Ventilation: \"If you were in your present state of health and suddenly became very ill and were unable to breathe on your own, what would your preference be about the use of a ventilator (breathing machine) if it were available to you? \"      Would the patient desire the use of ventilator (breathing machine)?: yes    \"If your health worsens and it becomes clear that your chance of recovery is unlikely, what would your preference be about the use of a ventilator (breathing machine) if it were available to you? \"     Would the patient desire the use of ventilator (breathing machine)?: not sure      Resuscitation  \"CPR works best to restart the heart when there is a sudden event, like a heart attack, in someone who is otherwise healthy. Unfortunately, CPR does not typically restart the heart for people who have serious health conditions or who are very sick. \"    \"In the event your heart stopped as a result of an underlying serious health condition, would you want attempts to be made to restart your heart (answer \"yes\" for attempt to resuscitate) or would you prefer a natural death (answer \"no\" for do not attempt to resuscitate)? \" yes       [] Yes   [] No   Educated Patient / Emerson Regalado regarding differences between Advance Directives and portable DNR orders.     A consult to spiritual care for advance directives and noted that the patient speaks Estonian    Length of ACP Conversation in minutes:  10    Conversation Outcomes:  [x] ACP discussion completed  [] Existing advance directive reviewed with patient; no changes to patient's previously recorded wishes  [] New Advance Directive completed  [] Portable Do Not Rescitate prepared for Provider review and signature  [] POLST/POST/MOLST/MOST prepared for Provider review and signature      Follow-up plan:    [] Schedule follow-up conversation to continue planning  [] Referred individual to Provider for additional questions/concerns   [] Advised patient/agent/surrogate to review completed ACP document and update if needed with changes in condition, patient preferences or care setting    [x] This note routed to one or more involved healthcare providers

## 2021-07-05 NOTE — H&P
History and Physical  Patient: Tia Tillman  Unit/Bed: U703/G608-66  YOB: 1929  MRN: [id number]  Acct: [de-identified]   Admitting Diagnosis: Acute on chronic combined systolic and diastolic CHF (congestive heart failure) ([facility] [I50.43]  Admit Date:  7/4/2021  Hospital Day: 1      Chief Complaint: SOB      History of Present Illness: Pt has had recurrent admissons for CHF related to noncompliance with meds. He ran out of Lasix and started water retention weight gain and SOB. In the ER he had abd discomfort but sice resolved. He feels better. He isl erin flat comfortably. He received total 100 mg iv Lasix yesterday. I/Os are not reliable overnight. He is on 2L O2 w 100% sats now. There is a serious disconnect from pt, family and providers. EKG:  PMHx:  Past Medical History:   Diagnosis Date    CHF (congestive heart failure) ([facility]     Hypertension        PSHx:  History reviewed. No pertinent surgical history. Social Hx:  Social History     Socioeconomic History    Marital status:      Spouse name: None    Number of children: None    Years of education: None    Highest education level: None   Occupational History    None   Tobacco Use    Smoking status: Never Smoker    Smokeless tobacco: Former User   Substance and Sexual Activity    Alcohol use: Not Currently    Drug use: Not Currently    Sexual activity: None   Other Topics Concern    None   Social History Narrative    None     Social Determinants of Health     Financial Resource Strain:     Difficulty of Paying Living Expenses:    Food Insecurity:     Worried About Running Out of Food in the Last Year:     Ran Out of Food in the Last Year:    Transportation Needs:     Lack of Transportation (Medical):      Lack of Transportation (Non-Medical):    Physical Activity:     Days of Exercise per Week:     Minutes of Exercise per Session:    Stress:     Feeling of Stress :    Social Connections:     Frequency of Communication with Friends and Family:     Frequency of Social Gatherings with Friends and Family:     Attends Adventist Services:     Active Member of Clubs or Organizations:     Attends Club or Organization Meetings:     Marital Status:    Intimate Partner Violence:     Fear of Current or Ex-Partner:     Emotionally Abused:     Physically Abused:     Sexually Abused:        Family Hx:  History reviewed. No pertinent family history.     No Known Allergies    Current Facility-Administered Medications   Medication Dose Route Frequency Provider Last Rate Last Admin    potassium chloride (KLOR-CON M) extended release tablet 60 mEq  60 mEq Oral Once [place] Real APRN - CNP        sodium chloride flush 0.9 % injection 5-40 mL  5-40 mL Intravenous 2 times per day Larene Canavan, MD   10 mL at 07/05/21 0825    sodium chloride flush 0.9 % injection 5-40 mL  5-40 mL Intravenous PRN Larene Canavan, MD        0.9 % sodium chloride infusion  25 mL Intravenous PRN Larene Canavan, MD        ondansetron (ZOFRAN-ODT) disintegrating tablet 4 mg  4 mg Oral Q8H PRN Larene Canavan, MD        Or    ondansetron [facility]) injection 4 mg  4 mg Intravenous Q6H PRN Larene Canavan, MD   4 mg at 07/04/21 2048    acetaminophen (TYLENOL) tablet 650 mg  650 mg Oral Q6H PRN Larene Canavan, MD        Or   [facility] acetaminophen (TYLENOL) suppository 650 mg  650 mg Rectal Q6H PRN Larene Canavan, MD        polyethylene glycol (GLYCOLAX) packet 17 g  17 g Oral Daily PRN Larene Canavan, MD        aspirin chewable tablet 81 mg  81 mg Oral Daily Larene Canavan, MD   81 mg at 07/05/21 0825    apixaban (ELIQUIS) tablet 2.5 mg  2.5 mg Oral BID Larene Canavan, MD   2.5 mg at 07/05/21 0825    metoprolol tartrate (LOPRESSOR) tablet 25 mg  25 mg Oral BID Larene Canavan, MD   25 mg at 07/05/21 0825    tiotropium (SPIRIVA RESPIMAT) 2.5 MCG/ACT inhaler 1 puff  1 puff Inhalation Daily Larene Canavan, MD        budesonide-formoterol (SYMBICORT) 80-4.5 MCG/ACT inhaler 2 puff  2 puff Inhalation BID Sharon Coelho MD           Review of Systems:   Review of Systems   Constitutional: Negative. Negative for diaphoresis and fatigue. HENT: Negative. Eyes: Negative. Respiratory: Negative. Negative for cough, chest tightness, shortness of breath, wheezing and stridor. Cardiovascular: Negative. Negative for chest pain, palpitations and leg swelling. Gastrointestinal: Negative. Negative for blood in stool and nausea. Genitourinary: Negative. Musculoskeletal: Negative. Skin: Negative. Neurological: Negative. Negative for dizziness, syncope, weakness and light-headedness. Hematological: Negative. Psychiatric/Behavioral: Negative. Physical Examination:    /60   Pulse 59   Temp 97.7 °F (36.5 °C) (Oral)   Resp 16   Ht 5' 9\" (1.753 m)   Wt 148 lb (67.1 kg)   SpO2 100%   BMI 21.86 kg/m²    Physical Exam   Constitutional: He appears healthy. No distress. HENT:   Normal cephalic and Atraumatic   Eyes: Pupils are equal, round, and reactive to light. Neck: Thyroid normal. No JVD present. No neck adenopathy. No thyromegaly present. Cardiovascular: Normal rate, regular rhythm, normal heart sounds, intact distal pulses and normal pulses. Pulmonary/Chest: Effort normal and breath sounds normal. He has no wheezes. He has no rales. He exhibits no tenderness. Abdominal: Soft. Bowel sounds are normal. There is no abdominal tenderness. Musculoskeletal:         General: No tenderness or edema. Normal range of motion. Cervical back: Normal range of motion and neck supple. Neurological: He is alert and oriented to person, place, and time. Skin: Skin is warm. No cyanosis. Nails show no clubbing.          LABS:  CBC:   Lab Results   Component Value Date    WBC 6.2 07/05/2021    RBC 3.56 07/05/2021    HGB 11.3 07/05/2021    HCT 34.2 07/05/2021    MCV 96.1 07/05/2021    MCH 31.6 07/05/2021    MCHC 32.9 07/05/2021    RDW 15.3 07/05/2021     07/05/2021     CBC with Differential:    Lab Results   Component Value Date    WBC 6.2 07/05/2021    RBC 3.56 07/05/2021    HGB 11.3 07/05/2021    HCT 34.2 07/05/2021     07/05/2021    MCV 96.1 07/05/2021    MCH 31.6 07/05/2021    MCHC 32.9 07/05/2021    RDW 15.3 07/05/2021    LYMPHOPCT 41.4 07/04/2021    MONOPCT 13.1 07/04/2021    BASOPCT 0.9 07/04/2021    MONOSABS 0.6 07/04/2021    LYMPHSABS 2.0 07/04/2021    EOSABS 0.1 07/04/2021    BASOSABS 0.0 07/04/2021     CMP:    Lab Results   Component Value Date     07/05/2021    K 4.1 07/05/2021     07/05/2021    CO2 25 07/05/2021    BUN 18 07/05/2021    CREATININE 1.01 07/05/2021    GFRAA >60.0 07/05/2021    LABGLOM >60.0 07/05/2021    GLUCOSE 94 07/05/2021    PROT 5.8 07/05/2021    LABALBU 3.2 07/05/2021    CALCIUM 8.6 07/05/2021    BILITOT 1.1 07/05/2021    ALKPHOS 93 07/05/2021    AST 33 07/05/2021    ALT 58 07/05/2021     BMP:    Lab Results   Component Value Date     07/05/2021    K 4.1 07/05/2021     07/05/2021    CO2 25 07/05/2021    BUN 18 07/05/2021    LABALBU 3.2 07/05/2021    CREATININE 1.01 07/05/2021    CALCIUM 8.6 07/05/2021    GFRAA >60.0 07/05/2021    LABGLOM >60.0 07/05/2021    GLUCOSE 94 07/05/2021     Magnesium:    Lab Results   Component Value Date    MG 2.0 07/04/2021     Troponin:    Lab Results   Component Value Date    TROPONINI <0.010 07/04/2021       Active Hospital Problems    Diagnosis Date Noted    Acute on chronic combined systolic and diastolic CHF (congestive heart failure) ([facility] [I50.43] 07/04/2021     Priority: Low        Assessment/Plan:  1. Noncompliance  2. 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 Replace K. Follow labs  3. LVEF 20-25%  4. Single chamber PPM for SSS - previously felt to be not a good candidate for ICD. 5. 2+ MR  6. Persistent AF on Eliqus and rate control. 7. We will need to arrange Family meeting.  Perhaps pt needs placement in [place]. Will discuss with care coordinators     Electronically signed by Nuvia Hurley MD on 7/5/2021 at 8:26 AM

## 2021-07-06 VITALS
TEMPERATURE: 98.6 F | WEIGHT: 148 LBS | HEART RATE: 65 BPM | OXYGEN SATURATION: 100 % | DIASTOLIC BLOOD PRESSURE: 96 MMHG | RESPIRATION RATE: 18 BRPM | BODY MASS INDEX: 21.92 KG/M2 | SYSTOLIC BLOOD PRESSURE: 124 MMHG | HEIGHT: 69 IN

## 2021-07-06 LAB
ANION GAP SERPL CALCULATED.3IONS-SCNC: 21 MEQ/L (ref 9–15)
BUN BLDV-MCNC: 24 MG/DL (ref 8–23)
CALCIUM SERPL-MCNC: 9.4 MG/DL (ref 8.5–9.9)
CHLORIDE BLD-SCNC: 100 MEQ/L (ref 95–107)
CO2: 16 MEQ/L (ref 20–31)
CREAT SERPL-MCNC: 1.26 MG/DL (ref 0.7–1.2)
GFR AFRICAN AMERICAN: >60
GFR NON-AFRICAN AMERICAN: 53.5
GLUCOSE BLD-MCNC: 96 MG/DL (ref 70–99)
POTASSIUM SERPL-SCNC: 5.5 MEQ/L (ref 3.4–4.9)
SODIUM BLD-SCNC: 137 MEQ/L (ref 135–144)

## 2021-07-06 PROCEDURE — 2060000000 HC ICU INTERMEDIATE R&B

## 2021-07-06 PROCEDURE — 6360000002 HC RX W HCPCS: Performed by: INTERNAL MEDICINE

## 2021-07-06 PROCEDURE — 6370000000 HC RX 637 (ALT 250 FOR IP): Performed by: INTERNAL MEDICINE

## 2021-07-06 PROCEDURE — 36415 COLL VENOUS BLD VENIPUNCTURE: CPT

## 2021-07-06 PROCEDURE — 94761 N-INVAS EAR/PLS OXIMETRY MLT: CPT

## 2021-07-06 PROCEDURE — 99233 SBSQ HOSP IP/OBS HIGH 50: CPT | Performed by: INTERNAL MEDICINE

## 2021-07-06 PROCEDURE — 97162 PT EVAL MOD COMPLEX 30 MIN: CPT

## 2021-07-06 PROCEDURE — 80048 BASIC METABOLIC PNL TOTAL CA: CPT

## 2021-07-06 PROCEDURE — 2580000003 HC RX 258: Performed by: INTERNAL MEDICINE

## 2021-07-06 PROCEDURE — 94640 AIRWAY INHALATION TREATMENT: CPT

## 2021-07-06 PROCEDURE — 2700000000 HC OXYGEN THERAPY PER DAY

## 2021-07-06 RX ORDER — FUROSEMIDE 10 MG/ML
20 INJECTION INTRAMUSCULAR; INTRAVENOUS 2 TIMES DAILY
Status: DISCONTINUED | OUTPATIENT
Start: 2021-07-06 | End: 2021-07-07

## 2021-07-06 RX ADMIN — FUROSEMIDE 20 MG: 10 INJECTION, SOLUTION INTRAVENOUS at 18:48

## 2021-07-06 RX ADMIN — APIXABAN 2.5 MG: 2.5 TABLET, FILM COATED ORAL at 20:56

## 2021-07-06 RX ADMIN — BUDESONIDE AND FORMOTEROL FUMARATE DIHYDRATE 2 PUFF: 80; 4.5 AEROSOL RESPIRATORY (INHALATION) at 07:58

## 2021-07-06 RX ADMIN — TIOTROPIUM BROMIDE INHALATION SPRAY 1 PUFF: 3.12 SPRAY, METERED RESPIRATORY (INHALATION) at 07:58

## 2021-07-06 RX ADMIN — ASPIRIN 81 MG: 81 TABLET, CHEWABLE ORAL at 08:18

## 2021-07-06 RX ADMIN — BUDESONIDE AND FORMOTEROL FUMARATE DIHYDRATE 2 PUFF: 80; 4.5 AEROSOL RESPIRATORY (INHALATION) at 19:36

## 2021-07-06 RX ADMIN — Medication 10 ML: at 20:56

## 2021-07-06 RX ADMIN — POTASSIUM CHLORIDE 20 MEQ: 20 TABLET, EXTENDED RELEASE ORAL at 08:18

## 2021-07-06 RX ADMIN — Medication 10 ML: at 08:19

## 2021-07-06 RX ADMIN — METOPROLOL TARTRATE 25 MG: 25 TABLET, FILM COATED ORAL at 08:18

## 2021-07-06 RX ADMIN — FUROSEMIDE 40 MG: 10 INJECTION, SOLUTION INTRAMUSCULAR; INTRAVENOUS at 08:19

## 2021-07-06 RX ADMIN — APIXABAN 2.5 MG: 2.5 TABLET, FILM COATED ORAL at 08:18

## 2021-07-06 RX ADMIN — METOPROLOL TARTRATE 25 MG: 25 TABLET, FILM COATED ORAL at 20:56

## 2021-07-06 ASSESSMENT — PAIN SCALES - GENERAL
PAINLEVEL_OUTOF10: 0
PAINLEVEL_OUTOF10: 0

## 2021-07-06 NOTE — PLAN OF CARE
See OT evaluation for all goals and OT POC.  Electronically signed by Negrita Vogel OT on 7/6/2021 at 9:59 AM

## 2021-07-06 NOTE — PROGRESS NOTES
Progress Note  Patient: Didier Grewal  Unit/Bed: W236/E736-71  YOB: 1929  MRN: [id number]  Acct: [de-identified]   Admitting Diagnosis: Acute on chronic combined systolic and diastolic CHF (congestive heart failure) ([facility] [I50.43]  Admit Date:  7/4/2021  Hospital Day: 2    Chief Complaint:    Histories:  Past Medical History:   Diagnosis Date    CHF (congestive heart failure) ([facility]     Hypertension      History reviewed. No pertinent surgical history. History reviewed. No pertinent family history. Social History     Socioeconomic History    Marital status:      Spouse name: None    Number of children: None    Years of education: None    Highest education level: None   Occupational History    None   Tobacco Use    Smoking status: Never Smoker    Smokeless tobacco: Former User   Substance and Sexual Activity    Alcohol use: Not Currently    Drug use: Not Currently    Sexual activity: None   Other Topics Concern    None   Social History Narrative    None     Social Determinants of Health     Financial Resource Strain:     Difficulty of Paying Living Expenses:    Food Insecurity:     Worried About Running Out of Food in the Last Year:     Ran Out of Food in the Last Year:    Transportation Needs:     Lack of Transportation (Medical):      Lack of Transportation (Non-Medical):    Physical Activity:     Days of Exercise per Week:     Minutes of Exercise per Session:    Stress:     Feeling of Stress :    Social Connections:     Frequency of Communication with Friends and Family:     Frequency of Social Gatherings with Friends and Family:     Attends Roman Catholic Services:     Active Member of Clubs or Organizations:     Attends Club or Organization Meetings:     Marital Status:    Intimate Partner Violence:     Fear of Current or Ex-Partner:     Emotionally Abused:     Physically Abused:     Sexually Abused:        Subjective/HPI    EKG:        Review of Systems:   Review of Systems      Physical Examination:    BP (!) 111/90   Pulse 73   Temp 97.9 °F (36.6 °C) (Axillary)   Resp 18   Ht 5' 9\" (1.753 m)   Wt 148 lb (67.1 kg)   SpO2 100%   BMI 21.86 kg/m²    Physical Exam    LABS:  CBC:   Lab Results   Component Value Date    WBC 6.2 07/05/2021    RBC 3.56 07/05/2021    HGB 11.3 07/05/2021    HCT 34.2 07/05/2021    MCV 96.1 07/05/2021    MCH 31.6 07/05/2021    MCHC 32.9 07/05/2021    RDW 15.3 07/05/2021     07/05/2021     CBC with Differential:    Lab Results   Component Value Date    WBC 6.2 07/05/2021    RBC 3.56 07/05/2021    HGB 11.3 07/05/2021    HCT 34.2 07/05/2021     07/05/2021    MCV 96.1 07/05/2021    MCH 31.6 07/05/2021    MCHC 32.9 07/05/2021    RDW 15.3 07/05/2021    LYMPHOPCT 41.4 07/04/2021    MONOPCT 13.1 07/04/2021    BASOPCT 0.9 07/04/2021    MONOSABS 0.6 07/04/2021    LYMPHSABS 2.0 07/04/2021    EOSABS 0.1 07/04/2021    BASOSABS 0.0 07/04/2021     CMP:    Lab Results   Component Value Date     07/06/2021    K 5.5 07/06/2021    K 4.1 07/05/2021     07/06/2021    CO2 16 07/06/2021    BUN 24 07/06/2021    CREATININE 1.26 07/06/2021    GFRAA >60.0 07/06/2021    LABGLOM 53.5 07/06/2021    GLUCOSE 96 07/06/2021    PROT 5.8 07/05/2021    LABALBU 3.2 07/05/2021    CALCIUM 9.4 07/06/2021    BILITOT 1.1 07/05/2021    ALKPHOS 93 07/05/2021    AST 33 07/05/2021    ALT 58 07/05/2021     BMP:    Lab Results   Component Value Date     07/06/2021    K 5.5 07/06/2021    K 4.1 07/05/2021     07/06/2021    CO2 16 07/06/2021    BUN 24 07/06/2021    LABALBU 3.2 07/05/2021    CREATININE 1.26 07/06/2021    CALCIUM 9.4 07/06/2021    GFRAA >60.0 07/06/2021    LABGLOM 53.5 07/06/2021    GLUCOSE 96 07/06/2021     Magnesium:    Lab Results   Component Value Date    MG 2.0 07/04/2021     Troponin:    Lab Results   Component Value Date    TROPONINI <0.010 07/04/2021        Active Hospital Problems    Diagnosis Date Noted    Acute on chronic combined systolic and diastolic CHF (congestive heart failure) ([facility] [I50.43] 07/04/2021     Priority: Low        Assessment/Plan:  1. Noncompliance  2. 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 Hold K. Follow Labs. Mild Prerenal this am.   3. HyperK- Hold  4. LVEF 20-25%  5. Single chamber PPM for SSS - previously felt to be not a good candidate for ICD. 6. 2+ MR  7. Persistent AF on Eliqus and rate control. Had family meeting yesterday with . Son, Jocelyn Freeman, will assume responsibility of taking care of his Dad's medications.       Electronically signed by Aye Trejo MD on 7/6/2021 at 8:41 AM

## 2021-07-06 NOTE — PROGRESS NOTES
Physical Therapy Med Surg Initial Assessment  Facility/Department: [facility]  Room: E699/W239-41       NAME: Kimberly Garcia  : 1929 (80 y.o.)  MRN: [id number]  CODE STATUS: Full Code    Date of Service: 2021    Patient Diagnosis(es): Acute on chronic combined systolic and diastolic CHF (congestive heart failure) [facility]) [I50.43]   Chief Complaint   Patient presents with    Shortness of Breath     Patient Active Problem List    Diagnosis Date Noted    Acute on chronic combined systolic [facility] diastolic [facility]est[facility] 2021        Past Medical History:   Diagnosis Date    CHF (congestive heart failure) ([facility]     Hypertension      History reviewed. No pertinent surgical history. Chart Reviewed: Yes  Family / Caregiver Present: No  General Comment  Comments: Flora Richmond #[id number]    Restrictions:  Restrictions/Precautions: Fall Risk     SUBJECTIVE:      Pain  Pre Treatment Pain Screening  Comments / Details: denies    Post Treatment Pain Screening:   Pain Assessment  Pain Assessment:  (denies)    Prior Level of Function:  Social/Functional History  Lives With: Spouse, Family  Type of Home: House  Home Layout: Two level, Bed/Bath upstairs  Bathroom Shower/Tub: Tub/Shower unit  Bathroom Equipment: Shower chair  Home Equipment: Rolling walker  Receives Help From: Family  ADL Assistance: Independent  Homemaking Assistance: Needs assistance (Family assists with IADL tasks)  Homemaking Responsibilities: No    OBJECTIVE:   Vision Exceptions: Wears glasses at all times  Hearing: Within functional limits    Cognition:  Orientation Level: Oriented to person, Oriented to place, Disoriented to situation, Disoriented to time (understands that he is SOB, but unsure as to why)  Follows Commands: Within Functional Limits    Observation/Palpation  Observation: No acute distress noted. 3.5L O2 via NC. Removed for evaluation as pt does not wear at home.  Saturations stable at 100% resting in bed on RA. Desat to 80's, low 90's following activity. ROM:  RLE PROM: WFL  RLE General PROM: tightness observed in hamstrings and gastroc  LLE PROM: WFL  LLE General PROM: tightness observed in hamstrings and gastroc    Strength:  Strength RLE  Comment: Grossly 2/5  Strength LLE  Comment: Grossly 2/5  Strength Other  Other: Trunk strength grossly 2/5    Neuro:  Balance  Sitting - Static: Good  Sitting - Dynamic: Fair;Poor  Standing - Static: Fair  Standing - Dynamic: Poor     Motor Control  Gross Motor?: WFL  Sensation  Overall Sensation Status: WFL    Bed mobility  Rolling to Left: Stand by assistance  Rolling to Right: Stand by assistance  Supine to Sit: Minimal assistance  Sit to Supine: Stand by assistance  Comment: Difficulty rolling and lifting trunk to sitting position. Transfers  Sit to Stand: Contact guard assistance;Minimal Assistance  Stand to sit: Contact guard assistance  Bed to Chair: Contact guard assistance;Minimal assistance  Comment: Steadying assist throughout. Mildy retropulsive. Ambulation  Ambulation?: Yes  Ambulation 1  Surface: level tile  Device: Rolling Walker  Assistance: Minimal assistance; Moderate assistance  Quality of Gait: Lateral LOB X 3. Pt with increased SOB. Required seated rest break before returning BTB. Distance: 5ft X 2    Stairs/Curb  Stairs?: No         Activity Tolerance  Activity Tolerance: Patient Tolerated treatment well;Patient limited by fatigue          PT Education  PT Education: Goals;PT Role;Plan of Care    ASSESSMENT:   Body structures, Functions, Activity limitations: Decreased functional mobility ; Decreased safe awareness;Decreased balance;Decreased strength;Decreased ADL status; Decreased ROM; Decreased endurance  Decision Making: Medium Complexity  History: High  Exam: Med  Clinical Presentation: Med    Prognosis: Good  Barriers to Learning: None    DISCHARGE RECOMMENDATIONS:  Discharge Recommendations: Continue to assess pending progress, Patient would benefit from continued therapy after discharge    Assessment: Continued PT indicated to progress mobility and facilitate DC at highest level of indep and safety. Concerns for pt returning home d/t severity of mobility deficits and increased risk for falls. Rec therapy stay upon DC.  REQUIRES PT FOLLOW UP: Yes      PLAN OF CARE:  Plan  Times per week: 3-6  Current Treatment Recommendations: Strengthening, Balance Training, Functional Mobility Training, ROM, ADL/Self-care Training, Transfer Training, Endurance Training, Patient/Caregiver Education & Training, Equipment Evaluation, Education, & procurement, Home Exercise Program, Safety Education & Training, Gait Training, Neuromuscular Re-education  Safety Devices  Type of devices: All fall risk precautions in place    Goals:  Short term goals  Short term goal 1: Pt to complete HEP with indep  Long term goals  Long term goal 1: Pt to complete bed mobility with SBA  Long term goal 2: Pt to complete transfers with SBA  Long term goal 3: Pt to ambulate 50ft with LRD and SBA    AMPAC (6 CLICK) BASIC MOBILITY  AM-PAC Inpatient Mobility Raw Score : 15     Therapy Time:   Individual   Time In 0839   Time Out 0911   Minutes [address] [place] [address], 07/06/21 at 12:15 PM         Definitions for assistance levels  Independent = pt does not require any physical supervision or assistance from another person for activity completion. Device may be needed.   Stand by assistance = pt requires verbal cues or instructions from another person, close to but not touching, to perform the activity  Minimal assistance= pt performs 75% or more of the activity; assistance is required to complete the activity  Moderate assistance= pt performs 50% of the activity; assistance is required to complete the activity  Maximal assistance = pt performs 25% of the activity; assistance is required to complete the activity  Dependent = pt requires total physical assistance to accomplish the task

## 2021-07-06 NOTE — CARE COORDINATION
Pt's son Alexsander Valero recently taught about CHF with . Importance of taking medication as orderd reinforced.  Electronically signed by Glo Warren RN on 7/6/2021 at 1:00 PM

## 2021-07-06 NOTE — PROGRESS NOTES
[facility] OCCUPATIONAL THERAPY EVALUATION - ACUTE     NAME: Russel Uriarte  : 1929 (80 y.o.)  MRN: [id number]  CODE STATUS: Full Code  Room: [place] [address]    Date of Service: 2021    Patient Diagnosis(es): Acute on chronic combined systolic and diastolic CHF (congestive heart failure) [facility]) [I50.43]   Chief Complaint   Patient presents with    Shortness of Breath     Patient Active Problem List    Diagnosis Date Noted    Acute on chronic combined systolic and diastolic [facility]cong[facility] 2021        Past Medical History:   Diagnosis Date    CHF (congestive heart failure) ([facility]     Hypertension      History reviewed. No pertinent surgical history. Restrictions  Restrictions/Precautions: Fall Risk     Safety Devices: Safety Devices  Safety Devices in place: Yes  Type of devices:  All fall risk precautions in place        Subjective  Pre Treatment Pain Screening  Pain at present: 0  Scale Used: Numeric Score  Intervention List: Patient able to continue with treatment    Pain Reassessment:   Pain Assessment  Patient Currently in Pain: No  Pain Assessment: 0-10  Pain Level: 0       Prior Level of Function:  Social/Functional History  Lives With: Spouse, Family  Type of Home: House  Home Layout: Two level, Bed/Bath upstairs  Bathroom Shower/Tub: Tub/Shower unit  Bathroom Equipment: Shower chair  Home Equipment: Rolling walker  Receives Help From: Family  ADL Assistance: Independent  Homemaking Assistance: Needs assistance (Family assists with IADL tasks)  Homemaking Responsibilities: No    OBJECTIVE:     Orientation Status:  Orientation  Overall Orientation Status: Impaired  Orientation Level: Oriented to place    Observation:  Observation/Palpation  Posture: Fair  Observation: alert and oriented to place, compliant; good O2 saturation at rest without oxygen; sats decreased to 80% without O2 with ~5 feet of ambulation    Cognition Status:  Cognition  Overall Cognitive Status: [facility]    Perception Status:  Perception  Overall Perceptual Status: [facility]    Sensation Status:  Sensation  Overall Sensation Status: [facility]    Vision and Hearing Status:  Vision  Vision: Impaired  Vision Exceptions: Wears glasses at all times  Hearing  Hearing: Within functional limits     ROM:   LUE AROM (degrees)  LUE AROM : [facility]  Left Hand AROM (degrees)  Left Hand AROM: Exceptions  Left Hand General AROM: overall arthritic changes to L hand  RUE AROM (degrees)  RUE AROM : [facility]  Right Hand AROM (degrees)  Right Hand AROM: Exceptions  Right Hand General AROM: overall arthritic changes to R hand    Strength:  LUE Strength  Gross LUE Strength: [facility]  L Hand General: 3+/5  LUE Strength Comment: overall 3+/5 to LUE and hand strength  RUE Strength  Gross RUE Strength: [facility]  R Hand General: 3+/5  RUE Strength Comment: overall 3+/5 to RUE and hand strength    Coordination, Tone, Quality of Movement: Tone RUE  RUE Tone: Normotonic  Tone LUE  LUE Tone: Normotonic  Coordination  Movements Are Fluid And Coordinated: No  Coordination and Movement description: Decreased speed  Quality of Movement Other  Comment: slow and deliberate movements    Hand Dominance:  Hand Dominance  Hand Dominance: Right    ADL Status:  ADL  Feeding: Setup  Grooming: Setup  UE Bathing: Setup  UE Dressing: Moderate assistance  LE Dressing: Moderate assistance  Toileting:  Moderate assistance  Additional Comments: Simulated ADLs as above          Therapy key for assistance levels -   Independent = Pt. is able to perform task with no assistance but may require a device   Stand by assistance = Pt. does not perform task at an independent level but does not need physical assistance, requires verbal cues  Minimal, Moderate, Maximal Assistance = Pt. requires physical assistance (25%, 50%, 75% assist from helper) for task but is able to actively participate in task   Dependent = Pt. requires total assistance with task and is not able to actively participate with task completion     Functional Mobility:  Functional Mobility  Functional - Mobility Device: Rolling Walker  Activity: To/from bathroom  Assist Level: Minimal assistance  Functional Mobility Comments: saturation decreased and patient complained of increased fatigue  Transfers  Sit to stand: Contact guard assistance  Stand to sit: Contact guard assistance    Bed Mobility  Bed mobility  Rolling to Left: Stand by assistance  Rolling to Right: Stand by assistance  Supine to Sit: Minimal assistance  Sit to Supine: Stand by assistance  Scooting: Moderate assistance, 2 Person assistance  Comment: to scoot to [facility]    Seated and Standing Balance:  Balance  Sitting Balance: Supervision  Standing Balance: Minimal assistance    Functional Endurance:  Activity Tolerance  Activity Tolerance: Patient limited by fatigue    D/C Recommendations:  OT D/C RECOMMENDATIONS  REQUIRES OT FOLLOW UP: Yes    Equipment Recommendations:       OT Education:   OT Education  OT Education: OT Role, Plan of Care  Barriers to Learning: communication    OT Follow Up:  OT D/C RECOMMENDATIONS  REQUIRES OT FOLLOW UP: Yes       Assessment/Discharge Disposition:  Assessment: Patient is a 79 YO male from home with spouse and family members who presents to [address] [place] [address] with above deficits which affect his ability to do his ADLs and mobility. He would benefit from continued OT to maximize his independence to return home safely.   Performance deficits / Impairments: Decreased functional mobility , Decreased strength, Decreased endurance, Decreased ADL status, Decreased balance  Prognosis: Fair  Discharge Recommendations: Continue to assess pending progress  Decision Making: High Complexity  Exam: 5 deficits  Assistance / Modification: moderate    Six Click Score   How much help for putting on and taking off regular lower body clothing?: A Little  How much help for Bathing?: A Little  How much help for Toileting?: A Little  How much help for putting on and taking off regular upper body clothing?: A Little  How much help for taking care of personal grooming?: None  How much help for eating meals?: None  AM-PAC Inpatient Daily Activity Raw Score: 20  AM-PAC Inpatient ADL T-Scale Score : 42.03  ADL Inpatient CMS 0-100% Score: 38.32    Plan:  Plan  Times per week: 1-3x per week  Plan weeks: length of acute stay  Current Treatment Recommendations: Strengthening, Endurance Training, Patient/Caregiver Education & Training, Balance Training, Self-Care / ADL, Functional Mobility Training    Goals:   Patient will:    - Improve functional endurance to tolerate/complete 30 mins of ADL's  - Be supervision in UB ADLs   - Be supervision in LB ADLs  - Be supervision in ADL transfers without LOB  - Be supervision in toileting tasks  - Improve B UE strength and endurance to 4-/5 in order to participate in self-care activities as projected. - Access appropriate D/C site with as few architectural barriers as possible.     Patient Goal: Patient goals : \"I want to go home\"     Discussed and agreed upon: Yes      Therapy Time:   OT Individual Minutes  Time In: 8300  Time Out: 2025  Minutes: 32    Eval: 32 minutes     Electronically signed by:    Electronically signed by Lalo Kirkpatrick OT on 7/6/2021 at 9:57 AM

## 2021-07-07 LAB
ANION GAP SERPL CALCULATED.3IONS-SCNC: 17 MEQ/L (ref 9–15)
BUN BLDV-MCNC: 33 MG/DL (ref 8–23)
CALCIUM SERPL-MCNC: 9 MG/DL (ref 8.5–9.9)
CHLORIDE BLD-SCNC: 101 MEQ/L (ref 95–107)
CO2: 22 MEQ/L (ref 20–31)
CREAT SERPL-MCNC: 1.52 MG/DL (ref 0.7–1.2)
EKG ATRIAL RATE: 326 BPM
EKG Q-T INTERVAL: 520 MS
EKG QRS DURATION: 196 MS
EKG QTC CALCULATION (BAZETT): 580 MS
EKG R AXIS: -75 DEGREES
EKG T AXIS: 91 DEGREES
EKG VENTRICULAR RATE: 75 BPM
GFR AFRICAN AMERICAN: 52.1
GFR NON-AFRICAN AMERICAN: 43.1
GLUCOSE BLD-MCNC: 100 MG/DL (ref 70–99)
POTASSIUM SERPL-SCNC: 4.4 MEQ/L (ref 3.4–4.9)
SODIUM BLD-SCNC: 140 MEQ/L (ref 135–144)

## 2021-07-07 PROCEDURE — 97110 THERAPEUTIC EXERCISES: CPT

## 2021-07-07 PROCEDURE — 94640 AIRWAY INHALATION TREATMENT: CPT

## 2021-07-07 PROCEDURE — 80048 BASIC METABOLIC PNL TOTAL CA: CPT

## 2021-07-07 PROCEDURE — 6370000000 HC RX 637 (ALT 250 FOR IP): Performed by: INTERNAL MEDICINE

## 2021-07-07 PROCEDURE — 93010 ELECTROCARDIOGRAM REPORT: CPT | Performed by: INTERNAL MEDICINE

## 2021-07-07 PROCEDURE — 94761 N-INVAS EAR/PLS OXIMETRY MLT: CPT

## 2021-07-07 PROCEDURE — 2700000000 HC OXYGEN THERAPY PER DAY

## 2021-07-07 PROCEDURE — 36415 COLL VENOUS BLD VENIPUNCTURE: CPT

## 2021-07-07 PROCEDURE — 99239 HOSP IP/OBS DSCHRG MGMT >30: CPT | Performed by: INTERNAL MEDICINE

## 2021-07-07 RX ORDER — FUROSEMIDE 40 MG/1
40 TABLET ORAL DAILY
Qty: 60 TABLET | Refills: 5 | Status: SHIPPED | OUTPATIENT
Start: 2021-07-07 | End: 2021-07-16 | Stop reason: SDUPTHER

## 2021-07-07 RX ORDER — FUROSEMIDE 40 MG/1
40 TABLET ORAL DAILY
Status: DISCONTINUED | OUTPATIENT
Start: 2021-07-07 | End: 2021-07-07 | Stop reason: HOSPADM

## 2021-07-07 RX ORDER — ASPIRIN 81 MG/1
81 TABLET, CHEWABLE ORAL DAILY
Qty: 30 TABLET | Refills: 3 | Status: SHIPPED | OUTPATIENT
Start: 2021-07-07

## 2021-07-07 RX ORDER — FUROSEMIDE 40 MG/1
40 TABLET ORAL DAILY
Qty: 7 TABLET | Refills: 0 | Status: SHIPPED | OUTPATIENT
Start: 2021-07-07 | End: 2021-07-16 | Stop reason: SDUPTHER

## 2021-07-07 RX ADMIN — ASPIRIN 81 MG: 81 TABLET, CHEWABLE ORAL at 10:52

## 2021-07-07 RX ADMIN — APIXABAN 2.5 MG: 2.5 TABLET, FILM COATED ORAL at 10:53

## 2021-07-07 RX ADMIN — METOPROLOL TARTRATE 25 MG: 25 TABLET, FILM COATED ORAL at 10:52

## 2021-07-07 RX ADMIN — FUROSEMIDE 40 MG: 40 TABLET ORAL at 10:53

## 2021-07-07 RX ADMIN — BUDESONIDE AND FORMOTEROL FUMARATE DIHYDRATE 2 PUFF: 80; 4.5 AEROSOL RESPIRATORY (INHALATION) at 07:10

## 2021-07-07 RX ADMIN — TIOTROPIUM BROMIDE INHALATION SPRAY 1 PUFF: 3.12 SPRAY, METERED RESPIRATORY (INHALATION) at 07:10

## 2021-07-07 ASSESSMENT — PAIN SCALES - WONG BAKER: WONGBAKER_NUMERICALRESPONSE: 0

## 2021-07-07 ASSESSMENT — PAIN SCALES - GENERAL: PAINLEVEL_OUTOF10: 0

## 2021-07-07 NOTE — DISCHARGE SUMMARY
Cardiology Discharge Summary      Patient Identification:  Teena Horner  : 1929  MRN: [id number]   Account: [de-identified]     Admit date: 2021  Discharge date: 2021   Attending provider: Larissa Sanchez MD        Primary care provider: Kelley Sotelo     Admission Diagnoses:  <principal problem not specified>   CHF - combined HF  Medical noncompliance  CMP   PAF    Discharge Diagnoses: Active Hospital Problems    Diagnosis Date Noted    Acute on chronic combined systolic and diastolic CHF (congestive heart failure) ([facility] [I50.43] 2021     Priority: [address] [facility] [place] Course: Teena Horner is a 80 y.o. male admitted to [facility] on 2021 for .      medcial Rx with iv Lasix. Pt iduresed well. Became Prerenal and HyperK. Pt feelsw better. Had Family conference regarding compliance. Procedures:   1. Consults:   IP CONSULT TO SPIRITUAL SERVICES  IP CONSULT TO SPIRITUAL SERVICES    Examination:  BP (!) 124/96   Pulse 65   Temp 98.6 °F (37 °C) (Axillary)   Resp 18   Ht 5' 9\" (1.753 m)   Wt 148 lb (67.1 kg)   SpO2 100%   BMI 21.86 kg/m²    Physical Exam   Constitutional: He appears healthy. No distress. HENT:   Normal cephalic and Atraumatic   Eyes: Pupils are equal, round, and reactive to light. Neck: Thyroid normal. No JVD present. No neck adenopathy. No thyromegaly present. Cardiovascular: Normal rate, regular rhythm, normal heart sounds, intact distal pulses and normal pulses. Pulmonary/Chest: Effort normal and breath sounds normal. He has no wheezes. He has no rales. He exhibits no tenderness. Abdominal: Soft. Bowel sounds are normal. There is no abdominal tenderness. Musculoskeletal:         General: Edema (trace) present. No tenderness. Normal range of motion. Cervical back: Normal range of motion and neck supple.    Neurological: He is alert and oriented to person, place, and time.   Skin: Skin is warm. No cyanosis. Nails show no clubbing. Medications:  Current Discharge Medication List      START taking these medications    Details   aspirin 81 MG chewable tablet Take 1 tablet by mouth daily  Qty: 30 tablet, Refills: 3      !! furosemide (LASIX) 40 MG tablet Take 1 tablet by mouth daily  Qty: 60 tablet, Refills: 5       !! - Potential duplicate medications found. Please discuss with provider. CONTINUE these medications which have NOT CHANGED    Details   apixaban (ELIQUIS) 2.5 MG TABS tablet Take 2.5 mg by mouth 2 times daily      fluticasone-salmeterol (ADVAIR) 100-50 MCG/DOSE diskus inhaler Inhale 1 puff into the lungs every 12 hours      !! furosemide (LASIX) 40 MG tablet Take 40 mg by mouth daily      metoprolol tartrate (LOPRESSOR) 50 MG tablet Take 25 mg by mouth 2 times daily      tiotropium (SPIRIVA) 18 MCG inhalation capsule Inhale 18 mcg into the lungs daily       ! ! - Potential duplicate medications found. Please discuss with provider. Significant Diagnostics:   Radiology: CT ABDOMEN PELVIS W IV CONTRAST Additional Contrast? None    Result Date: 7/4/2021  CT of the Abdomen and Pelvis with intravenous contrast medium History:  Sudden onset of midline abdominal pain radiating from the epigastrium to suprapubic region. Technical Factors: CT imaging of the abdomen and pelvis were obtained and formatted as 5 mm contiguous axial images from the domes of the diaphragm to the symphysis pubis. Sagittal and coronal reconstructions were also obtained. Oral contrast medium:  None. Intravenous contrast medium:  Isovue-370, 100 mL. Comparison:  None. Findings: Lungs:  Subsegmental atelectatic change, posterior right lower lobe. Small to moderate left pleural effusion with left lower lobe atelectasis. Cardiac size enlarged. Liver:  Normal in size, shape, and attenuation. Bile Ducts:  Normal in caliber. Gallbladder:  No stones or wall thickening.  Pancreas:  Normal without masses, cysts, ductal dilatation or calcification. Spleen:  Normal in size without masses or calcifications. No splenules. Kidneys:  Normal in size and enhancement. No hydronephrosis, masses, or stones. Adrenals:  Normal. Small bowel:  Normal in caliber. Appendix:  Normal. Colon:  Normal in caliber. Diverticular change, sigmoid colon. Peritoneum:  No ascites, free air, or fluid collections. Vessels: Aorta normal in course and caliber. Portal vein, splenic vein, superior mesenteric vein are patent. Lymph nodes:  Retroperitoneal:  No enlarged retroperitoneal lymph nodes. Mesenteric:  No enlarged mesenteric lymph nodes. Pelvic: No enlarged pelvic lymph nodes. Ureters: Normal in course and caliber. No calcifications. Bladder: No wall thickening. Reproductive organs: No pelvic masses. Abdominal Wall:  No hernia identified. No diastasis of rectus musculature. No edema or masses. Bones:  No bone lesions. Anterior osteophytes L1-L5 with bridging osteophytes T9-T12 No post operative changes. Left lower lobe atelectasis with small to moderate left pleural effusion. Cardiomegaly. Sigmoid diverticulosis. All CT scans at this facility use dose modulation, iterative reconstruction, and/or weight based dosing when appropriate to reduce radiation dose to as low as reasonably achievable. XR CHEST PORTABLE    Result Date: 7/4/2021  EXAMINATION: XR CHEST PORTABLE CLINICAL HISTORY: SHORTNESS OF BREATH COMPARISONS: NONE FINDINGS: Pacemaker generator overlies left upper chest laterally with tip of pacemaker lead in region of right ventricle. Patient leaning to right. Osteopenia, with narrowing left glenohumeral joint, and left acromioclavicular joint. Osteophyte formation left acromioclavicular joint, and laterally at left humeral head. Cardiopericardial silhouette is enlarged. Area of increased opacification left lung base obscuring left diaphragm and left lower lung. Right lung clear. CARDIOMEGALY.  LEFT LOWER LUNG ATELECTASIS/PNEUMONIA. SMALL LEFT EFFUSION. DEGENERATIVE CHANGE LEFT SHOULDER.       Labs:   Recent Results (from the past 72 hour(s))   EKG 12 Lead    Collection Time: 07/04/21  2:19 PM   Result Value Ref Range    Ventricular Rate 75 BPM    Atrial Rate 326 BPM    QRS Duration 196 ms    Q-T Interval 520 ms    QTc Calculation (Bazett) 580 ms    R Axis -75 degrees    T Axis 91 degrees   CBC Auto Differential    Collection Time: 07/04/21  2:40 PM   Result Value Ref Range    WBC 4.8 4.8 - 10.8 K/uL    RBC 4.20 (L) 4.70 - 6.10 M/uL    Hemoglobin 13.1 (L) 14.0 - 18.0 g/dL    Hematocrit 40.3 (L) 42.0 - 52.0 %    MCV 95.8 80.0 - 100.0 fL    MCH 31.2 27.0 - 31.3 pg    MCHC 32.6 (L) 33.0 - 37.0 %    RDW 15.1 (H) 11.5 - 14.5 %    Platelets 961 830 - 213 K/uL    PLATELET SLIDE REVIEW Adequate     Neutrophils % 41.6 %    Lymphocytes % 41.4 %    Monocytes % 13.1 %    Eosinophils % 3.0 %    Basophils % 0.9 %    Neutrophils Absolute 2.0 1.4 - 6.5 K/uL    Lymphocytes Absolute 2.0 1.0 - 4.8 K/uL    Monocytes Absolute 0.6 0.2 - 0.8 K/uL    Eosinophils Absolute 0.1 0.0 - 0.7 K/uL    Basophils Absolute 0.0 0.0 - 0.2 K/uL   Comprehensive Metabolic Panel    Collection Time: 07/04/21  2:41 PM   Result Value Ref Range    Sodium 136 135 - 144 mEq/L    Potassium 3.9 3.4 - 4.9 mEq/L    Chloride 99 95 - 107 mEq/L    CO2 21 20 - 31 mEq/L    Anion Gap 16 (H) 9 - 15 mEq/L    Glucose 177 (H) 70 - 99 mg/dL    BUN 19 8 - 23 mg/dL    CREATININE 0.96 0.70 - 1.20 mg/dL    GFR Non-African American >60.0 >60    GFR  >60.0 >60    Calcium 9.2 8.5 - 9.9 mg/dL    Total Protein 7.2 6.3 - 8.0 g/dL    Albumin 3.9 3.5 - 4.6 g/dL    Total Bilirubin 1.4 (H) 0.2 - 0.7 mg/dL    Alkaline Phosphatase 119 (H) 35 - 104 U/L    ALT 74 (H) 0 - 41 U/L    AST 51 (H) 0 - 40 U/L    Globulin 3.3 2.3 - 3.5 g/dL   Lipase    Collection Time: 07/04/21  2:41 PM   Result Value Ref Range    Lipase 29 12 - 95 U/L   Troponin    Collection Time: 07/04/21  2:41 PM Result Value Ref Range    Troponin <0.010 0.000 - 0.010 ng/mL   CK    Collection Time: 07/04/21  2:41 PM   Result Value Ref Range    Total CK 41 0 - 190 U/L   Magnesium    Collection Time: 07/04/21  2:41 PM   Result Value Ref Range    Magnesium 2.0 1.7 - 2.4 mg/dL   Lactic Acid, Plasma    Collection Time: 07/04/21  2:41 PM   Result Value Ref Range    Lactic Acid 2.8 (H) 0.5 - 2.2 mmol/L   Brain Natriuretic Peptide    Collection Time: 07/04/21  2:41 PM   Result Value Ref Range    Pro-BNP 11,291 pg/mL   POCT Creatinine    Collection Time: 07/04/21  2:42 PM   Result Value Ref Range    POC CREATININE WHOLE BLOOD 1.0    POCT Venous    Collection Time: 07/04/21  2:42 PM   Result Value Ref Range    POC Creatinine 1.0 0.8 - 1.3 mg/dL    GFR Non-African American >60 >60    GFR  >60 >60    Sample Type TEENA     Performed on SEE BELOW    Urinalysis Reflex to Culture    Collection Time: 07/04/21  2:45 PM    Specimen: Urine, clean catch   Result Value Ref Range    Color, UA Yellow Straw/Yellow    Clarity, UA Clear Clear    Glucose, Ur Negative Negative mg/dL    Bilirubin Urine Negative Negative    Ketones, Urine Negative Negative mg/dL    Specific Gravity, UA 1.015 1.005 - 1.030    Blood, Urine Negative Negative    pH, UA 7.5 5.0 - 9.0    Protein, UA Negative Negative mg/dL    Urobilinogen, Urine 0.2 <2.0 E.U./dL    Nitrite, Urine Negative Negative    Leukocyte Esterase, Urine Negative Negative    Urine Reflex to Culture Not Indicated    POCT Glucose    Collection Time: 07/04/21  3:43 PM   Result Value Ref Range    Glucose 177 mg/dL    QC OK?  yea    Lactic Acid, Plasma    Collection Time: 07/04/21  7:22 PM   Result Value Ref Range    Lactic Acid 2.8 (H) 0.5 - 2.2 mmol/L   Troponin    Collection Time: 07/04/21  7:22 PM   Result Value Ref Range    Troponin <0.010 0.000 - 0.010 ng/mL   Troponin    Collection Time: 07/04/21 10:44 PM   Result Value Ref Range    Troponin <0.010 0.000 - 0.010 ng/mL   CBC    Collection Time: 07/05/21  6:10 AM   Result Value Ref Range    WBC 6.2 4.8 - 10.8 K/uL    RBC 3.56 (L) 4.70 - 6.10 M/uL    Hemoglobin 11.3 (L) 14.0 - 18.0 g/dL    Hematocrit 34.2 (L) 42.0 - 52.0 %    MCV 96.1 80.0 - 100.0 fL    MCH 31.6 (H) 27.0 - 31.3 pg    MCHC 32.9 (L) 33.0 - 37.0 %    RDW 15.3 (H) 11.5 - 14.5 %    Platelets 478 807 - 825 K/uL   Comprehensive Metabolic Panel w/ Reflex to MG    Collection Time: 07/05/21  6:10 AM   Result Value Ref Range    Sodium 138 135 - 144 mEq/L    Potassium reflex Magnesium 4.1 3.4 - 4.9 mEq/L    Chloride 105 95 - 107 mEq/L    CO2 25 20 - 31 mEq/L    Anion Gap 8 (L) 9 - 15 mEq/L    Glucose 94 70 - 99 mg/dL    BUN 18 8 - 23 mg/dL    CREATININE 1.01 0.70 - 1.20 mg/dL    GFR Non-African American >60.0 >60    GFR  >60.0 >60    Calcium 8.6 8.5 - 9.9 mg/dL    Total Protein 5.8 (L) 6.3 - 8.0 g/dL    Albumin 3.2 (L) 3.5 - 4.6 g/dL    Total Bilirubin 1.1 (H) 0.2 - 0.7 mg/dL    Alkaline Phosphatase 93 35 - 104 U/L    ALT 58 (H) 0 - 41 U/L    AST 33 0 - 40 U/L    Globulin 2.6 2.3 - 3.5 g/dL   Basic Metabolic Panel    Collection Time: 07/05/21  6:10 AM   Result Value Ref Range    Potassium 4.1 3.4 - 4.9 mEq/L   TSH without Reflex    Collection Time: 07/05/21  6:10 AM   Result Value Ref Range    TSH 1.770 0.440 - 3.860 uIU/mL   Basic Metabolic Panel    Collection Time: 07/06/21  5:56 AM   Result Value Ref Range    Sodium 137 135 - 144 mEq/L    Potassium 5.5 (H) 3.4 - 4.9 mEq/L    Chloride 100 95 - 107 mEq/L    CO2 16 (L) 20 - 31 mEq/L    Anion Gap 21 (H) 9 - 15 mEq/L    Glucose 96 70 - 99 mg/dL    BUN 24 (H) 8 - 23 mg/dL    CREATININE 1.26 (H) 0.70 - 1.20 mg/dL    GFR Non-African American 53.5 (L) >60    GFR  >60.0 >60    Calcium 9.4 8.5 - 9.9 mg/dL   Basic Metabolic Panel    Collection Time: 07/07/21  6:02 AM   Result Value Ref Range    Sodium 140 135 - 144 mEq/L    Potassium 4.4 3.4 - 4.9 mEq/L    Chloride 101 95 - 107 mEq/L    CO2 22 20 - 31 mEq/L    Anion Gap 17 (H) 9 - 15 mEq/L    Glucose 100 (H) 70 - 99 mg/dL    BUN 33 (H) 8 - 23 mg/dL    CREATININE 1.52 (H) 0.70 - 1.20 mg/dL    GFR Non-African American 43.1 (L) >60    GFR  52.1 (L) >60    Calcium 9.0 8.5 - 9.9 mg/dL           Follow-up visits:   [facility] [place]  [address]  [facility]  [address], [facility] [address] [place] [address]  [phone]  Schedule an appointment as soon as possible for a visit in 1 week      [address] [place] [address] [address] [facility] Problems    Diagnosis Date Noted    Acute on chronic combined systolic and diastolic CHF (congestive heart failure) ([facility] [I50.43] 07/04/2021     Priority: Low      Assessment/Plan:  1. Noncompliance  2. 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 Hold K. F/u CHF Clinic 1 week. Redraw out pt labs. 3. LVEF 20-25%  4. Single chamber PPM for SSS - previously felt to be not a good candidate for ICD. 5. 2+ MR  6.  AF on Eliqus and rate control. He is in SR today. 1. Had family meeting with . Son, Rosario, will assume responsibility of taking care of his Dad's medications. 2. We will assess for Home O2 needs today prior to dc. 3. [address] already on board.                 Electronically signed by Opal Villalpando MD on 7/7/2021 at 8:25 AM

## 2021-07-07 NOTE — FLOWSHEET NOTE
Nurse in to assess the pt. Pt stating he needs to use the bathroom. Pt given the walker and he is unsteady. Nurse stood outside the bathroom to keep an eye on the pt and the pt got irritated. Pt would not allow the nurse to wipe him or assist with his adl's. Pt assisted back to bed. Alarm on.    1200  Pt is being discharged  Pt did not qualify for home oxygen. 0  Pts son here and nurse went over dc instructions using . #[id number]. Son seemed slightly confused about the meds but nurse reinforced several times. Son did voice understanding. 1415  Pt left the unit per wc.

## 2021-07-07 NOTE — DISCHARGE INSTR - COC
Continuity of Care Form    Patient Name: Aletha Valderrama   :  1929  MRN:  [id number]    Admit date:  2021  Discharge date:  2021    Code Status Order: Full Code   Advance Directives:   Advance Care Flowsheet Documentation     Date/Time Healthcare Directive Type of Healthcare Directive Copy in [address] Agent's Name Healthcare Agent's Phone Number    21 1357  No, patient does not have an advance directive for healthcare treatment  --  --  --  --  --    21 0018  No, patient does not have an advance directive for healthcare treatment  --  --  --  --  --          Admitting Physician:  Rekha Hoover MD  PCP: Jazzmine Espinal    Discharging Nurse: emerson  [address] Unit/Room#: D351/B722-09  Discharging Unit Phone Number: [phone]    Emergency Contact:   Extended Emergency Contact Information  Primary Emergency Contact: Chapo [facility]  Phone: [phone]  Relation: Child   needed? No  Secondary Emergency Contact: Elaina Villar[address] Phone: [phone]  Relation: Grandchild   needed? No    Past Surgical History:  History reviewed. No pertinent surgical history. Immunization History: There is no immunization history on file for this patient.     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 CHF (congestive heart failure) ([facility]) I50.43       Isolation/Infection:   Isolation          No Isolation        Patient Infection Status     None to display          Nurse Assessment:  Last Vital Signs: BP (!) 124/96   Pulse 65   Temp 98.6 °F (37 °C) (Axillary)   Resp 18   Ht 5' 9\" (1.753 m)   Wt 148 lb (67.1 kg)   SpO2 100%   BMI 21.86 kg/m²     Last documented pain score (0-10 scale): Pain Level: 0  Last Weight:   Wt Readings from Last 1 Encounters:   21 148 lb (67.1 kg)     Mental Status:  alert and Swedish speaking    IV Access:  - None    Nursing Mobility/ADLs:  Walking   assisted  Transfer  assisted  Bathing  Dependent  Dressing  Dependent  Toileting  Assisted  Feeding  Independent  Med Admin  Assisted  Med Delivery   whole    Wound Care Documentation and Therapy:        Elimination:  Continence:   · Bowel: No  · Bladder: No  Urinary Catheter: None   Colostomy/Ileostomy/Ileal Conduit: No       Date of Last BM: ***    Intake/Output Summary (Last 24 hours) at 7/7/2021 1257  Last data filed at 7/6/2021 1700  Gross per 24 hour   Intake 240 ml   Output --   Net 240 ml     I/O last 3 completed shifts: In: 5 [P.O.:840]  Out: -     Safety Concerns: At Risk for Falls    Impairments/Disabilities:      Language Barrier - Fijian speaking    Nutrition Therapy:  Current Nutrition Therapy:   - Oral Diet:  Low Sodium (2gm)    Routes of Feeding: Oral  Liquids: No Restrictions  Daily Fluid Restriction: yes - amount 11/2 liters  Last Modified Barium Swallow with Video (Video Swallowing Test): not done    Treatments at the Time of Hospital Discharge:   Respiratory Treatments: ***  Oxygen Therapy:  is not on home oxygen therapy.   Ventilator:    - No ventilator support    Rehab Therapies: Physical Therapy and Occupational Therapy  Weight Bearing Status/Restrictions: No weight bearing restirctions  Other Medical Equipment (for information only, NOT a DME order):  wheelchair, walker, bedside commode and hospital bed  Other Treatments: ***    Patient's personal belongings (please select all that are sent with patient):  Garett    RN SIGNATURE:  Electronically signed by Andrea Mcghee RN on 7/7/21 at 1:05 PM EDT    CASE MANAGEMENT/SOCIAL WORK SECTION    Inpatient Status Date: ***    Readmission Risk Assessment Score:  Readmission Risk              Risk of Unplanned Readmission:  13           Discharging to Facility/ Agency   · Name:   · Address:  · Phone:  · Fax:    Dialysis Facility (if applicable)   · Name:  · Address:  · Dialysis Schedule:  · Phone:  · Fax:    Case Manager/ signature: {Esignature:[id number]}    PHYSICIAN SECTION    Prognosis: {Prognosis:3356305956}    Condition at Discharge: [address] Patient Condition:593738893}    Rehab Potential (if transferring to Rehab): {Prognosis:7640897763}    Recommended Labs or Other Treatments After Discharge: ***    Physician Certification: I certify the above information and transfer of Tyrell Alvarenga  is necessary for the continuing treatment of the diagnosis listed and that he requires {Admit to Appropriate Level of Care:07608} for {GREATER/LESS:513951937} 30 days.      Update Admission H&P: {CHP DME Changes in IFCXZ:929038621}    PHYSICIAN SIGNATURE:  {Esignature:637809860}

## 2021-07-07 NOTE — PROGRESS NOTES
Physical Therapy Med Surg Daily Treatment Note  Facility/Department: [facility] TELEMETRY  Room: A782/R374-54       NAME: Russel Uriarte  : 1929 (80 y.o.)  MRN: [id number]  CODE STATUS: Full Code    Date of Service: 2021    Patient Diagnosis(es): Acute on chronic combined systolic and diastolic CHF (congestive heart failure) [facility]) [I50.43]   Chief Complaint   Patient presents with    Shortness of Breath     Patient Active Problem List    Diagnosis Date Noted    Acute on chronic combined systolic and diastolic CHF (congestive heart failure) ([facility] 2021        Past Medical History:   Diagnosis Date    CHF (congestive heart failure) ([facility]     Hypertension      History reviewed. No pertinent surgical history. Restrictions  Restrictions/Precautions: Fall Risk    SUBJECTIVE   General  Chart Reviewed: Yes  Family / Caregiver Present: Yes  General Comment  Comments: Interpretor    Pre-Session Pain Report  Pre Treatment Pain Screening  Pain at present: 0  Pain Screening  Patient Currently in Pain: No       Post-Session Pain Report  Pain Assessment  Pain Assessment: 0-10  Pain Level: 0  Thomas-Baker Pain Rating: No hurt         OBJECTIVE   Exercises  Heelslides: x20  Hip Flexion: x 20 (supine)  Hip Abduction: x 20  Ankle Pumps: x 20                    Activity Tolerance  Activity Tolerance: Patient Tolerated treatment well;Patient limited by fatigue          ASSESSMENT   Assessment: Pt was good participant in therapy and able to perform exercises after receiving instruction; with good follow through. Pt fatigue noted between exercises requiring increased time to complete.      Discharge Recommendations:  Continue to assess pending progress, Patient would benefit from continued therapy after discharge    Goals  Short term goals  Short term goal 1: Pt to complete HEP with indep  Long term goals  Long term goal 1: Pt to complete bed mobility with SBA  Long term goal 2: Pt to complete transfers with SBA  Long term goal 3: Pt to ambulate 50ft with LRD and SBA    PLAN    Times per week: 3-6  Safety Devices  Type of devices: All fall risk precautions in place     [facility] (6 CLICK) Aniya Arriola 28 Inpatient Mobility Raw Score : 15     Therapy Time   Individual   Time In 1005   Time Out 1017   Minutes 12      TherEx: [address] [place], [place], 07/07/21 at 10:33 AM         Definitions for assistance levels  Independent = pt does not require any physical supervision or assistance from another person for activity completion. Device may be needed.   Stand by assistance = pt requires verbal cues or instructions from another person, close to but not touching, to perform the activity  Minimal assistance= pt performs 75% or more of the activity; assistance is required to complete the activity  Moderate assistance= pt performs 50% of the activity; assistance is required to complete the activity  Maximal assistance = pt performs 25% of the activity; assistance is required to complete the activity  Dependent = pt requires total physical assistance to accomplish the task

## 2021-07-07 NOTE — CARE COORDINATION
Team quality rounds done this am and resp did home O2 eval and pt has documented sat of 98 resting and 100% on room air. His plan is to dc home with [facility] and they are aware .

## 2021-07-08 RX ORDER — METOPROLOL TARTRATE 50 MG/1
25 TABLET, FILM COATED ORAL 2 TIMES DAILY
Qty: 60 TABLET | Refills: 5 | Status: ON HOLD | OUTPATIENT
Start: 2021-07-08 | End: 2021-09-01

## 2021-07-08 NOTE — TELEPHONE ENCOUNTER
Patient is requesting medication refill.  Please approve or deny this request.    Rx requested:  Requested Prescriptions      No prescriptions requested or ordered in this encounter         Last Office Visit:   2/26/2021      Next Visit Date:  Future Appointments   Date Time Provider Maco Miller   7/16/2021 11:15 AM Fortunato Richards,  [facility]   8/27/2021 11:45 AM Fortunato Richards, DO [address] [place] [address]

## 2021-07-08 NOTE — PROGRESS NOTES
Physical Therapy  Facility/Department: [facility] MED SURG X701/G237-08  Physical Therapy Discharge      NAME: Rashawn Love    : 1929 (80 y.o.)  MRN: [id number]    Account: [de-identified]  Gender: male      Patient has been discharged from acute care hospital. DC patient from current PT program.      Electronically signed by Evelia Singh PT on 21 at 3:30 PM EDT

## 2021-07-12 RX ORDER — FUROSEMIDE 40 MG/1
40 TABLET ORAL DAILY
Qty: 7 TABLET | Refills: 0 | OUTPATIENT
Start: 2021-07-12 | End: 2021-07-19

## 2021-07-16 ENCOUNTER — OFFICE VISIT (OUTPATIENT)
Dept: CARDIOLOGY CLINIC | Age: 86
End: 2021-07-16
Payer: MEDICARE

## 2021-07-16 VITALS
BODY MASS INDEX: 22.45 KG/M2 | SYSTOLIC BLOOD PRESSURE: 124 MMHG | HEART RATE: 91 BPM | WEIGHT: 152 LBS | DIASTOLIC BLOOD PRESSURE: 80 MMHG

## 2021-07-16 DIAGNOSIS — I50.43 CHF (CONGESTIVE HEART FAILURE), NYHA CLASS I, ACUTE ON CHRONIC, COMBINED (HCC): ICD-10-CM

## 2021-07-16 DIAGNOSIS — R06.02 SOB (SHORTNESS OF BREATH): ICD-10-CM

## 2021-07-16 DIAGNOSIS — I50.43 ACUTE ON CHRONIC COMBINED SYSTOLIC AND DIASTOLIC CHF (CONGESTIVE HEART FAILURE) (HCC): ICD-10-CM

## 2021-07-16 DIAGNOSIS — I49.5 SSS (SICK SINUS SYNDROME) (HCC): ICD-10-CM

## 2021-07-16 DIAGNOSIS — I48.20 CHRONIC ATRIAL FIBRILLATION (HCC): Primary | ICD-10-CM

## 2021-07-16 PROBLEM — I48.91 NEW ONSET A-FIB (HCC): Status: RESOLVED | Noted: 2019-10-03 | Resolved: 2021-07-16

## 2021-07-16 PROCEDURE — G8427 DOCREV CUR MEDS BY ELIG CLIN: HCPCS | Performed by: INTERNAL MEDICINE

## 2021-07-16 PROCEDURE — 1123F ACP DISCUSS/DSCN MKR DOCD: CPT | Performed by: INTERNAL MEDICINE

## 2021-07-16 PROCEDURE — 1111F DSCHRG MED/CURRENT MED MERGE: CPT | Performed by: INTERNAL MEDICINE

## 2021-07-16 PROCEDURE — 4040F PNEUMOC VAC/ADMIN/RCVD: CPT | Performed by: INTERNAL MEDICINE

## 2021-07-16 PROCEDURE — 99214 OFFICE O/P EST MOD 30 MIN: CPT | Performed by: INTERNAL MEDICINE

## 2021-07-16 PROCEDURE — 93000 ELECTROCARDIOGRAM COMPLETE: CPT | Performed by: INTERNAL MEDICINE

## 2021-07-16 PROCEDURE — 1036F TOBACCO NON-USER: CPT | Performed by: INTERNAL MEDICINE

## 2021-07-16 PROCEDURE — G8420 CALC BMI NORM PARAMETERS: HCPCS | Performed by: INTERNAL MEDICINE

## 2021-07-16 RX ORDER — FUROSEMIDE 40 MG/1
40 TABLET ORAL DAILY
Qty: 30 TABLET | Refills: 6 | Status: SHIPPED | OUTPATIENT
Start: 2021-07-16 | End: 2021-07-20 | Stop reason: SDUPTHER

## 2021-07-16 RX ORDER — FUROSEMIDE 40 MG/1
40 TABLET ORAL DAILY
Qty: 30 TABLET | Refills: 5 | Status: CANCELLED | OUTPATIENT
Start: 2021-07-16 | End: 2021-07-23

## 2021-07-16 NOTE — PROGRESS NOTES
dizziness, lightheadedness, palpitations, PND, orthopnea, or claudication. 7-16-21: hx of SSS, chronic Afib, s/p PPM single chamber. Hx of CMP, EF of 35%, 1-2+ MRAlyssa Not felt to be a good AICD candidate due to age. Has some mild shortness of breath. Pt denies chest pain, nausea, vomiting, diarrhea, constipation, motor weakness, insomnia, weight loss, syncope, dizziness, lightheadedness, palpitations, PND, orthopnea, or claudication. Has stage III chronic kidney disease. Patient was hospitalized recently and his Cardizem and lisinopril were discontinued. He was given Lasix for 7 days. EKG with A. fib/V paced.       Patient Active Problem List   Diagnosis    Chronic atrial fibrillation (HCC)    SSS (sick sinus syndrome) (HCC)    Cardiomyopathy ([address]    SOB (shortness of breath)    Pleural effusion on right    Atelectasis of right lung    CHF (congestive heart failure), NYHA class I, acute on chronic, combined ([address]    Acute on chronic combined systolic and diastolic CHF (congestive heart failure) ([address]       Past Surgical History:   Procedure Laterality Date    BACK SURGERY      CARDIAC PACEMAKER PLACEMENT  0990    HERNIA REPAIR      THORACENTESIS Left 06/24/2021    450ml removed by Dr Londa Leventhal [phone]       Social History     Socioeconomic History    Marital status:      Spouse name: Not on file    Number of children: Not on file    Years of education: Not on file    Highest education level: Not on file   Occupational History    Not on file   Tobacco Use    Smoking status: Never Smoker    Smokeless tobacco: Former User    Tobacco comment: quit 30 years ago   Substance and Sexual Activity    Alcohol use: Not Currently    Drug use: Not Currently    Sexual activity: Not on file   Other Topics Concern    Not on file   Social History Narrative    ** Merged History Encounter **          Social Determinants of Health     Financial Resource Strain:     Difficulty of Paying Living Expenses:    Food Insecurity:     Worried About [facility] in the Last Year:     [address] [facility] [address] in the Last Year:    Transportation Needs:     Lack of Transportation (Medical):  Lack of Transportation (Non-Medical):    Physical Activity:     Days of Exercise per Week:     Minutes of Exercise per Session:    Stress:     Feeling of Stress :    Social Connections:     Frequency of Communication with Friends and Family:     Frequency of Social Gatherings with Friends and Family:     Attends Zoroastrian Services:     Active Member of Clubs or Organizations:     Attends Club or Organization Meetings:     Marital Status:    Intimate Partner Violence:     Fear of Current or Ex-Partner:     Emotionally Abused:     Physically Abused:     Sexually Abused:        No family history on file. Current Outpatient Medications   Medication Sig Dispense Refill    furosemide (LASIX) 40 MG tablet Take 1 tablet by mouth daily 30 tablet 6    metoprolol tartrate (LOPRESSOR) 50 MG tablet Take 0.5 tablets by mouth 2 times daily 60 tablet 5    aspirin 81 MG chewable tablet Take 1 tablet by mouth daily 30 tablet 3    apixaban (ELIQUIS) 2.5 MG TABS tablet Take 2.5 mg by mouth 2 times daily      fluticasone-salmeterol (ADVAIR) 100-50 MCG/DOSE diskus inhaler Inhale 1 puff into the lungs every 12 hours      tiotropium (SPIRIVA) 18 MCG inhalation capsule Inhale 18 mcg into the lungs daily      tiotropium (SPIRIVA RESPIMAT) 2.5 MCG/ACT AERS inhaler Inhale 2 puffs into the lungs daily      apixaban (ELIQUIS) 2.5 MG TABS tablet Take 1 tablet by mouth 2 times daily 60 tablet 5    metoprolol tartrate (LOPRESSOR) 25 MG tablet TAKE 1 TABLET BY MOUTH TWICE DAILY 60 tablet 5    fluticasone-salmeterol (ADVAIR DISKUS) 250-50 MCG/DOSE AEPB Inhale 1 puff into the lungs every 12 hours for 14 days 1 Inhaler 0     No current facility-administered medications for this visit.        Patient has no known modification is strongly recommended. We should adhere to the JNC VIII guidelines for HTN management and the NCEP ATP III guidelines for LDL-C management. Cardiac diet is always recommended with low fat, cholesterol, calories and sodium. Continue medications at current doses. No asa    LASIX 40MG DAILY. Follow up with device clinic    Cont with DOAC low dose. Monitor H/H with PCP.      Check EKG

## 2021-07-19 ENCOUNTER — HOSPITAL ENCOUNTER (EMERGENCY)
Age: 86
Discharge: HOME | End: 2021-07-20
Payer: MEDICARE

## 2021-07-19 DIAGNOSIS — I50.20 SYSTOLIC CONGESTIVE HEART FAILURE, UNSPECIFIED HF CHRONICITY (HCC): Primary | ICD-10-CM

## 2021-07-19 DIAGNOSIS — J44.1 COPD EXACERBATION (HCC): ICD-10-CM

## 2021-07-19 PROCEDURE — 99285 EMERGENCY DEPT VISIT HI MDM: CPT

## 2021-07-19 PROCEDURE — 96374 THER/PROPH/DIAG INJ IV PUSH: CPT

## 2021-07-19 PROCEDURE — 93005 ELECTROCARDIOGRAM TRACING: CPT | Performed by: EMERGENCY MEDICINE

## 2021-07-20 ENCOUNTER — TELEPHONE (OUTPATIENT)
Dept: CARDIOLOGY CLINIC | Age: 86
End: 2021-07-20

## 2021-07-20 ENCOUNTER — TELEPHONE (OUTPATIENT)
Dept: OTHER | Facility: CLINIC | Age: 86
End: 2021-07-20

## 2021-07-20 ENCOUNTER — APPOINTMENT (OUTPATIENT)
Dept: GENERAL RADIOLOGY | Age: 86
End: 2021-07-20
Payer: MEDICARE

## 2021-07-20 VITALS
HEART RATE: 89 BPM | BODY MASS INDEX: 22.15 KG/M2 | RESPIRATION RATE: 11 BRPM | WEIGHT: 150 LBS | DIASTOLIC BLOOD PRESSURE: 80 MMHG | SYSTOLIC BLOOD PRESSURE: 119 MMHG | OXYGEN SATURATION: 97 % | TEMPERATURE: 98 F

## 2021-07-20 LAB
ALBUMIN SERPL-MCNC: 4.1 G/DL (ref 3.5–4.6)
ALP BLD-CCNC: 100 U/L (ref 35–104)
ALT SERPL-CCNC: 18 U/L (ref 0–41)
ANION GAP SERPL CALCULATED.3IONS-SCNC: 20 MEQ/L (ref 9–15)
APTT: 37.8 SEC (ref 24.4–36.8)
AST SERPL-CCNC: 19 U/L (ref 0–40)
BASOPHILS ABSOLUTE: 0 K/UL (ref 0–0.2)
BASOPHILS RELATIVE PERCENT: 0.8 %
BILIRUB SERPL-MCNC: 1.9 MG/DL (ref 0.2–0.7)
BUN BLDV-MCNC: 26 MG/DL (ref 8–23)
CALCIUM SERPL-MCNC: 9.2 MG/DL (ref 8.5–9.9)
CHLORIDE BLD-SCNC: 102 MEQ/L (ref 95–107)
CO2: 19 MEQ/L (ref 20–31)
CREAT SERPL-MCNC: 1.05 MG/DL (ref 0.7–1.2)
EKG ATRIAL RATE: 113 BPM
EKG Q-T INTERVAL: 404 MS
EKG QRS DURATION: 142 MS
EKG QTC CALCULATION (BAZETT): 518 MS
EKG R AXIS: -44 DEGREES
EKG T AXIS: 98 DEGREES
EKG VENTRICULAR RATE: 99 BPM
EOSINOPHILS ABSOLUTE: 0.1 K/UL (ref 0–0.7)
EOSINOPHILS RELATIVE PERCENT: 1.9 %
GFR AFRICAN AMERICAN: >60
GFR NON-AFRICAN AMERICAN: >60
GLOBULIN: 2.9 G/DL (ref 2.3–3.5)
GLUCOSE BLD-MCNC: 123 MG/DL (ref 70–99)
HCT VFR BLD CALC: 41 % (ref 42–52)
HEMOGLOBIN: 13.7 G/DL (ref 14–18)
INR BLD: 1.7
LYMPHOCYTES ABSOLUTE: 2.5 K/UL (ref 1–4.8)
LYMPHOCYTES RELATIVE PERCENT: 39.5 %
MCH RBC QN AUTO: 32.4 PG (ref 27–31.3)
MCHC RBC AUTO-ENTMCNC: 33.5 % (ref 33–37)
MCV RBC AUTO: 96.6 FL (ref 80–100)
MONOCYTES ABSOLUTE: 0.7 K/UL (ref 0.2–0.8)
MONOCYTES RELATIVE PERCENT: 10.3 %
NEUTROPHILS ABSOLUTE: 3 K/UL (ref 1.4–6.5)
NEUTROPHILS RELATIVE PERCENT: 47.5 %
PDW BLD-RTO: 16.1 % (ref 11.5–14.5)
PLATELET # BLD: 184 K/UL (ref 130–400)
PLATELET SLIDE REVIEW: ADEQUATE
POTASSIUM SERPL-SCNC: 4.2 MEQ/L (ref 3.4–4.9)
PRO-BNP: NORMAL PG/ML
PROTHROMBIN TIME: 19.3 SEC (ref 12.3–14.9)
RBC # BLD: 4.25 M/UL (ref 4.7–6.1)
RBC # BLD: NORMAL 10*6/UL
SLIDE REVIEW: ABNORMAL
SODIUM BLD-SCNC: 141 MEQ/L (ref 135–144)
TOTAL PROTEIN: 7 G/DL (ref 6.3–8)
TROPONIN: <0.01 NG/ML (ref 0–0.01)
WBC # BLD: 6.4 K/UL (ref 4.8–10.8)

## 2021-07-20 PROCEDURE — 84484 ASSAY OF TROPONIN QUANT: CPT

## 2021-07-20 PROCEDURE — 6370000000 HC RX 637 (ALT 250 FOR IP): Performed by: NURSE PRACTITIONER

## 2021-07-20 PROCEDURE — 94640 AIRWAY INHALATION TREATMENT: CPT

## 2021-07-20 PROCEDURE — 93010 ELECTROCARDIOGRAM REPORT: CPT | Performed by: INTERNAL MEDICINE

## 2021-07-20 PROCEDURE — 6360000002 HC RX W HCPCS: Performed by: NURSE PRACTITIONER

## 2021-07-20 PROCEDURE — 85730 THROMBOPLASTIN TIME PARTIAL: CPT

## 2021-07-20 PROCEDURE — 94761 N-INVAS EAR/PLS OXIMETRY MLT: CPT

## 2021-07-20 PROCEDURE — 71045 X-RAY EXAM CHEST 1 VIEW: CPT

## 2021-07-20 PROCEDURE — 85610 PROTHROMBIN TIME: CPT

## 2021-07-20 PROCEDURE — 83880 ASSAY OF NATRIURETIC PEPTIDE: CPT

## 2021-07-20 PROCEDURE — 85025 COMPLETE CBC W/AUTO DIFF WBC: CPT

## 2021-07-20 PROCEDURE — 36415 COLL VENOUS BLD VENIPUNCTURE: CPT

## 2021-07-20 PROCEDURE — 80053 COMPREHEN METABOLIC PANEL: CPT

## 2021-07-20 PROCEDURE — 2700000000 HC OXYGEN THERAPY PER DAY

## 2021-07-20 RX ORDER — IPRATROPIUM BROMIDE AND ALBUTEROL SULFATE 2.5; .5 MG/3ML; MG/3ML
1 SOLUTION RESPIRATORY (INHALATION) ONCE
Status: COMPLETED | OUTPATIENT
Start: 2021-07-20 | End: 2021-07-20

## 2021-07-20 RX ORDER — FUROSEMIDE 40 MG/1
40 TABLET ORAL DAILY
Qty: 30 TABLET | Refills: 6 | Status: SHIPPED | OUTPATIENT
Start: 2021-07-20 | End: 2021-11-29 | Stop reason: SDUPTHER

## 2021-07-20 RX ORDER — METHYLPREDNISOLONE SODIUM SUCCINATE 125 MG/2ML
125 INJECTION, POWDER, LYOPHILIZED, FOR SOLUTION INTRAMUSCULAR; INTRAVENOUS ONCE
Status: COMPLETED | OUTPATIENT
Start: 2021-07-20 | End: 2021-07-20

## 2021-07-20 RX ADMIN — IPRATROPIUM BROMIDE AND ALBUTEROL SULFATE 1 AMPULE: .5; 2.5 SOLUTION RESPIRATORY (INHALATION) at 01:34

## 2021-07-20 RX ADMIN — METHYLPREDNISOLONE SODIUM SUCCINATE 125 MG: 125 INJECTION, POWDER, FOR SOLUTION INTRAMUSCULAR; INTRAVENOUS at 01:13

## 2021-07-20 NOTE — ED NOTES
Patient has been observed breathing while sleeping multiple times, respirations are easy and deep, saO2 maintained at % on RA.       [place], [facility]  07/20/21 9935

## 2021-07-20 NOTE — ED PROVIDER NOTES
[facility] ED  EMERGENCY DEPARTMENT ENCOUNTER      Pt Name: Teena Horner  MRN: [id number]  [place] 2/12/1929  Date of evaluation: 7/19/2021  Provider: ZACH Sinha CNP    CHIEF COMPLAINT       Chief Complaint   Patient presents with    Shortness of Breath         HISTORY OF PRESENT ILLNESS   (Location/Symptom, Timing/Onset,Context/Setting, Quality, Duration, Modifying Factors, Severity)  Note limiting factors. Teena Horner is a 80 y.o. male who presents to the emergency department with a chart reviewed past medical history of sick sinus syndrome, chronic atrial fibrillation, cardiomyopathy, congestive heart failure, diabetes, hypertension, hyperlipidemia for a chief complaint of diffuse shortness of breath. Patient has been short of breath for the past 3 months. His shortness of breath has gotten worse within the past couple of days. He was recently admitted for shortness of breath due to a left-sided pleural effusion and acute on chronic congestive heart failure. His symptoms are similar to his previous presentation. He is 100% on room air. He denies fevers or chills. His dyspnea is worse with exertion. He denies chest pain. He has no nausea or vomiting. No alleviating modifying factors. Nursing Notes were reviewed. REVIEW OF SYSTEMS    (2-9 systems for level 4, 10 or more for level 5)     Review of Systems   Constitutional: Negative for activity change, appetite change, fatigue and fever. HENT: Negative for congestion, ear pain, rhinorrhea, sore throat and trouble swallowing. Eyes: Negative for pain, discharge and redness. Respiratory: Positive for chest tightness and shortness of breath. Negative for cough and wheezing. Cardiovascular: Negative for chest pain and palpitations. Gastrointestinal: Negative for abdominal pain, blood in stool, constipation, diarrhea, nausea and vomiting. Endocrine: Negative for polydipsia and polyuria.    Genitourinary: Negative for decreased urine volume, dysuria, flank pain and hematuria. Musculoskeletal: Negative for arthralgias, back pain and myalgias. Skin: Negative for color change, rash and wound. Neurological: Negative for dizziness, syncope, weakness, light-headedness and headaches. Psychiatric/Behavioral: Negative for behavioral problems. All other systems reviewed and are negative. Except as noted above the remainder of the review of systems was reviewed and negative. PAST MEDICAL HISTORY     Past Medical History:   Diagnosis Date    Atrial fibrillation ([facility]     [facility] 9/18/2020    CHF (congestive heart failure) (HCC)     Diabetes mellitus ([facility]     Hypertension      Past Surgical History:   Procedure Laterality Date    BACK SURGERY      CARDIAC PACEMAKER PLACEMENT  7285    HERNIA REPAIR      THORACENTESIS Left 06/24/2021    450ml removed by Dr Miryam Bassett [phone]     Social History     Socioeconomic History    Marital status:      Spouse name: None    Number of children: None    Years of education: None    Highest education level: None   Occupational History    None   Tobacco Use    Smoking status: Never Smoker    Smokeless tobacco: Former User    Tobacco comment: quit 30 years ago   Substance and Sexual Activity    Alcohol use: Not Currently    Drug use: Not Currently    Sexual activity: None   Other Topics Concern    None   Social History Narrative    ** Merged History Encounter **          Social Determinants of Health     Financial Resource Strain:     Difficulty of Paying Living Expenses:    Food Insecurity:     Worried About Running Out of Food in the Last Year:     Ran Out of Food in the Last Year:    Transportation Needs:     Lack of Transportation (Medical):      Lack of Transportation (Non-Medical):    Physical Activity:     Days of Exercise per Week:     Minutes of Exercise per Session:    Stress:     Feeling of Stress :    Social Connections:     Frequency of Communication with Friends and Family:     Frequency of Social Gatherings with Friends and Family:     Attends Amish Services:     Active Member of Clubs or Organizations:     Attends Club or Organization Meetings:     Marital Status:    Intimate Partner Violence:     Fear of Current or Ex-Partner:     Emotionally Abused:     Physically Abused:     Sexually Abused:        SCREENINGS    Marcie Coma Scale  Eye Opening: Spontaneous  Best Verbal Response: Oriented  Best Motor Response: Obeys commands  Graham Coma Scale Score: 15        PHYSICAL EXAM    (up to 7 for level 4, 8 or more for level 5)     ED Triage Vitals [07/19/21 2350]   BP Temp Temp Source Pulse Resp SpO2 Height Weight   (!) 139/92 98 °F (36.7 °C) Oral 60 25 99 % -- 150 lb (68 kg)       Physical Exam  Vitals and nursing note reviewed. Constitutional:       General: He is not in acute distress. Appearance: He is well-developed. He is not diaphoretic. HENT:      Head: Normocephalic and atraumatic. Nose: Nose normal.   Eyes:      Conjunctiva/sclera: Conjunctivae normal.      Pupils: Pupils are equal, round, and reactive to light. Cardiovascular:      Rate and Rhythm: Normal rate and regular rhythm. Heart sounds: Normal heart sounds. Pulmonary:      Breath sounds: Examination of the right-upper field reveals wheezing. Examination of the left-upper field reveals wheezing. Wheezing present. Abdominal:      General: Bowel sounds are normal.      Palpations: Abdomen is soft. Tenderness: There is no abdominal tenderness. Musculoskeletal:      Cervical back: Normal range of motion and neck supple. Skin:     General: Skin is warm and dry. Capillary Refill: Capillary refill takes less than 2 seconds. Findings: No rash. Neurological:      Mental Status: He is alert and oriented to person, place, and time. Cranial Nerves: No cranial nerve deficit.    Psychiatric: Behavior: Behavior normal.         RESULTS     EKG: All EKG's are interpreted by the Emergency Department Physician who either signs or Co-signsthis chart in the absence of a cardiologist.    Atrial fibrillation rate of 99 bpm, left bundle branch block consistent from previous, mild left axis deviation    RADIOLOGY:   Non-plain filmimages such as CT, Ultrasound and MRI are read by the radiologist. Plain radiographic images are visualized and preliminarily interpreted by the emergency physician with the below findings:    Left sided pleural effusion similar to previous, scattered vascular congestion     Interpretation per the Radiologist below, if available at the time ofthis note:    XR CHEST PORTABLE   Final Result   ATELECTASIS, INFILTRATE LEFT LOWER LOBE. TRACE TO SMALL LEFT PLEURAL EFFUSION            ED BEDSIDE ULTRASOUND:   Performed by ED Physician - none    LABS:  Labs Reviewed   COMPREHENSIVE METABOLIC PANEL - Abnormal; Notable for the following components:       Result Value    CO2 19 (*)     Anion Gap 20 (*)     Glucose 123 (*)     BUN 26 (*)     Total Bilirubin 1.9 (*)     All other components within normal limits   CBC WITH AUTO DIFFERENTIAL - Abnormal; Notable for the following components:    RBC 4.25 (*)     Hemoglobin 13.7 (*)     Hematocrit 41.0 (*)     MCH 32.4 (*)     RDW 16.1 (*)     All other components within normal limits   APTT - Abnormal; Notable for the following components:    aPTT 37.8 (*)     All other components within normal limits   PROTIME-INR - Abnormal; Notable for the following components:    Protime 19.3 (*)     All other components within normal limits   TROPONIN   BRAIN NATRIURETIC PEPTIDE       All other labs were within normal range or not returned as of this dictation.     EMERGENCY DEPARTMENT COURSE and DIFFERENTIAL DIAGNOSIS/MDM:   Vitals:    Vitals:    07/20/21 0030 07/20/21 0100 07/20/21 0130 07/20/21 0200   BP: 124/88 (!) 132/92 119/80    Pulse: 71 85 75 89   Resp: 20 9 21 11   Temp:       TempSrc:       SpO2: 99% 100% 99% 97%   Weight:                MDM   Patient is a 51-year-old male presenting the emergency department with chief complaint of shortness of breath. Patient is hemodynamically stable nontoxic-appearing 97% on room air. The patient was recently admitted for congestive heart failure. Cardiac work-up initiated and patient will be reevaluated. She has some mild wheezing on physical exam and he is given a breathing treatment and Solu-Medrol. Some improvement noted after treatment. There could be a left-sided small infiltrate but it is very consistent from previous and patient is not experiencing any fevers or cough so is not really consistent with pneumonia. I think this is more of a COPD exacerbation rather than a CHF exacerbation but the patient is instructed to see Dr. Dianna Whittington tomorrow anyways in the office. Patient is instructed to return back to the emergency department within the next 24 hours if he worsens in any way or any new symptoms develop. The patient is discharged ambulatory in a stable condition with stable vital signs on room air 97%. CRITICAL CARE TIME       CONSULTS:  None    PROCEDURES:  Unless otherwise noted below, none     Procedures    FINAL IMPRESSION      1. Systolic congestive heart failure, unspecified HF chronicity ([facility]    2.  COPD exacerbation [facility])          DISPOSITION/PLAN   DISPOSITION Decision To Discharge 07/20/2021 01:39:11 AM      PATIENT REFERRED TO:  Jason Yu MD  [facility]  [phone]    Schedule an appointment as soon as possible for a visit in 1 day        DISCHARGE MEDICATIONS:  Discharge Medication List as of 7/20/2021  1:39 AM             (Please notethat portions of this note were completed with a voice recognition program.  Efforts were made to edit the dictations but occasionally words are mis-transcribed.)    ZACH Ritchie CNP (electronically signed)  Attending Emergency Physician         Minerva Santamaria, ZACH - CNP  07/21/21 7325

## 2021-07-21 RX ORDER — POTASSIUM CHLORIDE 750 MG/1
10 TABLET, EXTENDED RELEASE ORAL DAILY
Qty: 30 TABLET | Refills: 5 | Status: ON HOLD | OUTPATIENT
Start: 2021-07-21 | End: 2021-08-13 | Stop reason: HOSPADM

## 2021-07-21 ASSESSMENT — ENCOUNTER SYMPTOMS
EYE DISCHARGE: 0
EYE REDNESS: 0
WHEEZING: 0
TROUBLE SWALLOWING: 0
RHINORRHEA: 0
EYE PAIN: 0
BACK PAIN: 0
NAUSEA: 0
SHORTNESS OF BREATH: 1
CONSTIPATION: 0
BLOOD IN STOOL: 0
CHEST TIGHTNESS: 1
DIARRHEA: 0
VOMITING: 0
SORE THROAT: 0
ABDOMINAL PAIN: 0
COUGH: 0
COLOR CHANGE: 0

## 2021-07-21 NOTE — TELEPHONE ENCOUNTER
Potassium 10 Meq daily is ok with me.      Electronically signed by Javier Quintanilla DO on 7/21/2021 at 3:41 PM

## 2021-07-22 ENCOUNTER — HOSPITAL ENCOUNTER (EMERGENCY)
Age: 86
Discharge: HOME | End: 2021-07-22
Attending: EMERGENCY MEDICINE
Payer: MEDICARE

## 2021-07-22 ENCOUNTER — APPOINTMENT (OUTPATIENT)
Dept: GENERAL RADIOLOGY | Age: 86
End: 2021-07-22
Payer: MEDICARE

## 2021-07-22 ENCOUNTER — TELEPHONE (OUTPATIENT)
Dept: OTHER | Facility: CLINIC | Age: 86
End: 2021-07-22

## 2021-07-22 ENCOUNTER — TELEPHONE (OUTPATIENT)
Dept: CARDIOLOGY CLINIC | Age: 86
End: 2021-07-22

## 2021-07-22 VITALS
RESPIRATION RATE: 20 BRPM | SYSTOLIC BLOOD PRESSURE: 104 MMHG | BODY MASS INDEX: 22.15 KG/M2 | TEMPERATURE: 98.2 F | OXYGEN SATURATION: 99 % | WEIGHT: 150 LBS | DIASTOLIC BLOOD PRESSURE: 73 MMHG | HEART RATE: 64 BPM

## 2021-07-22 DIAGNOSIS — J44.1 COPD EXACERBATION (HCC): Primary | ICD-10-CM

## 2021-07-22 LAB
ALBUMIN SERPL-MCNC: 4.5 G/DL (ref 3.5–4.6)
ALP BLD-CCNC: 114 U/L (ref 35–104)
ALT SERPL-CCNC: 36 U/L (ref 0–41)
ANION GAP SERPL CALCULATED.3IONS-SCNC: 20 MEQ/L (ref 9–15)
AST SERPL-CCNC: 58 U/L (ref 0–40)
BASOPHILS ABSOLUTE: 0 K/UL (ref 0–0.2)
BASOPHILS RELATIVE PERCENT: 0.3 %
BILIRUB SERPL-MCNC: 1.8 MG/DL (ref 0.2–0.7)
BILIRUBIN URINE: NEGATIVE
BLOOD, URINE: NEGATIVE
BUN BLDV-MCNC: 30 MG/DL (ref 8–23)
CALCIUM SERPL-MCNC: 9.5 MG/DL (ref 8.5–9.9)
CHLORIDE BLD-SCNC: 99 MEQ/L (ref 95–107)
CLARITY: CLEAR
CO2: 20 MEQ/L (ref 20–31)
COLOR: YELLOW
CREAT SERPL-MCNC: 1.16 MG/DL (ref 0.7–1.2)
EOSINOPHILS ABSOLUTE: 0.1 K/UL (ref 0–0.7)
EOSINOPHILS RELATIVE PERCENT: 0.7 %
GFR AFRICAN AMERICAN: >60
GFR NON-AFRICAN AMERICAN: 58.8
GLOBULIN: 3.1 G/DL (ref 2.3–3.5)
GLUCOSE BLD-MCNC: 161 MG/DL (ref 70–99)
GLUCOSE URINE: NEGATIVE MG/DL
HCT VFR BLD CALC: 43.2 % (ref 42–52)
HEMOGLOBIN: 14.3 G/DL (ref 14–18)
KETONES, URINE: NEGATIVE MG/DL
LEUKOCYTE ESTERASE, URINE: NEGATIVE
LIPASE: 32 U/L (ref 12–95)
LYMPHOCYTES ABSOLUTE: 2.7 K/UL (ref 1–4.8)
LYMPHOCYTES RELATIVE PERCENT: 34.6 %
MCH RBC QN AUTO: 32.4 PG (ref 27–31.3)
MCHC RBC AUTO-ENTMCNC: 33.1 % (ref 33–37)
MCV RBC AUTO: 97.7 FL (ref 80–100)
MONOCYTES ABSOLUTE: 0.7 K/UL (ref 0.2–0.8)
MONOCYTES RELATIVE PERCENT: 8.9 %
NEUTROPHILS ABSOLUTE: 4.3 K/UL (ref 1.4–6.5)
NEUTROPHILS RELATIVE PERCENT: 55.5 %
NITRITE, URINE: NEGATIVE
PDW BLD-RTO: 17.1 % (ref 11.5–14.5)
PH UA: 7 (ref 5–9)
PLATELET # BLD: 188 K/UL (ref 130–400)
POTASSIUM SERPL-SCNC: 3.9 MEQ/L (ref 3.4–4.9)
PRO-BNP: NORMAL PG/ML
PROTEIN UA: ABNORMAL MG/DL
RBC # BLD: 4.42 M/UL (ref 4.7–6.1)
SODIUM BLD-SCNC: 139 MEQ/L (ref 135–144)
SPECIFIC GRAVITY UA: 1.01 (ref 1–1.03)
TOTAL PROTEIN: 7.6 G/DL (ref 6.3–8)
TROPONIN: 0.01 NG/ML (ref 0–0.01)
URINE REFLEX TO CULTURE: ABNORMAL
UROBILINOGEN, URINE: 1 E.U./DL
WBC # BLD: 7.7 K/UL (ref 4.8–10.8)

## 2021-07-22 PROCEDURE — 83690 ASSAY OF LIPASE: CPT

## 2021-07-22 PROCEDURE — 80053 COMPREHEN METABOLIC PANEL: CPT

## 2021-07-22 PROCEDURE — 36415 COLL VENOUS BLD VENIPUNCTURE: CPT

## 2021-07-22 PROCEDURE — 71045 X-RAY EXAM CHEST 1 VIEW: CPT

## 2021-07-22 PROCEDURE — 85025 COMPLETE CBC W/AUTO DIFF WBC: CPT

## 2021-07-22 PROCEDURE — 84484 ASSAY OF TROPONIN QUANT: CPT

## 2021-07-22 PROCEDURE — 99284 EMERGENCY DEPT VISIT MOD MDM: CPT

## 2021-07-22 PROCEDURE — 83880 ASSAY OF NATRIURETIC PEPTIDE: CPT

## 2021-07-22 PROCEDURE — 81003 URINALYSIS AUTO W/O SCOPE: CPT

## 2021-07-22 RX ORDER — PREDNISONE 10 MG/1
40 TABLET ORAL DAILY
Qty: 20 TABLET | Refills: 0 | Status: SHIPPED | OUTPATIENT
Start: 2021-07-22 | End: 2021-07-27

## 2021-07-22 ASSESSMENT — ENCOUNTER SYMPTOMS
DIARRHEA: 0
CHEST TIGHTNESS: 0
VOMITING: 0
BACK PAIN: 0
NAUSEA: 0
ABDOMINAL DISTENTION: 0
WHEEZING: 0
EYE DISCHARGE: 0
ABDOMINAL PAIN: 0
EYE REDNESS: 0
COUGH: 0
SHORTNESS OF BREATH: 1
RHINORRHEA: 0
SORE THROAT: 0
CONSTIPATION: 0

## 2021-07-22 NOTE — ED NOTES
Pt's son called to check in. He states he is en route to  the patient. Pt updated.      Scott Prieto RN  07/22/21 2122

## 2021-07-22 NOTE — ED PROVIDER NOTES
[facility] ED  EMERGENCY MEDICINE     Pt Name: Tia Tillman  MRN: [id number]  [employer] 2/12/1929  Date of evaluation: 7/22/2021  PCP:    Annette Gibson  Provider: Bartolo Zavala DO    CHIEF COMPLAINT       Chief Complaint   Patient presents with    Shortness of Breath       HISTORY OF PRESENT ILLNESS    HPI     57-year-old male with history of A. fib, CHF, COPD presents to the emergency department with complaint of shortness of breath. Patient apparently lives alone and called the ambulance. Ambulance gave him nebulizer treatment as well as Solu-Medrol. Patient was satting at 100% on room air. He is not on chronic oxygen therapy. Patient takes Lasix 40 mg once per day. He gets admitted multiple times secondary to CHF issues. Patient states he has been taking his medications been compliant. Denies any increased leg swelling, abdominal pain, nausea, vomiting, or chest pain. He was recently seen here on the 19th for similar. At that time, he was also given Solu-Medrol and nebulizer treatment which he did feel better afterwards. Triage notes and Nursing notes were reviewed by myself. Any discrepancies are addressed above.     PAST MEDICAL HISTORY     Past Medical History:   Diagnosis Date    Atrial fibrillation ([address]     Cardiomyopathy ([facility] 9/18/2020    CHF (congestive heart failure) (HCC)     Diabetes mellitus ([facility]     Hypertension        SURGICAL HISTORY       Past Surgical History:   Procedure Laterality Date    BACK SURGERY      CARDIAC PACEMAKER PLACEMENT  9504    HERNIA REPAIR      THORACENTESIS Left 06/24/2021    450ml removed by Dr Miryam Bassett [phone]       CURRENT MEDICATIONS       Previous Medications    APIXABAN (ELIQUIS) 2.5 MG TABS TABLET    Take 1 tablet by mouth 2 times daily    APIXABAN (ELIQUIS) 2.5 MG TABS TABLET    Take 2.5 mg by mouth 2 times daily    ASPIRIN 81 MG CHEWABLE TABLET    Take 1 tablet by mouth daily    FLUTICASONE-SALMETEROL (ADVAIR DISKUS) 250-50 MCG/DOSE AEPB    Inhale 1 puff into the lungs every 12 hours for 14 days    FLUTICASONE-SALMETEROL (ADVAIR) 100-50 MCG/DOSE DISKUS INHALER    Inhale 1 puff into the lungs every 12 hours    FUROSEMIDE (LASIX) 40 MG TABLET    Take 1 tablet by mouth daily    METOPROLOL TARTRATE (LOPRESSOR) 25 MG TABLET    TAKE 1 TABLET BY MOUTH TWICE DAILY    METOPROLOL TARTRATE (LOPRESSOR) 50 MG TABLET    Take 0.5 tablets by mouth 2 times daily    POTASSIUM CHLORIDE (KLOR-CON M) 10 MEQ EXTENDED RELEASE TABLET    Take 1 tablet by mouth daily    TIOTROPIUM (SPIRIVA RESPIMAT) 2.5 MCG/ACT AERS INHALER    Inhale 2 puffs into the lungs daily    TIOTROPIUM (SPIRIVA) 18 MCG INHALATION CAPSULE    Inhale 18 mcg into the lungs daily       ALLERGIES     No Known Allergies    FAMILY HISTORY     History reviewed. No pertinent family history. SOCIAL HISTORY       Social History     Socioeconomic History    Marital status:      Spouse name: None    Number of children: None    Years of education: None    Highest education level: None   Occupational History    None   Tobacco Use    Smoking status: Never Smoker    Smokeless tobacco: Former User    Tobacco comment: quit 30 years ago   Vaping Use    Vaping Use: Never used   Substance and Sexual Activity    Alcohol use: Not Currently    Drug use: Not Currently    Sexual activity: None   Other Topics Concern    None   Social History Narrative    ** Merged History Encounter **          Social Determinants of Health     Financial Resource Strain:     Difficulty of Paying Living Expenses:    Food Insecurity:     Worried About Running Out of Food in the Last Year:     Ran Out of Food in the Last Year:    Transportation Needs:     Lack of Transportation (Medical):      Lack of Transportation (Non-Medical):    Physical Activity:     Days of Exercise per Week:     Minutes of Exercise per Session:    Stress:     Feeling of Stress :    Social Connections:     Frequency of Communication with Friends and Family:     Frequency of Social Gatherings with Friends and Family:     Attends Church Services:     Active Member of Clubs or Organizations:     Attends Club or Organization Meetings:     Marital Status:    Intimate Partner Violence:     Fear of Current or Ex-Partner:     Emotionally Abused:     Physically Abused:     Sexually Abused:        REVIEW OF SYSTEMS     Review of Systems   Constitutional: Negative for activity change, appetite change, chills, fatigue and fever. HENT: Negative for congestion, postnasal drip, rhinorrhea, sneezing and sore throat. Eyes: Negative for discharge and redness. Respiratory: Positive for shortness of breath. Negative for cough, chest tightness and wheezing. Cardiovascular: Negative for chest pain and palpitations. Gastrointestinal: Negative for abdominal distention, abdominal pain, constipation, diarrhea, nausea and vomiting. Genitourinary: Negative for difficulty urinating, dysuria and flank pain. Musculoskeletal: Negative for back pain and myalgias. Skin: Negative for rash. Allergic/Immunologic: Negative for environmental allergies and food allergies. Neurological: Negative for dizziness, weakness, light-headedness and numbness. Psychiatric/Behavioral: Negative for sleep disturbance. The patient is nervous/anxious. Except as noted above the remainder of the review of systems was reviewed and is negative.   SCREENINGS        [place] Coma Scale  Eye Opening: Spontaneous  Best Verbal Response: Oriented  Best Motor Response: Obeys commands  Marcie Coma Scale Score: 15               PHYSICAL EXAM    (up to 7 for level 4, 8 or more for level 5)     ED Triage Vitals   BP Temp Temp Source Pulse Resp SpO2 Height Weight   07/22/21 1255 07/22/21 1256 07/22/21 1256 07/22/21 1255 07/22/21 1256 07/22/21 1256 -- 07/22/21 1256   119/79 98.2 °F (36.8 °C) Oral 73 20 96 %  150 lb (68 kg)       Physical Exam  Constitutional: Vascular congestion. LABS:  Labs Reviewed   CBC WITH AUTO DIFFERENTIAL - Abnormal; Notable for the following components:       Result Value    RBC 4.42 (*)     MCH 32.4 (*)     RDW 17.1 (*)     All other components within normal limits   COMPREHENSIVE METABOLIC PANEL - Abnormal; Notable for the following components:    Anion Gap 20 (*)     Glucose 161 (*)     BUN 30 (*)     GFR Non- 58.8 (*)     Total Bilirubin 1.8 (*)     Alkaline Phosphatase 114 (*)     AST 58 (*)     All other components within normal limits   TROPONIN - Abnormal; Notable for the following components:    Troponin 0.012 (*)     All other components within normal limits    Narrative:     Kim Shackelford tel. [phone],  Trop results called to and read back by Dr Bobbi Stern, 07/22/2021 14:23, by [address] - Abnormal; Notable for the following components:    Protein, UA TRACE (*)     All other components within normal limits   LIPASE   BRAIN NATRIURETIC PEPTIDE    Narrative:     Kim Shackelford tel. [phone],  Trop results called to and read back by Dr Bobbi Stern, 07/22/2021 14:23, by [facility]       All other labs were within normal range or not returned as of this dictation. Please note, any cultures that may have been sent were not resulted at the time of this patient visit. EMERGENCY DEPARTMENT COURSE and Medical Decision Making:     Vitals:    Vitals:    07/22/21 1330 07/22/21 1400 07/22/21 1443 07/22/21 1445   BP: 110/78 115/73 111/74    Pulse: 67 66 67 69   Resp:       Temp:       TempSrc:       SpO2: 100% 99% 94% 100%   Weight:           PROCEDURES: (None if blank)  Procedures       MDM     Patient's lab work was unremarkable. BNP was elevated but is chronically elevated. Slight increase in his troponin of 0.012. Patient is not having any chest pain. He has chronic CHF which he is already taking Lasix for. Patient feels better after Solu-Medrol and 1 nebulizer treatment via EMS.   Patient has been satting 100% on room air throughout his entire stay. Hemodynamically stable. Patient will be discharged in stable condition with prednisone.     Strict return precautions and follow up instructions were discussed with the patient with which the patient agrees    ED Medications administered this visit:  Medications - No data to display      FINAL IMPRESSION      1. COPD exacerbation [facility])          DISPOSITION/PLAN   DISPOSITION Decision To Discharge 07/22/2021 02:51:11 PM      PATIENT REFERRED TO:  Santi Sena  [address] [facility] [address] [address]  [facility]:  New Prescriptions    PREDNISONE (DELTASONE) 10 MG TABLET    Take 4 tablets by mouth daily for 5 days              Serge Peters DO (electronically signed)  Attending Physician, Emergency Department          Serge Peters DO  07/22/21 1521

## 2021-07-22 NOTE — TELEPHONE ENCOUNTER
Ara calling back as patient states it feels like he is breathing through straw. She is with the therapy dept and is also trying to get a nurse out there to listen to his lungs and get a better evaluation.

## 2021-07-22 NOTE — ED NOTES
Pt updated that we are still waiting for son to arrive to take him home. Denies need at this time.      Ovidio Carrillo, DWAYNE  07/22/21 0591

## 2021-07-22 NOTE — TELEPHONE ENCOUNTER
Ara with [facility] calling as patient is having some shortness of breth. He was seen Tuesday and noted it to be slight but today it has worsened. No CP or edema. He is taking his Lasix and all other prescribed medications. BP today 108/74, HR running 50-60's and 99% on Pulse Ox. Please contact Ara or patient with any changes or advise. Thank you.

## 2021-07-23 ENCOUNTER — TELEPHONE (OUTPATIENT)
Dept: OTHER | Facility: CLINIC | Age: 86
End: 2021-07-23

## 2021-07-23 NOTE — TELEPHONE ENCOUNTER
Nurse Access contact pt's son with  on the phone.  notified pt of appt scheduled for Tuesday 7-27 @1:30 with Dr. Judy Almaguer.      Dawson Lopez

## 2021-07-23 NOTE — TELEPHONE ENCOUNTER
Nurse Access contacted Dr. Aleena Higgins Office to schedule ED f/u appt. Spoke with Office, appt scheduled for Tuesday 7-27 @1:30 with Dr. Aleena Higgins.

## 2021-07-26 NOTE — TELEPHONE ENCOUNTER
Needs to see PCP for CXR/labs or go to ER.      Electronically signed by Christofer Eduardo DO on 7/26/2021 at 8:07 AM

## 2021-07-28 ENCOUNTER — OFFICE VISIT (OUTPATIENT)
Dept: CARDIOLOGY CLINIC | Age: 86
End: 2021-07-28
Payer: MEDICARE

## 2021-07-28 VITALS
SYSTOLIC BLOOD PRESSURE: 118 MMHG | BODY MASS INDEX: 22.22 KG/M2 | RESPIRATION RATE: 18 BRPM | OXYGEN SATURATION: 94 % | DIASTOLIC BLOOD PRESSURE: 62 MMHG | HEART RATE: 66 BPM | WEIGHT: 150 LBS | HEIGHT: 69 IN

## 2021-07-28 DIAGNOSIS — Z95.0 PRESENCE OF CARDIAC PACEMAKER: ICD-10-CM

## 2021-07-28 DIAGNOSIS — I50.43 CHF (CONGESTIVE HEART FAILURE), NYHA CLASS I, ACUTE ON CHRONIC, COMBINED (HCC): Primary | ICD-10-CM

## 2021-07-28 DIAGNOSIS — I34.0 NONRHEUMATIC MITRAL VALVE REGURGITATION: ICD-10-CM

## 2021-07-28 DIAGNOSIS — R06.02 SOB (SHORTNESS OF BREATH): ICD-10-CM

## 2021-07-28 DIAGNOSIS — I49.5 SSS (SICK SINUS SYNDROME) (HCC): ICD-10-CM

## 2021-07-28 DIAGNOSIS — I48.20 CHRONIC ATRIAL FIBRILLATION (HCC): ICD-10-CM

## 2021-07-28 DIAGNOSIS — I42.9 CARDIOMYOPATHY, UNSPECIFIED TYPE (HCC): ICD-10-CM

## 2021-07-28 PROCEDURE — 1123F ACP DISCUSS/DSCN MKR DOCD: CPT | Performed by: INTERNAL MEDICINE

## 2021-07-28 PROCEDURE — 1036F TOBACCO NON-USER: CPT | Performed by: INTERNAL MEDICINE

## 2021-07-28 PROCEDURE — G8427 DOCREV CUR MEDS BY ELIG CLIN: HCPCS | Performed by: INTERNAL MEDICINE

## 2021-07-28 PROCEDURE — 4040F PNEUMOC VAC/ADMIN/RCVD: CPT | Performed by: INTERNAL MEDICINE

## 2021-07-28 PROCEDURE — 1111F DSCHRG MED/CURRENT MED MERGE: CPT | Performed by: INTERNAL MEDICINE

## 2021-07-28 PROCEDURE — 99214 OFFICE O/P EST MOD 30 MIN: CPT | Performed by: INTERNAL MEDICINE

## 2021-07-28 PROCEDURE — G8420 CALC BMI NORM PARAMETERS: HCPCS | Performed by: INTERNAL MEDICINE

## 2021-07-28 ASSESSMENT — ENCOUNTER SYMPTOMS
STRIDOR: 0
EYES NEGATIVE: 1
BLOOD IN STOOL: 0
GASTROINTESTINAL NEGATIVE: 1
COUGH: 0
NAUSEA: 0
SHORTNESS OF BREATH: 1
WHEEZING: 0
CHEST TIGHTNESS: 0

## 2021-07-28 NOTE — PROGRESS NOTES
Subsequent Progress Note  Patient: Russel Uriarte  YOB: 1929  MRN: [id number]    Chief Complaint: SOB  Chief Complaint   Patient presents with   [address] from [facility] 7/19-7/20    Congestive Heart Failure    COPD    Fatigue    Shortness of Breath     COPD       CV Data:    Subjective/HPI:  used. Dr. Halima Cordero Pt. Recent recurrent hosp for CHF due to noncompliance. He is very anxious and gets his worked up and thus SOB. Vitals are stalbe. He is now compliant with meds. Denies CP. LE edema resolved. LVEF 20-25% Pt has a single chmaber PPM. He was previously felt not a good candidate for ICD. He has chronic AF on NOAC. He wants to travel to [place] in the near future. EKG:    Past Medical History:   Diagnosis Date    [facility]rial [facility] ([facility]     Cardiomyopathy ([facility] 9/18/2020    CHF (congestive heart failure) ([facility]     Diabetes mellitus ([facility]     Hypertension        Past Surgical History:   Procedure Laterality Date    BACK SURGERY      CARDIAC PACEMAKER PLACEMENT  8028    HERNIA REPAIR      THORACENTESIS Left 06/24/2021    450ml removed by Dr Peter Castro [phone]       No family history on file.     Social History     Socioeconomic History    Marital status:      Spouse name: Not on file    Number of children: Not on file    Years of education: Not on file    Highest education level: Not on file   Occupational History    Not on file   Tobacco Use    Smoking status: Never Smoker    Smokeless tobacco: Former User    Tobacco comment: quit 30 years ago   Vaping Use    Vaping Use: Never used   Substance and Sexual Activity    Alcohol use: Not Currently    Drug use: Not Currently    Sexual activity: Not on file   Other Topics Concern    Not on file   Social History Narrative    ** Merged History Encounter **          Social Determinants of Health     Financial Resource Strain:     Difficulty of Paying Living Expenses:    Food Insecurity:     Worried About Running Out of Food in the Last Year:     [address] in the Last Year:    Transportation Needs:     Lack of Transportation (Medical):  Lack of Transportation (Non-Medical):    Physical Activity:     Days of Exercise per Week:     Minutes of Exercise per Session:    Stress:     Feeling of Stress :    Social Connections:     Frequency of Communication with Friends and Family:     Frequency of Social Gatherings with Friends and Family:     Attends Hindu Services:     Active Member of Clubs or Organizations:     Attends Club or Organization Meetings:     Marital Status:    Intimate Partner Violence:     Fear of Current or Ex-Partner:     Emotionally Abused:     Physically Abused:     Sexually Abused:        No Known Allergies    Current Outpatient Medications   Medication Sig Dispense Refill    potassium chloride (KLOR-CON M) 10 MEQ extended release tablet Take 1 tablet by mouth daily 30 tablet 5    furosemide (LASIX) 40 MG tablet Take 1 tablet by mouth daily 30 tablet 6    metoprolol tartrate (LOPRESSOR) 50 MG tablet Take 0.5 tablets by mouth 2 times daily 60 tablet 5    aspirin 81 MG chewable tablet Take 1 tablet by mouth daily 30 tablet 3    fluticasone-salmeterol (ADVAIR) 100-50 MCG/DOSE diskus inhaler Inhale 1 puff into the lungs every 12 hours      tiotropium (SPIRIVA) 18 MCG inhalation capsule Inhale 18 mcg into the lungs daily      tiotropium (SPIRIVA RESPIMAT) 2.5 MCG/ACT AERS inhaler Inhale 2 puffs into the lungs daily      apixaban (ELIQUIS) 2.5 MG TABS tablet Take 1 tablet by mouth 2 times daily 60 tablet 5    fluticasone-salmeterol (ADVAIR DISKUS) 250-50 MCG/DOSE AEPB Inhale 1 puff into the lungs every 12 hours for 14 days 1 Inhaler 0     No current facility-administered medications for this visit. Review of Systems:   Review of Systems   Constitutional: Negative. Negative for diaphoresis and fatigue. HENT: Negative.     Eyes: Lab Results   Component Value Date    TRIG 99 06/22/2021    TRIG 55 08/09/2020     Lab Results   Component Value Date    HDL 38 (L) 06/22/2021    HDL 40 08/09/2020     Lab Results   Component Value Date    LDLCALC 65 06/22/2021    [address] 08/09/2020     No results found for: LABVLDL, VLDL  No results found for: CHOLHDLRATIO  CMP:    Lab Results   Component Value Date     07/22/2021    K 3.9 07/22/2021    K 4.1 07/05/2021    CL 99 07/22/2021    CO2 20 07/22/2021    BUN 30 07/22/2021    CREATININE 1.16 07/22/2021    GFRAA >60.0 07/22/2021    LABGLOM 58.8 07/22/2021    GLUCOSE 161 07/22/2021    PROT 7.6 07/22/2021    LABALBU 4.5 07/22/2021    CALCIUM 9.5 07/22/2021    BILITOT 1.8 07/22/2021    ALKPHOS 114 07/22/2021    AST 58 07/22/2021    ALT 36 07/22/2021     BMP:    Lab Results   Component Value Date     07/22/2021    K 3.9 07/22/2021    K 4.1 07/05/2021    CL 99 07/22/2021    CO2 20 07/22/2021    BUN 30 07/22/2021    LABALBU 4.5 07/22/2021    CREATININE 1.16 07/22/2021    CALCIUM 9.5 07/22/2021    GFRAA >60.0 07/22/2021    LABGLOM 58.8 07/22/2021    GLUCOSE 161 07/22/2021     Magnesium:    Lab Results   Component Value Date    MG 2.0 07/04/2021     TSH:  Lab Results   Component Value Date    TSH 1.770 07/05/2021       Patient Active Problem List   Diagnosis    Chronic atrial fibrillation (HCC)    SSS (sick sinus syndrome) (HCC)    Cardiomyopathy (HCC)    SOB (shortness of breath)    Pleural effusion on right    Atelectasis of right lung    CHF (congestive heart failure), NYHA class I, acute on chronic, combined (HCC)    Acute on chronic combined systolic and diastolic CHF (congestive heart failure) ([facility]       Medications Discontinued During This Encounter   Medication Reason    apixaban (ELIQUIS) 2.5 MG TABS tablet DUPLICATE    metoprolol tartrate (LOPRESSOR) 25 MG tablet DUPLICATE       Modified Medications    No medications on file       No orders of the defined types were placed in this encounter. Assessment/Plan:    1. CHF (congestive heart failure), NYHA class I, acute on chronic, combined ([facility]   compensated. 2. Chronic atrial fibrillation (HCC)   NOAC and rate control     3. Cardiomyopathy, unspecified type ([facility]       4. Presence of cardiac pacemaker       5. SSS (sick sinus syndrome) ([facility])       6. SOB (shortness of breath)  Stable     7. Nonrheumatic mitral valve regurgitation   2+      F/u Dr. Yahir River   Counseling:  Heart Healthy Lifestyle, Low Salt Diet, Take Precautions to Prevent Falls and Walk Daily    Return in about 2 weeks (around 8/11/2021).       Electronically signed by Nova Young MD on 7/28/2021 at 2:40 PM

## 2021-08-09 ENCOUNTER — APPOINTMENT (OUTPATIENT)
Dept: GENERAL RADIOLOGY | Age: 86
DRG: 292 | End: 2021-08-09
Payer: MEDICARE

## 2021-08-09 ENCOUNTER — HOSPITAL ENCOUNTER (INPATIENT)
Age: 86
LOS: 3 days | Discharge: HOME HEALTH | DRG: 292 | End: 2021-08-13
Attending: INTERNAL MEDICINE | Admitting: INTERNAL MEDICINE
Payer: MEDICARE

## 2021-08-09 DIAGNOSIS — I50.43 ACUTE ON CHRONIC COMBINED SYSTOLIC AND DIASTOLIC CHF (CONGESTIVE HEART FAILURE) (HCC): ICD-10-CM

## 2021-08-09 DIAGNOSIS — I50.33 ACUTE ON CHRONIC DIASTOLIC CHF (CONGESTIVE HEART FAILURE) (HCC): Primary | ICD-10-CM

## 2021-08-09 LAB
ALBUMIN SERPL-MCNC: 4 G/DL (ref 3.5–4.6)
ALP BLD-CCNC: 105 U/L (ref 35–104)
ALT SERPL-CCNC: 20 U/L (ref 0–41)
ANION GAP SERPL CALCULATED.3IONS-SCNC: 15 MEQ/L (ref 9–15)
AST SERPL-CCNC: 30 U/L (ref 0–40)
BACTERIA: NEGATIVE /HPF
BASE EXCESS ARTERIAL: -2 (ref -3–3)
BASOPHILS ABSOLUTE: 0.1 K/UL (ref 0–0.2)
BASOPHILS RELATIVE PERCENT: 1.3 %
BILIRUB SERPL-MCNC: 1.9 MG/DL (ref 0.2–0.7)
BILIRUBIN URINE: ABNORMAL
BLOOD, URINE: NEGATIVE
BUN BLDV-MCNC: 17 MG/DL (ref 8–23)
CALCIUM IONIZED: 1.16 MMOL/L (ref 1.12–1.32)
CALCIUM SERPL-MCNC: 9.3 MG/DL (ref 8.5–9.9)
CHLORIDE BLD-SCNC: 104 MEQ/L (ref 95–107)
CLARITY: CLEAR
CO2: 21 MEQ/L (ref 20–31)
COLOR: ABNORMAL
CREAT SERPL-MCNC: 0.97 MG/DL (ref 0.7–1.2)
EOSINOPHILS ABSOLUTE: 0.1 K/UL (ref 0–0.7)
EOSINOPHILS RELATIVE PERCENT: 2.2 %
EPITHELIAL CELLS, UA: ABNORMAL /HPF (ref 0–5)
GFR AFRICAN AMERICAN: >60
GFR AFRICAN AMERICAN: >60
GFR NON-AFRICAN AMERICAN: >60
GFR NON-AFRICAN AMERICAN: >60
GLOBULIN: 3 G/DL (ref 2.3–3.5)
GLUCOSE BLD-MCNC: 137 MG/DL (ref 60–115)
GLUCOSE BLD-MCNC: 161 MG/DL (ref 70–99)
GLUCOSE URINE: NEGATIVE MG/DL
HCO3 ARTERIAL: 22.5 MMOL/L (ref 21–29)
HCT VFR BLD CALC: 40.4 % (ref 42–52)
HEMOGLOBIN: 12.7 GM/DL (ref 13.5–17.5)
HEMOGLOBIN: 13.6 G/DL (ref 14–18)
HYALINE CASTS: ABNORMAL /LPF (ref 0–5)
KETONES, URINE: ABNORMAL MG/DL
LACTATE: 1.69 MMOL/L (ref 0.4–2)
LACTIC ACID: 3.4 MMOL/L (ref 0.5–2.2)
LEUKOCYTE ESTERASE, URINE: ABNORMAL
LYMPHOCYTES ABSOLUTE: 2.5 K/UL (ref 1–4.8)
LYMPHOCYTES RELATIVE PERCENT: 47.2 %
MAGNESIUM: 2.2 MG/DL (ref 1.7–2.4)
MCH RBC QN AUTO: 33.1 PG (ref 27–31.3)
MCHC RBC AUTO-ENTMCNC: 33.7 % (ref 33–37)
MCV RBC AUTO: 98.1 FL (ref 80–100)
MONOCYTES ABSOLUTE: 0.5 K/UL (ref 0.2–0.8)
MONOCYTES RELATIVE PERCENT: 9.8 %
NEUTROPHILS ABSOLUTE: 2.1 K/UL (ref 1.4–6.5)
NEUTROPHILS RELATIVE PERCENT: 39.5 %
NITRITE, URINE: NEGATIVE
O2 SAT, ARTERIAL: 95 % (ref 93–100)
PCO2 ARTERIAL: 35 MM HG (ref 35–45)
PDW BLD-RTO: 17.6 % (ref 11.5–14.5)
PERFORMED ON: ABNORMAL
PH ARTERIAL: 7.42 (ref 7.35–7.45)
PH UA: 5.5 (ref 5–9)
PLATELET # BLD: 138 K/UL (ref 130–400)
PO2 ARTERIAL: 75 MM HG (ref 75–108)
POC CHLORIDE: 108 MEQ/L (ref 99–110)
POC CREATININE: 1.1 MG/DL (ref 0.8–1.3)
POC HEMATOCRIT: 37 % (ref 41–53)
POC POTASSIUM: 3.5 MEQ/L (ref 3.5–5.1)
POC SAMPLE TYPE: ABNORMAL
POC SODIUM: 141 MEQ/L (ref 136–145)
POTASSIUM SERPL-SCNC: 4.1 MEQ/L (ref 3.4–4.9)
PRO-BNP: NORMAL PG/ML
PRO-BNP: NORMAL PG/ML
PROTEIN UA: 100 MG/DL
RBC # BLD: 4.12 M/UL (ref 4.7–6.1)
RBC UA: ABNORMAL /HPF (ref 0–5)
SARS-COV-2, NAAT: NOT DETECTED
SODIUM BLD-SCNC: 140 MEQ/L (ref 135–144)
SPECIFIC GRAVITY UA: 1.02 (ref 1–1.03)
TCO2 ARTERIAL: 24 (ref 22–29)
TOTAL PROTEIN: 7 G/DL (ref 6.3–8)
TROPONIN: <0.01 NG/ML (ref 0–0.01)
TROPONIN: <0.01 NG/ML (ref 0–0.01)
URINE REFLEX TO CULTURE: YES
UROBILINOGEN, URINE: 1 E.U./DL
WBC # BLD: 5.3 K/UL (ref 4.8–10.8)
WBC UA: ABNORMAL /HPF (ref 0–5)

## 2021-08-09 PROCEDURE — 36415 COLL VENOUS BLD VENIPUNCTURE: CPT

## 2021-08-09 PROCEDURE — 85025 COMPLETE CBC W/AUTO DIFF WBC: CPT

## 2021-08-09 PROCEDURE — 71045 X-RAY EXAM CHEST 1 VIEW: CPT

## 2021-08-09 PROCEDURE — 83605 ASSAY OF LACTIC ACID: CPT

## 2021-08-09 PROCEDURE — 84484 ASSAY OF TROPONIN QUANT: CPT

## 2021-08-09 PROCEDURE — 6360000002 HC RX W HCPCS: Performed by: PHYSICIAN ASSISTANT

## 2021-08-09 PROCEDURE — 84295 ASSAY OF SERUM SODIUM: CPT

## 2021-08-09 PROCEDURE — G0378 HOSPITAL OBSERVATION PER HR: HCPCS

## 2021-08-09 PROCEDURE — 2580000003 HC RX 258: Performed by: PHYSICIAN ASSISTANT

## 2021-08-09 PROCEDURE — 82435 ASSAY OF BLOOD CHLORIDE: CPT

## 2021-08-09 PROCEDURE — 85014 HEMATOCRIT: CPT

## 2021-08-09 PROCEDURE — 96374 THER/PROPH/DIAG INJ IV PUSH: CPT

## 2021-08-09 PROCEDURE — 99220 PR INITIAL OBSERVATION CARE/DAY 70 MINUTES: CPT | Performed by: INTERNAL MEDICINE

## 2021-08-09 PROCEDURE — 83880 ASSAY OF NATRIURETIC PEPTIDE: CPT

## 2021-08-09 PROCEDURE — 84132 ASSAY OF SERUM POTASSIUM: CPT

## 2021-08-09 PROCEDURE — 82330 ASSAY OF CALCIUM: CPT

## 2021-08-09 PROCEDURE — 80053 COMPREHEN METABOLIC PANEL: CPT

## 2021-08-09 PROCEDURE — 99285 EMERGENCY DEPT VISIT HI MDM: CPT

## 2021-08-09 PROCEDURE — 87635 SARS-COV-2 COVID-19 AMP PRB: CPT

## 2021-08-09 PROCEDURE — 81001 URINALYSIS AUTO W/SCOPE: CPT

## 2021-08-09 PROCEDURE — 82803 BLOOD GASES ANY COMBINATION: CPT

## 2021-08-09 PROCEDURE — 82565 ASSAY OF CREATININE: CPT

## 2021-08-09 PROCEDURE — 36600 WITHDRAWAL OF ARTERIAL BLOOD: CPT

## 2021-08-09 PROCEDURE — 83735 ASSAY OF MAGNESIUM: CPT

## 2021-08-09 PROCEDURE — 87086 URINE CULTURE/COLONY COUNT: CPT

## 2021-08-09 PROCEDURE — 93005 ELECTROCARDIOGRAM TRACING: CPT | Performed by: PHYSICIAN ASSISTANT

## 2021-08-09 RX ORDER — POLYETHYLENE GLYCOL 3350 17 G/17G
17 POWDER, FOR SOLUTION ORAL DAILY PRN
Status: DISCONTINUED | OUTPATIENT
Start: 2021-08-09 | End: 2021-08-13 | Stop reason: HOSPADM

## 2021-08-09 RX ORDER — LORAZEPAM 2 MG/ML
1 INJECTION INTRAMUSCULAR ONCE
Status: COMPLETED | OUTPATIENT
Start: 2021-08-09 | End: 2021-08-09

## 2021-08-09 RX ORDER — ASPIRIN 81 MG/1
81 TABLET, CHEWABLE ORAL DAILY
Status: DISCONTINUED | OUTPATIENT
Start: 2021-08-10 | End: 2021-08-10

## 2021-08-09 RX ORDER — ACETAMINOPHEN 650 MG/1
650 SUPPOSITORY RECTAL EVERY 6 HOURS PRN
Status: DISCONTINUED | OUTPATIENT
Start: 2021-08-09 | End: 2021-08-13 | Stop reason: HOSPADM

## 2021-08-09 RX ORDER — SODIUM CHLORIDE 0.9 % (FLUSH) 0.9 %
5-40 SYRINGE (ML) INJECTION EVERY 12 HOURS SCHEDULED
Status: DISCONTINUED | OUTPATIENT
Start: 2021-08-09 | End: 2021-08-13 | Stop reason: HOSPADM

## 2021-08-09 RX ORDER — ATORVASTATIN CALCIUM 80 MG/1
80 TABLET, FILM COATED ORAL NIGHTLY
Status: DISCONTINUED | OUTPATIENT
Start: 2021-08-09 | End: 2021-08-10

## 2021-08-09 RX ORDER — 0.9 % SODIUM CHLORIDE 0.9 %
500 INTRAVENOUS SOLUTION INTRAVENOUS ONCE
Status: COMPLETED | OUTPATIENT
Start: 2021-08-09 | End: 2021-08-09

## 2021-08-09 RX ORDER — ONDANSETRON 2 MG/ML
4 INJECTION INTRAMUSCULAR; INTRAVENOUS EVERY 6 HOURS PRN
Status: DISCONTINUED | OUTPATIENT
Start: 2021-08-09 | End: 2021-08-09

## 2021-08-09 RX ORDER — SODIUM CHLORIDE 9 MG/ML
25 INJECTION, SOLUTION INTRAVENOUS PRN
Status: DISCONTINUED | OUTPATIENT
Start: 2021-08-09 | End: 2021-08-13 | Stop reason: HOSPADM

## 2021-08-09 RX ORDER — ONDANSETRON 4 MG/1
4 TABLET, ORALLY DISINTEGRATING ORAL EVERY 8 HOURS PRN
Status: DISCONTINUED | OUTPATIENT
Start: 2021-08-09 | End: 2021-08-09

## 2021-08-09 RX ORDER — ACETAMINOPHEN 325 MG/1
650 TABLET ORAL EVERY 6 HOURS PRN
Status: DISCONTINUED | OUTPATIENT
Start: 2021-08-09 | End: 2021-08-13 | Stop reason: HOSPADM

## 2021-08-09 RX ORDER — NITROGLYCERIN 0.4 MG/1
0.4 TABLET SUBLINGUAL EVERY 5 MIN PRN
Status: DISCONTINUED | OUTPATIENT
Start: 2021-08-09 | End: 2021-08-13 | Stop reason: HOSPADM

## 2021-08-09 RX ORDER — SODIUM CHLORIDE 0.9 % (FLUSH) 0.9 %
5-40 SYRINGE (ML) INJECTION PRN
Status: DISCONTINUED | OUTPATIENT
Start: 2021-08-09 | End: 2021-08-13 | Stop reason: HOSPADM

## 2021-08-09 RX ADMIN — LORAZEPAM 1 MG: 2 INJECTION INTRAMUSCULAR; INTRAVENOUS at 13:01

## 2021-08-09 RX ADMIN — SODIUM CHLORIDE 500 ML: 9 INJECTION, SOLUTION INTRAVENOUS at 13:44

## 2021-08-09 ASSESSMENT — ENCOUNTER SYMPTOMS
ABDOMINAL PAIN: 0
SORE THROAT: 0
SHORTNESS OF BREATH: 1
RHINORRHEA: 0
PHOTOPHOBIA: 0
BACK PAIN: 0
COUGH: 1
EYE PAIN: 0
VOMITING: 0
NAUSEA: 0
DIARRHEA: 0

## 2021-08-09 ASSESSMENT — PAIN SCALES - GENERAL: PAINLEVEL_OUTOF10: 0

## 2021-08-09 NOTE — H&P
History and Physical  Patient: Jazmine Rai  Unit/Bed:W164/W164-01  YOB: 1929  MRN: [id number]  Acct: [de-identified]   Admitting Diagnosis: Acute on chronic diastolic CHF (congestive heart failure) ([address] [I50.33]  Admit Date:  8/9/2021  Hospital Day: 0      Chief Complaint:   Shortness of breath and ankle edema    History of Present Illness:  Elderly gentleman was noted to be hypoxic in the ER with significantly elevated BNP. And findings consistent with congestive heart failure he was admitted for observation. He is known to have impaired left cancer fraction about 25% EF but was not thought to be an AICD candidate and just a single chamber pacemaker was done. No Known Allergies    No current facility-administered medications for this encounter.        PMHx:  Past Medical History:   Diagnosis Date    Anxiety     Atrial fibrillation ([address]     Cardiomyopathy ([facility] 9/18/2020    CHF (congestive heart failure) ([facility]     Diabetes mellitus ([facility]     Hypertension        PSHx:  Past Surgical History:   Procedure Laterality Date    BACK SURGERY      CARDIAC PACEMAKER PLACEMENT  9454    HERNIA REPAIR      THORACENTESIS Left 06/24/2021    450ml removed by Dr Bashir Nugent [phone]       Social Hx:  Social History     Socioeconomic History    Marital status:      Spouse name: None    Number of children: None    Years of education: None    Highest education level: None   Occupational History    None   Tobacco Use    Smoking status: Former Smoker    Smokeless tobacco: Former User    Tobacco comment: quit 30 years ago   Vaping Use    Vaping Use: Never used   Substance and Sexual Activity    Alcohol use: Not Currently    Drug use: Not Currently    Sexual activity: None   Other Topics Concern    None   Social History Narrative    ** Merged History Encounter **          Social Determinants of Health     Financial Resource Strain:     Difficulty of Paying Living Expenses:    Food Insecurity:     Worried About Running Out of Food in the Last Year:     [address] in the Last Year:    Transportation Needs:     Lack of Transportation (Medical):  Lack of Transportation (Non-Medical):    Physical Activity:     Days of Exercise per Week:     Minutes of Exercise per Session:    Stress:     Feeling of Stress :    Social Connections:     Frequency of Communication with Friends and Family:     Frequency of Social Gatherings with Friends and Family:     Attends Alevism Services:     Active Member of Clubs or Organizations:     Attends Club or Organization Meetings:     Marital Status:    Intimate Partner Violence:     Fear of Current or Ex-Partner:     Emotionally Abused:     Physically Abused:     Sexually Abused:        Family Hx:  History reviewed. No pertinent family history. Review ofSystems:   Review of Systems   Unable to perform ROS: Acuity of condition          Physical Examination:    /86   Pulse 70   Temp 96.5 °F (35.8 °C) (Oral)   Resp 18   Ht 5' 9\" (1.753 m)   Wt 150 lb (68 kg)   SpO2 99%   BMI 22.15 kg/m²    Physical Exam  Constitutional:       Appearance: He is ill-appearing. HENT:      Head: Atraumatic. Cardiovascular:      Rate and Rhythm: Rhythm irregular. Heart sounds: Gallop present. Musculoskeletal:         General: Swelling present. Skin:     Coloration: Skin is pale.    Neurological:      Comments: NA,drowsy   Psychiatric:      Comments: NA          LABS:  CBC:   Lab Results   Component Value Date    WBC 5.3 08/09/2021    RBC 4.12 08/09/2021    HGB 12.7 08/09/2021    HCT 40.4 08/09/2021    MCV 98.1 08/09/2021    MCH 33.1 08/09/2021    MCHC 33.7 08/09/2021    RDW 17.6 08/09/2021     08/09/2021     CBC with Differential:   Lab Results   Component Value Date    WBC 5.3 08/09/2021    RBC 4.12 08/09/2021    HGB 12.7 08/09/2021    HCT 40.4 08/09/2021     08/09/2021    MCV 98.1 08/09/2021    MCH 33.1 08/09/2021    MCHC 33.7 08/09/2021    RDW 17.6 08/09/2021    LYMPHOPCT 47.2 08/09/2021    MONOPCT 9.8 08/09/2021    BASOPCT 1.3 08/09/2021    MONOSABS 0.5 08/09/2021    LYMPHSABS 2.5 08/09/2021    EOSABS 0.1 08/09/2021    BASOSABS 0.1 08/09/2021     CMP:    Lab Results   Component Value Date     08/09/2021    K 4.1 08/09/2021    K 4.1 07/05/2021     08/09/2021    CO2 21 08/09/2021    BUN 17 08/09/2021    CREATININE 1.1 08/09/2021    CREATININE 0.97 08/09/2021    GFRAA >60 08/09/2021    LABGLOM >60 08/09/2021    GLUCOSE 161 08/09/2021    PROT 7.0 08/09/2021    LABALBU 4.0 08/09/2021    CALCIUM 9.3 08/09/2021    BILITOT 1.9 08/09/2021    ALKPHOS 105 08/09/2021    AST 30 08/09/2021    ALT 20 08/09/2021     BMP:    Lab Results   Component Value Date     08/09/2021    K 4.1 08/09/2021    K 4.1 07/05/2021     08/09/2021    CO2 21 08/09/2021    BUN 17 08/09/2021    LABALBU 4.0 08/09/2021    CREATININE 1.1 08/09/2021    CREATININE 0.97 08/09/2021    CALCIUM 9.3 08/09/2021    GFRAA >60 08/09/2021    LABGLOM >60 08/09/2021    GLUCOSE 161 08/09/2021     Magnesium:    Lab Results   Component Value Date    MG 2.2 08/09/2021     Troponin:    Lab Results   Component Value Date    TROPONINI <0.010 08/09/2021     Recent Labs     08/09/21  1230   PROBNP 10,068     No results for input(s): INR in the last 72 hours. RADIOLOGY:  XR CHEST PORTABLE    Result Date: 8/9/2021  EXAMINATION: XR CHEST PORTABLE CLINICAL HISTORY: SHORTNESS OF BREATH COMPARISONS: JULY 22, 2021 FINDINGS: Pacemaker generator and wires unchanged. Bilateral degenerative change glenohumeral joints and acromioclavicular joints with bilateral narrowing of the interval between acromion and humeral heads. Cardiopericardial silhouette enlarged, unchanged. Blunting left costophrenic angle. Lungs otherwise clear. STABLE CARDIOMEGALY. SMALL LEFT EFFUSION. DEGENERATIVE CHANGE BILATERAL SHOULDERS.          EKG:   Assessment:    Active Hospital Problems    Diagnosis Date Noted    Acute on chronic diastolic CHF (congestive heart failure) ([facility] [I50.33] 08/09/2021     1. Cardiomyopathy  2. Single-chamber pacemaker    Plan:  1.  IV lasix        Electronically signed by Donny Miller MD on 8/9/2021 at 5:39 PM

## 2021-08-09 NOTE — ED NOTES
Pt wakens with calling his name but remains very groggy. Goes back to sleep fairly quickly. Made aware of admission. Pt appears to have some sleep apnea as well.       Leon Solano RN  08/09/21 2796

## 2021-08-09 NOTE — ED PROVIDER NOTES
[address] [facility] ED  eMERGENCY dEPARTMENTeNCOUnter      Pt Name: Kaiser Acevedo  MRN: [id number]  [place] 2/12/1929  Date ofevaluation: 8/9/2021  Provider: Sofia Guzman PA-C    CHIEF COMPLAINT       Chief Complaint   Patient presents with    Shortness of Breath     x 1 month. Dry mouth, intermittent chest pressure. HISTORY OF PRESENT ILLNESS   (Location/Symptom, Timing/Onset,Context/Setting, Quality, Duration, Modifying Factors, Severity)  Note limiting factors. Kaiser Acevedo is a 80 y.o. male who presents to the emergency department with  tablet was used to obtain history and physical exam shortness of breath and intermittent chest pain over the last few days. Patient does have a history of CHF. Patient reports that he has been compliant on his medication. He reports that his leg swelling has worsened over the last few days and his shortness of breath is worse with exertion. Patient is tachypneic on examination. Patient also states that he feels like his anxiety is acting up too. HPI    NursingNotes were reviewed. REVIEW OF SYSTEMS    (2-9 systems for level 4, 10 or more for level 5)     Review of Systems   Constitutional: Negative for chills, diaphoresis, fatigue and fever. HENT: Negative for congestion, rhinorrhea and sore throat. Eyes: Negative for photophobia and pain. Respiratory: Positive for cough and shortness of breath. Cardiovascular: Positive for chest pain. Negative for palpitations. Gastrointestinal: Negative for abdominal pain, diarrhea, nausea and vomiting. Genitourinary: Negative for dysuria and flank pain. Musculoskeletal: Negative for back pain. Skin: Negative for rash. Neurological: Negative for dizziness, light-headedness and headaches. Psychiatric/Behavioral: Negative. All other systems reviewed and are negative. Except as noted above the remainder of the review of systems was reviewed and negative.        PAST MEDICAL HISTORY     Past Medical History:   Diagnosis Date    Anxiety     Atrial fibrillation ([facility]     Cardiomyopathy ([facility] 9/18/2020    CHF (congestive heart failure) (HCC)     Diabetes mellitus ([facility]     Hypertension          SURGICALHISTORY       Past Surgical History:   Procedure Laterality Date    BACK SURGERY      CARDIAC PACEMAKER PLACEMENT  9963    HERNIA REPAIR      THORACENTESIS Left 06/24/2021    450ml removed by Dr Marnie Hardy [phone]         CURRENT MEDICATIONS       Previous Medications    APIXABAN (ELIQUIS) 2.5 MG TABS TABLET    Take 1 tablet by mouth 2 times daily    ASPIRIN 81 MG CHEWABLE TABLET    Take 1 tablet by mouth daily    FLUTICASONE-SALMETEROL (ADVAIR DISKUS) 250-50 MCG/DOSE AEPB    Inhale 1 puff into the lungs every 12 hours for 14 days    FLUTICASONE-SALMETEROL (ADVAIR) 100-50 MCG/DOSE DISKUS INHALER    Inhale 1 puff into the lungs every 12 hours    FUROSEMIDE (LASIX) 40 MG TABLET    Take 1 tablet by mouth daily    METOPROLOL TARTRATE (LOPRESSOR) 50 MG TABLET    Take 0.5 tablets by mouth 2 times daily    POTASSIUM CHLORIDE (KLOR-CON M) 10 MEQ EXTENDED RELEASE TABLET    Take 1 tablet by mouth daily    TIOTROPIUM (SPIRIVA RESPIMAT) 2.5 MCG/ACT AERS INHALER    Inhale 2 puffs into the lungs daily    TIOTROPIUM (SPIRIVA) 18 MCG INHALATION CAPSULE    Inhale 18 mcg into the lungs daily       ALLERGIES     Patient has no known allergies. FAMILY HISTORY     History reviewed. No pertinent family history.        SOCIAL HISTORY       Social History     Socioeconomic History    Marital status:      Spouse name: None    Number of children: None    Years of education: None    Highest education level: None   Occupational History    None   Tobacco Use    Smoking status: Former Smoker    Smokeless tobacco: Former User    Tobacco comment: quit 30 years ago   Vaping Use    Vaping Use: Never used   Substance and Sexual Activity    Alcohol use: Not Currently    Drug use: Not Currently    Sexual activity: None   Other Topics Concern    None   Social History Narrative    ** Merged History Encounter **          Social Determinants of Health     Financial Resource Strain:     Difficulty of Paying Living Expenses:    Food Insecurity:     Worried About Running Out of Food in the Last Year:     Ran Out of Food in the Last Year:    Transportation Needs:     Lack of Transportation (Medical):  Lack of Transportation (Non-Medical):    Physical Activity:     Days of Exercise per Week:     Minutes of Exercise per Session:    Stress:     Feeling of Stress :    Social Connections:     Frequency of Communication with Friends and Family:     Frequency of Social Gatherings with Friends and Family:     Attends Christianity Services:     Active Member of Clubs or Organizations:     Attends Club or Organization Meetings:     Marital Status:    Intimate Partner Violence:     Fear of Current or Ex-Partner:     Emotionally Abused:     Physically Abused:     Sexually Abused:        SCREENINGS      @FLOW([id number])@      PHYSICAL EXAM    (up to 7 for level 4, 8 or more for level 5)     ED Triage Vitals [08/09/21 1222]   BP Temp Temp Source Pulse Resp SpO2 Height Weight   98/71 96.5 °F (35.8 °C) Oral 70 (!) 36 100 % 5' 9\" (1.753 m) 150 lb (68 kg)       Physical Exam  Vitals and nursing note reviewed. Constitutional:       General: He is not in acute distress. Appearance: Normal appearance. He is well-developed. He is not diaphoretic. HENT:      Head: Normocephalic and atraumatic. Eyes:      General: Lids are normal.      Conjunctiva/sclera: Conjunctivae normal.   Cardiovascular:      Rate and Rhythm: Normal rate and regular rhythm. Pulses: Normal pulses. Heart sounds: Normal heart sounds. Pulmonary:      Effort: Pulmonary effort is normal. Tachypnea present. Breath sounds: Normal breath sounds.    Abdominal:      General: Bowel sounds are normal.      Palpations: Abdomen is soft. Tenderness: There is no abdominal tenderness. Musculoskeletal:      Cervical back: Normal range of motion and neck supple. Lymphadenopathy:      Cervical: No cervical adenopathy. Skin:     General: Skin is warm and dry. Capillary Refill: Capillary refill takes less than 2 seconds. Findings: No rash. Neurological:      Mental Status: He is alert and oriented to person, place, and time. Psychiatric:         Thought Content: Thought content normal.         Judgment: Judgment normal.         DIAGNOSTIC RESULTS     EKG: All EKG's are interpreted by the Emergency Department Physician who either signs or Co-signsthis chart in the absence of a cardiologist.    A. fib 77 bpm left axis left bundle branch block similar to previous. RADIOLOGY:   Non-plain filmimages such as CT, Ultrasound and MRI are read by the radiologist. Plain radiographic images are visualized and preliminarily interpreted by the emergency physician with the below findings:        Interpretation per the Radiologist below, if available at the time ofthis note:    XR CHEST PORTABLE   Final Result   STABLE CARDIOMEGALY. SMALL LEFT EFFUSION. DEGENERATIVE CHANGE BILATERAL SHOULDERS.             ED BEDSIDE ULTRASOUND:   Performed by ED Physician - none    LABS:  Labs Reviewed   COMPREHENSIVE METABOLIC PANEL - Abnormal; Notable for the following components:       Result Value    Glucose 161 (*)     Total Bilirubin 1.9 (*)     Alkaline Phosphatase 105 (*)     All other components within normal limits   CBC WITH AUTO DIFFERENTIAL - Abnormal; Notable for the following components:    RBC 4.12 (*)     Hemoglobin 13.6 (*)     Hematocrit 40.4 (*)     MCH 33.1 (*)     RDW 17.6 (*)     All other components within normal limits   URINE RT REFLEX TO CULTURE - Abnormal; Notable for the following components:    Color, UA DARK YELLOW (*)     Bilirubin Urine SMALL (*)     Ketones, Urine TRACE (*)     Protein,  (*)     Leukocyte Esterase, Urine MODERATE (*)     All other components within normal limits   LACTIC ACID, PLASMA - Abnormal; Notable for the following components:    Lactic Acid 3.4 (*)     All other components within normal limits    Narrative:     Ziyad Cook  [facility] tel. [phone],  RENU results called to and read back by Luci Bocanegra, 08/09/2021 13:17, by  [facility]   MICROSCOPIC URINALYSIS - Abnormal; Notable for the following components:    Hyaline Casts, UA 10-20 (*)     WBC, UA 20-50 (*)     RBC, UA 3-5 (*)     All other components within normal limits   POCT ARTERIAL - Abnormal; Notable for the following components:    POC Glucose 137 (*)     pO2, Arterial 75 (*)     O2 Sat, Arterial 95 (*)     POC Hematocrit 37 (*)     Hemoglobin 12.7 (*)     All other components within normal limits    Narrative:     CALL  Garner  [facility] tel. [phone],   COVID-19, RAPID   CULTURE, URINE   MAGNESIUM   TROPONIN   BRAIN NATRIURETIC PEPTIDE       All other labs were within normal range or not returned as of this dictation. EMERGENCY DEPARTMENT COURSE and DIFFERENTIAL DIAGNOSIS/MDM:   Vitals:    Vitals:    08/09/21 1500 08/09/21 1505 08/09/21 1530 08/09/21 1600   BP: 88/71  103/77 95/72   Pulse: 65  71 65   Resp: 16  16 16   Temp:       TempSrc:       SpO2: 93% 94% 98% 100%   Weight:       Height:               MDM    Patient presents with worsening shortness of breath he does have a history of CHF and has been compliant on his medications. He has had worsening leg swelling and shortness of breath with exertion. Also been having intermittent chest pain. Today troponin is negative BNP is around 10,000 but he has been mildly hypotensive he has been in the high 80s low 90s throughout this visit so it was difficult to try and diurese him while being cognizant of his blood pressure. Patient oxygen saturations have dipped into the high 87-88 while at rest in the bed. I seen previously notes that discussion for home O2 due to his CHF.  He is Covid negative chest x-ray shows some vascular congestion without any definitive infiltrate. Patient will be admitted for observation due to CHF with low blood pressure likely will need some medical management of his medications and possible home O2. Patient is stable and resting comfortably in the emergency department is on 2 L nasal cannula. REASSESSMENT          CRITICAL CARE TIME   Total Critical Care time was  minutes, excluding separatelyreportable procedures. There was a high probability ofclinically significant/life threatening deterioration in the patient's condition which required my urgent intervention. CONSULTS:  None    PROCEDURES:  Unless otherwise noted below, none     Procedures    FINAL IMPRESSION      1. Acute on chronic diastolic CHF (congestive heart failure) ([facility] [address] [facility]          DISPOSITION/PLAN   DISPOSITION Admitted 08/09/2021 04:00:44 PM      PATIENT REFERREDTO:  No follow-up provider specified. DISCHARGEMEDICATIONS:  New Prescriptions    No medications on file          (Please note that portions of this note were completed with a voice recognition program.  Efforts were made to edit the dictations but occasionally words are mis-transcribed.)    Army Noemy PA-C (electronically signed)  Attending Emergency Physician         Army Noemy PA-C   08/09/21 7570    Attending Supervisory Note/Shared Visit   I have personally performed a face to face diagnostic evaluation on this patient. I have reviewed the mid-levels findings and agree.   History and Exam by me shows CHF      Suman Post DO  Attending Emergency Physician         Suman Post DO  08/11/21 9866

## 2021-08-09 NOTE — ED NOTES
Transport here. Pts belongings in bag on bed. Pt denies any complaints.  Remains groggy     Carolin Roberts RN  08/09/21 8084

## 2021-08-09 NOTE — ED TRIAGE NOTES
A & Ox4. Skin pink warm and dry. States he has been SOB for a month with intermittent chest pressure. States he has anxiety and feels like he he's having an attack.

## 2021-08-09 NOTE — ED NOTES
Pt sleeping. Resp even and unlabored. Pt much more relaxed at this time. [place] applied.      Glen Mahmood RN  08/09/21 3549

## 2021-08-10 LAB
CHOLESTEROL, TOTAL: 130 MG/DL (ref 0–199)
EKG ATRIAL RATE: 64 BPM
EKG ATRIAL RATE: 73 BPM
EKG Q-T INTERVAL: 480 MS
EKG Q-T INTERVAL: 526 MS
EKG QRS DURATION: 150 MS
EKG QRS DURATION: 152 MS
EKG QTC CALCULATION (BAZETT): 543 MS
EKG QTC CALCULATION (BAZETT): 563 MS
EKG R AXIS: -43 DEGREES
EKG R AXIS: -50 DEGREES
EKG T AXIS: 100 DEGREES
EKG T AXIS: 67 DEGREES
EKG VENTRICULAR RATE: 69 BPM
EKG VENTRICULAR RATE: 77 BPM
HCT VFR BLD CALC: 41.6 % (ref 42–52)
HDLC SERPL-MCNC: 40 MG/DL (ref 40–59)
HEMOGLOBIN: 13.8 G/DL (ref 14–18)
LDL CHOLESTEROL CALCULATED: 78 MG/DL (ref 0–129)
LV EF: 20 %
LVEF MODALITY: NORMAL
MAGNESIUM: 2.3 MG/DL (ref 1.7–2.4)
MCH RBC QN AUTO: 33.1 PG (ref 27–31.3)
MCHC RBC AUTO-ENTMCNC: 33.2 % (ref 33–37)
MCV RBC AUTO: 99.8 FL (ref 80–100)
PDW BLD-RTO: 18.3 % (ref 11.5–14.5)
PLATELET # BLD: 132 K/UL (ref 130–400)
RBC # BLD: 4.17 M/UL (ref 4.7–6.1)
TRIGL SERPL-MCNC: 61 MG/DL (ref 0–150)
TROPONIN: <0.01 NG/ML (ref 0–0.01)
WBC # BLD: 2.8 K/UL (ref 4.8–10.8)

## 2021-08-10 PROCEDURE — 97166 OT EVAL MOD COMPLEX 45 MIN: CPT

## 2021-08-10 PROCEDURE — 80061 LIPID PANEL: CPT

## 2021-08-10 PROCEDURE — 6360000002 HC RX W HCPCS: Performed by: INTERNAL MEDICINE

## 2021-08-10 PROCEDURE — 93306 TTE W/DOPPLER COMPLETE: CPT

## 2021-08-10 PROCEDURE — 85027 COMPLETE CBC AUTOMATED: CPT

## 2021-08-10 PROCEDURE — 6370000000 HC RX 637 (ALT 250 FOR IP): Performed by: INTERNAL MEDICINE

## 2021-08-10 PROCEDURE — 99233 SBSQ HOSP IP/OBS HIGH 50: CPT | Performed by: INTERNAL MEDICINE

## 2021-08-10 PROCEDURE — 2060000000 HC ICU INTERMEDIATE R&B

## 2021-08-10 PROCEDURE — 93010 ELECTROCARDIOGRAM REPORT: CPT | Performed by: INTERNAL MEDICINE

## 2021-08-10 PROCEDURE — 36415 COLL VENOUS BLD VENIPUNCTURE: CPT

## 2021-08-10 PROCEDURE — 93005 ELECTROCARDIOGRAM TRACING: CPT | Performed by: INTERNAL MEDICINE

## 2021-08-10 PROCEDURE — APPSS30 APP SPLIT SHARED TIME 16-30 MINUTES: Performed by: PHYSICIAN ASSISTANT

## 2021-08-10 PROCEDURE — 94640 AIRWAY INHALATION TREATMENT: CPT

## 2021-08-10 PROCEDURE — 83735 ASSAY OF MAGNESIUM: CPT

## 2021-08-10 PROCEDURE — 2580000003 HC RX 258: Performed by: INTERNAL MEDICINE

## 2021-08-10 PROCEDURE — 97162 PT EVAL MOD COMPLEX 30 MIN: CPT

## 2021-08-10 RX ORDER — BUDESONIDE AND FORMOTEROL FUMARATE DIHYDRATE 80; 4.5 UG/1; UG/1
2 AEROSOL RESPIRATORY (INHALATION) 2 TIMES DAILY
Status: DISCONTINUED | OUTPATIENT
Start: 2021-08-10 | End: 2021-08-11

## 2021-08-10 RX ORDER — POTASSIUM CHLORIDE 20 MEQ/1
10 TABLET, EXTENDED RELEASE ORAL DAILY
Status: DISCONTINUED | OUTPATIENT
Start: 2021-08-10 | End: 2021-08-11

## 2021-08-10 RX ORDER — SPIRONOLACTONE 25 MG/1
25 TABLET ORAL DAILY
Status: DISCONTINUED | OUTPATIENT
Start: 2021-08-10 | End: 2021-08-13 | Stop reason: HOSPADM

## 2021-08-10 RX ORDER — FUROSEMIDE 40 MG/1
40 TABLET ORAL DAILY
Status: DISCONTINUED | OUTPATIENT
Start: 2021-08-10 | End: 2021-08-10

## 2021-08-10 RX ORDER — FUROSEMIDE 10 MG/ML
20 INJECTION INTRAMUSCULAR; INTRAVENOUS 2 TIMES DAILY
Status: DISCONTINUED | OUTPATIENT
Start: 2021-08-10 | End: 2021-08-11

## 2021-08-10 RX ADMIN — TIOTROPIUM BROMIDE INHALATION SPRAY 2 PUFF: 3.12 SPRAY, METERED RESPIRATORY (INHALATION) at 11:14

## 2021-08-10 RX ADMIN — BUDESONIDE AND FORMOTEROL FUMARATE DIHYDRATE 2 PUFF: 80; 4.5 AEROSOL RESPIRATORY (INHALATION) at 19:21

## 2021-08-10 RX ADMIN — METOPROLOL TARTRATE 25 MG: 25 TABLET, FILM COATED ORAL at 19:47

## 2021-08-10 RX ADMIN — ASPIRIN 81 MG: 81 TABLET, CHEWABLE ORAL at 08:25

## 2021-08-10 RX ADMIN — APIXABAN 2.5 MG: 2.5 TABLET, FILM COATED ORAL at 19:47

## 2021-08-10 RX ADMIN — METOPROLOL TARTRATE 25 MG: 25 TABLET, FILM COATED ORAL at 09:57

## 2021-08-10 RX ADMIN — Medication 10 ML: at 19:47

## 2021-08-10 RX ADMIN — BUDESONIDE AND FORMOTEROL FUMARATE DIHYDRATE 2 PUFF: 80; 4.5 AEROSOL RESPIRATORY (INHALATION) at 11:15

## 2021-08-10 RX ADMIN — Medication 10 ML: at 08:25

## 2021-08-10 RX ADMIN — APIXABAN 2.5 MG: 2.5 TABLET, FILM COATED ORAL at 09:57

## 2021-08-10 RX ADMIN — SPIRONOLACTONE 25 MG: 25 TABLET ORAL at 09:57

## 2021-08-10 RX ADMIN — FUROSEMIDE 20 MG: 10 INJECTION, SOLUTION INTRAMUSCULAR; INTRAVENOUS at 19:47

## 2021-08-10 RX ADMIN — POTASSIUM CHLORIDE 10 MEQ: 1500 TABLET, EXTENDED RELEASE ORAL at 09:56

## 2021-08-10 RX ADMIN — FUROSEMIDE 20 MG: 10 INJECTION, SOLUTION INTRAMUSCULAR; INTRAVENOUS at 09:57

## 2021-08-10 RX ADMIN — ATORVASTATIN CALCIUM 80 MG: 80 TABLET, FILM COATED ORAL at 00:14

## 2021-08-10 ASSESSMENT — PAIN SCALES - GENERAL
PAINLEVEL_OUTOF10: 0
PAINLEVEL_OUTOF10: 0

## 2021-08-10 ASSESSMENT — ENCOUNTER SYMPTOMS: SHORTNESS OF BREATH: 0

## 2021-08-10 NOTE — CONSULTS
Cardiac Rehab Consult Note  Name: Chiara Bradley  Age: 80 y.o. Gender: male    Chief Complaint:Shortness of Breath (x 1 month. Dry mouth, intermittent chest pressure. )    Primary Care Provider: Selam Hodgson  InpatientTreatment Team: Treatment Team: Attending Provider: Darron Moreno MD; : Kaden Amos, RN; Utilization Reviewer: Tiana Mcdonald RN; Registered Nurse: Pop Lindsey RN; Tech: Mina Duffy; Utilization Reviewer: Patrick Ramesh RN  Admission Date: 8/9/2021    Consult Received from Dr. Juan José Weiner. Reason for consult CHF. Patient visited at bedside. Program and benefits introduced. Brochures given. Patient is British speaking therefore I used the  services to inform patient about cardiac rehab     Active Problems:    CHF (congestive heart failure), NYHA class I, acute on chronic, combined ([address]    Acute on chronic diastolic CHF (congestive heart failure) ([address]  Resolved Problems:    * No resolved hospital problems. *       Plan: f/u as outpatient and speak to son additionally to discuss plan for cardiac rehab    All of pt questions were answered. Case will be discussed with physician when appropriate.      Electronically signed by Cinthya Colunga on 8/10/2021 at 12:59 PM

## 2021-08-10 NOTE — CARE COORDINATION
[place] CHF zone map left with patient. 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 Pt has dementia. Unable to teach him due to inability to verify patient comprehension. Will attempt to catch family and review.    Electronically signed by Pawan Gary RN on 8/10/2021 at 3:20 PM

## 2021-08-10 NOTE — PROGRESS NOTES
Physical Therapy Med Surg Initial Assessment  [facility]Department: [facility]  Room: O83/K776-03       NAME: Altagracia Rivera  : 1929 (80 y.o.)  MRN: [id number]  CODE STATUS: Full Code    Date of Service: 8/10/2021    Patient Diagnosis(es): Acute on chronic diastolic CHF (congestive heart failure) ([facility]) [I50.33]  CHF (congestive heart failure), NYHA class I, acute on chronic, combined [facility]) [I50.43]   Chief Complaint   Patient presents with    Shortness of Breath     x 1 month. Dry mouth, intermittent chest pressure.       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 CHF (congestive heart failure) ([address] 2021    CHF (congestive heart failure), NYHA class I, acute on chronic, combined ([address] 2021    Atelectasis of right lung     Pleural effusion on right     SOB (shortness of breath) 2020    Cardiomyopathy ([address] 2020    Chronic atrial fibrillation ([address] 2020        Past Medical History:   Diagnosis Date    Anxiety     Atrial fibrillation ([address]     Cardiomyopathy ([address] 2020    CHF (congestive heart failure) ([address]     Diabetes mellitus ([address]     Hypertension     Sleep apnea      Past Surgical History:   Procedure Laterality Date    BACK SURGERY      CARDIAC PACEMAKER PLACEMENT      HERNIA REPAIR      THORACENTESIS Left 2021    450ml removed by Dr Asia Alcala [phone]       Chart Reviewed: Yes  Patient assessed for rehabilitation services?: Yes    Restrictions:  Restrictions/Precautions: Fall Risk     SUBJECTIVE:      Pain       Pre and post Treatment Pain Screening:   Pain Screening  Patient Currently in Pain: Denies    Prior Level of Function:  Social/Functional History  Lives With: Spouse, Son  Type of Home: Apartment  Home Layout: One level  Home Access: Stairs to enter with rails  Entrance Stairs - Number of Steps: 7  Entrance Stairs - Rails: Both  Bathroom Shower/Tub: Tub/Shower unit  Bathroom Equipment: Shower chair  Home Equipment:  (4 wheeled walker)  ADL Assistance: Needs assistance (son assists)  Homemaking Assistance: Needs assistance (9-2 assistance)  Ambulation Assistance: Needs assistance (with ww - son assists)  Transfer Assistance: Needs assistance  Active : No  Occupation: Retired  Additional Comments: son is there all the time    OBJECTIVE:   Vision: Impaired  Vision Exceptions: Wears glasses at all times  Hearing: Within functional limits    Cognition:  Overall Orientation Status: Within Functional Limits  Follows Commands: Within Functional Limits (Philippe Nyhan interpreted)         ROM:  RLE AROM: WFL  LLE AROM : WFL    Strength:  Strength RLE  Strength RLE: WFL  Strength LLE  Strength LLE: WFL    Neuro:  Balance  Sitting - Static: Fair;Poor  Sitting - Dynamic: Fair;Poor;- (varied throughout with LOB primarily posteriorly with delayed reactions)  Standing - Static: Poor  Standing - Dynamic: Poor  Comments: in standing pt required assistance due to anterior LOB with and without UE support        Sensation  Overall Sensation Status: Impaired (numbness in hands since neck surgery)    Bed mobility  Supine to Sit: Moderate assistance  Sit to Supine: Stand by assistance    Transfers  Sit to Stand: Minimal Assistance; Moderate Assistance;Maximum Assistance (varied throughout the  session)  Stand to sit: Minimal Assistance; Moderate Assistance  Comment: variable assist due to variable balance with LOB primarily forward without ability to correct    Ambulation  Ambulation?: Yes  Ambulation 1  Surface: level tile  Device: No Device;Rolling Walker  Assistance: Maximum assistance; Moderate assistance  Quality of Gait: knees stable.  LOB forward and laterally with recovery assist required, flexed trunk, dyspniac throughout with anxiety noted  Distance: 2 steps with ww and laterally 2 steps with no device              Activity Tolerance  Activity Tolerance: Patient limited by endurance  Activity Tolerance: Pt dyspniac throughout session especially with transitions. O2 in place for standing tasks with O2 sat remaining over 95% when tested. Pt dyspnea reduces when anxiet in control          PT Education  PT Education: Goals;PT Role;Plan of Care    ASSESSMENT:   Body structures, Functions, Activity limitations: Decreased functional mobility ; Decreased safe awareness;Decreased coordination;Decreased balance;Decreased endurance;Decreased strength;Decreased posture  Decision Making: Medium Complexity  History: high  Exam: med  Clinical Presentation: med    Barriers to Learning: none    DISCHARGE RECOMMENDATIONS:  Discharge Recommendations: Continue to assess pending progress    Assessment: Pt demonstrates deficits as listed. He is reuiring assistane with all mobility.  Pt would benefit from PT at this level of care to ensure ability to return to home with assistance he has available  REQUIRES PT FOLLOW UP: Yes      PLAN OF CARE:  Plan  Times per week: 3-6  Current Treatment Recommendations: Transfer Training, Neuromuscular Re-education, Gait Training, Balance Training, Functional Mobility Training, Safety Education & Training  Plan Comment: +2 to start with ww  Safety Devices  Type of devices: Bed alarm in place, Left in bed, Call light within reach    Goals:  Short term goals  Short term goal 1: mod assist sit to stand  Short term goal 2: SBA bed mobility  Short term goal 3: min assist gait 20 feet with ww  Short term goal 4: pt able to stand with ww with min assist 2 min    AMPAC (6 CLICK) BASIC MOBILITY  AM-PAC Inpatient Mobility Raw Score : 12     Therapy Time:   Individual   Time In 1305   Time Out 1334   Minutes Radha [address] [place] [address], 08/10/21 at 1:47 PM         Definitions for assistance levels  Independent = pt does not require any physical supervision or assistance from another person for activity completion. Device may be needed.   Stand by assistance = pt requires verbal cues or instructions from another person, close to but not touching, to perform the activity  Minimal assistance= pt performs 75% or more of the activity; assistance is required to complete the activity  Moderate assistance= pt performs 50% of the activity; assistance is required to complete the activity  Maximal assistance = pt performs 25% of the activity; assistance is required to complete the activity  Dependent = pt requires total physical assistance to accomplish the task

## 2021-08-10 NOTE — PROGRESS NOTES
Denies  Pain Assessment: 0-10  Pain Level: 0       Prior Level of Function:  Social/Functional History  Lives With: Spouse, Son  Type of Home: Apartment  Home Layout: One level  Home Access: Stairs to enter with rails  Entrance Stairs - Number of Steps: 7  Entrance Stairs - Rails: Both  Bathroom Shower/Tub: Tub/Shower unit  Bathroom Equipment: Shower chair, Grab bars in shower  Home Equipment: 4 wheeled walker  ADL Assistance: Needs assistance  Homemaking Assistance: Needs assistance (9-2 assistance)  Ambulation Assistance: Needs assistance (son assists when using ww)  Transfer Assistance: Needs assistance  Active : No  Patient's  Info: Son  Occupation: Retired  Type of occupation: construction  IADL Comments: son completes medications/finances  Additional Comments: son is there all the time    OBJECTIVE:     OT evaluation interpreted by Merle Cooper, rehab tech    Orientation Status:  Orientation  Overall Orientation Status: Within Functional Limits    Observation:  Observation/Palpation  Posture: Fair  Observation: anxious, Faroese speaking, agreeable to OT evaluation    Cognition Status:  Cognition  Overall Cognitive Status: Exceptions  Arousal/Alertness: Appropriate responses to stimuli  Following Commands:  Follows one step commands with repetition  Attention Span: Appears intact  Memory: Appears intact  Safety Judgement: Decreased awareness of need for assistance, Decreased awareness of need for safety  Problem Solving: Assistance required to identify errors made, Assistance required to generate solutions, Assistance required to implement solutions, Assistance required to correct errors made  Insights: Decreased awareness of deficits  Initiation: Requires cues for some  Sequencing: Requires cues for some    Perception Status:  Perception  Overall Perceptual Status: WFL    Sensation Status:  Sensation  Overall Sensation Status: Impaired (numbness in B hands since neck sx)    Vision and Hearing Status:  Vision  Vision: Impaired  Vision Exceptions: Wears glasses at all times  Hearing  Hearing: Within functional limits     ROM:   LUE AROM (degrees)  LUE AROM : WFL  Left Hand AROM (degrees)  Left Hand AROM: WFL  RUE AROM (degrees)  RUE AROM : WFL  Right Hand AROM (degrees)  Right Hand AROM: WFL    Strength:  LUE Strength  Gross LUE Strength: Exceptions to [facility]  L Hand General: 3/5  LUE Strength Comment: 3/5 all planes  RUE Strength  Gross RUE Strength: Exceptions to [facility]  R Hand General: 3/5  RUE Strength Comment: 3/5 all planes    Coordination, Tone, Quality of Movement: Tone RUE  RUE Tone: Normotonic  Tone LUE  LUE Tone: Normotonic  Coordination  Movements Are Fluid And Coordinated: No  Coordination and Movement description: Decreased speed, Decreased accuracy, Right UE, Left UE, Fine motor impairments    Hand Dominance:  Hand Dominance  Hand Dominance: Right    ADL Status:  ADL  Feeding: Setup  Grooming: Setup  UE Bathing: Stand by assistance  LE Bathing: Moderate assistance  UE Dressing: Stand by assistance  LE Dressing:  Moderate assistance  Toileting: Minimal assistance  Additional Comments: Simulated all ADLs as above  Toilet Transfers  Toilet Transfer: Unable to assess  Toilet Transfers Comments: Anticipate Min A       Therapy key for assistance levels -   Independent = Pt. is able to perform task with no assistance but may require a device   Stand by assistance = Pt. does not perform task at an independent level but does not need physical assistance, requires verbal cues  Minimal, Moderate, Maximal Assistance = Pt. requires physical assistance (25%, 50%, 75% assist from helper) for task but is able to actively participate in task   Dependent = Pt. requires total assistance with task and is not able to actively participate with task completion     Functional Mobility:  Functional Mobility  Functional - Mobility Device: Rolling Walker  Activity: Other  Assist Level: Minimal assistance  Functional Mobility Comments: side step along EOB  Transfers  Sit to stand: Contact guard assistance  Stand to sit: Contact guard assistance    Bed Mobility  Bed mobility  Supine to Sit: Moderate assistance  Sit to Supine: Stand by assistance    Seated and Standing Balance:  Balance  Sitting Balance: Contact guard assistance  Standing Balance: Minimal assistance    Functional Endurance:  Activity Tolerance  Activity Tolerance: Patient Tolerated treatment well    D/C Recommendations:  OT D/C RECOMMENDATIONS  REQUIRES OT FOLLOW UP: Yes    Equipment Recommendations:  OT Equipment Recommendations  Other: continue to assess    OT Education:   OT Education  OT Education: OT Role, Plan of Care    OT Follow Up:  OT D/C RECOMMENDATIONS  REQUIRES OT FOLLOW UP: Yes       Assessment/Discharge Disposition:  Assessment: Pt is a 79 y/o male from home with son and spouse who presents to [facility] with the above deficits which impact his independence and safety to complete ADLs. Pt would benefit from OT services to maximize independence and safety during ADLs and IADLs. Performance deficits / Impairments: Decreased functional mobility , Decreased safe awareness, Decreased balance, Decreased cognition, Decreased ADL status, Decreased endurance, Decreased high-level IADLs, Decreased strength, Decreased fine motor control  Prognosis: Good  Discharge Recommendations: Continue to assess pending progress  Decision Making: High Complexity  History: Pt's medical history is moderately complex  Exam: 9 performance deficits  Assistance / Modification:  Mod A    Six Click Score   How much help for putting on and taking off regular lower body clothing?: A Lot  How much help for Bathing?: A Lot  How much help for Toileting?: A Little  How much help for putting on and taking off regular upper body clothing?: A Little  How much help for taking care of personal grooming?: A Little  How much help for eating meals?: A Little  AM-PAC Inpatient Daily Activity Raw Score: 16  AM-PAC Inpatient ADL T-Scale Score : 35.96  ADL Inpatient CMS 0-100% Score: 53.32    Plan:  Plan  Times per week: 1-3x  Plan weeks: length of acute stay  Current Treatment Recommendations: Strengthening, Endurance Training, Neuromuscular Re-education, Self-Care / ADL, Equipment Evaluation, Education, & procurement, Cognitive/Perceptual Training, Balance Training, Functional Mobility Training, Safety Education & Training    Goals:   Patient will:    - Improve functional endurance to tolerate/complete 30 mins of ADL's  - Be SBA in UB ADLs   - Be SBA I in LB ADLs  - Be SBAI in ADL transfers without LOB  - Be SBA in toileting tasks  - Improve B hand fine motor coordination to [facility] in order to manage clothing fasteners/self-care containers in a timely manner  - Improve B UE strength and endurance to [facility] in order to participate in self-care activities as projected. - Access appropriate D/C site with as few architectural barriers as possible.   - Sequence self-care tasks with no verbal cues    Patient Goal: Patient goals : \"to get stronger\"     Discussed and agreed upon: Yes Comments:     Therapy Time:   OT Individual Minutes  Time In: 6321  Time Out: 3105  Minutes: 29    Eval: 29 minutes     Electronically signed by:    Skip Nunez OT, OTR/L  8/10/2021, 2:32 PM

## 2021-08-10 NOTE — PROGRESS NOTES
Progress Note  Patient: Kevin Avila  Unit/Bed: M734/Q747-63  YOB: 1929  MRN: [id number]  Acct: [de-identified]   Admitting Diagnosis: Acute on chronic diastolic CHF (congestive heart failure) ([facility]) [I50.33]  CHF (congestive heart failure), NYHA class I, acute on chronic, combined ([facility] [I50.43]  Date:  8/9/2021  Hospital Day: 0    Chief Complaint:  SOB    Subjective      8/10/21: Resting comfortably in bed in no acute distress. Maintaining adequate SPO2 of 98% on room air earlier this morning per nursing staff. Denies chest pain. Patient is Macedonian-speaking. On Lasix 20 mg IV twice daily but accurate intake and output are not being tracked. Patient does appear to have slightly labored breathing. On telemetry, he is A. fib with V pacing with heart rate 60s to 70s. He is on low-dose oral anticoagulation with Eliquis 2.5 mg p.o. twice daily. proBNP was elevated 12,692 on presentation. Serial troponins negative x3. Will consult PT/OT for evaluation. 8/9/21: Elderly gentleman was noted to be hypoxic in the ER with significantly elevated BNP. And findings consistent with congestive heart failure he was admitted for observation. 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 Cardiovascular: Negative for chest pain and leg swelling. Physical Examination:    /63   Pulse 68   Temp 97.5 °F (36.4 °C) (Oral)   Resp 20   Ht 5' 9\" (1.753 m)   Wt 150 lb (68 kg)   SpO2 98%   BMI 22.15 kg/m²    Physical Exam  Constitutional:       General: He is not in acute distress. Appearance: Normal appearance. HENT:      Head: Normocephalic and atraumatic. Cardiovascular:      Rate and Rhythm: Normal rate. Rhythm irregularly irregular.    Pulmonary:      Effort: CO2 21 08/09/2021    BUN 17 08/09/2021    LABALBU 4.0 08/09/2021    CREATININE 1.1 08/09/2021    CREATININE 0.97 08/09/2021    CALCIUM 9.3 08/09/2021    GFRAA >60 08/09/2021    LABGLOM >60 08/09/2021    GLUCOSE 161 08/09/2021     Magnesium:    Lab Results   Component Value Date    MG 2.3 08/10/2021     Troponin:    Lab Results   Component Value Date    TROPONINI <0.010 08/09/2021       Radiology:  XR CHEST PORTABLE    Result Date: 8/9/2021  EXAMINATION: XR CHEST PORTABLE CLINICAL HISTORY: SHORTNESS OF BREATH COMPARISONS: JULY 22, 2021 FINDINGS: Pacemaker generator and wires unchanged. Bilateral degenerative change glenohumeral joints and acromioclavicular joints with bilateral narrowing of the interval between acromion and humeral heads. Cardiopericardial silhouette enlarged, unchanged. Blunting left costophrenic angle. Lungs otherwise clear. STABLE CARDIOMEGALY. SMALL LEFT EFFUSION. DEGENERATIVE CHANGE BILATERAL SHOULDERS. Echocardiogram 6/8/21:  Conclusions   Summary   Normal mitral valve structure and function. 2+ MR   LV is mildly Dilated   Left ventricular ejection fraction is visually estimated at 20-25%. Large Pleural Effusion noted      Signature   ----------------------------------------------------------------   Electronically signed by Nic Polanco MD(Interpreting   physician) on 06/08/2021 11:53 AM      EKG 8/10/21: A-fib with occasional V-paced beats 69, nonspecific ST changes, QTc 563ms    Telemetry 8/10/21: V-paced/A-fib 60s-70s      Assessment:    Active Hospital Problems    Diagnosis Date Noted    Acute on chronic diastolic CHF (congestive heart failure) ([facility]) [I50.33] 08/09/2021    CHF (congestive heart failure), NYHA class I, acute on chronic, combined ([address] [I50.43] 06/07/2021     1. Acute on chronic systolic CHF  2. Chronic atrial fibrillation--on low dose Eliquis   3. Cardiomyopathy EF 20-25% per echo 6/8/21  4. Hx SSS s/p single chamber PPM implant on 8/11/2020  5.  2+ MR per echo 6/8/21  6. HTN  7. DM  8. Abnormal EKG/prolonged QTc     Plan:  1. Maximize medical therapy-Lopressor 25 mg p.o. twice daily, IV diuresis as tolerated-currently with Lasix 20 mg IV twice daily, spironolactone 25 mg p.o. daily, potassium chloride 10 mEq p.o. daily, oral anticoagulation with low-dose Eliquis 2.5 mg p.o. twice daily, will hold off on ACE inhibitor/angiotensin receptor blocker for now given marginal blood pressure and concern for hypotension  2. Cardiac/less than 2 g sodium diet recommended  3. Check daily weight and strict intake and output  4. Monitor on telemetry for any tachycardia or bradycardia arrhythmias  5. Maintain potassium greater than 4, magnesium greater than 2  6. GI/DVT prophylaxis  7. Repeat EKG in a.m. to monitor QTc interval.  Avoid any QT prolonging agents. 8. Conservative medical therapy from a cardiac standpoint secondary to advanced age. No plan for any invasive cardiac work-up  9. Will need continued outpatient follow-up with Dr. Grace Benson upon discharge  10.  Further recommendations to follow        Electronically signed by STEFANIE Casper on 8/10/2021 at 11:57 AM

## 2021-08-10 NOTE — CARE COORDINATION
SPOKE WITH SOFIE AT [facility]. PT HAD [facility] BUT WAS DISCHARGED FROM THEM ON 8/5. PT HAD TELE HEALTH SN/OT. PREVIOUS ENCOUNTER PT HAD PHYSICAL THERAPY. PT/OT ORDERED AND WILL MONITOR FOR NEEDS. DIETARY CONSULTED AND CARDIAC REHAB ORDERED. WILL NEED NEW ORDER FOR HHC. Signed        [facility] EMERGENCY [facility] Case Management Initial Discharge Assessment     Met with Patient and Family to discuss discharge plan.          PCP: Ck Dean                                Date of Last Visit: yesterday     If no PCP, list provided? N/A     Discharge Planning     Living Arrangements: at home dependent on family care     Who do you live with? Son, wife     Who helps you with your care:  family or spouse     If lives at home:                Do you have any barriers navigating in your home? no     Patient can perform ADL? No     Current Services (outpatient and in home) :  [facility] ([facility]: sn/pt/ot)     Dialysis: No     Is transportation available to get to your appointments? Yes     DME Equipment:  yes - cane, walker, shower chair. Needs a bsc     Respiratory equipment: None     Respiratory provider:  no     Pharmacy:  yes - [facility]     Consult with Medication Assistance Program?  No    Does Patient Have a High-Risk for Readmission Diagnosis (CHF, PN, MI, COPD)? Yes was admitted by cardio     Initial Discharge Plan? (Note: please see concurrent daily documentation for any updates after initial note).    I spoke with the patient and his son using  [facility]. Pt lives with his son and his wife and there are other family members that help as well. He would like to have a bsc. He does not have grab bars and I did suggest to his son that they check with their landlord about getting those installed. His son also states that MOW may be helpful.  He plans to continue with [facility] sn/ot/pt after d/c and anticipated no other d/c needs.     Readmission Risk              Risk of Unplanned Readmission:  10 Electronically signed by Rufino Diaz on 7/4/2021 at 8:17 PM

## 2021-08-11 LAB
ANION GAP SERPL CALCULATED.3IONS-SCNC: 25 MEQ/L (ref 9–15)
BUN BLDV-MCNC: 37 MG/DL (ref 8–23)
CALCIUM SERPL-MCNC: 9.6 MG/DL (ref 8.5–9.9)
CHLORIDE BLD-SCNC: 98 MEQ/L (ref 95–107)
CO2: 15 MEQ/L (ref 20–31)
CREAT SERPL-MCNC: 1.31 MG/DL (ref 0.7–1.2)
EKG ATRIAL RATE: 234 BPM
EKG Q-T INTERVAL: 458 MS
EKG QRS DURATION: 118 MS
EKG QTC CALCULATION (BAZETT): 557 MS
EKG R AXIS: -67 DEGREES
EKG T AXIS: 84 DEGREES
EKG VENTRICULAR RATE: 89 BPM
GFR AFRICAN AMERICAN: >60
GFR NON-AFRICAN AMERICAN: 51.1
GLUCOSE BLD-MCNC: 80 MG/DL (ref 70–99)
POTASSIUM REFLEX MAGNESIUM: 4.7 MEQ/L (ref 3.4–4.9)
PRO-BNP: NORMAL PG/ML
SODIUM BLD-SCNC: 138 MEQ/L (ref 135–144)
URINE CULTURE, ROUTINE: NORMAL

## 2021-08-11 PROCEDURE — 6360000002 HC RX W HCPCS: Performed by: INTERNAL MEDICINE

## 2021-08-11 PROCEDURE — 80048 BASIC METABOLIC PNL TOTAL CA: CPT

## 2021-08-11 PROCEDURE — 36415 COLL VENOUS BLD VENIPUNCTURE: CPT

## 2021-08-11 PROCEDURE — 97116 GAIT TRAINING THERAPY: CPT

## 2021-08-11 PROCEDURE — 93005 ELECTROCARDIOGRAM TRACING: CPT | Performed by: PHYSICIAN ASSISTANT

## 2021-08-11 PROCEDURE — 2580000003 HC RX 258: Performed by: INTERNAL MEDICINE

## 2021-08-11 PROCEDURE — 2060000000 HC ICU INTERMEDIATE R&B

## 2021-08-11 PROCEDURE — 94761 N-INVAS EAR/PLS OXIMETRY MLT: CPT

## 2021-08-11 PROCEDURE — 6370000000 HC RX 637 (ALT 250 FOR IP): Performed by: INTERNAL MEDICINE

## 2021-08-11 PROCEDURE — 83880 ASSAY OF NATRIURETIC PEPTIDE: CPT

## 2021-08-11 PROCEDURE — 2700000000 HC OXYGEN THERAPY PER DAY

## 2021-08-11 PROCEDURE — 94640 AIRWAY INHALATION TREATMENT: CPT

## 2021-08-11 PROCEDURE — 93010 ELECTROCARDIOGRAM REPORT: CPT | Performed by: INTERNAL MEDICINE

## 2021-08-11 PROCEDURE — 99232 SBSQ HOSP IP/OBS MODERATE 35: CPT | Performed by: INTERNAL MEDICINE

## 2021-08-11 RX ORDER — FUROSEMIDE 20 MG/1
20 TABLET ORAL DAILY
Status: DISCONTINUED | OUTPATIENT
Start: 2021-08-12 | End: 2021-08-13 | Stop reason: HOSPADM

## 2021-08-11 RX ORDER — METOPROLOL TARTRATE 50 MG/1
50 TABLET, FILM COATED ORAL 2 TIMES DAILY
Status: DISCONTINUED | OUTPATIENT
Start: 2021-08-11 | End: 2021-08-13

## 2021-08-11 RX ADMIN — FUROSEMIDE 20 MG: 10 INJECTION, SOLUTION INTRAMUSCULAR; INTRAVENOUS at 08:27

## 2021-08-11 RX ADMIN — APIXABAN 2.5 MG: 2.5 TABLET, FILM COATED ORAL at 20:11

## 2021-08-11 RX ADMIN — APIXABAN 2.5 MG: 2.5 TABLET, FILM COATED ORAL at 08:27

## 2021-08-11 RX ADMIN — Medication 10 ML: at 08:27

## 2021-08-11 RX ADMIN — METOPROLOL TARTRATE 50 MG: 50 TABLET, FILM COATED ORAL at 20:11

## 2021-08-11 RX ADMIN — TIOTROPIUM BROMIDE INHALATION SPRAY 2 PUFF: 3.12 SPRAY, METERED RESPIRATORY (INHALATION) at 07:05

## 2021-08-11 RX ADMIN — SPIRONOLACTONE 25 MG: 25 TABLET ORAL at 08:27

## 2021-08-11 RX ADMIN — Medication 10 ML: at 20:15

## 2021-08-11 RX ADMIN — BUDESONIDE AND FORMOTEROL FUMARATE DIHYDRATE 2 PUFF: 80; 4.5 AEROSOL RESPIRATORY (INHALATION) at 07:05

## 2021-08-11 RX ADMIN — POTASSIUM CHLORIDE 10 MEQ: 1500 TABLET, EXTENDED RELEASE ORAL at 08:27

## 2021-08-11 RX ADMIN — METOPROLOL TARTRATE 25 MG: 25 TABLET, FILM COATED ORAL at 08:27

## 2021-08-11 ASSESSMENT — PAIN SCALES - GENERAL
PAINLEVEL_OUTOF10: 0

## 2021-08-11 NOTE — PROGRESS NOTES
Physical Therapy Med Surg Daily Treatment Note  Facility/Department: [facility]  Room: [facility]/C213-83       NAME: Isabel Godwin  : 1929 (80 y.o.)  MRN: [id number]  CODE STATUS: Full Code    Date of Service: 2021    Patient Diagnosis(es): Acute on chronic diastolic CHF (congestive heart failure) ([facility]) [I50.33]  CHF (congestive heart failure), NYHA class I, acute on chronic, combined [facility]) [I50.43]   Chief Complaint   Patient presents with    Shortness of Breath     x 1 month. Dry mouth, intermittent chest pressure.       Patient Active Problem List    Diagnosis Date Noted    SSS (sick sinus syndrome) ([address]     Pacemaker     Acute on chronic diastolic CHF (congestive heart failure) ([address] 2021    Acute on chronic combined systolic and diastolic CHF (congestive heart failure) ([address] 2021    CHF (congestive heart failure), NYHA class I, acute on chronic, combined ([address] 2021    Atelectasis of right lung     Pleural effusion on right     SOB (shortness of breath) 2020    Cardiomyopathy ([address] 2020    Chronic atrial fibrillation ([address] 2020        Past Medical History:   Diagnosis Date    Anxiety     Atrial fibrillation ([address]     Cardiomyopathy ([address] 2020    CHF (congestive heart failure) ([address]     Diabetes mellitus ([address]     Hypertension     Sleep apnea      Past Surgical History:   Procedure Laterality Date    BACK SURGERY      CARDIAC PACEMAKER PLACEMENT  1613    HERNIA REPAIR      THORACENTESIS Left 2021    450ml removed by Dr Christie Bai [phone]       Restrictions  Restrictions/Precautions: Fall Risk    SUBJECTIVE   General  Chart Reviewed: Yes  Family / Caregiver Present: Yes  Subjective  Subjective: Ipad  used throughout Migdalia tariq #[id number]    Pre-Session Pain Report  Pre Treatment Pain Screening  Pain at present: 0  Scale Used: Numeric Score  Intervention List: Patient able to continue with treatment  Pain Screening  Patient Currently in Pain: No       Post-Session Pain Report  Pain Assessment  Pain Assessment: 0-10  Pain Level: 0         OBJECTIVE        Bed mobility  Supine to Sit: Minimal assistance  Sit to Supine:  (DNT pt into chair.)  Comment: impulsive quick movements, vc's for improved safety awareness. Transfers  Sit to Stand: Minimal Assistance  Stand to sit: Moderate Assistance  Comment: Elizabeth Finnegan, pt does not follow cues for hand placement. Ambulation  Ambulation?: Yes  Ambulation 1  Surface: level tile  Device: Rolling Walker  Assistance: Moderate assistance  Quality of Gait: knees stable. LOB forward and laterally with recovery assist required, flexed trunk, dyspniac throughout with anxiety noted  Distance: 10'  Comments: pt SOB after completing, vc's for PLB and relaxation techniques, pt recovers quickly. Activity Tolerance  Activity Tolerance: Patient limited by fatigue;Patient limited by endurance  Activity Tolerance: SOB with all transitions, SpO2 100% when taken. ASSESSMENT   Assessment: pt fatigues very quickly and is limited by SOB and anxiety. pt does well with iPad interpretation.      Discharge Recommendations:  Continue to assess pending progress    Goals  Short term goals  Short term goal 1: mod assist sit to stand  Short term goal 2: SBA bed mobility  Short term goal 3: min assist gait 20 feet with ww  Short term goal 4: pt able to stand with ww with min assist 2 min    PLAN    Times per week: 3-6  Safety Devices  Type of devices: Call light within reach, Chair alarm in place, Left in chair     AMPAC (6 CLICK) BASIC MOBILITY  AM-PAC Inpatient Mobility Raw Score : 14      Therapy Time   Individual   Time In 0849   Time Out 0907   Minutes 18      BM/Trsf: 6  Gait: [address], PTA, 08/11/21 at 9:14 AM         Definitions for assistance levels  Independent = pt does not require any physical supervision or assistance from another person for activity completion. Device may be needed.   Stand by assistance = pt requires verbal cues or instructions from another person, close to but not touching, to perform the activity  Minimal assistance= pt performs 75% or more of the activity; assistance is required to complete the activity  Moderate assistance= pt performs 50% of the activity; assistance is required to complete the activity  Maximal assistance = pt performs 25% of the activity; assistance is required to complete the activity  Dependent = pt requires total physical assistance to accomplish the task

## 2021-08-11 NOTE — PROGRESS NOTES
Progress Note  Patient: Russel Uriarte  Unit/Bed: Y410/J873-18  YOB: 1929  MRN: [id number]  Acct: [de-identified]   Admitting Diagnosis: Acute on chronic diastolic CHF (congestive heart failure) ([facility]) [I50.33]  CHF (congestive heart failure), NYHA class I, acute on chronic, combined ([facility] [I50.43]  Date:  8/9/2021  Hospital Day: 1    Chief Complaint:  Shortness of breath    Subjective  Cardiomyopathy with EF significantly depressed. About 20%. Admitted with acute on chronic left heart failure both systolic and diastolic but predominantly systolic during this admission. Has a pacemaker. Was not considered to be an ICD candidate because of advanced age and questionable dementia. BUN/creatinine little bit worse. Started on Aldactone yesterday will monitor renal function. Review of Systems:   Review of Systems   Unable to perform ROS: Acuity of condition         Physical Examination:    BP (!) 124/55   Pulse 81   Temp 96.5 °F (35.8 °C) (Oral)   Resp 24   Ht 5' 9\" (1.753 m)   Wt 145 lb 8.1 oz (66 kg)   SpO2 98%   BMI 21.49 kg/m²    Physical Exam  Constitutional:       Appearance: He is ill-appearing. Cardiovascular:      Rate and Rhythm: Rhythm irregular. Heart sounds: Gallop present. Skin:     Coloration: Skin is pale.          LABS:  CBC:   Lab Results   Component Value Date    WBC 2.8 08/10/2021    RBC 4.17 08/10/2021    HGB 13.8 08/10/2021    HCT 41.6 08/10/2021    MCV 99.8 08/10/2021    MCH 33.1 08/10/2021    MCHC 33.2 08/10/2021    RDW 18.3 08/10/2021     08/10/2021     CBC with Differential:   Lab Results   Component Value Date    WBC 2.8 08/10/2021    RBC 4.17 08/10/2021    HGB 13.8 08/10/2021    HCT 41.6 08/10/2021     08/10/2021    MCV 99.8 08/10/2021    MCH 33.1 08/10/2021    MCHC 33.2 08/10/2021    RDW 18.3 08/10/2021    LYMPHOPCT 47.2 08/09/2021    MONOPCT 9.8 08/09/2021    BASOPCT 1.3 08/09/2021    MONOSABS 0.5 08/09/2021    LYMPHSABS 2.5 08/09/2021 visualization Indications:LVF. Patient Status: Routine Height: 69 inches Weight: 150 pounds BSA: 1.83 m^2 BMI: 22.15 kg/m^2 BP: 108/63 mmHg  Conclusions   Summary  Normal mitral valve structure and function. Moderate (2 to 3+) mitral regurgitation is present. Normal aortic valve structure and function. Mild aortic regurgitation is noted. Normal tricuspid valve structure and function. There is moderate ( 2 +) tricuspid regurgitation with estimated RVSP of 60  mm Hg. Moderately dilated left atrium. Left ventricular ejection fraction is visually estimated at 20%. Restrictive filling pattern. Evidence of large pleural effusion is noted. Signature   ----------------------------------------------------------------  Electronically signed by Alejandro Sanchez(Interpreting physician)  on 08/10/2021 04:16 PM  ----------------------------------------------------------------   Findings  Left Ventricle Left ventricular ejection fraction is visually estimated at 20%. Restrictive filling pattern. Right Ventricle Normal right ventricle structure and function. Left Atrium Moderately dilated left atrium. Right Atrium Moderately enlarged right atrium size. Mitral Valve Normal mitral valve structure and function. Moderate (2 to 3+) mitral regurgitation is present. Tricuspid Valve Normal tricuspid valve structure and function. There is moderate ( 2 +) tricuspid regurgitation with estimated RVSP of 60 mm Hg. Aortic Valve Normal aortic valve structure and function. Mild aortic regurgitation is noted. Pulmonic Valve Normal pulmonic valve structure and function. Pericardial Effusion No evidence of pericardial effusion. Pleural Effusion Evidence of large pleural effusion is noted. Aorta \ Miscellaneous Miscellaneous normal findings were found. M-Mode Measurements (cm)   LVIDd: 6.32 cm                         LVIDs: 5.55 cm  IVSd: 0.9 cm                           IVSs: 1.2 cm  LVPWd: 1.04 cm                         LVPWs: 1.34 cm  Rt. Vent. Dimension: 2.89 cm           AO Root Dimension: 3.49 cm                                         ACS: 1.92 cm                                         LVOT: 2.14 cm  Doppler Measurements:   AV Velocity:0.01 m/s                    MV Peak E-Wave: 0.68 m/s  AV Peak Gradient: 7.15 mmHg             MV Peak A-Wave: 0.16 m/s  AV Mean Gradient: 3.56 mmHg  AV Area (Continuity):1.39 cm^2  TR Velocity:3.55 m/s                    Estimated RAP:10 mmHg  TR Gradient:50.46 mmHg                  RVSP:60.46 mmHg  Valves  Mitral Valve   Peak E-Wave: 0.68 m/s            Peak A-Wave: 0.16 m/s                                   E/A Ratio: 4.3                                   Peak Gradient: 1.87 mmHg  MR Velocity: 4.36 m/s            Deceleration Time: 178 msec   Aortic Valve   Peak Velocity: 1.34 m/s                Mean Velocity: 0.88 m/s  Peak Gradient: 7.15 mmHg               Mean Gradient: 3.56 mmHg  Area (continuity): 1.39 cm^2  AV VTI: 23.71 cm  AI P1/2t: 541 msec  Cusp Separation: 1.92 cm   Tricuspid Valve   Estimated RVSP: 60.46 mmHg              Estimated RAP: 10 mmHg  TR Velocity: 3.55 m/s                   TR Gradient: 50.46 mmHg   Pulmonic Valve   Peak Velocity: 0.52 m/s           Peak Gradient: 1.09 mmHg                                    Estimated PASP: 60.46 mmHg   LVOT   Peak Velocity: 0.57 m/s              Mean Velocity: 0.36 m/s  Peak Gradient: 1.23 mmHg             Mean Gradient: 0.62 mmHg  LVOT Diameter: 2.14 cm               LVOT VTI: 9.19 cm  Structures  Left Atrium   LA Volume/Index: 169.1 ml /92 m^2             LA Area: 36.25 cm^2   Left Ventricle   Diastolic Dimension: 4.36 cm          Systolic Dimension: 8.26 cm  Septum Diastolic: 0.9 cm              Septum Systolic: 1.2 cm  PW Diastolic: 6.50 cm                 PW Systolic: 9.48 cm                                        FS: 12.2 %  LV EDV/LV EDV Index: 202.45 ml/111    LV ESV/LV ESV Index: 150.46 ml/82  m^2                                   m^2  EF

## 2021-08-11 NOTE — PROGRESS NOTES
Nutrition Education    · Consult for CHF diet education received, pt has is Somali speaking only and has dementia. Eductional packet left for family by Care transition nurse, ALONSON name and phone number included for family to call with questions.     Electronically signed by Summer Goncalves RD, LD on 8/11/21 at 10:15 AM EDT

## 2021-08-12 LAB
ANION GAP SERPL CALCULATED.3IONS-SCNC: 21 MEQ/L (ref 9–15)
BUN BLDV-MCNC: 45 MG/DL (ref 8–23)
CALCIUM SERPL-MCNC: 9.3 MG/DL (ref 8.5–9.9)
CHLORIDE BLD-SCNC: 98 MEQ/L (ref 95–107)
CO2: 16 MEQ/L (ref 20–31)
CREAT SERPL-MCNC: 1.18 MG/DL (ref 0.7–1.2)
GFR AFRICAN AMERICAN: >60
GFR NON-AFRICAN AMERICAN: 57.7
GLUCOSE BLD-MCNC: 99 MG/DL (ref 70–99)
POTASSIUM REFLEX MAGNESIUM: 4.7 MEQ/L (ref 3.4–4.9)
SODIUM BLD-SCNC: 135 MEQ/L (ref 135–144)

## 2021-08-12 PROCEDURE — 6370000000 HC RX 637 (ALT 250 FOR IP): Performed by: INTERNAL MEDICINE

## 2021-08-12 PROCEDURE — 99232 SBSQ HOSP IP/OBS MODERATE 35: CPT | Performed by: INTERNAL MEDICINE

## 2021-08-12 PROCEDURE — 36415 COLL VENOUS BLD VENIPUNCTURE: CPT

## 2021-08-12 PROCEDURE — 2580000003 HC RX 258: Performed by: INTERNAL MEDICINE

## 2021-08-12 PROCEDURE — 2700000000 HC OXYGEN THERAPY PER DAY

## 2021-08-12 PROCEDURE — 80048 BASIC METABOLIC PNL TOTAL CA: CPT

## 2021-08-12 PROCEDURE — 2060000000 HC ICU INTERMEDIATE R&B

## 2021-08-12 RX ORDER — ALPRAZOLAM 0.25 MG/1
0.25 TABLET ORAL 2 TIMES DAILY PRN
Status: DISCONTINUED | OUTPATIENT
Start: 2021-08-12 | End: 2021-08-13 | Stop reason: HOSPADM

## 2021-08-12 RX ADMIN — SPIRONOLACTONE 25 MG: 25 TABLET ORAL at 10:32

## 2021-08-12 RX ADMIN — METOPROLOL TARTRATE 50 MG: 50 TABLET, FILM COATED ORAL at 10:32

## 2021-08-12 RX ADMIN — Medication 10 ML: at 10:33

## 2021-08-12 RX ADMIN — ALPRAZOLAM 0.25 MG: 0.25 TABLET ORAL at 15:31

## 2021-08-12 RX ADMIN — ACETAMINOPHEN 650 MG: 325 TABLET ORAL at 02:33

## 2021-08-12 RX ADMIN — APIXABAN 2.5 MG: 2.5 TABLET, FILM COATED ORAL at 20:41

## 2021-08-12 RX ADMIN — FUROSEMIDE 20 MG: 20 TABLET ORAL at 10:32

## 2021-08-12 RX ADMIN — Medication 10 ML: at 20:45

## 2021-08-12 RX ADMIN — APIXABAN 2.5 MG: 2.5 TABLET, FILM COATED ORAL at 10:32

## 2021-08-12 ASSESSMENT — ENCOUNTER SYMPTOMS
VOMITING: 0
NAUSEA: 0
BLOOD IN STOOL: 0

## 2021-08-12 ASSESSMENT — PAIN SCALES - GENERAL
PAINLEVEL_OUTOF10: 0
PAINLEVEL_OUTOF10: 5
PAINLEVEL_OUTOF10: 0

## 2021-08-12 NOTE — PROGRESS NOTES
Progress Note  Patient: Kimberly Garcia  Unit/Bed: R723/K607-92  YOB: 1929  MRN: [id number]  Acct: [de-identified]   Admitting Diagnosis: Acute on chronic diastolic CHF (congestive heart failure) ([facility]) [I50.33]  CHF (congestive heart failure), NYHA class I, acute on chronic, combined ([address] [I50.43]  Date:  8/9/2021  Hospital Day: 2    Chief Complaint:  CHF    Subjective  Cardiomyopathy with severely impaired left cardiac fraction moderate pulmonary hypertension moderate mitral regurgitation not a case for intervention. He was thought not to be candidate for AICD and therefore single-lead pacemaker was inserted. Medications were optimized. BUN is mildly elevated creatinine stable we will continue with Aldactone. Cardiac rehab is an issue because of significant language. Review of Systems:   Review of Systems   Unable to perform ROS: Acuity of condition   Constitutional: Negative for diaphoresis. HENT: Negative for nosebleeds. Cardiovascular: Negative for chest pain, palpitations and leg swelling. Gastrointestinal: Negative for blood in stool, nausea and vomiting. Musculoskeletal: Negative for myalgias. Neurological: Negative for dizziness, seizures, weakness and headaches. Psychiatric/Behavioral: The patient is not nervous/anxious. Physical Examination:    /77   Pulse 66   Temp 97.5 °F (36.4 °C) (Axillary)   Resp 18   Ht 5' 9\" (1.753 m)   Wt 145 lb 8.1 oz (66 kg)   SpO2 100%   BMI 21.49 kg/m²    Physical Exam  Cardiovascular:      Rate and Rhythm: Rhythm irregular. Heart sounds: Gallop present.           LABS:  CBC:   Lab Results   Component Value Date    WBC 2.8 08/10/2021    RBC 4.17 08/10/2021    HGB 13.8 08/10/2021    HCT 41.6 08/10/2021    MCV 99.8 08/10/2021    MCH 33.1 08/10/2021    MCHC 33.2 08/10/2021    RDW 18.3 08/10/2021     08/10/2021     CBC with Differential:   Lab Results   Component Value Date    WBC 2.8 08/10/2021    RBC 4.17 08/10/2021 HGB 13.8 08/10/2021    HCT 41.6 08/10/2021     08/10/2021    MCV 99.8 08/10/2021    MCH 33.1 08/10/2021    MCHC 33.2 08/10/2021    RDW 18.3 08/10/2021    LYMPHOPCT 47.2 08/09/2021    MONOPCT 9.8 08/09/2021    BASOPCT 1.3 08/09/2021    MONOSABS 0.5 08/09/2021    LYMPHSABS 2.5 08/09/2021    EOSABS 0.1 08/09/2021    BASOSABS 0.1 08/09/2021     CMP:    Lab Results   Component Value Date     08/12/2021    K 4.7 08/12/2021    CL 98 08/12/2021    CO2 16 08/12/2021    BUN 45 08/12/2021    CREATININE 1.18 08/12/2021    GFRAA >60.0 08/12/2021    LABGLOM 57.7 08/12/2021    GLUCOSE 99 08/12/2021    PROT 7.0 08/09/2021    LABALBU 4.0 08/09/2021    CALCIUM 9.3 08/12/2021    BILITOT 1.9 08/09/2021    ALKPHOS 105 08/09/2021    AST 30 08/09/2021    ALT 20 08/09/2021     BMP:    Lab Results   Component Value Date     08/12/2021    K 4.7 08/12/2021    CL 98 08/12/2021    CO2 16 08/12/2021    BUN 45 08/12/2021    LABALBU 4.0 08/09/2021    CREATININE 1.18 08/12/2021    CALCIUM 9.3 08/12/2021    GFRAA >60.0 08/12/2021    LABGLOM 57.7 08/12/2021    GLUCOSE 99 08/12/2021     Magnesium:    Lab Results   Component Value Date    MG 2.3 08/10/2021     Troponin:    Lab Results   Component Value Date    TROPONINI <0.010 08/09/2021       Radiology:  No results found. EKG:  Assessment:    Active Hospital Problems    Diagnosis Date Noted    Pacemaker [Z95.0]     Acute on chronic diastolic CHF (congestive heart failure) ([facility]) [I50.33] 08/09/2021    CHF (congestive heart failure), NYHA class I, acute on chronic, combined ([facility] [I50.43] 06/07/2021           Plan:  1. Talk to the daughter. We should be able to send the patient home tomorrow with home health care.   Have optimized his medication  Electronically signed by Maty Rocha MD on 8/12/2021 at 1:30 PM

## 2021-08-12 NOTE — FLOWSHEET NOTE
Pt refused AM meds and breakfast at this time. Resting in bed, eyes closed. Bed alarm on and call light at hand.  Electronically signed by Elizabeth Lopez RN on 8/12/2021 at 12:00 PM

## 2021-08-13 VITALS
HEIGHT: 69 IN | OXYGEN SATURATION: 100 % | DIASTOLIC BLOOD PRESSURE: 65 MMHG | WEIGHT: 140.87 LBS | SYSTOLIC BLOOD PRESSURE: 108 MMHG | RESPIRATION RATE: 17 BRPM | BODY MASS INDEX: 20.87 KG/M2 | TEMPERATURE: 97.5 F | HEART RATE: 64 BPM

## 2021-08-13 LAB
EKG ATRIAL RATE: 51 BPM
EKG Q-T INTERVAL: 576 MS
EKG QRS DURATION: 200 MS
EKG QTC CALCULATION (BAZETT): 570 MS
EKG R AXIS: -75 DEGREES
EKG T AXIS: 91 DEGREES
EKG VENTRICULAR RATE: 59 BPM

## 2021-08-13 PROCEDURE — 99221 1ST HOSP IP/OBS SF/LOW 40: CPT | Performed by: NURSE PRACTITIONER

## 2021-08-13 PROCEDURE — APPSS30 APP SPLIT SHARED TIME 16-30 MINUTES: Performed by: PHYSICIAN ASSISTANT

## 2021-08-13 PROCEDURE — 97110 THERAPEUTIC EXERCISES: CPT

## 2021-08-13 PROCEDURE — 6370000000 HC RX 637 (ALT 250 FOR IP): Performed by: INTERNAL MEDICINE

## 2021-08-13 PROCEDURE — 99239 HOSP IP/OBS DSCHRG MGMT >30: CPT | Performed by: INTERNAL MEDICINE

## 2021-08-13 PROCEDURE — 2700000000 HC OXYGEN THERAPY PER DAY

## 2021-08-13 PROCEDURE — 97535 SELF CARE MNGMENT TRAINING: CPT

## 2021-08-13 PROCEDURE — 6370000000 HC RX 637 (ALT 250 FOR IP): Performed by: PHYSICIAN ASSISTANT

## 2021-08-13 PROCEDURE — 2580000003 HC RX 258: Performed by: INTERNAL MEDICINE

## 2021-08-13 RX ORDER — BUSPIRONE HYDROCHLORIDE 5 MG/1
5 TABLET ORAL 2 TIMES DAILY
Status: DISCONTINUED | OUTPATIENT
Start: 2021-08-13 | End: 2021-08-13 | Stop reason: HOSPADM

## 2021-08-13 RX ORDER — BUSPIRONE HYDROCHLORIDE 5 MG/1
5 TABLET ORAL 2 TIMES DAILY
Status: ON HOLD | COMMUNITY
End: 2021-09-01

## 2021-08-13 RX ORDER — SPIRONOLACTONE 25 MG/1
25 TABLET ORAL DAILY
Qty: 30 TABLET | Refills: 3 | Status: SHIPPED | OUTPATIENT
Start: 2021-08-14 | End: 2021-11-29 | Stop reason: SDUPTHER

## 2021-08-13 RX ADMIN — ALPRAZOLAM 0.25 MG: 0.25 TABLET ORAL at 01:53

## 2021-08-13 RX ADMIN — FUROSEMIDE 20 MG: 20 TABLET ORAL at 08:51

## 2021-08-13 RX ADMIN — METOPROLOL TARTRATE 50 MG: 50 TABLET, FILM COATED ORAL at 08:51

## 2021-08-13 RX ADMIN — SPIRONOLACTONE 25 MG: 25 TABLET ORAL at 08:51

## 2021-08-13 RX ADMIN — APIXABAN 2.5 MG: 2.5 TABLET, FILM COATED ORAL at 08:51

## 2021-08-13 RX ADMIN — BUSPIRONE HYDROCHLORIDE 5 MG: 5 TABLET ORAL at 11:27

## 2021-08-13 RX ADMIN — Medication 5 ML: at 11:28

## 2021-08-13 ASSESSMENT — ENCOUNTER SYMPTOMS
ABDOMINAL PAIN: 0
ALLERGIC/IMMUNOLOGIC NEGATIVE: 1
ABDOMINAL DISTENTION: 0
CONSTIPATION: 0
DIARRHEA: 0
CHEST TIGHTNESS: 0
WHEEZING: 0
VOMITING: 0
EYES NEGATIVE: 1
SHORTNESS OF BREATH: 1
NAUSEA: 0

## 2021-08-13 ASSESSMENT — PAIN SCALES - GENERAL: PAINLEVEL_OUTOF10: 0

## 2021-08-13 NOTE — PROGRESS NOTES
Physical Therapy Med Surg Daily Treatment Note  Facility/Department: [facility]  Room: [facility]/D198-31       NAME: Elijah Blackwood  : 1929 (80 y.o.)  MRN: [id number]  CODE STATUS: Full Code    Date of Service: 2021    Patient Diagnosis(es): Acute on chronic diastolic CHF (congestive heart failure) ([facility]) [I50.33]  CHF (congestive heart failure), NYHA class I, acute on chronic, combined [facility]) [I50.43]   Chief Complaint   Patient presents with    Shortness of Breath     x 1 month. Dry mouth, intermittent chest pressure.       Patient Active Problem List    Diagnosis Date Noted    SSS (sick sinus syndrome) ([address]     Pacemaker     Acute on chronic diastolic CHF (congestive heart failure) ([address] 2021    Acute on chronic combined systolic and diastolic CHF (congestive heart failure) ([address] 2021    CHF (congestive heart failure), NYHA class I, acute on chronic, combined ([address] 2021    Atelectasis of right lung     Pleural effusion on right     SOB (shortness of breath) 2020    Cardiomyopathy ([address] 2020    Chronic atrial fibrillation ([address] 2020        Past Medical History:   Diagnosis Date    Anxiety     Atrial fibrillation ([address]     Cardiomyopathy ([address] 2020    CHF (congestive heart failure) ([address]     Diabetes mellitus ([address]     Hypertension     Sleep apnea      Past Surgical History:   Procedure Laterality Date    BACK SURGERY      CARDIAC PACEMAKER PLACEMENT  5040    HERNIA REPAIR      THORACENTESIS Left 2021    450ml removed by Dr Daniela Umana [phone]       Restrictions  Restrictions/Precautions: Fall Risk    SUBJECTIVE   General  Chart Reviewed: Yes  Family / Caregiver Present: Yes (son)  Subjective  Subjective: Pt able to state name and birthday    Pre-Session Pain Report  Pre Treatment Pain Screening  Pain at present: 0  Intervention List: Patient able to continue with treatment  Pain Screening  Patient Currently in Pain: Denies Post-Session Pain Report  Pain Assessment  Pain Level: 0         OBJECTIVE        Bed mobility  Rolling to Left: Contact guard assistance  Rolling to Right: Contact guard assistance  Supine to Sit: Minimal assistance  Sit to Supine: Minimal assistance (assist with BLE)  Scooting: Minimal assistance  Comment: Impulsive quick moveemtns, poor follow through of tc's    Transfers  Sit to Stand: Minimal Assistance  Stand to sit: Minimal Assistance  Comment: TC's for hand placement and proper technique with poor follow through. Unable to remain standing more than 25 seconds x3 trials       Exercises  Hip Flexion: x10  Knee Long Arc Quad: x10  Ankle Pumps: x10  Core Strengthening: trunk flx/ext with reaching OOBOS at EOB to initiate core strengthening         ASSESSMENT   Assessment: Pt limited by fatigue and experiences dyspnea upon exersion. SpO2 taken throughout tx, remaining WNL at 95% and above. Max VC's for proper breathing technique provided. Standing trials performed x3 with max standing time 25 seconds. EOB ther ex utilized to continue to strengthen BLE and core while improving overall endurance and functional mobility to get back to PLOF. Increased time and effort needed throughout tx d/t decreased endurance and frequent RB's with breathing cues. Discharge Recommendations:  Continue to assess pending progress    Goals  Short term goals  Short term goal 1: mod assist sit to stand  Short term goal 2: SBA bed mobility  Short term goal 3: min assist gait 20 feet with ww  Short term goal 4: pt able to stand with ww with min assist 2 min    PLAN    Times per week: 3-6  Plan Comment: Cont. POC  Safety Devices  Type of devices:  All fall risk precautions in place, Bed alarm in place, Call light within reach, Left in bed (son present upon departure)     [facility] (6 CLICK) [address] Mobility Raw Score : 14     Therapy Time   Individual   Time In 0931   Time Out 0955   Minutes 24      BM/Trsf: 14  There ex: Marychuy Pike 95, PTA, 08/13/21 at 10:02 AM         Definitions for assistance levels  Independent = pt does not require any physical supervision or assistance from another person for activity completion. Device may be needed.   Stand by assistance = pt requires verbal cues or instructions from another person, close to but not touching, to perform the activity  Minimal assistance= pt performs 75% or more of the activity; assistance is required to complete the activity  Moderate assistance= pt performs 50% of the activity; assistance is required to complete the activity  Maximal assistance = pt performs 25% of the activity; assistance is required to complete the activity  Dependent = pt requires total physical assistance to accomplish the task

## 2021-08-13 NOTE — DISCHARGE SUMMARY
Cardiology Discharge Summary      Patient Identification:  Aspen Vanegas  : 1929  MRN: [id number]   Account: [de-identified]     Admit date: 2021  Discharge date: 21  Attending provider: Marc Ellis MD        Primary care provider: Antonia Neumann     Admission Diagnoses:  Acute on chronic combined systolic and diastolic CHF (congestive heart failure) [facility])         Discharge Diagnoses: Active Hospital Problems    Diagnosis Date Noted    Pacemaker [Z95.0]     Acute on chronic combined systolic and diastolic CHF (congestive heart failure) ([facility] [I50.43] 2021    CHF (congestive heart failure), NYHA class I, acute on chronic, combined ([facility] [I50.43] 2021          Hospital Course: Aspen Vanegas is a80 y.o. male admitted to [facility] on 2021 for acute on chronic systolic congestive heart failure. His BNP on presentation was 12,692. Chest x-ray on 2021 showed able cardiomegaly, small left effusion, degenerative change bilateral shoulders. He was diuresed with IV Lasix and Aldactone added. He was continued on rate control with Lopressor and oral anticoagulation with low-dose Eliquis 2.5 mg p.o. twice daily given history of A. fib. He did develop mild renal insufficiency following IV diuresis and subsequently transition back to p.o. Lasix. Repeat echocardiogram completed on 8/10/2021 revealed severely reduced LV systolic function with EF 20%, moderate 2-3+ MR, moderate TR, RVSP elevated at 60 mmHg. His renal function stabilized prior to discharge. He is hemodynamically stable. On telemetry, he is A. fib with V pacing with heart rate in the 60s. Patient does get anxious and son states that he was on BuSpar 5 mg twice daily at home and brought medication bottle with him today to verify so this will be resumed inpatient this morning and at discharge.   PT and OT as well as  were consulted for discharge planning. Plan is to discharge home with home health care today. Procedures:   None     Consults:   None    Examination:  /65   Pulse 59   Temp 97.5 °F (36.4 °C) (Axillary)   Resp 17   Ht 5' 9\" (1.753 m)   Wt 140 lb 14 oz (63.9 kg)   SpO2 100%   BMI 20.80 kg/m²    Physical Exam  Constitutional:       General: He is not in acute distress. Appearance: Normal appearance. HENT:      Head: Normocephalic and atraumatic. Cardiovascular:      Rate and Rhythm: Normal rate. Rhythm irregularly irregular. Pulmonary:      Effort: Pulmonary effort is normal. No respiratory distress. Breath sounds: No wheezing, rhonchi or rales. Abdominal:      Palpations: Abdomen is soft. Musculoskeletal:         General: Normal range of motion. Right lower leg: No edema. Left lower leg: No edema. Skin:     General: Skin is warm. Neurological:      General: No focal deficit present. Mental Status: He is alert. Cranial Nerves: No cranial nerve deficit.    Psychiatric:         Mood and Affect: Mood normal.         Behavior: Behavior normal.         Medications:  Current Discharge Medication List      START taking these medications    Details   spironolactone (ALDACTONE) 25 MG tablet Take 1 tablet by mouth daily  Qty: 30 tablet, Refills: 3         CONTINUE these medications which have NOT CHANGED    Details   furosemide (LASIX) 40 MG tablet Take 1 tablet by mouth daily  Qty: 30 tablet, Refills: 6      metoprolol tartrate (LOPRESSOR) 50 MG tablet Take 0.5 tablets by mouth 2 times daily  Qty: 60 tablet, Refills: 5      aspirin 81 MG chewable tablet Take 1 tablet by mouth daily  Qty: 30 tablet, Refills: 3      fluticasone-salmeterol (ADVAIR) 100-50 MCG/DOSE diskus inhaler Inhale 1 puff into the lungs every 12 hours      tiotropium (SPIRIVA) 18 MCG inhalation capsule Inhale 18 mcg into the lungs daily      tiotropium (SPIRIVA RESPIMAT) 2.5 MCG/ACT AERS inhaler Inhale 2 ms    Q-T Interval 458 ms    QTc Calculation (Bazett) 557 ms    R Axis -67 degrees    T Axis 84 degrees   Basic Metabolic Panel w/ Reflex to MG    Collection Time: 08/11/21  6:20 AM   Result Value Ref Range    Sodium 138 135 - 144 mEq/L    Potassium reflex Magnesium 4.7 3.4 - 4.9 mEq/L    Chloride 98 95 - 107 mEq/L    CO2 15 (L) 20 - 31 mEq/L    Anion Gap 25 (H) 9 - 15 mEq/L    Glucose 80 70 - 99 mg/dL    BUN 37 (H) 8 - 23 mg/dL    CREATININE 1.31 (H) 0.70 - 1.20 mg/dL    GFR Non- 51.1 (L) >60    GFR  >60.0 >60    Calcium 9.6 8.5 - 9.9 mg/dL   BRAIN NATRIURETIC PEPTIDE    Collection Time: 08/11/21  6:20 AM   Result Value Ref Range    Pro-BNP 30,669 pg/mL   Basic Metabolic Panel w/ Reflex to MG    Collection Time: 08/12/21  5:53 AM   Result Value Ref Range    Sodium 135 135 - 144 mEq/L    Potassium reflex Magnesium 4.7 3.4 - 4.9 mEq/L    Chloride 98 95 - 107 mEq/L    CO2 16 (L) 20 - 31 mEq/L    Anion Gap 21 (H) 9 - 15 mEq/L    Glucose 99 70 - 99 mg/dL    BUN 45 (H) 8 - 23 mg/dL    CREATININE 1.18 0.70 - 1.20 mg/dL    GFR Non-African American 57.7 (L) >60    GFR  >60.0 >60    Calcium 9.3 8.5 - 9.9 mg/dL         Telemetry 8/13/21: A-fib/V-pacing 60s    Follow-up visits:   [address] Juan Johnson 112 TO ARRANGE VISIT    [address] [id number] [facility]  [phone]    On 8/23/2021  CHF clinic follow-up on Mon 8/23/21 at 2:30pm in [address]       Assessment:  ZENOBIA/Jr [address] Problems    Diagnosis Date Noted    Pacemaker [Z95.0]     Acute on chronic combined systolic and diastolic CHF (congestive heart failure) ([facility]) [I50.43] 07/04/2021    CHF (congestive heart failure), NYHA class I, acute on chronic, combined ([facility] [I50.43] 06/07/2021     1. Acute on chronic systolic CHF  2. Chronic atrial fibrillation--on low dose Eliquis   3.  Cardiomyopathy EF 20% per echo 8/10/21  4. Hx SSS s/p single chamber PPM implant on 8/11/2020  5. Valvular heart disease--moderate 2-3+ MR, moderate 2+ TR, mild AI per echo 8/10/2021  6. HTN  7. DM  8. Abnormal EKG/prolonged QTc    Plan:   1. Stable for discharge home today with home health care  2. Continue current medications-aspirin 81 mg p.o. daily, Lopressor 25 mg p.o. twice daily, Lasix 40 mg p.o. daily, spironolactone 25 mg p.o. daily, oral anticoagulation with Eliquis 2.5 mg p.o. twice daily  3. Cardiac/less than 2 g sodium diet recommended  4. Recommend 1500 mL daily fluid restriction  5. Follow-up with CHF clinic on Monday, 8/23/2021 as scheduled  6.   Follow-up with Dr. Venus Boeck in 2 weeks upon discharge        Electronically signed by Maurice Elias 8/13/2021 at 9:42 AM

## 2021-08-13 NOTE — DISCHARGE INSTR - DIET

## 2021-08-13 NOTE — DISCHARGE INSTR - COC
Continuity of Care Form    Patient Name: Rashawn Love   :  1929  MRN:  [id number]    Admit date:  2021  Discharge date:  2021    Code Status Order: Full Code   Advance Directives:     Admitting Physician:  Mamie Pizarro MD  PCP: Camelia Pedroza    Discharging Nurse: Nitin Herndon Unit/Room#: X119/R721-54  Discharging Unit Phone Number: [phone]    Emergency Contact:   Extended Emergency Contact Information  Primary Emergency Contact: [address] [facility] Phone: [phone]  Relation: Child  Secondary Emergency Contact: Selena Orosco [address] Phone: [phone]  Mobile Phone: [phone]  Relation: Grandchild    Past Surgical History:  Past Surgical History:   Procedure Laterality Date    BACK SURGERY      CARDIAC PACEMAKER PLACEMENT      HERNIA REPAIR      THORACENTESIS Left 2021    450ml removed by Dr Kristie Landry [phone]       Immunization History: There is no immunization history on file for this patient.     Active Problems:  Patient Active Problem List   Diagnosis Code    Chronic atrial fibrillation ([facility]) I48.20    SSS (sick sinus syndrome) ([facility]) I49.5    Cardiomyopathy ([facility] I42.9    SOB (shortness of breath) R06.02    Pleural effusion on right J90    Atelectasis of right lung J98.11    CHF (congestive heart failure), NYHA class I, acute on chronic, combined ([facility]) I50.43    Acute on chronic combined systolic and diastolic CHF (congestive heart failure) ([facility]) I50.43    Acute on chronic diastolic CHF (congestive heart failure) ([facility]) I50.33    Pacemaker Z95.0    Goals of care, counseling/discussion Z71.89       Isolation/Infection:   Isolation          No Isolation        Patient Infection Status     Infection Onset Added Last Indicated Last Indicated By Review Planned Expiration Resolved Resolved By    None active    Resolved    COVID-19 Rule Out 21 COVID-19, Rapid (Ordered)   21

## 2021-08-13 NOTE — CONSULTS
Palliative Care Consult Note  Patient: Angely Hart  Gender: male  YOB: 1929  Unit/Bed: N755/P031-66  CodeStatus: Full Code  Inpatient Treatment Team: Treatment Team: Attending Provider: Chauncy Holter, MD; Utilization Reviewer: Rosalva Cross RN; : Adriano Escobar RN; Patient Care Tech: Saira Pierre; : Edvin Dukes RN; Registered Nurse: Erman Litten, RN; Utilization Reviewer: Leti Diop RN  Admit Date:  8/9/2021    Chief Complaint:  Shortness of breath    History of Presenting Illness: Angely Hart is a 80 y.o. male on hospital day 3 with a history of acute on chronic diastolic CHF. PMH is significant for CHF, DM2, Afib, HTN, Anxiety, SSS, Cardiomyopathy, Bilateral rotator cuff arthropathy of shoulder, and sleep apnea. Patient seen and examined at bedside for goals of care discussion, code status discussion, family support, and symptom management. Patient presented to the ER on 8/9/21 with complaints of worsening shortness of breath, leg swelling, and anxiety. His BNP was elevated at 10,068. All his other labs were remarkable. CXR showed stable cardiomegaly, small left effusion, and degenerative change bilateral shoulders. In the past two months, patient has had three visits to the ER and four hospitalizations for similar complaints. Echocardiogram completed on 8/10/2021 revealed severely reduced LV systolic function with EF 20%, moderate 2-3+ MR, moderate TR, RVSP elevated at 60 mmHg . Per cardiology notes, only a single chamber pacemaker was placed because medically he was not a candidate for an AICD. Patient observed laying in bed. He is pleasant and cooperative. He is alert and oriented x4. He is [place] speaking only. He is in NAD. He reports that he is still having shortness of breath which is not his baseline. His lung sounds are CTA but decreased.  line used to conduct assessment. Patient's son at bedside.     Patient lives at home with his wife and son. He is independent with most of his ADLs. There are times when he needs assistance getting dressed or with his shower due to his shortness of breath. He is able to prepare his own coffee and snacks but his family prepares his meals. Per his son, the patient has had some weight loss over the past six months but he is not sure how much weight he has lost. He ambulates ar home with either his walker or cane depending on his breathing. Review of Systems:       Review of Systems   Constitutional: Positive for activity change and fatigue. Negative for appetite change and unexpected weight change. HENT: Negative for congestion. Eyes: Negative. Respiratory: Positive for shortness of breath. Negative for chest tightness and wheezing. Cardiovascular: Negative for chest pain, palpitations and leg swelling. Gastrointestinal: Negative for abdominal distention, abdominal pain, constipation, diarrhea, nausea and vomiting. Endocrine: Negative. Genitourinary: Negative for dysuria. Musculoskeletal: Positive for arthralgias and gait problem. Skin: Negative for pallor. Allergic/Immunologic: Negative. Neurological: Positive for weakness. Negative for dizziness and tremors. Psychiatric/Behavioral: Negative for agitation and sleep disturbance. The patient is nervous/anxious. Physical Examination:       /65   Pulse 59   Temp 97.5 °F (36.4 °C) (Axillary)   Resp 17   Ht 5' 9\" (1.753 m)   Wt 140 lb 14 oz (63.9 kg)   SpO2 100%   BMI 20.80 kg/m²    Physical Exam  Constitutional:       General: He is awake. He is not in acute distress. Appearance: He is well-developed. Interventions: Nasal cannula in place. HENT:      Head: Normocephalic. Nose: No congestion or rhinorrhea. Mouth/Throat:      Mouth: Mucous membranes are dry. Eyes:      General: No scleral icterus. Cardiovascular:      Rate and Rhythm: Bradycardia present. Rhythm irregular. Pulses: Normal pulses. Pulmonary:      Breath sounds: Decreased breath sounds present. Abdominal:      Palpations: Abdomen is soft. Tenderness: There is no abdominal tenderness. There is no guarding. Musculoskeletal:         General: No tenderness. Normal range of motion. Cervical back: Normal range of motion. Right lower leg: No edema. Left lower leg: No edema. Skin:     General: Skin is warm and dry. Capillary Refill: Capillary refill takes less than 2 seconds. Neurological:      Mental Status: He is alert and oriented to person, place, and time. Psychiatric:         Behavior: Behavior is cooperative. Thought Content:  Thought content normal.         Allergies:      No Known Allergies    Medications:      Current Facility-Administered Medications   Medication Dose Route Frequency Provider Last Rate Last Admin    ALPRAZolam (XANAX) tablet 0.25 mg  0.25 mg Oral BID PRN Mega Velasco MD   0.25 mg at 08/13/21 0153    metoprolol tartrate (LOPRESSOR) tablet 50 mg  50 mg Oral BID Mega Velasco MD   50 mg at 08/13/21 0851    furosemide (LASIX) tablet 20 mg  20 mg Oral Daily Mega Velasco MD   20 mg at 08/13/21 0851    apixaban (ELIQUIS) tablet 2.5 mg  2.5 mg Oral BID Mega Velasco MD   2.5 mg at 08/13/21 0851    spironolactone (ALDACTONE) tablet 25 mg  25 mg Oral Daily Mega Velasco MD   25 mg at 08/13/21 0851    sodium chloride flush 0.9 % injection 5-40 mL  5-40 mL Intravenous 2 times per day Mega Velasco MD   10 mL at 08/12/21 2045    sodium chloride flush 0.9 % injection 5-40 mL  5-40 mL Intravenous PRN Mega Velasco MD        0.9 % sodium chloride infusion  25 mL Intravenous PRN Mega Velasco MD        acetaminophen (TYLENOL) tablet 650 mg  650 mg Oral Q6H PRN Mega Velasco MD   650 mg at 08/12/21 3931    Or    acetaminophen (TYLENOL) suppository 650 mg  650 mg Rectal Q6H PRN Mega Velasco MD        polyethylene glycol [facility]) packet 17 g  17 g Oral Daily PRN Tere Cantu MD        nitroGLYCERIN (NITROSTAT) SL tablet 0.4 mg  0.4 mg Sublingual Q5 Min PRN Tere Cantu MD           History: PMHx:  Past Medical History:   Diagnosis Date    Anxiety     Atrial fibrillation ([facility]     Cardiomy[facility] 9/18/2020    CHF (congestive heart failure) (HCC)     Diabetes mellitus ([facility]     Hypertension     Sleep apnea        PSHx:  Past Surgical History:   Procedure Laterality Date    BACK SURGERY      CARDIAC PACEMAKER PLACEMENT  6384    HERNIA REPAIR      THORACENTESIS Left 06/24/2021    450ml removed by Dr Brandon Greene [phone]       Social Hx:  Social History     Socioeconomic History    Marital status:      Spouse name: None    Number of children: None    Years of education: None    Highest education level: None   Occupational History    None   Tobacco Use    Smoking status: Former Smoker    Smokeless tobacco: Former User    Tobacco comment: quit 30 years ago   Vaping Use    Vaping Use: Never used   Substance and Sexual Activity    Alcohol use: Not Currently    Drug use: Not Currently    Sexual activity: None   Other Topics Concern    None   Social History Narrative    ** Merged History Encounter **          Social Determinants of Health     Financial Resource Strain:     Difficulty of Paying Living Expenses:    Food Insecurity:     Worried About Running Out of Food in the Last Year:     Ran Out of Food in the Last Year:    Transportation Needs:     Lack of Transportation (Medical):      Lack of Transportation (Non-Medical):    Physical Activity:     Days of Exercise per Week:     Minutes of Exercise per Session:    Stress:     Feeling of Stress :    Social Connections:     Frequency of Communication with Friends and Family:     Frequency of Social Gatherings with Friends and Family:     Attends [employer]ism Services:     Active Member of Clubs or Organizations:     Attends Club or Organization Meetings:     Marital Status:    Intimate Partner Violence:     Fear of Current or Ex-Partner:     Emotionally Abused:     Physically Abused:     Sexually Abused:        Family Hx:  History reviewed. No pertinent family history.     LABS: Reviewed     CBC:  Lab Results   Component Value Date    WBC 2.8 08/10/2021    RBC 4.17 08/10/2021    HGB 13.8 08/10/2021    HCT 41.6 08/10/2021    MCV 99.8 08/10/2021    MCH 33.1 08/10/2021    MCHC 33.2 08/10/2021    RDW 18.3 08/10/2021     08/10/2021     CBC with Differential:   Lab Results   Component Value Date    WBC 2.8 08/10/2021    RBC 4.17 08/10/2021    HGB 13.8 08/10/2021    HCT 41.6 08/10/2021     08/10/2021    MCV 99.8 08/10/2021    MCH 33.1 08/10/2021    MCHC 33.2 08/10/2021    RDW 18.3 08/10/2021    LYMPHOPCT 47.2 08/09/2021    MONOPCT 9.8 08/09/2021    BASOPCT 1.3 08/09/2021    MONOSABS 0.5 08/09/2021    LYMPHSABS 2.5 08/09/2021    EOSABS 0.1 08/09/2021    BASOSABS 0.1 08/09/2021     CMP:    Lab Results   Component Value Date     08/12/2021    K 4.7 08/12/2021    CL 98 08/12/2021    CO2 16 08/12/2021    BUN 45 08/12/2021    CREATININE 1.18 08/12/2021    GFRAA >60.0 08/12/2021    LABGLOM 57.7 08/12/2021    GLUCOSE 99 08/12/2021    PROT 7.0 08/09/2021    LABALBU 4.0 08/09/2021    CALCIUM 9.3 08/12/2021    BILITOT 1.9 08/09/2021    ALKPHOS 105 08/09/2021    AST 30 08/09/2021    ALT 20 08/09/2021     BMP:    Lab Results   Component Value Date     08/12/2021    K 4.7 08/12/2021    CL 98 08/12/2021    CO2 16 08/12/2021    BUN 45 08/12/2021    LABALBU 4.0 08/09/2021    CREATININE 1.18 08/12/2021    CALCIUM 9.3 08/12/2021    GFRAA >60.0 08/12/2021    LABGLOM 57.7 08/12/2021    GLUCOSE 99 08/12/2021     TSH:   Lab Results   Component Value Date    TSH 1.770 07/05/2021     Vitamin B12 and Folate: No components found for: FOLIC,  N85  Urinalysis:   Lab Results   Component Value Date    NITRU Negative 08/09/2021    WBCUA 20-50 08/09/2021    BACTERIA Negative 08/09/2021    RBCUA 3-5 08/09/2021    BLOODU Negative 08/09/2021    SPECGRAV 1.020 08/09/2021    GLUCOSEU Negative 08/09/2021           FUNCTIONAL ADL´S:     Independent: [ X ] Eating, [   ] Dressing, [ X  ] Transferring, [ X  ] Toileting, [   ] Bathing, [  X ] Continence  Dependent   : [  ] Eating, [   ] Dressing, [  ] Transferring, [   ] Dayla Marce, [   ] Arian Safe, [   ] Continence  W. assistant : [  ] Eating, [ X  ] Dressing, [   ] Transferring, [   ] Yanira Zheng, [ X ] Bathing, [   ] Continence    Radiology: Reviewed       XR CHEST PORTABLE     Result Date: 8/9/2021  EXAMINATION: XR CHEST PORTABLE CLINICAL HISTORY: SHORTNESS OF BREATH COMPARISONS: JULY 22, 2021 FINDINGS: Pacemaker generator and wires unchanged. Bilateral degenerative change glenohumeral joints and acromioclavicular joints with bilateral narrowing of the interval between acromion and humeral heads. Cardiopericardial silhouette enlarged, unchanged. Blunting left costophrenic angle. Lungs otherwise clear.      STABLE CARDIOMEGALY. SMALL LEFT EFFUSION. DEGENERATIVE CHANGE BILATERAL SHOULDERS.       Assessment and plan:    1. Anxiety  -Currently being managed with Buspar     -Advance Care Planning:  Discussed goals of care with patient. Explained in extensive detail nuances between full code, DNR CCA and DNR CC. Patient has made the decision to be FULL CODE. No HCPOA in place. Patient is not interested in completing one at this time.      -Goals of Care Discussion:  Disease process and goals of treatment were discussed in basic terms. Uday's goal is to optimize available comfort care measures to decrease hospitalization and prevent ER visits when possible, manage symptomology with future Palliative Care service. We discussed the palliative care philosophy in light of those goals. We discussed all care options contingent on treatment response and QOL. Much active listening, presence, and emotional support were given.      -Palliative Care Encounter:  Met with patient and his son at bedside for [address], ACP, and initial discussion on pall care philosophy.  line was used for the discussion due to patient and his son being [address] speaking only. Nick Baker is 80years old with multiple comorbidities which includes CHF, DM2, Afib, HTN, Anxiety, SSS, Cardiomyopathy, Bilateral rotator cuff arthropathy of shoulder, and sleep apnea. Considering his comorbidities, symptom burden, three ER visits and four hospitalization in the past two months, and risk for rehospitalization, Nick Baker is appropriate for Palliative Care Services.    -Based on his ischemic cardiomyopathy, chronic CHF, ejection fraction of 20%, and multiple hospitalizations he would benefit from hospice care if the family should be in agreement. At this time, the patient and his son is not in agreement for hospice services. They want to remain with palliative care services.    -Patient is scheduled to be discharged to home today with [facility] with PT/OT    -Palliative care will schedule to follow as outpatient.    -Patient is currently being treated for multiple medical conditions includin. Acute on chronic diastolic CHF (congestive heart failure)  2. CHF (congestive heart failure), NYHA class I, acute on chronic, combined ([facility]    Thank you for the opportunity to participate in the care of  Didier Grewal . Please call with any questions or concerns.     Electronically signed by ZACH Barraza CNP on 2021 at 9:59 AM

## 2021-08-16 NOTE — PROGRESS NOTES
Physical Therapy  Facility/Department: [facility] MED SURG R910/I343-85  Physical Therapy Discharge      NAME: Didier Grewal    : 1929 (80 y.o.)  MRN: [id number]    Account: [de-identified]  Gender: male      Patient has been discharged from acute care hospital. DC patient from current PT program.      Electronically signed by Tamika Trejo PT on 21 at 3:21 PM EDT

## 2021-08-20 LAB
ANION GAP SERPL CALCULATED.3IONS-SCNC: 14 MEQ/L (ref 9–15)
BUN BLDV-MCNC: 19 MG/DL (ref 8–23)
CALCIUM SERPL-MCNC: 8.6 MG/DL (ref 8.5–9.9)
CHLORIDE BLD-SCNC: 97 MEQ/L (ref 95–107)
CO2: 24 MEQ/L (ref 20–31)
CREAT SERPL-MCNC: 1.02 MG/DL (ref 0.7–1.2)
GFR AFRICAN AMERICAN: >60
GFR NON-AFRICAN AMERICAN: >60
GLUCOSE BLD-MCNC: 92 MG/DL (ref 70–99)
POTASSIUM SERPL-SCNC: 3.8 MEQ/L (ref 3.4–4.9)
SODIUM BLD-SCNC: 135 MEQ/L (ref 135–144)

## 2021-08-26 ENCOUNTER — OFFICE VISIT (OUTPATIENT)
Dept: CARDIOLOGY CLINIC | Age: 86
End: 2021-08-26
Payer: MEDICARE

## 2021-08-26 VITALS
SYSTOLIC BLOOD PRESSURE: 90 MMHG | BODY MASS INDEX: 20.08 KG/M2 | OXYGEN SATURATION: 96 % | WEIGHT: 136 LBS | DIASTOLIC BLOOD PRESSURE: 60 MMHG | HEART RATE: 74 BPM

## 2021-08-26 DIAGNOSIS — I48.20 CHRONIC ATRIAL FIBRILLATION (HCC): ICD-10-CM

## 2021-08-26 DIAGNOSIS — I50.43 CHF (CONGESTIVE HEART FAILURE), NYHA CLASS I, ACUTE ON CHRONIC, COMBINED (HCC): ICD-10-CM

## 2021-08-26 DIAGNOSIS — Z95.0 PACEMAKER: ICD-10-CM

## 2021-08-26 DIAGNOSIS — I49.5 SSS (SICK SINUS SYNDROME) (HCC): ICD-10-CM

## 2021-08-26 DIAGNOSIS — R06.02 SOB (SHORTNESS OF BREATH): ICD-10-CM

## 2021-08-26 DIAGNOSIS — I42.9 CARDIOMYOPATHY, UNSPECIFIED TYPE (HCC): Primary | ICD-10-CM

## 2021-08-26 PROCEDURE — 4040F PNEUMOC VAC/ADMIN/RCVD: CPT | Performed by: INTERNAL MEDICINE

## 2021-08-26 PROCEDURE — 1111F DSCHRG MED/CURRENT MED MERGE: CPT | Performed by: INTERNAL MEDICINE

## 2021-08-26 PROCEDURE — G8420 CALC BMI NORM PARAMETERS: HCPCS | Performed by: INTERNAL MEDICINE

## 2021-08-26 PROCEDURE — 1123F ACP DISCUSS/DSCN MKR DOCD: CPT | Performed by: INTERNAL MEDICINE

## 2021-08-26 PROCEDURE — G8427 DOCREV CUR MEDS BY ELIG CLIN: HCPCS | Performed by: INTERNAL MEDICINE

## 2021-08-26 PROCEDURE — 1036F TOBACCO NON-USER: CPT | Performed by: INTERNAL MEDICINE

## 2021-08-26 PROCEDURE — 99214 OFFICE O/P EST MOD 30 MIN: CPT | Performed by: INTERNAL MEDICINE

## 2021-08-26 NOTE — PROGRESS NOTES
Chief Complaint   Patient presents with   [address] from [facility]     ekg 8/9/21    Hypotension    Shortness of Breath       8-9-20: Patient is a 80 y.o. male who presents with a chief complaint of syncope. Patient is followed on a regular basis by Dr. Aleene Hashimoto. Patient with past medical history of chronic atrial fibrillation on oral anticoagulation. He was noted to have A. fib with slow ventricular response with heart rate in the 30s in the emergency department, placed on dopamine drip and given atropine which responded. Patient is currently seen in intensive care unit, on dopamine drip. He appears to be resting comfortably. Echocardiogram in September 2019 showed ejection fraction of 35%. Patient denies any chest pain, shortness of breath. Cardiac enzymes are negative, BNP is 4000. Potassium is 3.6, magnesium 2.3. Per granddaughter apparently patient had some chest pain prior to his syncopal episode. 9-18-20: Patient presents for initial medical evaluation. Patient is followed on a regular basis by Dr. Aleene Hashimoto. S/P  HOSPITALIZATION for SSS, hx of chronic Afibb, noted to have HR in 30's s/p single PPM. Not felt to be a good AICD candidate due to age. Doing well. On DOAC. Pt denies chest pain, dyspnea, dyspnea on exertion, change in exercise capacity, fatigue,  nausea, vomiting, diarrhea, constipation, motor weakness, insomnia, weight loss, syncope, dizziness, lightheadedness, palpitations, PND, orthopnea, or claudication. Echo with EF of 35%, 1-2+ MR.     2-26-21: hx of SSS, chronic Afib, s/p PPM single chamber. Hx of CMP, EF of 35%, 1-2+ MR. Not felt to be a good AICD candidate due to age. Getting his medications sometimes, does have SOB and anxiety with lopressor at times.    Pt denies chest pain, dyspnea, dyspnea on exertion, change in exercise capacity, fatigue,  nausea, vomiting, diarrhea, constipation, motor weakness, insomnia, weight loss, syncope, dizziness, lightheadedness, palpitations, PND, orthopnea, or claudication. 7-16-21: hx of SSS, chronic Afib, s/p PPM single chamber. Hx of CMP, EF of 35%, 1-2+ MR. Not felt to be a good AICD candidate due to age. Has some mild shortness of breath. Pt denies chest pain, nausea, vomiting, diarrhea, constipation, motor weakness, insomnia, weight loss, syncope, dizziness, lightheadedness, palpitations, PND, orthopnea, or claudication. Has stage III chronic kidney disease. Patient was hospitalized recently and his Cardizem and lisinopril were discontinued. He was given Lasix for 7 days. EKG with A. fib/V paced. 8-26-21: Status post recent hospitalization for acute decompensated heart failure. Status post echo ejection fraction of 20 to 25%. Patient with history of sick sinus syndrome/chronic atrial fibrillation status post permanent pacemaker placement. Was not felt to be a good AICD candidate secondary to advanced age. He does have shortness of breath. He gets very fatigued and tired with activity. Hospice was offered but patient elected to take him home with home health care. He is on Lasix as well as Aldactone. He is on oral anticoagulation. No smoking. Has lost some weight.       Patient Active Problem List   Diagnosis    Chronic atrial fibrillation (HCC)    SSS (sick sinus syndrome) (HCC)    Cardiomyopathy ([address]    SOB (shortness of breath)    Pleural effusion on right    Atelectasis of right lung    CHF (congestive heart failure), NYHA class I, acute on chronic, combined ([address]    Acute on chronic combined systolic and diastolic CHF (congestive heart failure) (HCC)    Acute on chronic diastolic CHF (congestive heart failure) ([address]    Pacemaker    Goals of care, counseling/discussion       Past Surgical History:   Procedure Laterality Date    BACK SURGERY      CARDIAC PACEMAKER PLACEMENT  0257    HERNIA REPAIR      THORACENTESIS Left 06/24/2021    450ml removed by Dr Monica Sanon [phone]       Social History     Socioeconomic History    Marital status:      Spouse name: Not on file    Number of children: Not on file    Years of education: Not on file    Highest education level: Not on file   Occupational History    Not on file   Tobacco Use    Smoking status: Former Smoker    Smokeless tobacco: Former User    Tobacco comment: quit 30 years ago   Vaping Use    Vaping Use: Never used   Substance and Sexual Activity    Alcohol use: Not Currently    Drug use: Not Currently    Sexual activity: Not on file   Other Topics Concern    Not on file   Social History Narrative    ** Merged History Encounter **          Social Determinants of Health     Financial Resource Strain:     Difficulty of Paying Living Expenses:    Food Insecurity:     Worried About Running Out of Food in the Last Year:     [address] in the Last Year:    Transportation Needs:     Lack of Transportation (Medical):  Lack of Transportation (Non-Medical):    Physical Activity:     Days of Exercise per Week:     Minutes of Exercise per Session:    Stress:     Feeling of Stress :    Social Connections:     Frequency of Communication with Friends and Family:     Frequency of Social Gatherings with Friends and Family:     Attends Ep[employer] Services:     Active Member of Clubs or Organizations:     Attends Club or Organization Meetings:     Marital Status:    Intimate Partner Violence:     Fear of Current or Ex-Partner:     Emotionally Abused:     Physically Abused:     Sexually Abused:        No family history on file.     Current Outpatient Medications   Medication Sig Dispense Refill    spironolactone (ALDACTONE) 25 MG tablet Take 1 tablet by mouth daily 30 tablet 3    busPIRone (BUSPAR) 5 MG tablet Take 5 mg by mouth 2 times daily      furosemide (LASIX) 40 MG tablet Take 1 tablet by mouth daily 30 tablet 6    metoprolol tartrate (LOPRESSOR) 50 MG tablet Take 0.5 tablets by mouth 2 times daily 60 tablet 5    aspirin 81 MG chewable tablet Take 1 tablet by mouth daily 30 tablet 3    fluticasone-salmeterol (ADVAIR) 100-50 MCG/DOSE diskus inhaler Inhale 1 puff into the lungs every 12 hours      tiotropium (SPIRIVA) 18 MCG inhalation capsule Inhale 18 mcg into the lungs daily      tiotropium (SPIRIVA RESPIMAT) 2.5 MCG/ACT AERS inhaler Inhale 2 puffs into the lungs daily      apixaban (ELIQUIS) 2.5 MG TABS tablet Take 1 tablet by mouth 2 times daily 60 tablet 5    fluticasone-salmeterol (ADVAIR DISKUS) 250-50 MCG/DOSE AEPB Inhale 1 puff into the lungs every 12 hours for 14 days 1 Inhaler 0     No current facility-administered medications for this visit. Patient has no known allergies. Review of Systems:  General ROS: negative  Psychological ROS: negative  Hematological and Lymphatic ROS: No history of blood clots or bleeding disorder. Respiratory ROS: no cough, shortness of breath, or wheezing  Cardiovascular ROS: no chest pain or dyspnea on exertion  Gastrointestinal ROS: no abdominal pain, change in bowel habits, or black or bloody stools  Genito-Urinary ROS: no dysuria, trouble voiding, or hematuria  Musculoskeletal ROS: negative  Neurological ROS: no TIA or stroke symptoms  Dermatological ROS: negative    VITALS:  Blood pressure 90/60, pulse 74, weight 136 lb (61.7 kg), SpO2 96 %. Body mass index is 20.08 kg/m². Physical Examination:  General appearance - alert, well appearing, and in no distress  Mental status - alert, oriented to person, place, and time  Neck - Neck is supple, no JVD or carotid bruits. No thyromegaly or adenopathy.    Chest - clear to auscultation, no wheezes, rales or rhonchi, symmetric air entry  Heart - normal rate, regular rhythm, normal S1, S2, no murmurs, rubs, clicks or gallops  Abdomen - soft, nontender, nondistended, no masses or organomegaly  Neurological - alert, oriented, normal speech, no focal findings or movement disorder noted  Extremities - peripheral pulses normal, no pedal edema, no clubbing or cyanosis  Skin - normal coloration and turgor, no rashes, no suspicious skin lesions noted      EKG: atrial fibrillation    No orders of the defined types were placed in this encounter. ASSESSMENT:     Diagnosis Orders   1. Cardiomyopathy, unspecified type ([facility]     2. CHF (congestive heart failure), NYHA class I, acute on chronic, combined ([facility]     3. Chronic atrial fibrillation ([facility]     4. Pacemaker     5. SOB (shortness of breath)     6. SSS (sick sinus syndrome) ([facility])           PLAN:     Patient will need to continue to follow up with you for their general medical care    As always, aggressive risk factor modification is strongly recommended. We should adhere to the JNC VIII guidelines for HTN management and the NCEP ATP III guidelines for LDL-C management. Cardiac diet is always recommended with low fat, cholesterol, calories and sodium. Continue medications at current doses. Check chest x-ray, lab work. No asa    LASIX 40MG DAILY. Also Aldactone    Follow up with device clinic    Cont with DOAC low dose. Monitor H/H with PCP. Prognosis is poor.

## 2021-09-01 ENCOUNTER — APPOINTMENT (OUTPATIENT)
Dept: CT IMAGING | Age: 86
End: 2021-09-01
Payer: MEDICARE

## 2021-09-01 ENCOUNTER — HOSPITAL ENCOUNTER (OUTPATIENT)
Age: 86
Setting detail: OBSERVATION
Discharge: HOME | End: 2021-09-02
Attending: STUDENT IN AN ORGANIZED HEALTH CARE EDUCATION/TRAINING PROGRAM | Admitting: INTERNAL MEDICINE
Payer: MEDICARE

## 2021-09-01 ENCOUNTER — APPOINTMENT (OUTPATIENT)
Dept: GENERAL RADIOLOGY | Age: 86
End: 2021-09-01
Payer: MEDICARE

## 2021-09-01 DIAGNOSIS — S01.01XA LACERATION OF SCALP, INITIAL ENCOUNTER: ICD-10-CM

## 2021-09-01 DIAGNOSIS — S09.90XA INJURY OF HEAD, INITIAL ENCOUNTER: ICD-10-CM

## 2021-09-01 DIAGNOSIS — R55 SYNCOPE AND COLLAPSE: Primary | ICD-10-CM
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
## 2021-09-01 LAB
ALBUMIN SERPL-MCNC: 4.4 G/DL (ref 3.5–4.6)
ALP BLD-CCNC: 90 U/L (ref 35–104)
ALT SERPL-CCNC: 23 U/L (ref 0–41)
ANION GAP SERPL CALCULATED.3IONS-SCNC: 17 MEQ/L (ref 9–15)
APTT: 36.4 SEC (ref 24.4–36.8)
AST SERPL-CCNC: 24 U/L (ref 0–40)
BASOPHILS ABSOLUTE: 0 K/UL (ref 0–0.2)
BASOPHILS RELATIVE PERCENT: 0.7 %
BILIRUB SERPL-MCNC: 2.3 MG/DL (ref 0.2–0.7)
BUN BLDV-MCNC: 13 MG/DL (ref 8–23)
CALCIUM SERPL-MCNC: 9.6 MG/DL (ref 8.5–9.9)
CHLORIDE BLD-SCNC: 96 MEQ/L (ref 95–107)
CO2: 21 MEQ/L (ref 20–31)
CREAT SERPL-MCNC: 0.89 MG/DL (ref 0.7–1.2)
EOSINOPHILS ABSOLUTE: 0.1 K/UL (ref 0–0.7)
EOSINOPHILS RELATIVE PERCENT: 1.6 %
ETHANOL PERCENT: NORMAL G/DL
ETHANOL: <10 MG/DL (ref 0–0.08)
GFR AFRICAN AMERICAN: >60
GFR NON-AFRICAN AMERICAN: >60
GLOBULIN: 2.6 G/DL (ref 2.3–3.5)
GLUCOSE BLD-MCNC: 110 MG/DL (ref 70–99)
HCT VFR BLD CALC: 43.4 % (ref 42–52)
HEMOGLOBIN: 14.5 G/DL (ref 14–18)
INR BLD: 1.4
LYMPHOCYTES ABSOLUTE: 1.8 K/UL (ref 1–4.8)
LYMPHOCYTES RELATIVE PERCENT: 39.1 %
MCH RBC QN AUTO: 33.2 PG (ref 27–31.3)
MCHC RBC AUTO-ENTMCNC: 33.5 % (ref 33–37)
MCV RBC AUTO: 99.1 FL (ref 80–100)
MONOCYTES ABSOLUTE: 0.8 K/UL (ref 0.2–0.8)
MONOCYTES RELATIVE PERCENT: 17.7 %
NEUTROPHILS ABSOLUTE: 1.9 K/UL (ref 1.4–6.5)
NEUTROPHILS RELATIVE PERCENT: 40.9 %
PDW BLD-RTO: 17.8 % (ref 11.5–14.5)
PLATELET # BLD: 146 K/UL (ref 130–400)
POTASSIUM SERPL-SCNC: 3.6 MEQ/L (ref 3.4–4.9)
PROTHROMBIN TIME: 17.2 SEC (ref 12.3–14.9)
RBC # BLD: 4.38 M/UL (ref 4.7–6.1)
SODIUM BLD-SCNC: 134 MEQ/L (ref 135–144)
TOTAL PROTEIN: 7 G/DL (ref 6.3–8)
TROPONIN: 0.02 NG/ML (ref 0–0.01)
WBC # BLD: 4.7 K/UL (ref 4.8–10.8)

## 2021-09-01 PROCEDURE — 6830039000 HC L3 TRAUMA ALERT

## 2021-09-01 PROCEDURE — 6370000000 HC RX 637 (ALT 250 FOR IP): Performed by: PHYSICIAN ASSISTANT

## 2021-09-01 PROCEDURE — 72170 X-RAY EXAM OF PELVIS: CPT

## 2021-09-01 PROCEDURE — 85025 COMPLETE CBC W/AUTO DIFF WBC: CPT

## 2021-09-01 PROCEDURE — 2500000003 HC RX 250 WO HCPCS: Performed by: PHYSICIAN ASSISTANT

## 2021-09-01 PROCEDURE — 99284 EMERGENCY DEPT VISIT MOD MDM: CPT

## 2021-09-01 PROCEDURE — 72125 CT NECK SPINE W/O DYE: CPT

## 2021-09-01 PROCEDURE — 93005 ELECTROCARDIOGRAM TRACING: CPT | Performed by: PHYSICIAN ASSISTANT

## 2021-09-01 PROCEDURE — G0378 HOSPITAL OBSERVATION PER HR: HCPCS

## 2021-09-01 PROCEDURE — 85610 PROTHROMBIN TIME: CPT

## 2021-09-01 PROCEDURE — 80053 COMPREHEN METABOLIC PANEL: CPT

## 2021-09-01 PROCEDURE — 96374 THER/PROPH/DIAG INJ IV PUSH: CPT

## 2021-09-01 PROCEDURE — 71045 X-RAY EXAM CHEST 1 VIEW: CPT

## 2021-09-01 PROCEDURE — 84484 ASSAY OF TROPONIN QUANT: CPT

## 2021-09-01 PROCEDURE — 36415 COLL VENOUS BLD VENIPUNCTURE: CPT

## 2021-09-01 PROCEDURE — 82077 ASSAY SPEC XCP UR&BREATH IA: CPT

## 2021-09-01 PROCEDURE — 85730 THROMBOPLASTIN TIME PARTIAL: CPT

## 2021-09-01 PROCEDURE — 70450 CT HEAD/BRAIN W/O DYE: CPT

## 2021-09-01 PROCEDURE — 2580000003 HC RX 258: Performed by: NURSE PRACTITIONER

## 2021-09-01 RX ORDER — FUROSEMIDE 40 MG/1
40 TABLET ORAL DAILY
Status: DISCONTINUED | OUTPATIENT
Start: 2021-09-01 | End: 2021-09-02 | Stop reason: HOSPADM

## 2021-09-01 RX ORDER — ACETAMINOPHEN 650 MG/1
650 SUPPOSITORY RECTAL EVERY 6 HOURS PRN
Status: DISCONTINUED | OUTPATIENT
Start: 2021-09-01 | End: 2021-09-02 | Stop reason: HOSPADM

## 2021-09-01 RX ORDER — SODIUM CHLORIDE 0.9 % (FLUSH) 0.9 %
5-40 SYRINGE (ML) INJECTION PRN
Status: DISCONTINUED | OUTPATIENT
Start: 2021-09-01 | End: 2021-09-02 | Stop reason: HOSPADM

## 2021-09-01 RX ORDER — LIDOCAINE HYDROCHLORIDE 10 MG/ML
INJECTION, SOLUTION INFILTRATION; PERINEURAL
Status: DISPENSED
Start: 2021-09-01 | End: 2021-09-02

## 2021-09-01 RX ORDER — POTASSIUM CHLORIDE 20 MEQ/1
40 TABLET, EXTENDED RELEASE ORAL PRN
Status: DISCONTINUED | OUTPATIENT
Start: 2021-09-01 | End: 2021-09-02 | Stop reason: HOSPADM

## 2021-09-01 RX ORDER — SODIUM CHLORIDE 0.9 % (FLUSH) 0.9 %
5-40 SYRINGE (ML) INJECTION EVERY 12 HOURS SCHEDULED
Status: DISCONTINUED | OUTPATIENT
Start: 2021-09-01 | End: 2021-09-02 | Stop reason: HOSPADM

## 2021-09-01 RX ORDER — ACETAMINOPHEN 325 MG/1
650 TABLET ORAL EVERY 6 HOURS PRN
Status: DISCONTINUED | OUTPATIENT
Start: 2021-09-01 | End: 2021-09-02 | Stop reason: HOSPADM

## 2021-09-01 RX ORDER — METOPROLOL TARTRATE 5 MG/5ML
5 INJECTION INTRAVENOUS ONCE
Status: COMPLETED | OUTPATIENT
Start: 2021-09-01 | End: 2021-09-01

## 2021-09-01 RX ORDER — LIDOCAINE HYDROCHLORIDE 10 MG/ML
5 INJECTION, SOLUTION EPIDURAL; INFILTRATION; INTRACAUDAL; PERINEURAL ONCE
Status: COMPLETED | OUTPATIENT
Start: 2021-09-01 | End: 2021-09-01

## 2021-09-01 RX ORDER — POTASSIUM CHLORIDE 7.45 MG/ML
10 INJECTION INTRAVENOUS PRN
Status: DISCONTINUED | OUTPATIENT
Start: 2021-09-01 | End: 2021-09-02 | Stop reason: HOSPADM

## 2021-09-01 RX ORDER — SPIRONOLACTONE 25 MG/1
25 TABLET ORAL DAILY
Status: DISCONTINUED | OUTPATIENT
Start: 2021-09-01 | End: 2021-09-02 | Stop reason: HOSPADM

## 2021-09-01 RX ORDER — DIAPER,BRIEF,INFANT-TODD,DISP
EACH MISCELLANEOUS 2 TIMES DAILY
Status: DISCONTINUED | OUTPATIENT
Start: 2021-09-01 | End: 2021-09-02 | Stop reason: HOSPADM

## 2021-09-01 RX ORDER — ONDANSETRON 2 MG/ML
4 INJECTION INTRAMUSCULAR; INTRAVENOUS EVERY 6 HOURS PRN
Status: DISCONTINUED | OUTPATIENT
Start: 2021-09-01 | End: 2021-09-02 | Stop reason: HOSPADM

## 2021-09-01 RX ORDER — SODIUM CHLORIDE 9 MG/ML
25 INJECTION, SOLUTION INTRAVENOUS PRN
Status: DISCONTINUED | OUTPATIENT
Start: 2021-09-01 | End: 2021-09-02 | Stop reason: HOSPADM

## 2021-09-01 RX ORDER — NITROGLYCERIN 0.4 MG/1
0.4 TABLET SUBLINGUAL EVERY 5 MIN PRN
Status: DISCONTINUED | OUTPATIENT
Start: 2021-09-01 | End: 2021-09-02 | Stop reason: HOSPADM

## 2021-09-01 RX ORDER — BUSPIRONE HYDROCHLORIDE 5 MG/1
5 TABLET ORAL 2 TIMES DAILY
Status: DISCONTINUED | OUTPATIENT
Start: 2021-09-01 | End: 2021-09-02 | Stop reason: HOSPADM

## 2021-09-01 RX ORDER — POLYETHYLENE GLYCOL 3350 17 G/17G
17 POWDER, FOR SOLUTION ORAL DAILY PRN
Status: DISCONTINUED | OUTPATIENT
Start: 2021-09-01 | End: 2021-09-02 | Stop reason: HOSPADM

## 2021-09-01 RX ORDER — ONDANSETRON 4 MG/1
4 TABLET, ORALLY DISINTEGRATING ORAL EVERY 8 HOURS PRN
Status: DISCONTINUED | OUTPATIENT
Start: 2021-09-01 | End: 2021-09-02 | Stop reason: HOSPADM

## 2021-09-01 RX ADMIN — LIDOCAINE HYDROCHLORIDE 5 ML: 10 INJECTION, SOLUTION EPIDURAL; INFILTRATION; INTRACAUDAL; PERINEURAL at 19:17

## 2021-09-01 RX ADMIN — METOPROLOL TARTRATE 5 MG: 5 INJECTION INTRAVENOUS at 18:53

## 2021-09-01 RX ADMIN — Medication 10 ML: at 21:43

## 2021-09-01 RX ADMIN — BACITRACIN ZINC: 500 OINTMENT TOPICAL at 22:28

## 2021-09-01 ASSESSMENT — ENCOUNTER SYMPTOMS
NAUSEA: 0
ABDOMINAL DISTENTION: 0
SHORTNESS OF BREATH: 0
VOICE CHANGE: 0
VOMITING: 0
COUGH: 0
BACK PAIN: 0
PHOTOPHOBIA: 0
ANAL BLEEDING: 0
APNEA: 0
EYE DISCHARGE: 0

## 2021-09-01 ASSESSMENT — PAIN SCALES - WONG BAKER: WONGBAKER_NUMERICALRESPONSE: 6

## 2021-09-01 NOTE — H&P
[place] [address] MEDICINE    HISTORY AND PHYSICAL EXAM    PATIENT NAME:  Estella Mccarty    MRN:  [id number]  SERVICE DATE:  9/1/2021   SERVICE TIME:  6:46 PM    Primary Care Physician: Wili Li     SUBJECTIVE  CHIEF COMPLAINT:  Fall, head injury    HPI:  Estella Mccarty is a 80 y.o., , male who  has a past medical history of Anxiety, Atrial fibrillation ([facility] [address] Cardiomyopathy ([facility] [address] CHF (congestive heart failure) ([facility] [address] Diabetes mellitus ([facility] [address] Hypertension, and Sleep apnea. 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 He has been followed at home by home health. Son who is at the bedside reports that the patient has been doing well, taking his home medications. However, today the patient normally uses a walker, was using a cane. He lost his balance and fell to his side hitting his head. He presented to the ER for further evaluation and treatment. In the ER, CBC shows mild leukopenia. Chemistry unremarkable. LFTs  with bilirubin 2.3, chronically elevated though this is higher than baseline. Troponin elevated at 0.021. Chest x-ray showing no acute cardiopulmonary abnormality. CT head/cervical spine showing no acute intracranial abnormality, fracture, or evidence of cervical spine injury, right parietal scalp hematoma/laceration, chronic degenerative changes. EKG showing rate controlled atrial fibrillation. Head laceration was sutured in ER and patient is admitted to hospitalist service for further evaluation and treatment. Patient is seen and examined while still in the ER. Son is at the bedside. Patient is Citizen of [place]-speaking. He is awake, alert, oriented. He is answering questions appropriately through son who is interpreting. He denies headache or dizziness. No chest pain, palpitations.   He does have some shortness of breath with exertion, none at rest.  No abdominal pain or nausea. Trace lower extremity edema, no calf tenderness. CODE STATUS was reviewed at length with patient, son, and granddaughter (telephonically). Family indicates that the patient does not want to be on \"life support\" and that he does not want extraordinary measures should he suffer cardiac or respiratory arrest.  CODE STATUS options were reviewed and patient and family elected for DNR CCA without intubation. HPI     PAST MEDICAL HISTORY:    Past Medical History:   Diagnosis Date    Anxiety     Atrial fibrillation ([facility] [address]     Cardiomyopathy ([facility] [address] 9/18/2020    CHF (congestive heart failure) (HCC)     Diabetes mellitus ([facility] [address]     Hypertension     Sleep apnea      PAST SURGICAL HISTORY:    Past Surgical History:   Procedure Laterality Date    BACK SURGERY      CARDIAC PACEMAKER PLACEMENT  7038    HERNIA REPAIR      THORACENTESIS Left 06/24/2021    450ml removed by Dr Christie Bai [phone]     FAMILY HISTORY:  No family history on file.   SOCIAL HISTORY:    Social History     Socioeconomic History    Marital status:      Spouse name: Not on file    Number of children: Not on file    Years of education: Not on file    Highest education level: Not on file   Occupational History    Not on file   Tobacco Use    Smoking status: Former Smoker    Smokeless tobacco: Former User    Tobacco comment: quit 30 years ago   Vaping Use    Vaping Use: Never used   Substance and Sexual Activity    Alcohol use: Not Currently    Drug use: Not Currently    Sexual activity: Not on file   Other Topics Concern    Not on file   Social History Narrative    ** Merged History Encounter **          Social Determinants of Health     Financial Resource Strain:     Difficulty of Paying Living Expenses:    Food Insecurity:     Worried About Running Out of Food in the Last Year:     [address] in the Last Year:    Transportation Needs:     Lack of Transportation (Medical):  Lack of Transportation (Non-Medical):    Physical Activity:     Days of Exercise per Week:     Minutes of Exercise per Session:    Stress:     Feeling of Stress :    Social Connections:     Frequency of Communication with Friends and Family:     Frequency of Social Gatherings with Friends and Family:     Attends [employer] Services:     Active Member of Clubs or Organizations:     Attends Club or Organization Meetings:     Marital Status:    Intimate Partner Violence:     Fear of Current or Ex-Partner:     Emotionally Abused:     Physically Abused:     Sexually Abused:      MEDICATIONS:   Prior to Admission medications    Medication Sig Start Date End Date Taking? Authorizing Provider   spironolactone (ALDACTONE) 25 MG tablet Take 1 tablet by mouth daily 8/14/21   STEFANIE Borjas   busPIRone (BUSPAR) 5 MG tablet Take 5 mg by mouth 2 times daily    Historical Provider, MD   furosemide (LASIX) 40 MG tablet Take 1 tablet by mouth daily 7/20/21 7/20/22  [employer] Holiday, DO   metoprolol tartrate (LOPRESSOR) 50 MG tablet Take 0.5 tablets by mouth 2 times daily 7/8/21   Fortunato  Holiday, DO   aspirin 81 MG chewable tablet Take 1 tablet by mouth daily 7/7/21   Rekha New MD   fluticasone-salmeterol (ADVAIR) 100-50 MCG/DOSE diskus inhaler Inhale 1 puff into the lungs every 12 hours    Historical Provider, MD   tiotropium (SPIRIVA) 18 MCG inhalation capsule Inhale 18 mcg into the lungs daily    Historical Provider, MD   tiotropium (SPIRIVA RESPIMAT) 2.5 MCG/ACT AERS inhaler Inhale 2 puffs into the lungs daily    Historical Provider, MD   apixaban (ELIQUIS) 2.5 MG TABS tablet Take 1 tablet by mouth 2 times daily 2/26/21   [facility] Holiday, DO   fluticasone-salmeterol (ADVAIR DISKUS) 250-50 MCG/DOSE AEPB Inhale 1 puff into the lungs every 12 hours for 14 days 10/4/19 6/7/21  Tito Baez MD       ALLERGIES: Patient has no known allergies.     REVIEW OF SYSTEM:   Review of Systems   10 point review of systems was obtained and all are negative unless noted below or in the HPI. OBJECTIVE  PHYSICAL EXAM:     CONSTITUTIONAL:  awake, alert, cooperative, no apparent distress, and appears stated age, [place] speaking. EYES:  Lids and lashes normal, pupils equal, round and reactive to light, extra ocular muscles intact, sclera clear, conjunctiva normal  ENT: Right parietal scalp laceration with sutures, scant bleeding at the site. Edentulous. NECK:  Supple, symmetrical, trachea midline, no adenopathy, thyroid symmetric, not enlarged and no tenderness, skin normal  LUNGS: Diminished at the bases, no crackles, rales, wheezes  CARDIOVASCULAR: Irregularly irregular, rate controlled atrial fibrillation per telemetry  ABDOMEN:  No scars, normal bowel sounds, soft, non-distended, non-tender, no masses palpated, no hepatosplenomegally  MUSCULOSKELETAL:  There is no redness, warmth, or swelling of the joints. NEUROLOGIC:  Awake, alert, oriented to name, place and time. Follows commands. Gait not assessed. BP (!) 141/88   Pulse 115   Temp 98.2 °F (36.8 °C)   Resp 16   Ht 5' 9\" (1.753 m)   Wt 136 lb (61.7 kg)   SpO2 97%   BMI 20.08 kg/m²     DATA:     Diagnostic tests reviewed for today's visit:    Most recent labs and imaging results reviewed.      LABS:    Recent Results (from the past 24 hour(s))   Comprehensive Metabolic Panel    Collection Time: 09/01/21  5:15 PM   Result Value Ref Range    Sodium 134 (L) 135 - 144 mEq/L    Potassium 3.6 3.4 - 4.9 mEq/L    Chloride 96 95 - 107 mEq/L    CO2 21 20 - 31 mEq/L    Anion Gap 17 (H) 9 - 15 mEq/L    Glucose 110 (H) 70 - 99 mg/dL    BUN 13 8 - 23 mg/dL    CREATININE 0.89 0.70 - 1.20 mg/dL    GFR Non-African American >60.0 >60    GFR  >60.0 >60    Calcium 9.6 8.5 - 9.9 mg/dL    Total Protein 7.0 6.3 - 8.0 g/dL    Albumin 4.4 3.5 - 4.6 g/dL    Total Bilirubin 2.3 (H) 0.2 - 0.7 mg/dL    Alkaline Phosphatase 90 35 - 104 U/L    ALT 23 0 - 41 U/L swelling/laceration is noted posterolaterally on the right. There is no intracranial hemorrhage, mass effect, midline shift, extra-axial collection, hydrocephalus, evidence of a recent ischemic infarct or skull fracture identified. Chronic right frontal, occipital and right cerebellar infarcts, mild generalized cerebral volume loss and moderate white matter changes are again noted. The mastoid air cells and visualized paranasal sinuses are essentially clear. CERVICAL SPINE CT FINDINGS: The spine is visualized from the craniovertebral junction nearly through the T1 level. There is no acute fracture, dislocation, or acute paraspinous soft tissue abnormalities identified. Degenerative changes with mild anterolisthesis of C7 over T1 and previous C4 and C5 laminectomy are again noted. FINAL IMPRESSION: NO ACUTE INTRACRANIAL PROCESS, FRACTURE, OR EVIDENCE OF CERVICAL SPINE INJURY IDENTIFIED. RIGHT PARIETAL SCALP HEMATOMA/LACERATION. CHRONIC, INVOLUTIONAL, DEGENERATIVE AND POSTOPERATIVE CHANGES, AS NOTED. CT CERVICAL SPINE WO CONTRAST    Result Date: 9/1/2021  CT HEAD WO CONTRAST, CT CERVICAL SPINE WO CONTRAST: 9/1/2021 CLINICAL HISTORY:  fall with head injury and loc . COMPARISON: Head and cervical spine CTs 12/22/2020. TECHNIQUE: ROUTINE All CT scans at this facility use dose modulation, iterative reconstruction, and/or weight based dosing when appropriate to reduce radiation dose to as low as reasonably achievable. HEAD CT FINDINGS: Scalp soft tissue swelling/laceration is noted posterolaterally on the right. There is no intracranial hemorrhage, mass effect, midline shift, extra-axial collection, hydrocephalus, evidence of a recent ischemic infarct or skull fracture identified. Chronic right frontal, occipital and right cerebellar infarcts, mild generalized cerebral volume loss and moderate white matter changes are again noted. The mastoid air cells and visualized paranasal sinuses are essentially clear. CERVICAL SPINE CT FINDINGS: The spine is visualized from the craniovertebral junction nearly through the T1 level. There is no acute fracture, dislocation, or acute paraspinous soft tissue abnormalities identified. Degenerative changes with mild anterolisthesis of C7 over T1 and previous C4 and C5 laminectomy are again noted. FINAL IMPRESSION: NO ACUTE INTRACRANIAL PROCESS, FRACTURE, OR EVIDENCE OF CERVICAL SPINE INJURY IDENTIFIED. RIGHT PARIETAL SCALP HEMATOMA/LACERATION. CHRONIC, INVOLUTIONAL, DEGENERATIVE AND POSTOPERATIVE CHANGES, AS NOTED. VTE Prophylaxis: SCDs    ASSESSMENT AND PLAN  Active Problems:    Syncope and collapse  Plan:     1. Fall, questionable syncope versus mechanical  2. Elevated troponin  3. Head injury  -Son reports patient is supposed to use a walker, but was using a cane instead. He did fall and hit his head. He sustained a right parietal head laceration. Uncertain if precipitating dizziness or true syncopal episode though reportedly had some loss of consciousness following the episode. He is on Eliquis for chronic atrial fibrillation. In the ER, vitals were stable, but does have some mild elevation in troponin.  -Continuous telemetry, trend troponin  -Consult cardiology  -Local wound care to right parietal head lack, monitor for continued bleeding  -Hold Eliquis (discussed with the ER physician who indicated this was discussed with cardiology)  -Repeat EKG in the a.m.  -PT OT lilly's    Continue home regimen for chronic conditions pending medication reconciliation:  1. Chronic systolic heart failure: Recent admission with titration of medication regimen. Recent 2D echo showing EF of 20%, moderate MR, moderate TR, pulmonary hypertension.  -Continue Lasix, spironolactone  2. Atrial fibrillation  -Hold home Eliquis due to head injury, continue telemetry, metoprolol  3. COPD  -Continue home inhalers, as needed albuterol  4.   Anxiety  -Continue BuSpar      Plan of care discussed with: patient, family and adult child    SIGNATURE: ZACH Harvey CNP  DATE: September 1, 2021  TIME: 6:46 PM   No admitting provider for patient encounter.  - supervising physician

## 2021-09-01 NOTE — ED PROVIDER NOTES
[address] [facility]  eMERGENCY dEPARTMENT eNCOUnter      Pt Name: Enedelia Hurst  MRN: [id number]  [place]ike 2/12/1929  Date of evaluation: 9/1/2021  Provider: Reyes Pineda Dr       Chief Complaint   Patient presents with    Fall         HISTORY OF PRESENT ILLNESS   (Location/Symptom, Timing/Onset,Context/Setting, Quality, Duration, Modifying Factors, Severity)  Note limiting factors. Enedelia Hurst is a 80 y.o. male who presents to the emergency department as walking with cane instead of his walker per family he fell hit his head positive loss of conscious per family. Patient has obvious laceration to right side of head patient moving all extremities speaks Ecuadorean only  pad utilized for nursing evaluation in triage in room. Dr. Miriam Welch saw patient in room. Symptoms moderate severity nothing proves worsen symptoms. HPI    NursingNotes were reviewed. REVIEW OF SYSTEMS    (2-9 systems for level 4, 10 or more for level 5)     Review of Systems   Constitutional: Negative for activity change, appetite change, fever and unexpected weight change. HENT: Negative for ear discharge, nosebleeds and voice change. Eyes: Negative for photophobia and discharge. Respiratory: Negative for apnea, cough and shortness of breath. Cardiovascular: Negative for chest pain. Gastrointestinal: Negative for abdominal distention, anal bleeding, nausea and vomiting. Endocrine: Negative for cold intolerance, heat intolerance and polyphagia. Genitourinary: Negative for genital sores. Musculoskeletal: Negative for back pain, joint swelling and neck pain. Skin: Positive for wound. Negative for pallor. Allergic/Immunologic: Negative for immunocompromised state. Neurological: Positive for headaches. Negative for seizures and facial asymmetry. Hematological: Does not bruise/bleed easily.    Psychiatric/Behavioral: Negative for behavioral problems, self-injury and sleep disturbance. All other systems reviewed and are negative. Except as noted above the remainder of the review of systems was reviewed and negative. PAST MEDICAL HISTORY     Past Medical History:   Diagnosis Date    Anxiety     Atrial fibrillation ([facility]     Cardiomyopathy ([facility] 9/18/2020    CHF (congestive heart failure) (HCC)     Diabetes mellitus ([facility]     Hypertension     Sleep apnea          SURGICALHISTORY       Past Surgical History:   Procedure Laterality Date    BACK SURGERY      CARDIAC PACEMAKER PLACEMENT  3920    HERNIA REPAIR      THORACENTESIS Left 06/24/2021    450ml removed by Dr Kashmir Rico [phone]         CURRENT MEDICATIONS       Discharge Medication List as of 9/2/2021  3:49 PM      CONTINUE these medications which have NOT CHANGED    Details   spironolactone (ALDACTONE) 25 MG tablet Take 1 tablet by mouth daily, Disp-30 tablet, R-3Normal      furosemide (LASIX) 40 MG tablet Take 1 tablet by mouth daily, Disp-30 tablet, R-6Normal      aspirin 81 MG chewable tablet Take 1 tablet by mouth daily, Disp-30 tablet, R-3Normal      tiotropium (SPIRIVA RESPIMAT) 2.5 MCG/ACT AERS inhaler Inhale 2 puffs into the lungs dailyHistorical Med      fluticasone-salmeterol (ADVAIR DISKUS) 250-50 MCG/DOSE AEPB Inhale 1 puff into the lungs every 12 hours for 14 days, Disp-1 Inhaler, R-0Normal             ALLERGIES     Patient has no known allergies. FAMILY HISTORY     No family history on file.        SOCIAL HISTORY       Social History     Socioeconomic History    Marital status:      Spouse name: Not on file    Number of children: Not on file    Years of education: Not on file    Highest education level: Not on file   Occupational History    Not on file   Tobacco Use    Smoking status: Former Smoker    Smokeless tobacco: Former User    Tobacco comment: quit 30 years ago   Vaping Use    Vaping Use: Never used   Substance and Sexual Activity    Alcohol use: Not Currently  Drug use: Not Currently    Sexual activity: Not on file   Other Topics Concern    Not on file   Social History Narrative    ** Merged History Encounter **          Social Determinants of Health     Financial Resource Strain:     Difficulty of Paying Living Expenses:    Food Insecurity:     Worried About Running Out of Food in the Last Year:     Ran Out of Food in the Last Year:    Transportation Needs:     Lack of Transportation (Medical):  Lack of Transportation (Non-Medical):    Physical Activity:     Days of Exercise per Week:     Minutes of Exercise per Session:    Stress:     Feeling of Stress :    Social Connections:     Frequency of Communication with Friends and Family:     Frequency of Social Gatherings with Friends and Family:     Attends Jewish Services:     Active Member of Clubs or Organizations:     Attends Club or Organization Meetings:     Marital Status:    Intimate Partner Violence:     Fear of Current or Ex-Partner:     Emotionally Abused:     Physically Abused:     Sexually Abused:        SCREENINGS    Marcie Coma Scale  Eye Opening: Spontaneous  Best Verbal Response: Confused  Best Motor Response: Localizes pain  [place] Coma Scale Score: 13 @FLOW([id number])@      PHYSICAL EXAM    (up to 7 for level 4, 8 or more for level 5)     ED Triage Vitals [09/01/21 1703]   BP Temp Temp src Pulse Resp SpO2 Height Weight   (!) 141/88 98.2 °F (36.8 °C) -- 115 16 97 % 5' 9\" (1.753 m) 136 lb (61.7 kg)       Physical Exam  Vitals and nursing note reviewed. Constitutional:       General: He is not in acute distress. Appearance: He is well-developed. HENT:      Head: Normocephalic. Comments: Positive scalp laceration tender to palpation.      Right Ear: Tympanic membrane and external ear normal.      Left Ear: Tympanic membrane and external ear normal.      Nose: Nose normal.      Mouth/Throat:      Mouth: Mucous membranes are moist.   Eyes:      General:         Right eye: No discharge. Left eye: No discharge. Extraocular Movements: Extraocular movements intact. Pupils: Pupils are equal, round, and reactive to light. Cardiovascular:      Rate and Rhythm: Normal rate and regular rhythm. Pulses: Normal pulses. Heart sounds: Normal heart sounds. Pulmonary:      Effort: Pulmonary effort is normal. No respiratory distress. Breath sounds: Normal breath sounds. No stridor. Abdominal:      General: Bowel sounds are normal. There is no distension. Palpations: Abdomen is soft. Tenderness: There is no abdominal tenderness. There is no right CVA tenderness or left CVA tenderness. Musculoskeletal:         General: Normal range of motion. Cervical back: Normal range of motion and neck supple. No tenderness. Comments: Negative cervical, thoracic, lumbar tenderness negative paracervical tenderness negative paralumbar tenderness negative parathoracic tenderness. Full range of motion all extremities negative tenderness lower and upper extremities. To palpation. Skin:     General: Skin is warm. Findings: No erythema. Neurological:      Mental Status: He is alert and oriented to person, place, and time. Motor: No weakness. Psychiatric:         Mood and Affect: Mood normal.         DIAGNOSTIC RESULTS     EKG: All EKG's are interpreted by the Emergency Department Physician who either signs or Co-signsthis chart in the absence of a cardiologist.    KG atrial fibrillation rate 97 ventricular   ms  ms.     RADIOLOGY:   Non-plain filmimages such as CT, Ultrasound and MRI are read by the radiologist. Plain radiographic images are visualized and preliminarily interpreted by the emergency physician with the below findings:         Interpretation per the Radiologist below, if available at the time ofthis note:    XR CHEST PORTABLE   Final Result   NO ACUTE CARDIOPULMONARY DISEASE      XR PELVIS (1-2 VIEWS)   Final Result   There are no acute osseous abnormalities. Mild arthrosis of the bilateral hips. CT Head WO Contrast   Final Result   FINAL IMPRESSION:      NO ACUTE INTRACRANIAL PROCESS, FRACTURE, OR EVIDENCE OF CERVICAL SPINE INJURY IDENTIFIED. RIGHT PARIETAL SCALP HEMATOMA/LACERATION. CHRONIC, INVOLUTIONAL, DEGENERATIVE AND POSTOPERATIVE CHANGES, AS NOTED. CT CERVICAL SPINE WO CONTRAST   Final Result   FINAL IMPRESSION:      NO ACUTE INTRACRANIAL PROCESS, FRACTURE, OR EVIDENCE OF CERVICAL SPINE INJURY IDENTIFIED. RIGHT PARIETAL SCALP HEMATOMA/LACERATION. CHRONIC, INVOLUTIONAL, DEGENERATIVE AND POSTOPERATIVE CHANGES, AS NOTED.             XR CHEST (2 VW)    (Results Pending)         ED BEDSIDE ULTRASOUND:   Performed by ED Physician - none    LABS:  Labs Reviewed   COMPREHENSIVE METABOLIC PANEL - Abnormal; Notable for the following components:       Result Value    Sodium 134 (*)     Anion Gap 17 (*)     Glucose 110 (*)     Total Bilirubin 2.3 (*)     All other components within normal limits    Narrative:     CALL  [place]  [facility] tel. [phone],  Troponin result called to Dr Bijal Coto, 09/01/2021 17:58, by Merced Hayden   CBC WITH AUTO DIFFERENTIAL - Abnormal; Notable for the following components:    WBC 4.7 (*)     RBC 4.38 (*)     MCH 33.2 (*)     RDW 17.8 (*)     All other components within normal limits   PROTIME-INR - Abnormal; Notable for the following components:    Protime 17.2 (*)     All other components within normal limits   TROPONIN - Abnormal; Notable for the following components:    Troponin 0.021 (*)     All other components within normal limits    Narrative:     CALL  [place]ner  ED tel. [phone],  Troponin result called to Dr Bijal Coto, 09/01/2021 17:58, by Merced Hayden   CBC - Abnormal; Notable for the following components:    WBC 4.1 (*)     RBC 3.81 (*)     Hemoglobin 12.7 (*)     Hematocrit 37.3 (*)     MCH 33.3 (*)     RDW 17.3 (*)     All other components within normal limits   ETHANOL   APTT   TROPONIN   BRAIN NATRIURETIC PEPTIDE       All other labs were within normal range or not returned as of this dictation. EMERGENCY DEPARTMENT COURSE and DIFFERENTIAL DIAGNOSIS/MDM:   Vitals:    Vitals:    09/01/21 2337 09/02/21 0544 09/02/21 0756 09/02/21 0837   BP: 99/65 124/67  102/66   Pulse: 77 88 68 64   Resp: 16 18 20 16   Temp: 97.5 °F (36.4 °C) 97.2 °F (36.2 °C)  97.7 °F (36.5 °C)   TempSrc: Axillary Axillary  Oral   SpO2: 100% 100% 97% 98%   Weight:  134 lb 14.4 oz (61.2 kg)     Height:  5' 9\" (1.753 m)              MDM  Number of Diagnoses or Management Options  Diagnosis management comments: Consult trauma Dr. Roxie Sinha he recommends adding chest x-ray and pelvis x-ray given exam we discussed including head injury loss of consciousness. Discussed with Dr. Ashlee Becerra he evaluated patient in emergency room. Recommends head neck CT. Reviewed CT reports atrial fibrillation discussed with Dr. Allan Arvizu he will admit patient for syncope and collapse were to consult Dr. Ricki Lira group we discussed with Dr. Annel Houston he recommends holding Eliquis. He recommends 1 dose of IV Lopressor 5 mg. Amount and/or Complexity of Data Reviewed  Clinical lab tests: ordered and reviewed  Tests in the radiology section of CPT®: reviewed and ordered  Discuss the patient with other providers: yes        CRITICAL CARE TIME   Total Critical Care time was 31  minutes, excluding separately reportableprocedures. There was a high probability of clinicallysignificant/life threatening deterioration in the patient's condition which required my urgent intervention.        CONSULTS:  IP CONSULT TO CARDIOLOGY  IP CONSULT TO PALLIATIVE CARE  IP CONSULT TO HOME CARE NEEDS    PROCEDURES:  Unless otherwise noted below, none     Lac Repair    Date/Time: 9/1/2021 7:13 PM  Performed by: Paz Darden PA-C  Authorized by: Mason William DO     Consent:     Consent obtained:  Verbal    Consent given by:  Patient Risks discussed:  Pain and poor cosmetic result  Anesthesia (see MAR for exact dosages): Anesthesia method:  Local infiltration    Local anesthetic:  Lidocaine 1% w/o epi  Laceration details:     Location:  Scalp    Scalp location:  R parietal    Length (cm):  6    Depth (mm):  4  Repair type:     Repair type:  Simple  Pre-procedure details:     Preparation:  Patient was prepped and draped in usual sterile fashion and imaging obtained to evaluate for foreign bodies  Exploration:     Hemostasis achieved with:  Direct pressure    Wound exploration: entire depth of wound probed and visualized      Wound extent: no foreign bodies/material noted      Contaminated: no    Treatment:     Area cleansed with:  Betadine    Amount of cleaning:  Standard    Irrigation solution:  Sterile saline    Visualized foreign bodies/material removed: no    Skin repair:     Repair method:  Sutures    Suture size:  3-0    Suture material:  Nylon    Suture technique:  Figure eight and simple interrupted    Number of sutures: 2 figure 8 single simple. Approximation:     Approximation:  Close  Post-procedure details:     Dressing:  Antibiotic ointment and sterile dressing    Patient tolerance of procedure: Tolerated well, no immediate complications  Comments:      Sterile technique utilized        FINAL IMPRESSION      1. Syncope and collapse    2. Fall, initial encounter    3. Injury of head, initial encounter    4.  Laceration of scalp, initial encounter          DISPOSITION/PLAN   DISPOSITION Admitted 09/01/2021 06:30:30 PM      PATIENT REFERRED TO:  [facility]  [address] [place] [address] Werner Simmons 112 TO ARRANGE VISIT    Jeanette Izaguirre DO  [address] [facility] [address] [place] [address] [address]  [phone]    Go on 9/7/2021  @ 11:30 , For suture removal      DISCHARGE MEDICATIONS:  Discharge Medication List as of 9/2/2021  3:49 PM      START taking these medications    Details   lisinopril (PRINIVIL;ZESTRIL) 5 MG tablet Take 1 tablet by mouth daily, Disp-30 tablet, R-3Normal                (Please note that portions of this note were completed with a voice recognition program.  Efforts were made to edit the dictations but occasionally words are mis-transcribed.)    Ton Santamaria PA-C (electronically signed)  Attending Emergency Physician       Ton Santamaria PA-C  09/28/21 9925

## 2021-09-01 NOTE — ED NOTES
Bed: 30  Expected date: 9/1/21  Expected time: 4:48 PM  Means of arrival: [facility]  Comments:  80 M - fall     Mary Walker RN  09/01/21 0231

## 2021-09-02 VITALS
HEART RATE: 64 BPM | HEIGHT: 69 IN | OXYGEN SATURATION: 98 % | DIASTOLIC BLOOD PRESSURE: 66 MMHG | RESPIRATION RATE: 16 BRPM | SYSTOLIC BLOOD PRESSURE: 102 MMHG | TEMPERATURE: 97.7 F | WEIGHT: 134.9 LBS | BODY MASS INDEX: 19.98 KG/M2

## 2021-09-02 LAB
EKG ATRIAL RATE: 115 BPM
EKG ATRIAL RATE: 122 BPM
EKG Q-T INTERVAL: 336 MS
EKG Q-T INTERVAL: 454 MS
EKG QRS DURATION: 146 MS
EKG QRS DURATION: 148 MS
EKG QTC CALCULATION (BAZETT): 426 MS
EKG QTC CALCULATION (BAZETT): 536 MS
EKG R AXIS: -48 DEGREES
EKG R AXIS: -61 DEGREES
EKG T AXIS: 96 DEGREES
EKG T AXIS: 98 DEGREES
EKG VENTRICULAR RATE: 84 BPM
EKG VENTRICULAR RATE: 97 BPM
HCT VFR BLD CALC: 37.3 % (ref 42–52)
HEMOGLOBIN: 12.7 G/DL (ref 14–18)
MCH RBC QN AUTO: 33.3 PG (ref 27–31.3)
MCHC RBC AUTO-ENTMCNC: 34 % (ref 33–37)
MCV RBC AUTO: 97.8 FL (ref 80–100)
PDW BLD-RTO: 17.3 % (ref 11.5–14.5)
PLATELET # BLD: 130 K/UL (ref 130–400)
PRO-BNP: NORMAL PG/ML
RBC # BLD: 3.81 M/UL (ref 4.7–6.1)
TROPONIN: <0.01 NG/ML (ref 0–0.01)
WBC # BLD: 4.1 K/UL (ref 4.8–10.8)

## 2021-09-02 PROCEDURE — 93005 ELECTROCARDIOGRAM TRACING: CPT | Performed by: NURSE PRACTITIONER

## 2021-09-02 PROCEDURE — 93010 ELECTROCARDIOGRAM REPORT: CPT | Performed by: INTERNAL MEDICINE

## 2021-09-02 PROCEDURE — 6370000000 HC RX 637 (ALT 250 FOR IP): Performed by: NURSE PRACTITIONER

## 2021-09-02 PROCEDURE — 84484 ASSAY OF TROPONIN QUANT: CPT

## 2021-09-02 PROCEDURE — 97162 PT EVAL MOD COMPLEX 30 MIN: CPT

## 2021-09-02 PROCEDURE — 99215 OFFICE O/P EST HI 40 MIN: CPT | Performed by: INTERNAL MEDICINE

## 2021-09-02 PROCEDURE — 83880 ASSAY OF NATRIURETIC PEPTIDE: CPT

## 2021-09-02 PROCEDURE — 99212 OFFICE O/P EST SF 10 MIN: CPT

## 2021-09-02 PROCEDURE — G0378 HOSPITAL OBSERVATION PER HR: HCPCS

## 2021-09-02 PROCEDURE — 6370000000 HC RX 637 (ALT 250 FOR IP): Performed by: INTERNAL MEDICINE

## 2021-09-02 PROCEDURE — 85027 COMPLETE CBC AUTOMATED: CPT

## 2021-09-02 PROCEDURE — 97166 OT EVAL MOD COMPLEX 45 MIN: CPT

## 2021-09-02 PROCEDURE — 36415 COLL VENOUS BLD VENIPUNCTURE: CPT

## 2021-09-02 PROCEDURE — 2580000003 HC RX 258: Performed by: NURSE PRACTITIONER

## 2021-09-02 PROCEDURE — 94640 AIRWAY INHALATION TREATMENT: CPT

## 2021-09-02 RX ORDER — LISINOPRIL 5 MG/1
5 TABLET ORAL DAILY
Qty: 30 TABLET | Refills: 3 | Status: SHIPPED | OUTPATIENT
Start: 2021-09-03 | End: 2021-11-29 | Stop reason: SDUPTHER

## 2021-09-02 RX ORDER — ASPIRIN 81 MG/1
81 TABLET ORAL DAILY
Status: DISCONTINUED | OUTPATIENT
Start: 2021-09-02 | End: 2021-09-02 | Stop reason: HOSPADM

## 2021-09-02 RX ORDER — METOPROLOL TARTRATE 50 MG/1
25 TABLET, FILM COATED ORAL 2 TIMES DAILY
Qty: 60 TABLET | Refills: 5 | COMMUNITY
Start: 2021-09-02

## 2021-09-02 RX ORDER — LISINOPRIL 5 MG/1
5 TABLET ORAL DAILY
Status: DISCONTINUED | OUTPATIENT
Start: 2021-09-02 | End: 2021-09-02 | Stop reason: HOSPADM

## 2021-09-02 RX ADMIN — METOPROLOL TARTRATE 25 MG: 25 TABLET, FILM COATED ORAL at 08:40

## 2021-09-02 RX ADMIN — BUSPIRONE HYDROCHLORIDE 5 MG: 5 TABLET ORAL at 08:40

## 2021-09-02 RX ADMIN — SPIRONOLACTONE 25 MG: 25 TABLET ORAL at 08:40

## 2021-09-02 RX ADMIN — LISINOPRIL 5 MG: 5 TABLET ORAL at 08:49

## 2021-09-02 RX ADMIN — ASPIRIN 81 MG: 81 TABLET, COATED ORAL at 08:40

## 2021-09-02 RX ADMIN — TIOTROPIUM BROMIDE INHALATION SPRAY 2 PUFF: 3.12 SPRAY, METERED RESPIRATORY (INHALATION) at 07:27

## 2021-09-02 RX ADMIN — Medication 10 ML: at 08:40

## 2021-09-02 RX ADMIN — FUROSEMIDE 40 MG: 40 TABLET ORAL at 08:40

## 2021-09-02 ASSESSMENT — ENCOUNTER SYMPTOMS
COUGH: 0
SHORTNESS OF BREATH: 0
GASTROINTESTINAL NEGATIVE: 1
RESPIRATORY NEGATIVE: 1
WHEEZING: 0
NAUSEA: 0
CHEST TIGHTNESS: 0
EYES NEGATIVE: 1
BLOOD IN STOOL: 0
STRIDOR: 0

## 2021-09-02 ASSESSMENT — PAIN SCALES - GENERAL
PAINLEVEL_OUTOF10: 0

## 2021-09-02 NOTE — CARE COORDINATION
Per morning team rounds, the LSW called and talked to Zac Niru. .  The patient is not current with [facility] per Pipe Burciaga. The LSW updated the patient's RN.  Electronically signed by YENY Mcmahan on 9/2/21 at 11:39 AM EDT

## 2021-09-02 NOTE — PROGRESS NOTES
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 CHF (congestive heart failure) ([address] 2021    CHF (congestive heart failure), NYHA class I, acute on chronic, combined ([address] 2021    Atelectasis of right lung     Pleural effusion on right     SOB (shortness of breath) 2020    Cardiomyopathy ([address] 2020    Chronic atrial fibrillation ([address] 2020        Past Medical History:   Diagnosis Date    Anxiety     Atrial fibrillation ([address]     Cardiomyopathy ([address] 2020    CHF (congestive heart failure) (HCC)     Diabetes mellitus ([address]     Hypertension     Sleep apnea      Past Surgical History:   Procedure Laterality Date    BACK SURGERY      CARDIAC PACEMAKER PLACEMENT  4960    HERNIA REPAIR      THORACENTESIS Left 2021    450ml removed by Dr Wilson Mon [phone]        Restrictions:Fall        Safety Devices: Safety Devices  Safety Devices in place: Yes  Type of devices: All fall risk precautions in place   Initially in place: No    Subjective:Pt seen with son present. Pt communicated with use of . Pain Reassessment: 0/10 pain reported.           Prior Level of Function:  Social/Functional History  Lives With: Spouse (has live-in caregiver)  Type of Home: Apartment  Home Layout: One level  Home Access: Stairs to enter with rails  Entrance Stairs - Number of Steps: 4  Entrance Stairs - Rails: Both  Bathroom Shower/Tub: Tub/Shower unit  Bathroom Equipment: Grab bars in shower, Grab bars around toilet, Shower chair  Home Equipment: Cane (rollator)  ADL Assistance: Independent (family assists with washing his back)  Ambulation Assistance: Independent (with rollator)  Transfer Assistance: Independent  Active : No  Additional Comments: Pt prefers to use cane, but family prefers him to use rollator. Pt's son reports that patient is never at home alone    OBJECTIVE:     Orientation Status:  Orientation  Overall Orientation Status: Within Functional Limits    Observation:  Observation/Palpation  Posture: Fair  Observation: mild flexion noted. SOB with minimal exertion    Cognition Status:  Cognition  Cognition Comment: Follows one step commands consistently. Pt Communicates in [place] Pt mildly impulsive with mild decreased safety awareness    Perception Status:  Perception  Overall Perceptual Status: WFL    Sensation Status:  Sensation  Overall Sensation Status: WFL    Vision and Hearing Status:  Vision  Vision: Impaired  Vision Exceptions: Wears glasses at all times  Hearing  Hearing: Within functional limits     ROM:   LUE AROM (degrees)  LUE AROM : WFL  Left Hand AROM (degrees)  Left Hand AROM: WFL  RUE AROM (degrees)  RUE AROM : WFL  Right Hand AROM (degrees)  Right Hand AROM: WFL    Strength:  LUE Strength  Gross LUE Strength: WFL  L Hand General: 4+/5  RUE Strength  Gross RUE Strength: WFL  R Hand General: 4+/5    Coordination, Tone, Quality of Movement:    Tone RUE  RUE Tone: Normotonic  Tone LUE  LUE Tone: Normotonic  Coordination  Movements Are Fluid And Coordinated: Yes    Hand Dominance:  Hand Dominance  Hand Dominance: Right    ADL Status:  ADL  Feeding: Setup  Grooming: Setup  UE Bathing: Stand by assistance  LE Bathing: Stand by assistance  UE Dressing: Stand by assistance  LE Dressing: Stand by assistance  Toileting: Unable to assess(comment)          Therapy key for assistance levels -   Independent = Pt. is able to perform task with no assistance but may require a device   Stand by assistance = Pt. does not perform task at an independent level but does not need physical assistance, requires verbal cues  Minimal, Moderate, Maximal Assistance = Pt. requires physical assistance (25%, 50%, 75% assist from helper) for task but is able to actively participate in task   Dependent = Pt. requires total assistance with task and is not able to actively participate with task completion     Functional Mobility:     Transfers  Sit to stand: Contact guard assistance  Stand to sit: Contact guard assistance    Bed Mobility       Seated and Standing Balance:  Balance  Sitting Balance: Independent  Standing Balance: Contact guard assistance    Functional Endurance:  Activity Tolerance  Activity Tolerance: Patient limited by fatigue, Treatment limited secondary to medical complications (free text)    D/C Recommendations:  OT D/C RECOMMENDATIONS  REQUIRES OT FOLLOW UP: Yes    Equipment Recommendations:       OT Education:        OT Follow Up:  OT D/C RECOMMENDATIONS  REQUIRES OT FOLLOW UP: Yes       Assessment/Discharge Disposition:     Performance deficits / Impairments: Decreased functional mobility , Decreased balance, Decreased ADL status, Decreased safe awareness, Decreased endurance  Prognosis: Fair  Discharge Recommendations: Continue to assess pending progress  Decision Making: Medium Complexity  History: 6 complexities  Exam: 5 deficits  Assistance / Modification: CGA    Six Click Score    How much help for putting on and taking off regular lower body clothing?: A Little  How much help for Bathing?: A Little  How much help for Toileting?: None  How much help for putting on and taking off regular upper body clothing?: None  How much help for taking care of personal grooming?: None  How much help for eating meals?: None  AM-PAC Inpatient Daily Activity Raw Score: 22  AM-PAC Inpatient ADL T-Scale Score : 47.1  ADL Inpatient CMS 0-100% Score: 25.8    Plan:  Plan  Times per week: 1-4x/wk  Current Treatment Recommendations: Functional Mobility Training, Endurance Training, Balance Training, Self-Care / ADL, Safety Education & Training, Neuromuscular Re-education    Goals:   Patient will:    - Improve functional endurance to tolerate/complete 10-15 mins of ADL's  - Be independent in LB ADLs    Patient Goal: Patient goals : TO return to home with family      Discussed and agreed upon: Yes Comments:     Therapy Time:   OT Individual Minutes  Time In: 1114  Time Out: 1125  Minutes: 11    Eval: 11 minutes     Electronically signed by:    DAVID Haas, GUSTABO/L  0/8/1899, 1:06 PM Electronically signed by DAVID Haas on 0/1/57 at 12:54 PM EDT

## 2021-09-02 NOTE — CARE COORDINATION
Spoke with [facility] and patient is current with [facility] sn, had therapy from august but was not dc'd from program. Will obtain new order to ensure pt/ot continues. Awaiting [facility] care visit.

## 2021-09-02 NOTE — PROGRESS NOTES
Physical Therapy  Facility/Department: [facility] H875/O536-07      PT evaluation attempted 9/2/21, at 8:06 AM EDT, however this patient status is currently observation. Per facility protocol, PT evaluation and treatment orders for patients in observation status must include a PT specific diagnosis (i.e. \"difficulty ambulating\", \"lack of coordination\", etc.) These order specifications may be added to the \"comments\" section of the PT evaluation and treatment order. Requested updated Physical Therapy orders from referring provider via \"sticky note\". Coordination team notified.      We thank you for your referral!    [address] [facility]) Physical Therapy Department    Electronically signed by Dinorah Crooks PT on 9/2/21 at 8:06 AM EDT

## 2021-09-02 NOTE — PROGRESS NOTES
[facility] Occupational Therapy      Date: 2021  Patient Name: Dinorah Amos        MRN: [id number]  Account: [de-identified]   : 1929  (80 y.o.)  Room: [place] [address]    Pt. is of observation status. To comply with medicare and insurance regulations, to see patient we require updated orders including a valid OT treatment diagnosis. Please reorder as appropriate.      Electronically signed by [id number] GUSTABO Osorio/L on 2564 at 8:08 AM

## 2021-09-02 NOTE — PROGRESS NOTES
Wound Ostomy Continence Nurse  Consult Note       NAME:  Estella Mccarty  MEDICAL RECORD NUMBER:  [id number]  AGE: 80 y.o. GENDER: male  : 1929  TODAY'S DATE:  2021    Subjective   Reason for [address] Nurse Evaluation and Assessment: Head laceration      Estella Mccarty is a 80 y.o. male referred by:   [x] Physician  [] Nursing  [] Other:     Wound Identification:  Wound Type: Traumatic/Laceration  Contributing Factors: fall at home prior to admission    Wound History: Patient admitted to [facility] with a laceration to the right side of the head, from a fall at home. The laceration was stitched in the ED on arrival.  Current Wound Care Treatment:  Recommending 1) pressure injury prevention interventions 2) cleanse right head wound daily with saline and dry thoroughly, keep open to air    Patient Goal of Care:  [x] Wound Healing  [] Odor Control  [] Palliative Care  [] Pain Control   [] Other:         PAST MEDICAL HISTORY        Diagnosis Date    Anxiety     Atrial fibrillation ([address]     Cardiomyopathy ([facility] [place] [facility] 2020    CHF (congestive heart failure) ([facility] [place] [facility]     Diabetes mellitus ([facility] [place] [facility]     Hypertension     Sleep apnea        PAST SURGICAL HISTORY    Past Surgical History:   Procedure Laterality Date    BACK SURGERY      CARDIAC PACEMAKER PLACEMENT  4260    HERNIA REPAIR      THORACENTESIS Left 2021    450ml removed by Dr Jered Marvin [phone]       FAMILY HISTORY    No family history on file. SOCIAL HISTORY    Social History     Tobacco Use    Smoking status: Former Smoker    Smokeless tobacco: Former User    Tobacco comment: quit 30 years ago   Vaping Use    Vaping Use: Never used   Substance Use Topics    Alcohol use: Not Currently    Drug use: Not Currently       ALLERGIES    No Known Allergies    MEDICATIONS    No current facility-administered medications on file prior to encounter.      Current Outpatient Medications on File Prior to Encounter   Medication Sig 09/02/21 0945   Wound Width (cm) 4 cm 09/02/21 0945   Wound Depth (cm) 0 cm 09/02/21 0945   Wound Surface Area (cm^2) 0 cm^2 09/02/21 0945   Wound Volume (cm^3) 0 cm^3 09/02/21 0945   Wound Assessment Dry 09/02/21 0945   Drainage Amount None 09/02/21 0945   Odor None 09/02/21 0945   Juan-wound Assessment Dry/flaky 09/02/21 0945   Number of days: 1     Assessment:    Patient sitting up in chair at this time. Laceration to the right side of head, measures 4cm, with approximated sutures, dry bloody crust, and ecchymosis noted to the juan wound area. No drainage noted at this time. Plan   Plan of Care: Wound 09/01/21 Head Right Laceration-Dressing/Treatment: Open to air    Specialty Bed Required : N/A   [] Low Air Loss   [] Pressure Redistribution  [] Fluid Immersion  [] Bariatric  [] Other:     Current Diet: ADULT DIET; Dysphagia - Soft and Bite Sized;  No Caffeine  Dietician consult:  N/A    Discharge Plan:  Placement for patient upon discharge: undetermined   Patient appropriate for Outpatient [address] [facility] [address]: N/A    Referrals:  []   [] [facility]  [] Supplies  [] Other    Patient/Caregiver Teaching:  Level of patient/caregiver understanding able to:   [] Indicates understanding       [] Needs reinforcement  [] Unsuccessful      [] Verbal Understanding  [] Demonstrated understanding       [] No evidence of learning  [] Refused teaching         [x] N/A       Electronically signed by MELISSA ChatmanN, RN, CWOCN on 9/2/2021 at 11:11 AM

## 2021-09-02 NOTE — DISCHARGE INSTR - COC
Continuity of Care Form    Patient Name: Donnie Tran   :  1929  MRN:  [id number]    Admit date:  2021  Discharge date:  21    Code Status Order: DNR-CCA   Advance Directives:     Admitting Physician:  Ida Kayser, MD  PCP: Wilfredo Jeffries    Discharging Nurse: [facility], [address] [place] [address] Unit/Room#: Y168/G523-39  Discharging Unit Phone Number: [phone]    Emergency Contact:   Extended Emergency Contact Information  Primary Emergency Contact: [address] [facility] Phone: [phone]  Relation: Child  Secondary Emergency Contact: [address] Phone: [phone]  Mobile Phone: [phone]  Relation: Grandchild    Past Surgical History:  Past Surgical History:   Procedure Laterality Date    BACK SURGERY      CARDIAC PACEMAKER PLACEMENT      HERNIA REPAIR      THORACENTESIS Left 2021    450ml removed by Dr Dakotah Sanchez [phone]       Immunization History: There is no immunization history on file for this patient.     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 CHF (congestive heart failure) ([facility]) I50.43    Acute on chronic diastolic CHF (congestive heart failure) ([facility]) I50.33    Pacemaker Z95.0    Goals of care, counseling/discussion Z71.89    Syncope and collapse R55       Isolation/Infection:   Isolation          No Isolation        Patient Infection Status     Infection Onset Added Last Indicated Last Indicated By Review Planned Expiration Resolved Resolved By    None active    Resolved    COVID-19 Rule Out 21 COVID-19, Rapid (Ordered)   21 Rule-Out Test Resulted    COVID-19 21 COVID-19   21     COVID-19 Rule Out 21 COVID-19 (Ordered)   21 Rule-Out Test Resulted    COVID-19 Rule Out 20 COVID-19 (Ordered)   20 Rule-Out Test Resulted    Waiting on second test    COVID-19 Rule Out 20 COVID-19 (Ordered)   20 Rule-Out Test Resulted    COVID-19 Rule Out 20 COVID-19 (Ordered)   20 Rule-Out Test Resulted          Nurse Assessment:  Last Vital Signs: /66   Pulse 64   Temp 97.7 °F (36.5 °C) (Oral)   Resp 16   Ht 5' 9\" (1.753 m)   Wt 134 lb 14.4 oz (61.2 kg)   SpO2 98%   BMI 19.92 kg/m²     Last documented pain score (0-10 scale): Pain Level: 0  Last Weight:   Wt Readings from Last 1 Encounters:   21 134 lb 14.4 oz (61.2 kg)     Mental Status:  disoriented    IV Access:  - None    Nursing Mobility/ADLs:  Walking   Assisted  Transfer  Assisted  Bathing  Assisted  Dressing  Assisted  Toileting  Assisted  Feeding  Assisted  Med Admin  Assisted  Med Delivery   whole    Wound Care Documentation and Therapy:  Wound 21 Head Right Laceration (Active)   Wound Image   21 0945   Wound Etiology Traumatic 21 0945   Dressing/Treatment Open to air 21 0945   Wound Length (cm) 0 cm 21 0945   Wound Width (cm) 4 cm 21 0945   Wound Depth (cm) 0 cm 21 0945   Wound Surface Area (cm^2) 0 cm^2 21 0945   Wound Volume (cm^3) 0 cm^3 21 0945   Wound Assessment Dry 21 0945   Drainage Amount None 21 0945   Odor None 21 0945   Radha-wound Assessment Dry/flaky 21 0945   Number of days: 1        Elimination:  Continence:   · Bowel: {YES / EF:90525}  · Bladder: Yes and No - Incont at times / depends on   Urinary Catheter: None   Colostomy/Ileostomy/Ileal Conduit: No       Date of Last BM: ***  No intake or output data in the 24 hours ending 09/02/21 1443  No intake/output data recorded. Safety Concerns:     History of Falls (last 30 days) and At Risk for Falls    Impairments/Disabilities:      Speech - Senegalese / Confused at times     Nutrition Therapy:  Current Nutrition Therapy:   - Oral Diet:  Cardiac and Low Sodium (2gm)    Routes of Feeding: Oral  Liquids:  Thin Liquids  Daily Fluid Restriction: {P DME Yes amt example:540053420}  Last Modified Barium Swallow with Video (Video Swallowing Test): not done    Treatments at the Time of Hospital Discharge:   Respiratory Treatments: ***  Oxygen Therapy:  {Therapy; copd oxygen:07286}  Ventilator:    - No ventilator support    Rehab Therapies: Physical Therapy and Occupational Therapy  Weight Bearing Status/Restrictions: No weight bearing restirctions  Other Medical Equipment (for information only, NOT a DME order):  walker  Other Treatments: ***    Patient's personal belongings (please select all that are sent with patient):  {[facility] DME Belongings:252722524}    RN SIGNATURE:  Electronically signed by Juana Ba RN on 9/2/21 at 2:44 PM EDT    CASE MANAGEMENT/SOCIAL WORK SECTION    Inpatient Status Date: ***    Readmission Risk Assessment Score:  Readmission Risk              Risk of Unplanned Readmission:  0           Discharging to Facility/ Agency   · Name:   · Address:  · Phone:  · Fax:    Dialysis Facility (if applicable)   · Name:  · Address:  · Dialysis Schedule:  · Phone:  · Fax:    / signature: {Esignature:582157223}    PHYSICIAN SECTION    Prognosis: {Prognosis:1490996647}    Condition at Discharge: [facility] Patient Condition:426951445}    Rehab Potential (if transferring to Rehab): {Prognosis:3156411210}    Recommended Labs or Other Treatments After Discharge: ***    Physician Certification: I certify the above information and transfer of [facility]  is necessary for the continuing treatment of the diagnosis listed and that he requires {Admit to Appropriate Level of Care:85228} for {GREATER/LESS:904978536} 30 days.      Update Admission H&P: {CHP DME Changes in [facility]:603865889}    PHYSICIAN SIGNATURE:  {Esignature:904949446}

## 2021-09-02 NOTE — PROGRESS NOTES
Function:  Social/Functional History  Lives With: Spouse (has live-in caregiver)  Type of Home: Apartment  Home Layout: One level  Home Access: Stairs to enter with rails  Entrance Stairs - Number of Steps: 4  Entrance Stairs - Rails: Both  Bathroom Shower/Tub: Tub/Shower unit  Bathroom Equipment: Grab bars in shower, Grab bars around toilet, Shower chair  Home Equipment: Cane (rollator)  ADL Assistance: Independent (family assists with washing his back)  Ambulation Assistance: Independent (with rollator)  Transfer Assistance: Independent  Additional Comments: Pt prefers to use cane, but family prefers him to use rollator    OBJECTIVE:   Vision Exceptions: Wears glasses at all times  Hearing: Within functional limits    Cognition:  Overall Orientation Status: Within Functional Limits  Follows Commands: Within Functional Limits    Observation/Palpation  Observation: No acute distress noted. Pt SOB at rest. Sao2 = 100% on RA. HR 57bpm    ROM:  RLE PROM: WFL  LLE PROM: WFL    Strength:  Strength RLE  Strength RLE: WFL  Strength LLE  Strength LLE: WFL    Neuro:  Balance  Sitting - Static: Good  Sitting - Dynamic: Good  Standing - Static: Fair  Standing - Dynamic: Fair     Motor Control  Gross Motor?: WFL       Bed mobility  Comment: NT    Transfers  Sit to Stand: Stand by assistance  Stand to sit: Stand by assistance  Bed to Chair: Stand by assistance;Contact guard assistance    Ambulation  Ambulation?: Yes  Ambulation 1  Surface: level tile  Device: Rolling Walker  Assistance: Stand by assistance;Contact guard assistance  Quality of Gait: FF over [employer]. Long strides. Pt severly SOB with minimal exertion. HR remains 57bpm following walk  Distance: 10ft X 2              Activity Tolerance  Activity Tolerance: Patient limited by fatigue          PT Education  PT Education: Goals;PT Role;Plan of Care    ASSESSMENT:   Body structures, Functions, Activity limitations: Decreased functional mobility ; Decreased safe awareness;Decreased balance;Decreased endurance;Decreased ADL status  Decision Making: Medium Complexity  History: High  Exam: Med  Clinical Presentation: Med    Prognosis: Good  Barriers to Learning: none    DISCHARGE RECOMMENDATIONS:  Discharge Recommendations: Continue to assess pending progress, Patient would benefit from continued therapy after discharge    Assessment: Continued PT indicated to progress mobility and facilitate DC at highest level of indep and safety. Pt's family reports 24/7 assist at home. REQUIRES PT FOLLOW UP: Yes      PLAN OF CARE:  Plan  Times per week: 3-6  Current Treatment Recommendations: Strengthening, Balance Training, Functional Mobility Training, Transfer Training, Endurance Training, Gait Training, Neuromuscular Re-education, Home Exercise Program, Safety Education & Training, Patient/Caregiver Education & Training, Equipment Evaluation, Education, & procurement  Safety Devices  Type of devices: All fall risk precautions in place    Goals:  Long term goals  Long term goal 1: Pt to complete bed mobility indep  Long term goal 2: Pt to complete transfers with SBA  Long term goal 3: Pt to ambulate 25ft with SBA and LRD  Long term goal 4: Pt to tolerate 1min standing activities without rest break    AMPAC (6 CLICK) BASIC MOBILITY  AM-PAC Inpatient Mobility Raw Score : 16     Therapy Time:   Individual   Time In 1114   Time Out 1125   Minutes 174 [address] [place] [address], 09/02/21 at 1:04 PM         Definitions for assistance levels  Independent = pt does not require any physical supervision or assistance from another person for activity completion. Device may be needed.   Stand by assistance = pt requires verbal cues or instructions from another person, close to but not touching, to perform the activity  Minimal assistance= pt performs 75% or more of the activity; assistance is required to complete the activity  Moderate assistance= pt performs 50% of the activity; assistance is required to complete the activity  Maximal assistance = pt performs 25% of the activity; assistance is required to complete the activity  Dependent = pt requires total physical assistance to accomplish the task

## 2021-09-02 NOTE — DISCHARGE SUMMARY
Hospital Medicine Discharge Summary    Donnie Tran  :  1929  MRN:  [id number]    Admit date:  2021  Discharge date:  2021    Admitting Physician:  Ida Kayser, MD  Primary Care Physician:  Wilfredo Jeffries    Discharge Diagnoses:    Mechanical fall    Chief Complaint   Patient presents with   [facility]:   Patient presented after a fall with head trauma. He did not lose consciousness and it was witnessed by his son. He tried walking with his cane instead of the walker he should be using; per his family he is at his baseline and would like to take him home. He was evaluated by cardiology who recommended stopping his eliquis and using a baby aspirin instead. Exam on discharge:   /66   Pulse 64   Temp 97.7 °F (36.5 °C) (Oral)   Resp 16   Ht 5' 9\" (1.753 m)   Wt 134 lb 14.4 oz (61.2 kg)   SpO2 98%   BMI 19.92 kg/m²   General appearance: No apparent distress, appears stated age and cooperative. HEENT: Conjunctivae/corneas clear. Neck: Trachea midline. Respiratory:  Normal respiratory effort. Clear to auscultation  Cardiovascular: Regular rate and rhythm   Abdomen: Soft, non-tender, non-distended with normal bowel sounds. Musculoskeletal: No clubbing, cyanosis or edema bilaterally. Neuro: Non Focal.   Capillary Refill: Brisk,< 3 seconds   Peripheral Pulses: +2 palpable, equal bilaterally     Patient was seen by the following consultants while admitted to [facility]:   Consults:  [address] TO PALLIATIVE CARE  IP CONSULT TO HOME CARE NEEDS    Significant Diagnostic Studies:    Refer to chart     Please refer to chart if no studies are shown here    XR PELVIS (1-2 VIEWS)    Result Date: 2021  EXAMINATION: XR PELVIS (1-2 VIEWS), 2021 5:15 PM CLINICAL HISTORY:  fall COMPARISON: None TECHNIQUE: Single AP view of the pelvis FINDINGS:  There is no acute fractures or dislocation.   There is mild arthrosis of the bilateral hip joint spaces. The SI joints and pubic symphysis are aligned. The soft tissues are within normal limits. There are no acute osseous abnormalities. Mild arthrosis of the bilateral hips. CT Head WO Contrast    Result Date: 9/1/2021  CT HEAD WO CONTRAST, CT CERVICAL SPINE WO CONTRAST: 9/1/2021 CLINICAL HISTORY:  fall with head injury and loc . COMPARISON: Head and cervical spine CTs 12/22/2020. TECHNIQUE: ROUTINE All CT scans at this facility use dose modulation, iterative reconstruction, and/or weight based dosing when appropriate to reduce radiation dose to as low as reasonably achievable. HEAD CT FINDINGS: Scalp soft tissue swelling/laceration is noted posterolaterally on the right. There is no intracranial hemorrhage, mass effect, midline shift, extra-axial collection, hydrocephalus, evidence of a recent ischemic infarct or skull fracture identified. Chronic right frontal, occipital and right cerebellar infarcts, mild generalized cerebral volume loss and moderate white matter changes are again noted. The mastoid air cells and visualized paranasal sinuses are essentially clear. CERVICAL SPINE CT FINDINGS: The spine is visualized from the craniovertebral junction nearly through the T1 level. There is no acute fracture, dislocation, or acute paraspinous soft tissue abnormalities identified. Degenerative changes with mild anterolisthesis of C7 over T1 and previous C4 and C5 laminectomy are again noted. FINAL IMPRESSION: NO ACUTE INTRACRANIAL PROCESS, FRACTURE, OR EVIDENCE OF CERVICAL SPINE INJURY IDENTIFIED. RIGHT PARIETAL SCALP HEMATOMA/LACERATION. CHRONIC, INVOLUTIONAL, DEGENERATIVE AND POSTOPERATIVE CHANGES, AS NOTED. CT CERVICAL SPINE WO CONTRAST    Result Date: 9/1/2021  CT HEAD WO CONTRAST, CT CERVICAL SPINE WO CONTRAST: 9/1/2021 CLINICAL HISTORY:  fall with head injury and loc . COMPARISON: Head and cervical spine CTs 12/22/2020.  TECHNIQUE: ROUTINE All CT scans at this facility use dose modulation, iterative reconstruction, and/or weight based dosing when appropriate to reduce radiation dose to as low as reasonably achievable. HEAD CT FINDINGS: Scalp soft tissue swelling/laceration is noted posterolaterally on the right. There is no intracranial hemorrhage, mass effect, midline shift, extra-axial collection, hydrocephalus, evidence of a recent ischemic infarct or skull fracture identified. Chronic right frontal, occipital and right cerebellar infarcts, mild generalized cerebral volume loss and moderate white matter changes are again noted. The mastoid air cells and visualized paranasal sinuses are essentially clear. CERVICAL SPINE CT FINDINGS: The spine is visualized from the craniovertebral junction nearly through the T1 level. There is no acute fracture, dislocation, or acute paraspinous soft tissue abnormalities identified. Degenerative changes with mild anterolisthesis of C7 over T1 and previous C4 and C5 laminectomy are again noted. FINAL IMPRESSION: NO ACUTE INTRACRANIAL PROCESS, FRACTURE, OR EVIDENCE OF CERVICAL SPINE INJURY IDENTIFIED. RIGHT PARIETAL SCALP HEMATOMA/LACERATION. CHRONIC, INVOLUTIONAL, DEGENERATIVE AND POSTOPERATIVE CHANGES, AS NOTED. XR CHEST PORTABLE    Result Date: 9/1/2021  EXAMINATION: XR CHEST PORTABLE CLINICAL HISTORY: FALL. HIT HEAD ON NO. LOSS OF CONSCIOUSNESS. COMPARISONS: AUGUST 9, 2021 FINDINGS: Pacemaker generator and wires are unchanged. Osseous structures intact. Cardiopericardial silhouette normal. Lungs clear.      NO ACUTE CARDIOPULMONARY DISEASE      Discharge Medications:       [facility]   Home Medication Instructions [facility]:[id number]    Printed on:09/02/21 3933   Medication Information                      aspirin 81 MG chewable tablet  Take 1 tablet by mouth daily             fluticasone-salmeterol (ADVAIR DISKUS) 250-50 MCG/DOSE AEPB  Inhale 1 puff into the lungs every 12 hours for 14 days             furosemide (LASIX) 40 MG tablet  Take 1 tablet by mouth daily             lisinopril (PRINIVIL;ZESTRIL) 5 MG tablet  Take 1 tablet by mouth daily             metoprolol tartrate (LOPRESSOR) 50 MG tablet  Take 0.5 tablets by mouth 2 times daily             spironolactone (ALDACTONE) 25 MG tablet  Take 1 tablet by mouth daily             tiotropium (SPIRIVA RESPIMAT) 2.5 MCG/ACT AERS inhaler  Inhale 2 puffs into the lungs daily                 Disposition:   If discharged to Home, Any [place] [address] needs that were indicated and/or required as been addressed and set up by Social Work. Condition at discharge: good     Activity: activity as tolerated    Total time taken for discharging this patient: 40 minutes. Greater than 70% of time was spent focused exclusively on this patient. Time was taken to review chart, discuss plans with consultants, reconciling medications, discussing plan answering questions with patient.      Signed:  Alecia Faustin MD  9/2/2021, 2:56 PM  ----------------------------------------------------------------------------------------------------------------------    Adaline Been,

## 2021-09-02 NOTE — CONSULTS
Consults    Patient Name: Rafael Jin  Admit Date: 2021  4:58 PM  MR #: [id number]  : 1929    Attending Physician: Bryant Gann MD  Reason for consult: Elevated Troponin    History of Presenting Illness: Rafael Jin is a 80 y.o. male on hospital day 0 with a history of . History Obtained From:  patient, electronic medical record    Pt to ER with son after mechanical fall at home. He normally uses a walker but attempted to use a vincenzo. Lost his balance and fell with a right sided scalp laceration. S/p sutures in ER. No intracranial acute injury by CT images. We are asked to evaluate due to elevated Troponin. Initial Troponin wa detected but 2nd level normal.  Pt denies having CP nor SOB. He is awake and alert. Discussed with son this regarding risks of being on Eliquis at his age and falls. History:      EKG: AF 70 with occ V pacing  Past Medical History:   Diagnosis Date    Anxiety     Atrial fibrillation ([address]     Cardiomyopathy ([address] 2020    CHF (congestive heart failure) ([address]     Diabetes mellitus ([address]     Hypertension     Sleep apnea      Past Surgical History:   Procedure Laterality Date    BACK SURGERY      CARDIAC PACEMAKER PLACEMENT  5535    HERNIA REPAIR      THORACENTESIS Left 2021    450ml removed by Dr Londa Leventhal [phone]       Family History  No family history on file.   [] Unable to obtain due to ventilated and/ or neurologic status    Social History     Socioeconomic History    Marital status:      Spouse name: Not on file    Number of children: Not on file    Years of education: Not on file    Highest education level: Not on file   Occupational History    Not on file   Tobacco Use    Smoking status: Former Smoker    Smokeless tobacco: Former User    Tobacco comment: quit 30 years ago   Vaping Use    Vaping Use: Never used   Substance and Sexual Activity    Alcohol use: Not Currently    Drug use: Not Currently  Sexual activity: Not on file   Other Topics Concern    Not on file   Social History Narrative    ** Merged History Encounter **          Social Determinants of Health     Financial Resource Strain:     Difficulty of Paying Living Expenses:    Food Insecurity:     Worried About Running Out of Food in the Last Year:     Ran Out of Food in the Last Year:    Transportation Needs:     Lack of Transportation (Medical):      Lack of Transportation (Non-Medical):    Physical Activity:     Days of Exercise per Week:     Minutes of Exercise per Session:    Stress:     Feeling of Stress :    Social Connections:     Frequency of Communication with Friends and Family:     Frequency of Social Gatherings with Friends and Family:     Attends [employer] Services:     Active Member of Clubs or Organizations:     Attends Club or Organization Meetings:     Marital Status:    Intimate Partner Violence:     Fear of Current or Ex-Partner:     Emotionally Abused:     Physically Abused:     Sexually Abused:       [] Unable to obtain due to ventilated and/ or neurologic status      Home Medications:      Medications Prior to Admission: spironolactone (ALDACTONE) 25 MG tablet, Take 1 tablet by mouth daily  furosemide (LASIX) 40 MG tablet, Take 1 tablet by mouth daily  aspirin 81 MG chewable tablet, Take 1 tablet by mouth daily  tiotropium (SPIRIVA RESPIMAT) 2.5 MCG/ACT AERS inhaler, Inhale 2 puffs into the lungs daily  apixaban (ELIQUIS) 2.5 MG TABS tablet, Take 1 tablet by mouth 2 times daily  fluticasone-salmeterol (ADVAIR DISKUS) 250-50 MCG/DOSE AEPB, Inhale 1 puff into the lungs every 12 hours for 14 days  [DISCONTINUED] busPIRone (BUSPAR) 5 MG tablet, Take 5 mg by mouth 2 times daily  [DISCONTINUED] metoprolol tartrate (LOPRESSOR) 50 MG tablet, Take 0.5 tablets by mouth 2 times daily  [DISCONTINUED] fluticasone-salmeterol (ADVAIR) 100-50 MCG/DOSE diskus inhaler, Inhale 1 puff into the lungs every 12 hours  [DISCONTINUED] tiotropium (SPIRIVA) 18 MCG inhalation capsule, Inhale 18 mcg into the lungs daily    Current Hospital Medications:     Scheduled Meds:   aspirin  81 mg Oral Daily    bacitracin zinc   Topical BID    sodium chloride flush  5-40 mL IntraVENous 2 times per day    busPIRone  5 mg Oral BID    furosemide  40 mg Oral Daily    metoprolol tartrate  25 mg Oral BID    spironolactone  25 mg Oral Daily    tiotropium  2 puff Inhalation Daily     Continuous Infusions:   sodium chloride       PRN Meds:.sodium chloride flush, sodium chloride, ondansetron **OR** ondansetron, acetaminophen **OR** acetaminophen, polyethylene glycol, potassium chloride **OR** potassium alternative oral replacement **OR** potassium chloride, nitroGLYCERIN  .  sodium chloride          Allergies:     No Known Allergies     Review of Systems:       Review of Systems   Constitutional: Negative. Negative for diaphoresis and fatigue. HENT: Negative. Eyes: Negative. Respiratory: Negative. Negative for cough, chest tightness, shortness of breath, wheezing and stridor. Cardiovascular: Negative. Negative for chest pain, palpitations and leg swelling. Gastrointestinal: Negative. Negative for blood in stool and nausea. Genitourinary: Negative. Musculoskeletal: Positive for gait problem. Skin: Negative. Neurological: Positive for weakness. Negative for dizziness, syncope and light-headedness. Hematological: Negative. Psychiatric/Behavioral: Negative. Objective Findings:     Vitals:/67   Pulse 68   Temp 97.2 °F (36.2 °C) (Axillary)   Resp 20   Ht 5' 9\" (1.753 m)   Wt 134 lb 14.4 oz (61.2 kg)   SpO2 97%   BMI 19.92 kg/m²      Physical Examination:    Physical Exam   Constitutional: He appears healthy. No distress. HENT:   Normal cephalic and Atraumatic   Eyes: Pupils are equal, round, and reactive to light. Neck: Thyroid normal. No JVD present. No neck adenopathy.  No thyromegaly present. Cardiovascular: Normal rate, regular rhythm, intact distal pulses and normal pulses. Murmur heard. Pulmonary/Chest: Effort normal and breath sounds normal. He has no wheezes. He has no rales. He exhibits no tenderness. Abdominal: Soft. Bowel sounds are normal. There is no abdominal tenderness. Musculoskeletal:         General: No tenderness or edema. Normal range of motion. Cervical back: Normal range of motion and neck supple. Neurological: He is alert and oriented to person, place, and time. Skin: Skin is warm. No cyanosis. Nails show no clubbing.    scalp laceration stable with no active bleed. Results/ Medications reviewed 9/2/2021, 8:25 AM     Laboratory, Microbiology, Pathology, Radiology, Cardiology, Medications and Transcriptions reviewed  Scheduled Meds:   aspirin  81 mg Oral Daily    bacitracin zinc   Topical BID    sodium chloride flush  5-40 mL IntraVENous 2 times per day    busPIRone  5 mg Oral BID    furosemide  40 mg Oral Daily    metoprolol tartrate  25 mg Oral BID    spironolactone  25 mg Oral Daily    tiotropium  2 puff Inhalation Daily     Continuous Infusions:   sodium chloride         Recent Labs     09/01/21  1715 09/02/21  0616   WBC 4.7* 4.1*   HGB 14.5 12.7*   HCT 43.4 37.3*   MCV 99.1 97.8    130     Recent Labs     09/01/21  1715   *   K 3.6   CL 96   CO2 21   BUN 13   CREATININE 0.89     Recent Labs     09/01/21  1715   AST 24   ALT 23   BILITOT 2.3*   ALKPHOS 90     No results for input(s): LIPASE, AMYLASE in the last 72 hours.   Recent Labs     09/01/21  1715   PROT 7.0   INR 1.4     Echocardiogram complete 2D with doppler with color    Result Date: 8/10/2021  Transthoracic Echocardiography Report (TTE)  Demographics   Patient Name    Babette Epley          Gender               Male                  [facility]   Patient Number  [id number]       Race                 Other                                  Ethnicity             or  Visit Number    [id number]      Room Number          W164   Corporate ID                   Date of Study        08/10/2021   Accession       [id number]     Referring Physician  Number   Date of Birth   02/12/1929     Sonographer          Fredy Aida   Age             80 year(s)     Interpreting         [facility])                                 Physician            Cardiology                                                      [address] [place] [address]  Procedure Type of Study   TTE procedure:ECHO COMPLETE 2D W/DOP W/COLOR. Procedure Date Date: 08/10/2021 Start: 10:49 AM Study Location: Portable Technical Quality: Adequate visualization Indications:LVF. Patient Status: Routine Height: 69 inches Weight: 150 pounds BSA: 1.83 m^2 BMI: 22.15 kg/m^2 BP: 108/63 mmHg  Conclusions   Summary  Normal mitral valve structure and function. Moderate (2 to 3+) mitral regurgitation is present. Normal aortic valve structure and function. Mild aortic regurgitation is noted. Normal tricuspid valve structure and function. There is moderate ( 2 +) tricuspid regurgitation with estimated RVSP of 60  mm Hg. Moderately dilated left atrium. Left ventricular ejection fraction is visually estimated at 20%. Restrictive filling pattern. Evidence of large pleural effusion is noted. Signature   ----------------------------------------------------------------  Electronically signed by Ronda Sanchez(Interpreting physician)  on 08/10/2021 04:16 PM  ----------------------------------------------------------------   Findings  Left Ventricle Left ventricular ejection fraction is visually estimated at 20%. Restrictive filling pattern. Right Ventricle Normal right ventricle structure and function. Left Atrium Moderately dilated left atrium. Right Atrium Moderately enlarged right atrium size. Mitral Valve Normal mitral valve structure and function. Moderate (2 to 3+) mitral regurgitation is present.  Tricuspid Valve Normal tricuspid valve structure and function. There is moderate ( 2 +) tricuspid regurgitation with estimated RVSP of 60 mm Hg. Aortic Valve Normal aortic valve structure and function. Mild aortic regurgitation is noted. Pulmonic Valve Normal pulmonic valve structure and function. Pericardial Effusion No evidence of pericardial effusion. Pleural Effusion Evidence of large pleural effusion is noted. Aorta \ Miscellaneous Miscellaneous normal findings were found. M-Mode Measurements (cm)   LVIDd: 6.32 cm                         LVIDs: 5.55 cm  IVSd: 0.9 cm                           IVSs: 1.2 cm  LVPWd: 1.04 cm                         LVPWs: 1.34 cm  Rt. Vent.  Dimension: 2.89 cm           AO Root Dimension: 3.49 cm                                         ACS: 1.92 cm                                         LVOT: 2.14 cm  Doppler Measurements:   AV Velocity:0.01 m/s                    MV Peak E-Wave: 0.68 m/s  AV Peak Gradient: 7.15 mmHg             MV Peak A-Wave: 0.16 m/s  AV Mean Gradient: 3.56 mmHg  AV Area (Continuity):1.39 cm^2  TR Velocity:3.55 m/s                    Estimated RAP:10 mmHg  TR Gradient:50.46 mmHg                  RVSP:60.46 mmHg  Valves  Mitral Valve   Peak E-Wave: 0.68 m/s            Peak A-Wave: 0.16 m/s                                   E/A Ratio: 4.3                                   Peak Gradient: 1.87 mmHg  MR Velocity: 4.36 m/s            Deceleration Time: 178 msec   Aortic Valve   Peak Velocity: 1.34 m/s                Mean Velocity: 0.88 m/s  Peak Gradient: 7.15 mmHg               Mean Gradient: 3.56 mmHg  Area (continuity): 1.39 cm^2  AV VTI: 23.71 cm  AI P1/2t: 541 msec  Cusp Separation: 1.92 cm   Tricuspid Valve   Estimated RVSP: 60.46 mmHg              Estimated RAP: 10 mmHg  TR Velocity: 3.55 m/s                   TR Gradient: 50.46 mmHg   Pulmonic Valve   Peak Velocity: 0.52 m/s           Peak Gradient: 1.09 mmHg                                    Estimated PASP: 60.46 mmHg   LVOT   Peak Velocity: 0.57 m/s Mean Velocity: 0.36 m/s  Peak Gradient: 1.23 mmHg             Mean Gradient: 0.62 mmHg  LVOT Diameter: 2.14 cm               LVOT VTI: 9.19 cm  Structures  Left Atrium   LA Volume/Index: 169.1 ml /92 m^2             LA Area: 36.25 cm^2   Left Ventricle   Diastolic Dimension: 1.75 cm          Systolic Dimension: 9.48 cm  Septum Diastolic: 0.9 cm              Septum Systolic: 1.2 cm  PW Diastolic: 8.84 cm                 PW Systolic: 2.55 cm                                        FS: 12.2 %  LV EDV/LV EDV Index: 202.45 ml/111    LV ESV/LV ESV Index: 150.46 ml/82  m^2                                   m^2  EF Calculated: 25.7 %   LVOT Diameter: 2.14 cm   Right Atrium   RA Systolic Pressure: 10 mmHg   Right Ventricle   Diastolic Dimension: 1.72 cm                                    RV Systolic Pressure: 51.13 mmHg  Aorta/ Miscellaneous Aorta   Aortic Root: 3.49 cm  LVOT Diameter: 2.14 cm      CT Head WO Contrast    Result Date: 9/1/2021  CT HEAD WO CONTRAST, CT CERVICAL SPINE WO CONTRAST: 9/1/2021 CLINICAL HISTORY:  fall with head injury and loc . COMPARISON: Head and cervical spine CTs 12/22/2020. TECHNIQUE: ROUTINE All CT scans at this facility use dose modulation, iterative reconstruction, and/or weight based dosing when appropriate to reduce radiation dose to as low as reasonably achievable. HEAD CT FINDINGS: Scalp soft tissue swelling/laceration is noted posterolaterally on the right. There is no intracranial hemorrhage, mass effect, midline shift, extra-axial collection, hydrocephalus, evidence of a recent ischemic infarct or skull fracture identified. Chronic right frontal, occipital and right cerebellar infarcts, mild generalized cerebral volume loss and moderate white matter changes are again noted. The mastoid air cells and visualized paranasal sinuses are essentially clear. CERVICAL SPINE CT FINDINGS: The spine is visualized from the craniovertebral junction nearly through the T1 level.  There is no acute fracture, dislocation, or acute paraspinous soft tissue abnormalities identified. Degenerative changes with mild anterolisthesis of C7 over T1 and previous C4 and C5 laminectomy are again noted. FINAL IMPRESSION: NO ACUTE INTRACRANIAL PROCESS, FRACTURE, OR EVIDENCE OF CERVICAL SPINE INJURY IDENTIFIED. RIGHT PARIETAL SCALP HEMATOMA/LACERATION. CHRONIC, INVOLUTIONAL, DEGENERATIVE AND POSTOPERATIVE CHANGES, AS NOTED. CT CERVICAL SPINE WO CONTRAST    Result Date: 9/1/2021  CT HEAD WO CONTRAST, CT CERVICAL SPINE WO CONTRAST: 9/1/2021 CLINICAL HISTORY:  fall with head injury and loc . COMPARISON: Head and cervical spine CTs 12/22/2020. TECHNIQUE: ROUTINE All CT scans at this facility use dose modulation, iterative reconstruction, and/or weight based dosing when appropriate to reduce radiation dose to as low as reasonably achievable. HEAD CT FINDINGS: Scalp soft tissue swelling/laceration is noted posterolaterally on the right. There is no intracranial hemorrhage, mass effect, midline shift, extra-axial collection, hydrocephalus, evidence of a recent ischemic infarct or skull fracture identified. Chronic right frontal, occipital and right cerebellar infarcts, mild generalized cerebral volume loss and moderate white matter changes are again noted. The mastoid air cells and visualized paranasal sinuses are essentially clear. CERVICAL SPINE CT FINDINGS: The spine is visualized from the craniovertebral junction nearly through the T1 level. There is no acute fracture, dislocation, or acute paraspinous soft tissue abnormalities identified. Degenerative changes with mild anterolisthesis of C7 over T1 and previous C4 and C5 laminectomy are again noted. FINAL IMPRESSION: NO ACUTE INTRACRANIAL PROCESS, FRACTURE, OR EVIDENCE OF CERVICAL SPINE INJURY IDENTIFIED. RIGHT PARIETAL SCALP HEMATOMA/LACERATION. CHRONIC, INVOLUTIONAL, DEGENERATIVE AND POSTOPERATIVE CHANGES, AS NOTED.      XR CHEST PORTABLE    Result Date: 9/1/2021  EXAMINATION: XR CHEST PORTABLE CLINICAL HISTORY: FALL. HIT HEAD ON NO. LOSS OF CONSCIOUSNESS. COMPARISONS: AUGUST 9, 2021 FINDINGS: Pacemaker generator and wires are unchanged. Osseous structures intact. Cardiopericardial silhouette normal. Lungs clear. NO ACUTE CARDIOPULMONARY DISEASE    XR CHEST PORTABLE    Result Date: 8/9/2021  EXAMINATION: XR CHEST PORTABLE CLINICAL HISTORY: SHORTNESS OF BREATH COMPARISONS: JULY 22, 2021 FINDINGS: Pacemaker generator and wires unchanged. Bilateral degenerative change glenohumeral joints and acromioclavicular joints with bilateral narrowing of the interval between acromion and humeral heads. Cardiopericardial silhouette enlarged, unchanged. Blunting left costophrenic angle. Lungs otherwise clear. STABLE CARDIOMEGALY. SMALL LEFT EFFUSION. DEGENERATIVE CHANGE BILATERAL SHOULDERS. Active Hospital Problems    Diagnosis Date Noted    Syncope and collapse [R55] 09/01/2021     Priority: Low         Impression/Plan:   1. Fall with scalp laceration  2. Debilitated. 3. Chronic AF - will permanently stop Eliquis and risk benefit profile suggest with holding Rx to be safer in this case. Continue rate control. Add low dose ASA  4. Demand ischemia- no angina reported. ASA BB   5. 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 continue Meds. Add ACEI. 6. Chronic Combined HF- He is volume controlled and not in CHF at this time. conitnue CV protective meds. 7. discussed with son   8. May need to consider palliative/Hospice care. Thank you for allowing us to participate in the care of this patient. Will continue to follow. Please call if questions or concerns arise.     Electronically signed by Vishal Allen MD on 9/2/2021 at 8:25 AM

## 2021-09-02 NOTE — FLOWSHEET NOTE
Pt to floor via transport, transferred over to bed via slide sheet. Pt unable to use  for admission so Demar Joness, pt's grand daughter contacted to complete medication list and admission. Dr. Sukumar Pastrana updated that pt's medication list completed. Pt is not taking metoprolol and Buspar at this time and all home medications have been taken. Pt's head laceration sutured and free of active bleed.  also notified that pt's BP was  86/59 and 96/55. Pt's bed alarm active and call light within reach . 2230 - Pt bathed, hair washed, gown changed, diaper changed (incontinent of urine), linen changed. Pt's phone found in bed with pt, pt has two pairs of pants, a shirt, a spoon, a ring and his phone with him. 2563 - Pt incontinent of urine, pt had complete linen change, diaper changed, juan care given. Pt's call light within reach and bed alarm active. Pt confused and anxious during most of night. Pt's suture site with scant amount of bloody drainage during night.

## 2021-10-25 ENCOUNTER — HOSPITAL ENCOUNTER (EMERGENCY)
Age: 86
Discharge: HOME | End: 2021-10-26
Attending: EMERGENCY MEDICINE
Payer: MEDICARE

## 2021-10-25 ENCOUNTER — APPOINTMENT (OUTPATIENT)
Dept: GENERAL RADIOLOGY | Age: 86
End: 2021-10-25
Payer: MEDICARE

## 2021-10-25 VITALS
BODY MASS INDEX: 19.79 KG/M2 | TEMPERATURE: 98.4 F | HEART RATE: 94 BPM | SYSTOLIC BLOOD PRESSURE: 112 MMHG | OXYGEN SATURATION: 99 % | DIASTOLIC BLOOD PRESSURE: 59 MMHG | RESPIRATION RATE: 16 BRPM | WEIGHT: 134 LBS

## 2021-10-25 DIAGNOSIS — L89.620 PRESSURE SORE ON HEEL, LEFT, UNSTAGEABLE (HCC): Primary | ICD-10-CM

## 2021-10-25 PROCEDURE — 99283 EMERGENCY DEPT VISIT LOW MDM: CPT

## 2021-10-25 PROCEDURE — 73630 X-RAY EXAM OF FOOT: CPT

## 2021-10-25 RX ORDER — SULFAMETHOXAZOLE AND TRIMETHOPRIM 800; 160 MG/1; MG/1
1 TABLET ORAL 2 TIMES DAILY
Qty: 14 TABLET | Refills: 0 | Status: SHIPPED | OUTPATIENT
Start: 2021-10-25 | End: 2021-11-01

## 2021-10-25 ASSESSMENT — PAIN DESCRIPTION - LOCATION: LOCATION: FOOT

## 2021-10-25 ASSESSMENT — PAIN SCALES - GENERAL
PAINLEVEL_OUTOF10: 0
PAINLEVEL_OUTOF10: 10

## 2021-10-25 ASSESSMENT — PAIN DESCRIPTION - PAIN TYPE: TYPE: ACUTE PAIN;CHRONIC PAIN

## 2021-10-25 ASSESSMENT — PAIN DESCRIPTION - ORIENTATION: ORIENTATION: LEFT

## 2021-10-26 NOTE — ED PROVIDER NOTES
[address] [facility] ED  EMERGENCY MEDICINE     Pt Name: Brittani Barcenas  MRN: [id number]  Bradygfmike 2/12/1929  Date of evaluation: 10/25/2021  PCP:    Sonia Juárez  Provider: Oscar Conde, [address] [place] [address]       Chief Complaint   Patient presents with    Foot Pain     left heel wound, pain radiating to hip       HISTORY OF PRESENT ILLNESS    HPI     35-year-old male with history of chronic A. fib, CHF, presents to the emergency department with complaint of left heel pain. Daughter who the patient lives with states that he has been having increased left heel pain. Patient is mainly laying on his back. He went to [facility] a few weeks ago and came back second was not moving as much as he used to. Daughter states that she tries to move around but when he was in [place] he was admitted to the hospital for quite a long time and was never moved. He was laying on his back with pressure ulcer forming on his sacral area. Since the patient's been home, daughter states that she has been trying to move him more. He does not walk and denies any trauma. No fevers or chills. Triage notes and Nursing notes were reviewed by myself. Any discrepancies are addressed above.     PAST MEDICAL HISTORY     Past Medical History:   Diagnosis Date    Anxiety     [facility]     Cardiomyopathy ([facility] 9/18/2020    CHF (congestive heart failure) (HCC)     Diabetes mellitus ([facility]     Hypertension     Sleep apnea        SURGICAL HISTORY       Past Surgical History:   Procedure Laterality Date    BACK SURGERY      CARDIAC PACEMAKER PLACEMENT  7537    HERNIA REPAIR      THORACENTESIS Left 06/24/2021    450ml removed by Dr Dorothy Kirkland [phone]       CURRENT MEDICATIONS       Discharge Medication List as of 10/25/2021 11:33 PM      CONTINUE these medications which have NOT CHANGED    Details   lisinopril (PRINIVIL;ZESTRIL) 5 MG tablet Take 1 tablet by mouth daily, Disp-30 tablet, R-3Normal metoprolol tartrate (LOPRESSOR) 50 MG tablet Take 0.5 tablets by mouth 2 times daily, Disp-60 tablet, R-5Historical Med      spironolactone (ALDACTONE) 25 MG tablet Take 1 tablet by mouth daily, Disp-30 tablet, R-3Normal      furosemide (LASIX) 40 MG tablet Take 1 tablet by mouth daily, Disp-30 tablet, R-6Normal      aspirin 81 MG chewable tablet Take 1 tablet by mouth daily, Disp-30 tablet, R-3Normal      tiotropium (SPIRIVA RESPIMAT) 2.5 MCG/ACT AERS inhaler Inhale 2 puffs into the lungs dailyHistorical Med      fluticasone-salmeterol (ADVAIR DISKUS) 250-50 MCG/DOSE AEPB Inhale 1 puff into the lungs every 12 hours for 14 days, Disp-1 Inhaler, R-0Normal             ALLERGIES     No Known Allergies    FAMILY HISTORY     No family history on file. SOCIAL HISTORY       Social History     Socioeconomic History    Marital status:      Spouse name: Not on file    Number of children: Not on file    Years of education: Not on file    Highest education level: Not on file   Occupational History    Not on file   Tobacco Use    Smoking status: Former Smoker    Smokeless tobacco: Former User    Tobacco comment: quit 30 years ago   Vaping Use    Vaping Use: Never used   Substance and Sexual Activity    Alcohol use: Not Currently    Drug use: Not Currently    Sexual activity: Not on file   Other Topics Concern    Not on file   Social History Narrative    ** Merged History Encounter **          Social Determinants of Health     Financial Resource Strain:     Difficulty of Paying Living Expenses:    Food Insecurity:     Worried About Running Out of Food in the Last Year:     [address] in the Last Year:    Transportation Needs:     Lack of Transportation (Medical):      Lack of Transportation (Non-Medical):    Physical Activity:     Days of Exercise per Week:     Minutes of Exercise per Session:    Stress:     Feeling of Stress :    Social Connections:     Frequency of Communication with Friends and Family:     Frequency of Social Gatherings with Friends and Family:     Attends [employer] Services:     Active Member of Clubs or Organizations:     Attends Club or Organization Meetings:     Marital Status:    Intimate Partner Violence:     Fear of Current or Ex-Partner:     Emotionally Abused:     Physically Abused:     Sexually Abused:        REVIEW OF SYSTEMS     Review of Systems   Musculoskeletal: Positive for arthralgias and gait problem. Except as noted above the remainder of the review of systems was reviewed and is negative. SCREENINGS                        PHYSICAL EXAM    (up to 7 for level 4, 8 or more for level 5)     ED Triage Vitals [10/25/21 2217]   BP Temp Temp Source Pulse Resp SpO2 Height Weight   (!) 100/53 98.4 °F (36.9 °C) Oral 98 20 100 % -- 134 lb (60.8 kg)       Physical Exam  Constitutional:       Appearance: Normal appearance. He is normal weight. He is not ill-appearing or toxic-appearing. HENT:      Head: Normocephalic and atraumatic. Nose: Nose normal. No congestion or rhinorrhea. Mouth/Throat:      Mouth: Mucous membranes are moist.   Eyes:      General:         Right eye: No discharge. Left eye: No discharge. Conjunctiva/sclera: Conjunctivae normal.      Pupils: Pupils are equal, round, and reactive to light. Cardiovascular:      Rate and Rhythm: Normal rate. Heart sounds: No murmur heard. Pulmonary:      Effort: Pulmonary effort is normal. No respiratory distress. Breath sounds: Normal breath sounds. No wheezing. Chest:      Chest wall: No tenderness. Abdominal:      General: Abdomen is flat. There is no distension. Palpations: Abdomen is soft. Tenderness: There is no abdominal tenderness. Musculoskeletal:         General: No swelling. Normal range of motion. Cervical back: Normal range of motion and neck supple. Feet:    Skin:     General: Skin is warm and dry.       Capillary Refill: Capillary refill takes less than 2 seconds. Neurological:      General: No focal deficit present. Mental Status: He is alert and oriented to person, place, and time. Psychiatric:         Mood and Affect: Mood normal.         Behavior: Behavior normal.         Thought Content: Thought content normal.         Judgment: Judgment normal.           DIAGNOSTIC RESULTS     EKG:(none if blank)  All EKGs are interpreted by the Emergency Department Physician who either signs or Co-signs this chart in the absence of a cardiologist.        RADIOLOGY: (none if blank)   I directly visualized the following images and reviewed the radiologist interpretations. Interpretation per the Radiologist below, if available at the time of this note:  XR FOOT LEFT (MIN 3 VIEWS)    (Results Pending)       LABS:  Labs Reviewed - No data to display    All other labs were within normal range or not returned as of this dictation. Please note, any cultures that may have been sent were not resulted at the time of this patient visit. EMERGENCY DEPARTMENT COURSE and Medical Decision Making:     Vitals:    Vitals:    10/25/21 2217 10/25/21 2300   BP: (!) 100/53 (!) 112/59   Pulse: 98 94   Resp: 20 16   Temp: 98.4 °F (36.9 °C)    TempSrc: Oral    SpO2: 100% 99%   Weight: 134 lb (60.8 kg)        PROCEDURES: (None if blank)  Procedures       MDM     I did incise and drain the what seemed to be heel hematoma on this left area. It was tight and tense. 1 in size, there was like clear brown fluid that did come out. Patient felt better afterwards. It could be from pressure as the patient does not move around and mainly stays on his back with his heels on the bed. Patient does not walk. I did refer to podiatry for further care and treatment. Sent him home with Bactrim to prevent risk of infection.     Strict return precautions and follow up instructions were discussed with the patient with which the patient agrees    ED Medications administered this visit:  Medications - No data to display      FINAL IMPRESSION      1.  Pressure sore on heel, left, unstageable [facility])          DISPOSITION/PLAN   DISPOSITION Decision To Discharge 10/25/2021 11:30:40 PM      PATIENT REFERRED TO:  Heather Wells DPM  [address] [place] [address] [address]  [phone]            DISCHARGE MEDICATIONS:  Discharge Medication List as of 10/25/2021 11:33 PM      START taking these medications    Details   sulfamethoxazole-trimethoprim (BACTRIM DS;SEPTRA DS) 800-160 MG per tablet Take 1 tablet by mouth 2 times daily for 7 days, Disp-14 tablet, R-0Print                    Wilma Villalba DO (electronically signed)  Attending Physician, Emergency Department         Wilma Villalba DO  10/26/21 4174

## 2021-10-26 NOTE — ED NOTES
Pt provided with discharge instructions, f/u care instructions, and RX x1. Medication education completed. Pt and family verbalize understanding. Deny questions. Pt off unit via [facility] with family in stable condition.      Misael Shi RN  10/25/21 3005

## 2021-10-28 ENCOUNTER — HOSPITAL ENCOUNTER (OUTPATIENT)
Dept: WOUND CARE | Age: 86
Discharge: HOME | End: 2021-10-28
Payer: MEDICARE

## 2021-10-28 VITALS
RESPIRATION RATE: 14 BRPM | HEART RATE: 73 BPM | SYSTOLIC BLOOD PRESSURE: 85 MMHG | DIASTOLIC BLOOD PRESSURE: 57 MMHG | TEMPERATURE: 96.5 F

## 2021-10-28 DIAGNOSIS — L89.626 PRESSURE INJURY OF DEEP TISSUE OF LEFT HEEL: ICD-10-CM

## 2021-10-28 PROCEDURE — 99214 OFFICE O/P EST MOD 30 MIN: CPT

## 2021-10-28 RX ORDER — BETAMETHASONE DIPROPIONATE 0.05 %
OINTMENT (GRAM) TOPICAL ONCE
OUTPATIENT
Start: 2021-10-28 | End: 2021-10-28

## 2021-10-28 RX ORDER — LIDOCAINE HYDROCHLORIDE 20 MG/ML
JELLY TOPICAL ONCE
OUTPATIENT
Start: 2021-10-28 | End: 2021-10-28

## 2021-10-28 RX ORDER — GENTAMICIN SULFATE 1 MG/G
OINTMENT TOPICAL ONCE
OUTPATIENT
Start: 2021-10-28 | End: 2021-10-28

## 2021-10-28 RX ORDER — MAG HYDROX/ALUMINUM HYD/SIMETH 400-400-40
1 SUSPENSION, ORAL (FINAL DOSE FORM) ORAL DAILY
Qty: 210 ML | Refills: 2 | Status: SHIPPED | OUTPATIENT
Start: 2021-10-28

## 2021-10-28 RX ORDER — BACITRACIN, NEOMYCIN, POLYMYXIN B 400; 3.5; 5 [USP'U]/G; MG/G; [USP'U]/G
OINTMENT TOPICAL ONCE
OUTPATIENT
Start: 2021-10-28 | End: 2021-10-28

## 2021-10-28 RX ORDER — LIDOCAINE 40 MG/G
CREAM TOPICAL ONCE
OUTPATIENT
Start: 2021-10-28 | End: 2021-10-28

## 2021-10-28 RX ORDER — CLOBETASOL PROPIONATE 0.5 MG/G
OINTMENT TOPICAL ONCE
OUTPATIENT
Start: 2021-10-28 | End: 2021-10-28

## 2021-10-28 RX ORDER — BACITRACIN ZINC AND POLYMYXIN B SULFATE 500; 1000 [USP'U]/G; [USP'U]/G
OINTMENT TOPICAL ONCE
OUTPATIENT
Start: 2021-10-28 | End: 2021-10-28

## 2021-10-28 RX ORDER — LIDOCAINE 50 MG/G
OINTMENT TOPICAL ONCE
OUTPATIENT
Start: 2021-10-28 | End: 2021-10-28

## 2021-10-28 RX ORDER — GINSENG 100 MG
CAPSULE ORAL ONCE
OUTPATIENT
Start: 2021-10-28 | End: 2021-10-28

## 2021-10-28 RX ORDER — LIDOCAINE HYDROCHLORIDE 40 MG/ML
SOLUTION TOPICAL ONCE
OUTPATIENT
Start: 2021-10-28 | End: 2021-10-28

## 2021-10-28 ASSESSMENT — PAIN DESCRIPTION - PAIN TYPE: TYPE: ACUTE PAIN

## 2021-10-28 ASSESSMENT — PAIN DESCRIPTION - LOCATION: LOCATION: FOOT

## 2021-10-28 ASSESSMENT — PAIN SCALES - GENERAL: PAINLEVEL_OUTOF10: 9

## 2021-10-28 ASSESSMENT — PAIN DESCRIPTION - ORIENTATION: ORIENTATION: LEFT;RIGHT

## 2021-10-28 NOTE — PROGRESS NOTES
[address]   Progress Note and Procedure Note      [facility] RECORD NUMBER:  [id number]  AGE: 80 y.o. GENDER: male  : 1929  EPISODE DATE:  10/28/2021    Subjective:     Chief Complaint   Patient presents with    Wound Check     left and right feet          HISTORY of PRESENT ILLNESS HPI     Vandana Contreras is a 80 y.o. male who presents today for wound/ulcer evaluation. History of Wound Context: This is a 80year-old pleasant [place]-speaking gentleman who presents today for evaluation of left lower extremity heel ulceration for the last several weeks. Patient reports that he was in [place] approximately a month ago and hospitalized there for unknown reasons. Since discharge has noticed a semipainful pressure injury to the left heel. His daughter had taken him to the emergency room last week at [facility] where he had x-rays which were negative for any acute findings. There was a bullous lesion that was drained in the emergency room but no other significant labs or ordered. Does not appear infected. He does live at home and is relatively nonambulatory not self-sufficient. He has his family taking care of him currently. He has just been applying a wrap to the heel daily soft further evaluation after the emergency room. Patient denies any vomiting nausea fever or chills.   Wound/Ulcer Pain Timing/Severity: constant  Quality of pain: burning  Severity:  2 / 10   Modifying Factors: Pain worsens with walking  Associated Signs/Symptoms: edema    Ulcer Identification:  Ulcer Type: pressure  Contributing Factors: chronic pressure    Wound: N/A        PAST MEDICAL HISTORY        Diagnosis Date    Anxiety     Atrial fibrillation (HCC)     Cardiomyopathy ([facility] 2020    CHF (congestive heart failure) ([facility]     Diabetes mellitus ([facility]     Hypertension     Sleep apnea        PAST SURGICAL HISTORY    Past Surgical History:   Procedure Laterality Date    BACK SURGERY      CARDIAC PACEMAKER PLACEMENT  2020    HERNIA REPAIR      THORACENTESIS Left 06/24/2021    450ml removed by Dr Kerry Nichole [phone]       FAMILY HISTORY    History reviewed. No pertinent family history. SOCIAL HISTORY    Social History     Tobacco Use    Smoking status: Former Smoker    Smokeless tobacco: Former User    Tobacco comment: quit 30 years ago   Vaping Use    Vaping Use: Never used   Substance Use Topics    Alcohol use: Not Currently    Drug use: Not Currently       ALLERGIES    No Known Allergies    MEDICATIONS    Current Outpatient Medications on File Prior to Encounter   Medication Sig Dispense Refill    sulfamethoxazole-trimethoprim (BACTRIM DS;SEPTRA DS) 800-160 MG per tablet Take 1 tablet by mouth 2 times daily for 7 days 14 tablet 0    lisinopril (PRINIVIL;ZESTRIL) 5 MG tablet Take 1 tablet by mouth daily 30 tablet 3    metoprolol tartrate (LOPRESSOR) 50 MG tablet Take 0.5 tablets by mouth 2 times daily 60 tablet 5    spironolactone (ALDACTONE) 25 MG tablet Take 1 tablet by mouth daily 30 tablet 3    furosemide (LASIX) 40 MG tablet Take 1 tablet by mouth daily 30 tablet 6    aspirin 81 MG chewable tablet Take 1 tablet by mouth daily 30 tablet 3    tiotropium (SPIRIVA RESPIMAT) 2.5 MCG/ACT AERS inhaler Inhale 2 puffs into the lungs daily      fluticasone-salmeterol (ADVAIR DISKUS) 250-50 MCG/DOSE AEPB Inhale 1 puff into the lungs every 12 hours for 14 days 1 Inhaler 0     No current facility-administered medications on file prior to encounter. REVIEW OF SYSTEMS    Pertinent items are noted in HPI. Objective:      BP (!) 85/57   Pulse 73   Temp 96.5 °F (35.8 °C) (Temporal)   Resp 14     Wt Readings from Last 3 Encounters:   10/25/21 134 lb (60.8 kg)   09/02/21 134 lb 14.4 oz (61.2 kg)   08/26/21 136 lb (61.7 kg)       PHYSICAL EXAM    Constitutional:   Well nourished and well developed. Appears neat and clean.   Patient is alert, oriented x3, and in no apparent distress. Respiratory:  Respiratory effort is easy and symmetric bilaterally. Rate is normal at rest and on room air. Vascular:  Pedal Pulses is not palpable and audible signal noted with doppler. Capillary refill is <5 sec to digits bilateral.  Extremities +1 pitting edema. Neurological:  Gross and Light touch intact. Protective sensation diminished    Dermatological:  Wound description noted in wound assessment. There is a very large deep tissue injury to the left posterior and plantar heel unstageable nature. There is no undermining, tracking, tunneling. No fluctuance. There was a bolus covering which was removed and drained. Minimal pain to palpation. No other openings. Psychiatric:  Judgement and insight intact. Short and long term memory intact. No evidence of depression, anxiety, or agitation. Patient is calm, cooperative, and communicative. Appropriate interactions and affect. Assessment:      Active Hospital Problems    Diagnosis Date Noted    Pressure injury of deep tissue of left heel [L89.626] 10/28/2021        Procedure Note  Indications:  Based on my examination of this patient's wound(s)/ulcer(s) today, debridement is not required to promote healing and evaluate the wound base. Wound 10/28/21 Heel Left #1  (Active)   Wound Image   10/28/21 1536   Wound Etiology Pressure Unstageable 10/28/21 1536   Wound Cleansed Cleansed with saline 10/28/21 1536   Dressing/Treatment Dry dressing; Other (comment) 10/28/21 1613   Wound Length (cm) 6.4 cm 10/28/21 1536   Wound Width (cm) 6.5 cm 10/28/21 1536   Wound Depth (cm) 0.1 cm 10/28/21 1536   Wound Surface Area (cm^2) 41.6 cm^2 10/28/21 1536   Wound Volume (cm^3) 4.16 cm^3 10/28/21 1536   Post-Procedure Length (cm) 6.4 cm 10/28/21 1538   Post-Procedure Width (cm) 6.5 cm 10/28/21 1538   Post-Procedure Depth (cm) 0.1 cm 10/28/21 1538   Post-Procedure Surface Area (cm^2) 41.6 cm^2 10/28/21 1538   Post-Procedure Volume (cm^3) 4.16 cm^3 10/28/21 1538   Wound Assessment Blood filled blister;Fluid filled blister; Purple/maroon;Pink/red 10/28/21 1536   Drainage Amount Small 10/28/21 1538   Drainage Description Serosanguinous 10/28/21 1538   Odor None 10/28/21 1538   Radha-wound Assessment Intact; Other (Comment) 10/28/21 1536   Margins Defined edges 10/28/21 1536   Wound Thickness Description not for Pressure Injury Full thickness 10/28/21 1538   Number of days: 0                  Plan:     Patient was examined evaluated today. The visit was done through . Extensive time was spent with the patient communicating care. Discussed that he has a deep tissue injury which are very difficult to heal.  This treatment will require daily dressing changes as well as strict offloading at all times. While he is in bed he is to apply pillows to the posterior heel and is there not to touch the bed at any time. He is to avoid wearing closed toed shoes and ambulating in sandals or house slipper with open backs to them. Discussed Prevalon boots in the future. Referrals placed for home health for dressing changes including Betadine wet-to-dry and ABDs changed 3 times weekly  Patient will follow up in 2 weeks. Treatment Note please see attached Discharge Instructions    Written patient dismissal instructions given to patient and signed by patient or POA. Discharge [address] and [facility] Medicine   Physician Orders and Discharge [address]  Telephone: [phone]      NAME:  Francesca Colvin  YOB: 1929  MEDICAL RECORD NUMBER:  [id number]    Home Care/Facility:  [facility]  (new referral)  Order dressings as needed    Wound Location:  Left Heel    Dressing orders: Cleanse with saline and dry. Betadine wet to moist gauze. Cover with ABD, kerlix.   Change every 2 days.    Compression:    Offloading Device:  Place pillows under his leg to keep heel off bed;  Continue with slide shoes. Other Instructions:     Keep all dressings clean, dry and intact. Keep pressure off the wound(s) at all times. Follow up visit   2 Weeks  November 11, 2021 @  3:00    Please give 24 hour notice if unable to keep appointment. [phone]    If you experience any of the following, please call the Wound Care Service at  [phone] or go to the nearest emergency room. *Increase in pain *Temperature over 101 *Increase in drainage from your wound or a foul odor  *Uncontrolled swelling *Need for compression bandage changes due to slippage, breakthrough drainage       PLEASE NOTE: IF YOU ARE UNABLE TO OBTAIN WOUND SUPPLIES, CONTINUE TO USE THE SUPPLIES YOU HAVE AVAILABLE UNTIL YOU ARE ABLE TO REACH US.  IT IS MOST IMPORTANT TO KEEP THE WOUND COVERED AT ALL TIMES    Electronically signed by Jenna Swann DPM on 10/28/2021 at 4:29 PM                    Electronically signed by Jenna Swann DPM on 10/28/2021 at 4:30 PM

## 2021-10-28 NOTE — CODE DOCUMENTATION
[address] [facility] Physician Billing Sheet. [place]  AGE: 80 y.o.    GENDER: male  : 1929  TODAY'S DATE:  10/28/2021    ICD-10  [facility] Problems    Diagnosis Date Noted    Pressure injury of deep tissue of left heel [L89.626] 10/28/2021       PHYSICIAN PROCEDURES    CPT CODE  17701      Electronically signed by María Oseguera DPM on 10/28/2021 at 4:36 PM

## 2021-10-28 NOTE — DISCHARGE INSTR - COC
Continuity of Care Form    Patient Name: Mariah Garcia   :  1929  MRN:  [id number]    Admit date:  10/28/2021  Discharge date:  ***    Code Status Order: Prior   Advance Directives:     Admitting Physician:  No admitting provider for patient encounter. PCP: Viral Harris    Discharging Nurse: [facility] Unit/Room#: No information available for this encounter. Discharging Unit Phone Number: ***    Emergency Contact:   Extended Emergency Contact Information  Primary Emergency Contact: [facility] Phone: [phone]  Relation: Child  Secondary Emergency Contact: Wade Harris [address] Phone: [phone]  Mobile Phone: [phone]  Relation: Grandchild    Past Surgical History:  Past Surgical History:   Procedure Laterality Date    BACK SURGERY      CARDIAC PACEMAKER PLACEMENT      HERNIA REPAIR      THORACENTESIS Left 2021    450ml removed by Dr Sukumar Thompson [phone]       Immunization History: There is no immunization history on file for this patient.     Active Problems:  Patient Active Problem List   Diagnosis Code    Chronic atrial fibrillation ([facility]) I48.20    SSS (sick sinus syndrome) ([facility]) I49.5    Cardiomyopathy ([facility]) I42.9    SOB (shortness of breath) R06.02    Pleural effusion on right J90    Atelectasis of right lung J98.11    CHF (congestive heart failure), NYHA class I, acute on chronic, combined ([facility]) I50.43    Acute on chronic combined systolic and diastolic CHF (congestive heart failure) ([facility]) I50.43    Acute on chronic diastolic CHF (congestive heart failure) ([facility]) I50.33    Pacemaker Z95.0    Goals of care, counseling/discussion Z71.89    Syncope and collapse R55       Isolation/Infection:   Isolation          No Isolation        Patient Infection Status     Infection Onset Added Last Indicated Last Indicated By Review Planned Expiration Resolved Resolved By    None active    Resolved    COVID-19 Rule Out 21 COVID-19, Rapid (Ordered)   21 Rule-Out Test Resulted    COVID-19 21 COVID-19   21     COVID-19 Rule Out 21 COVID-19 (Ordered)   21 Rule-Out Test Resulted    COVID-19 Rule Out 20 COVID-19 (Ordered)   20 Rule-Out Test Resulted    Waiting on second test    COVID-19 Rule Out 20 COVID-19 (Ordered)   20 Rule-Out Test Resulted    COVID-19 Rule Out 20 COVID-19 (Ordered)   20 Rule-Out Test Resulted          Nurse Assessment:  Last Vital Signs: BP (!) 85/57   Pulse 73   Temp 96.5 °F (35.8 °C) (Temporal)   Resp 14     Last documented pain score (0-10 scale): Pain Level: 9  Last Weight:   Wt Readings from Last 1 Encounters:   10/25/21 134 lb (60.8 kg)     Mental Status:  {IP PT MENTAL STATUS:25678}    IV Access:  { PRAKASH IV ACCESS:763882879}    Nursing Mobility/ADLs:  Walking   {CHP DME QTPO:946743230}  Transfer  {CHP DME YMCR:917296085}  Bathing  {CHP DME CBIQ:930406641}  Dressing  {CHP DME ZVXC:056088066}  Toileting  {CHP DME FLVO:738537943}  Feeding  {CHP DME MMGR:486800245}  Med Admin  {CHP DME MOJT:851459430}  Med Delivery   { PRAKASH MED Delivery:705295164}    Wound Care Documentation and Therapy:  Wound 10/28/21 Heel Left #1  (Active)   Wound Image   10/28/21 1536   Wound Etiology Pressure Unstageable 10/28/21 1536   Wound Cleansed Cleansed with saline 10/28/21 1536   Wound Length (cm) 6.4 cm 10/28/21 1536   Wound Width (cm) 6.5 cm 10/28/21 1536   Wound Depth (cm) 0.1 cm 10/28/21 1536   Wound Surface Area (cm^2) 41.6 cm^2 10/28/21 1536   Wound Volume (cm^3) 4.16 cm^3 10/28/21 1536   Post-Procedure Length (cm) 6.4 cm 10/28/21 1538   Post-Procedure Width (cm) 6.5 cm 10/28/21 1538   Post-Procedure Depth (cm) 0.1 cm 10/28/21 1538   Post-Procedure Surface Area (cm^2) 41.6 cm^2 10/28/21 1538   Post-Procedure Volume (cm^3) 4.16 cm^3 10/28/21 1538   Wound Assessment Blood filled blister;Fluid filled blister; Purple/maroon;Pink/red 10/28/21 1536   Drainage Amount Small 10/28/21 1538   Drainage Description Serosanguinous 10/28/21 1538   Odor None 10/28/21 1538   Radha-wound Assessment Intact; Other (Comment) 10/28/21 1536   Margins Defined edges 10/28/21 1536   Wound Thickness Description not for Pressure Injury Full thickness 10/28/21 1538   Number of days: 0        Elimination:  Continence:   · Bowel: {YES / A}  · Bladder: {YES / AL:45029}  Urinary Catheter: {Urinary Catheter:586342760}   Colostomy/Ileostomy/Ileal Conduit: {YES / ME:81336}       Date of Last BM: ***  No intake or output data in the 24 hours ending 10/28/21 1609  No intake/output data recorded.     Safety Concerns:     [employer] Safety Concerns:101045735}    Impairments/Disabilities:      [employer] Impairments/Disabilities:913267887}    Nutrition Therapy:  Current Nutrition Therapy:   [employer] Diet List:534194911}    Routes of Feeding: {CHP DME Other Feedings:516146662}  Liquids: {Slp liquid thickness:26454}  Daily Fluid Restriction: {CHP DME Yes amt example:857106884}  Last Modified Barium Swallow with Video (Video Swallowing Test): {Done Not Done EUWT:546512020}    Treatments at the Time of Hospital Discharge:   Respiratory Treatments: ***  Oxygen Therapy:  {Therapy; copd oxygen:31445}  Ventilator:    { CC Vent RDVF:953715895}    Rehab Therapies: {THERAPEUTIC INTERVENTION:1670759660}  Weight Bearing Status/Restrictions: [employer] Weight Bearin}  Other Medical Equipment (for information only, NOT a DME order):  {EQUIPMENT:611461897}  Other Treatments: ***    Patient's personal belongings (please select all that are sent with patient):  {CHP DME Belongings:004189685}    RN SIGNATURE:  {Esignature:020673938}    CASE MANAGEMENT/SOCIAL WORK SECTION    Inpatient Status Date: ***    Readmission Risk Assessment Score:  Readmission Risk              Risk of Unplanned Readmission:  0           Discharging to Facility/ Agency   · Name:   · Address:  · Phone:  · Fax:    Dialysis Facility (if applicable)   · Name:  · Address:  · Dialysis Schedule:  · Phone:  · Fax:    / signature: {Esignature:569360162}    PHYSICIAN SECTION    Prognosis: {Prognosis:2974057344}    Condition at Discharge: Tahir Finnegan Patient Condition:655696976}    Rehab Potential (if transferring to Rehab): {Prognosis:3835974188}    Recommended Labs or Other Treatments After Discharge: ***    Physician Certification: I certify the above information and transfer of Bismark Espinoza  is necessary for the continuing treatment of the diagnosis listed and that he requires {Admit to Appropriate Level of Care:80369} for {GREATER/LESS:194925758} 30 days.      Update Admission H&P: {CHP DME Changes in RPBNU:063757468}    PHYSICIAN SIGNATURE:  {Esignature:212874940}

## 2021-11-01 ENCOUNTER — HOSPITAL ENCOUNTER (OUTPATIENT)
Dept: CARDIOLOGY | Age: 86
Discharge: HOME | End: 2021-11-01
Payer: MEDICARE

## 2021-11-01 PROCEDURE — 93296 REM INTERROG EVL PM/IDS: CPT

## 2021-11-29 RX ORDER — FUROSEMIDE 40 MG/1
40 TABLET ORAL DAILY
Qty: 30 TABLET | Refills: 6 | Status: SHIPPED | OUTPATIENT
Start: 2021-11-29 | End: 2022-11-29

## 2021-11-29 RX ORDER — LISINOPRIL 5 MG/1
5 TABLET ORAL DAILY
Qty: 30 TABLET | Refills: 3 | Status: SHIPPED | OUTPATIENT
Start: 2021-11-29

## 2021-11-29 RX ORDER — SPIRONOLACTONE 25 MG/1
25 TABLET ORAL DAILY
Qty: 30 TABLET | Refills: 3 | Status: SHIPPED | OUTPATIENT
Start: 2021-11-29

## 2021-11-29 NOTE — TELEPHONE ENCOUNTER
Patient requesting medication refill.  Please approve or deny this request.    Rx requested:  Requested Prescriptions      No prescriptions requested or ordered in this encounter         Last Office Visit:   8/26/2021      Next Visit Date:  Future Appointments   Date Time Provider Maco Miller   12/2/2021  3:15 PM Capo James, [address] [place] [address]   [facility]20[facility] 12[facility]

## 2021-12-02 ENCOUNTER — HOSPITAL ENCOUNTER (INPATIENT)
Age: 86
LOS: 6 days | Discharge: HOSPICE | DRG: 175 | End: 2021-12-08
Attending: EMERGENCY MEDICINE | Admitting: FAMILY MEDICINE
Payer: MEDICARE

## 2021-12-02 ENCOUNTER — APPOINTMENT (OUTPATIENT)
Dept: GENERAL RADIOLOGY | Age: 86
DRG: 175 | End: 2021-12-02
Payer: MEDICARE

## 2021-12-02 ENCOUNTER — APPOINTMENT (OUTPATIENT)
Dept: CT IMAGING | Age: 86
DRG: 175 | End: 2021-12-02
Payer: MEDICARE

## 2021-12-02 DIAGNOSIS — J12.9 VIRAL PNEUMONIA: ICD-10-CM

## 2021-12-02 DIAGNOSIS — B34.8 RHINOVIRUS: ICD-10-CM

## 2021-12-02 DIAGNOSIS — R09.02 HYPOXIA: ICD-10-CM

## 2021-12-02 DIAGNOSIS — R77.8 ELEVATED TROPONIN: ICD-10-CM

## 2021-12-02 DIAGNOSIS — J98.01 BRONCHOSPASM: ICD-10-CM

## 2021-12-02 DIAGNOSIS — I26.93 SINGLE SUBSEGMENTAL PULMONARY EMBOLISM WITHOUT ACUTE COR PULMONALE (HCC): ICD-10-CM

## 2021-12-02 DIAGNOSIS — N17.9 AKI (ACUTE KIDNEY INJURY) (HCC): ICD-10-CM

## 2021-12-02 DIAGNOSIS — I48.91 ATRIAL FIBRILLATION WITH RAPID VENTRICULAR RESPONSE (HCC): ICD-10-CM

## 2021-12-02 DIAGNOSIS — R41.0 DISORIENTATION: Primary | ICD-10-CM

## 2021-12-02 LAB
ADENOVIRUS BY PCR: NOT DETECTED
ALBUMIN SERPL-MCNC: 3.4 G/DL (ref 3.5–4.6)
ALP BLD-CCNC: 103 U/L (ref 35–104)
ALT SERPL-CCNC: 21 U/L (ref 0–41)
AMMONIA: <10 UMOL/L (ref 16–60)
AMPHETAMINE SCREEN, URINE: NORMAL
ANION GAP SERPL CALCULATED.3IONS-SCNC: 19 MEQ/L (ref 9–15)
APTT: 43 SEC (ref 24.4–36.8)
AST SERPL-CCNC: 38 U/L (ref 0–40)
BARBITURATE SCREEN URINE: NORMAL
BASE EXCESS VENOUS: -10 (ref -3–3)
BASOPHILS ABSOLUTE: 0.1 K/UL (ref 0–0.2)
BASOPHILS RELATIVE PERCENT: 1.2 %
BENZODIAZEPINE SCREEN, URINE: NORMAL
BILIRUB SERPL-MCNC: 1.1 MG/DL (ref 0.2–0.7)
BILIRUBIN URINE: NEGATIVE
BLOOD, URINE: NEGATIVE
BORDETELLA PARAPERTUSSIS BY PCR: NOT DETECTED
BORDETELLA PERTUSSIS BY PCR: NOT DETECTED
BUN BLDV-MCNC: 34 MG/DL (ref 8–23)
C-REACTIVE PROTEIN, HIGH SENSITIVITY: 43.5 MG/L (ref 0–5)
CALCIUM IONIZED: 0.92 MMOL/L (ref 1.12–1.32)
CALCIUM SERPL-MCNC: 8.6 MG/DL (ref 8.5–9.9)
CANNABINOID SCREEN URINE: NORMAL
CHLAMYDOPHILIA PNEUMONIAE BY PCR: NOT DETECTED
CHLORIDE BLD-SCNC: 100 MEQ/L (ref 95–107)
CLARITY: CLEAR
CO2: 17 MEQ/L (ref 20–31)
COCAINE METABOLITE SCREEN URINE: NORMAL
COLOR: YELLOW
CORONAVIRUS 229E BY PCR: NOT DETECTED
CORONAVIRUS HKU1 BY PCR: NOT DETECTED
CORONAVIRUS NL63 BY PCR: NOT DETECTED
CORONAVIRUS OC43 BY PCR: NOT DETECTED
CREAT SERPL-MCNC: 1.65 MG/DL (ref 0.7–1.2)
EOSINOPHILS ABSOLUTE: 0.1 K/UL (ref 0–0.7)
EOSINOPHILS RELATIVE PERCENT: 2.2 %
GFR AFRICAN AMERICAN: 47.4
GFR AFRICAN AMERICAN: 53
GFR AFRICAN AMERICAN: 57
GFR NON-AFRICAN AMERICAN: 39.1
GFR NON-AFRICAN AMERICAN: 44
GFR NON-AFRICAN AMERICAN: 47
GLOBULIN: 3.1 G/DL (ref 2.3–3.5)
GLUCOSE BLD-MCNC: 146 MG/DL (ref 60–115)
GLUCOSE BLD-MCNC: 151 MG/DL (ref 70–99)
GLUCOSE URINE: NEGATIVE MG/DL
HCO3 VENOUS: 15 MMOL/L (ref 23–29)
HCT VFR BLD CALC: 40 % (ref 42–52)
HEMOGLOBIN: 12.4 GM/DL (ref 13.5–17.5)
HEMOGLOBIN: 13.1 G/DL (ref 14–18)
HUMAN METAPNEUMOVIRUS BY PCR: NOT DETECTED
HUMAN RHINOVIRUS/ENTEROVIRUS BY PCR: DETECTED
INFLUENZA A BY PCR: NOT DETECTED
INFLUENZA B BY PCR: NOT DETECTED
INR BLD: 2
KETONES, URINE: NEGATIVE MG/DL
LACTATE: 5.34 MMOL/L (ref 0.4–2)
LACTIC ACID: 5.8 MMOL/L (ref 0.5–2.2)
LEUKOCYTE ESTERASE, URINE: NEGATIVE
LYMPHOCYTES ABSOLUTE: 1.6 K/UL (ref 1–4.8)
LYMPHOCYTES RELATIVE PERCENT: 32.4 %
Lab: NORMAL
MAGNESIUM: 2.1 MG/DL (ref 1.7–2.4)
MCH RBC QN AUTO: 33.6 PG (ref 27–31.3)
MCHC RBC AUTO-ENTMCNC: 32.8 % (ref 33–37)
MCV RBC AUTO: 102.3 FL (ref 80–100)
METHADONE SCREEN, URINE: NORMAL
MONOCYTES ABSOLUTE: 0.5 K/UL (ref 0.2–0.8)
MONOCYTES RELATIVE PERCENT: 10.3 %
MYCOPLASMA PNEUMONIAE BY PCR: NOT DETECTED
NEUTROPHILS ABSOLUTE: 2.6 K/UL (ref 1.4–6.5)
NEUTROPHILS RELATIVE PERCENT: 53.9 %
NITRITE, URINE: NEGATIVE
O2 SAT, VEN: 98 %
OPIATE SCREEN URINE: NORMAL
OXYCODONE URINE: NORMAL
PARAINFLUENZA VIRUS 1 BY PCR: NOT DETECTED
PARAINFLUENZA VIRUS 2 BY PCR: NOT DETECTED
PARAINFLUENZA VIRUS 3 BY PCR: NOT DETECTED
PARAINFLUENZA VIRUS 4 BY PCR: NOT DETECTED
PCO2, VEN: 24.4 MM HG (ref 40–50)
PDW BLD-RTO: 15.9 % (ref 11.5–14.5)
PERFORMED ON: ABNORMAL
PERFORMED ON: ABNORMAL
PH UA: 5 (ref 5–9)
PH VENOUS: 7.4 (ref 7.35–7.45)
PHENCYCLIDINE SCREEN URINE: NORMAL
PLATELET # BLD: 200 K/UL (ref 130–400)
PO2, VEN: 105 MM HG
POC CHLORIDE: 104 MEQ/L (ref 99–110)
POC CREATININE: 1.4 MG/DL (ref 0.8–1.3)
POC CREATININE: 1.5 MG/DL (ref 0.8–1.3)
POC HEMATOCRIT: 37 % (ref 41–53)
POC POTASSIUM: 3.9 MEQ/L (ref 3.5–5.1)
POC SAMPLE TYPE: ABNORMAL
POC SAMPLE TYPE: ABNORMAL
POC SODIUM: 135 MEQ/L (ref 136–145)
POTASSIUM SERPL-SCNC: 4 MEQ/L (ref 3.4–4.9)
PRO-BNP: NORMAL PG/ML
PROCALCITONIN: 0.18 NG/ML (ref 0–0.15)
PROPOXYPHENE SCREEN: NORMAL
PROTEIN UA: NEGATIVE MG/DL
PROTHROMBIN TIME: 22.3 SEC (ref 12.3–14.9)
RBC # BLD: 3.91 M/UL (ref 4.7–6.1)
RESPIRATORY SYNCYTIAL VIRUS BY PCR: NOT DETECTED
SARS-COV-2, PCR: NOT DETECTED
SODIUM BLD-SCNC: 136 MEQ/L (ref 135–144)
SPECIFIC GRAVITY UA: 1.02 (ref 1–1.03)
TCO2 CALC VENOUS: 16 MMOL/L
TOTAL CK: 117 U/L (ref 0–190)
TOTAL PROTEIN: 6.5 G/DL (ref 6.3–8)
TROPONIN: 0.04 NG/ML (ref 0–0.01)
TSH SERPL DL<=0.05 MIU/L-ACNC: 1.75 UIU/ML (ref 0.44–3.86)
URINE REFLEX TO CULTURE: NORMAL
UROBILINOGEN, URINE: 0.2 E.U./DL
WBC # BLD: 4.9 K/UL (ref 4.8–10.8)

## 2021-12-02 PROCEDURE — 82550 ASSAY OF CK (CPK): CPT

## 2021-12-02 PROCEDURE — 6360000004 HC RX CONTRAST MEDICATION: Performed by: STUDENT IN AN ORGANIZED HEALTH CARE EDUCATION/TRAINING PROGRAM

## 2021-12-02 PROCEDURE — 1210000000 HC MED SURG R&B

## 2021-12-02 PROCEDURE — 82565 ASSAY OF CREATININE: CPT

## 2021-12-02 PROCEDURE — 83605 ASSAY OF LACTIC ACID: CPT

## 2021-12-02 PROCEDURE — 71045 X-RAY EXAM CHEST 1 VIEW: CPT

## 2021-12-02 PROCEDURE — 83880 ASSAY OF NATRIURETIC PEPTIDE: CPT

## 2021-12-02 PROCEDURE — 84443 ASSAY THYROID STIM HORMONE: CPT

## 2021-12-02 PROCEDURE — 85610 PROTHROMBIN TIME: CPT

## 2021-12-02 PROCEDURE — 36415 COLL VENOUS BLD VENIPUNCTURE: CPT

## 2021-12-02 PROCEDURE — 80307 DRUG TEST PRSMV CHEM ANLYZR: CPT

## 2021-12-02 PROCEDURE — 2500000003 HC RX 250 WO HCPCS: Performed by: STUDENT IN AN ORGANIZED HEALTH CARE EDUCATION/TRAINING PROGRAM

## 2021-12-02 PROCEDURE — 84484 ASSAY OF TROPONIN QUANT: CPT

## 2021-12-02 PROCEDURE — 36600 WITHDRAWAL OF ARTERIAL BLOOD: CPT

## 2021-12-02 PROCEDURE — 84132 ASSAY OF SERUM POTASSIUM: CPT

## 2021-12-02 PROCEDURE — 96365 THER/PROPH/DIAG IV INF INIT: CPT

## 2021-12-02 PROCEDURE — 6360000002 HC RX W HCPCS: Performed by: STUDENT IN AN ORGANIZED HEALTH CARE EDUCATION/TRAINING PROGRAM

## 2021-12-02 PROCEDURE — 71275 CT ANGIOGRAPHY CHEST: CPT

## 2021-12-02 PROCEDURE — 83735 ASSAY OF MAGNESIUM: CPT

## 2021-12-02 PROCEDURE — 82140 ASSAY OF AMMONIA: CPT

## 2021-12-02 PROCEDURE — 81003 URINALYSIS AUTO W/O SCOPE: CPT

## 2021-12-02 PROCEDURE — 85014 HEMATOCRIT: CPT

## 2021-12-02 PROCEDURE — 99285 EMERGENCY DEPT VISIT HI MDM: CPT

## 2021-12-02 PROCEDURE — 96374 THER/PROPH/DIAG INJ IV PUSH: CPT

## 2021-12-02 PROCEDURE — 86141 C-REACTIVE PROTEIN HS: CPT

## 2021-12-02 PROCEDURE — 82803 BLOOD GASES ANY COMBINATION: CPT

## 2021-12-02 PROCEDURE — 87040 BLOOD CULTURE FOR BACTERIA: CPT

## 2021-12-02 PROCEDURE — 85730 THROMBOPLASTIN TIME PARTIAL: CPT

## 2021-12-02 PROCEDURE — 84295 ASSAY OF SERUM SODIUM: CPT

## 2021-12-02 PROCEDURE — 93005 ELECTROCARDIOGRAM TRACING: CPT | Performed by: STUDENT IN AN ORGANIZED HEALTH CARE EDUCATION/TRAINING PROGRAM

## 2021-12-02 PROCEDURE — 0202U NFCT DS 22 TRGT SARS-COV-2: CPT

## 2021-12-02 PROCEDURE — 84145 PROCALCITONIN (PCT): CPT

## 2021-12-02 PROCEDURE — 85025 COMPLETE CBC W/AUTO DIFF WBC: CPT

## 2021-12-02 PROCEDURE — 6370000000 HC RX 637 (ALT 250 FOR IP): Performed by: STUDENT IN AN ORGANIZED HEALTH CARE EDUCATION/TRAINING PROGRAM

## 2021-12-02 PROCEDURE — 82330 ASSAY OF CALCIUM: CPT

## 2021-12-02 PROCEDURE — 80053 COMPREHEN METABOLIC PANEL: CPT

## 2021-12-02 PROCEDURE — 82435 ASSAY OF BLOOD CHLORIDE: CPT

## 2021-12-02 PROCEDURE — 94640 AIRWAY INHALATION TREATMENT: CPT

## 2021-12-02 PROCEDURE — 2580000003 HC RX 258: Performed by: STUDENT IN AN ORGANIZED HEALTH CARE EDUCATION/TRAINING PROGRAM

## 2021-12-02 PROCEDURE — 70450 CT HEAD/BRAIN W/O DYE: CPT

## 2021-12-02 RX ORDER — DILTIAZEM HYDROCHLORIDE 5 MG/ML
5 INJECTION INTRAVENOUS ONCE
Status: COMPLETED | OUTPATIENT
Start: 2021-12-02 | End: 2021-12-02

## 2021-12-02 RX ORDER — 0.9 % SODIUM CHLORIDE 0.9 %
500 INTRAVENOUS SOLUTION INTRAVENOUS ONCE
Status: DISCONTINUED | OUTPATIENT
Start: 2021-12-02 | End: 2021-12-08 | Stop reason: HOSPADM

## 2021-12-02 RX ORDER — IPRATROPIUM BROMIDE AND ALBUTEROL SULFATE 2.5; .5 MG/3ML; MG/3ML
1 SOLUTION RESPIRATORY (INHALATION) ONCE
Status: COMPLETED | OUTPATIENT
Start: 2021-12-02 | End: 2021-12-02

## 2021-12-02 RX ORDER — 0.9 % SODIUM CHLORIDE 0.9 %
1000 INTRAVENOUS SOLUTION INTRAVENOUS ONCE
Status: COMPLETED | OUTPATIENT
Start: 2021-12-02 | End: 2021-12-02

## 2021-12-02 RX ORDER — LORAZEPAM 2 MG/ML
0.5 INJECTION INTRAMUSCULAR EVERY 6 HOURS PRN
Status: DISCONTINUED | OUTPATIENT
Start: 2021-12-02 | End: 2021-12-05

## 2021-12-02 RX ADMIN — SODIUM CHLORIDE 1000 ML: 9 INJECTION, SOLUTION INTRAVENOUS at 16:05

## 2021-12-02 RX ADMIN — IOPAMIDOL 100 ML: 612 INJECTION, SOLUTION INTRAVENOUS at 17:35

## 2021-12-02 RX ADMIN — DILTIAZEM HYDROCHLORIDE 5 MG: 5 INJECTION INTRAVENOUS at 16:07

## 2021-12-02 RX ADMIN — IPRATROPIUM BROMIDE AND ALBUTEROL SULFATE 1 AMPULE: .5; 2.5 SOLUTION RESPIRATORY (INHALATION) at 19:47

## 2021-12-02 RX ADMIN — CEFTRIAXONE SODIUM 1000 MG: 1 INJECTION, POWDER, FOR SOLUTION INTRAMUSCULAR; INTRAVENOUS at 16:04

## 2021-12-02 NOTE — ED PROVIDER NOTES
[address] [facility] ED  eMERGENCY dEPARTMENT eNCOUnter      Pt Name: Keara Moss  MRN: [id number]  [place] 2/12/1929  Date of evaluation: 12/2/2021  Provider: Paul Randall DO    CHIEF COMPLAINT       Chief Complaint   Patient presents with    Altered Mental Status     family called EMS for Pt \"not acting right\", general weakness and loss of appetite for the past three days         HISTORY OF PRESENT ILLNESS   (Location/Symptom, Timing/Onset,Context/Setting, Quality, Duration, Modifying Factors, Severity)  Note limiting factors. Keara Moss is a 80 y.o. male who presents to the emergency department with c/o confusion and not acting right per family x 3 days. Patient AOx 1. Patient denies fever or chills. Patient denies N/V/D. Patient denies pain. The history is provided by the patient. The history is limited by a language barrier. A  was used. NursingNotes were reviewed. REVIEW OF SYSTEMS    (2-9 systems for level 4, 10 or more for level 5)     Review of Systems   Unable to perform ROS: Mental status change   Constitutional: Positive for activity change, appetite change and fatigue. Psychiatric/Behavioral: Positive for confusion. Except as noted above the remainder of the review of systems was reviewed and negative.        PAST MEDICAL HISTORY     Past Medical History:   Diagnosis Date    Anxiety     Atrial fibrillation ([address]     Cardiomyopathy ([address] 9/18/2020    CHF (congestive heart failure) (HCC)     Diabetes mellitus ([address]     Hypertension     Sleep apnea          SURGICALHISTORY       Past Surgical History:   Procedure Laterality Date    BACK SURGERY      CARDIAC PACEMAKER PLACEMENT  9345    HERNIA REPAIR      THORACENTESIS Left 06/24/2021    450ml removed by Dr Wheeler Speak [phone]         CURRENT MEDICATIONS       Previous Medications    ASPIRIN 81 MG CHEWABLE TABLET    Take 1 tablet by mouth daily    FLUTICASONE-SALMETEROL (ADVAIR DISKUS) 250-50 MCG/DOSE AEPB    Inhale 1 puff into the lungs every 12 hours for 14 days    FUROSEMIDE (LASIX) 40 MG TABLET    Take 1 tablet by mouth daily    LISINOPRIL (PRINIVIL;ZESTRIL) 5 MG TABLET    Take 1 tablet by mouth daily    METOPROLOL TARTRATE (LOPRESSOR) 50 MG TABLET    Take 0.5 tablets by mouth 2 times daily    SODIUM CHLORIDE, EXTERNAL, (SALINE WOUND WASH) 0.9 % SOLN    Apply 1 Bottle topically daily    SPIRONOLACTONE (ALDACTONE) 25 MG TABLET    Take 1 tablet by mouth daily    TIOTROPIUM (SPIRIVA RESPIMAT) 2.5 MCG/ACT AERS INHALER    Inhale 2 puffs into the lungs daily       ALLERGIES     Patient has no known allergies. FAMILY HISTORY     No family history on file. SOCIAL HISTORY       Social History     Socioeconomic History    Marital status:      Spouse name: Not on file    Number of children: Not on file    Years of education: Not on file    Highest education level: Not on file   Occupational History    Not on file   Tobacco Use    Smoking status: Former Smoker    Smokeless tobacco: Former User    Tobacco comment: quit 30 years ago   Vaping Use    Vaping Use: Never used   Substance and Sexual Activity    Alcohol use: Not Currently    Drug use: Not Currently    Sexual activity: Not on file   Other Topics Concern    Not on file   Social History Narrative    ** Merged History Encounter **          Social Determinants of Health     Financial Resource Strain:     Difficulty of Paying Living Expenses: Not on file   Food Insecurity:     Worried About [address] in the Last Year: Not on file    [address] in the Last Year: Not on file   Transportation Needs:     Lack of Transportation (Medical): Not on file    Lack of Transportation (Non-Medical):  Not on file   Physical Activity:     Days of Exercise per Week: Not on file    Minutes of Exercise per Session: Not on file   Stress:     Feeling of Stress : Not on file   Social Connections:     Frequency of Communication with Friends and Family: Not on file    Frequency of Social Gatherings with Friends and Family: Not on file    Attends Anabaptist Services: Not on file    Active Member of Clubs or Organizations: Not on file    Attends Club or Organization Meetings: Not on file    Marital Status: Not on file   Intimate Partner Violence:     Fear of Current or Ex-Partner: Not on file    Emotionally Abused: Not on file    Physically Abused: Not on file    Sexually Abused: Not on file   Housing Stability:     Unable to Pay for Housing in the Last Year: Not on file    Number of [place] in the Last Year: Not on file    Unstable Housing in the Last Year: Not on file       SCREENINGS    Marcie Coma Scale  Eye Opening: Spontaneous  Best Verbal Response: Confused  Best Motor Response: Localizes pain  Marcie Coma Scale Score: 13 @FLOW(32060646)@      PHYSICAL EXAM    (up to 7 for level 4, 8 or more for level 5)     ED Triage Vitals   BP Temp Temp Source Pulse Resp SpO2 Height Weight   12/02/21 1442 12/02/21 1451 12/02/21 1451 12/02/21 1442 12/02/21 1442 12/02/21 1451 -- 12/02/21 1442   100/87 96 °F (35.6 °C) Temporal 113 28 100 %  135 lb (61.2 kg)       Physical Exam  Vitals and nursing note reviewed. Constitutional:       General: He is awake. He is not in acute distress. Appearance: Normal appearance. He is well-developed and normal weight. He is not ill-appearing, toxic-appearing or diaphoretic. Comments: No photophobia. No phonophobia. HENT:      Head: Normocephalic and atraumatic. No Beverly's sign. Comments: No facial asymmetry. No slurred speech. Right Ear: External ear normal.      Left Ear: External ear normal.      Nose: Nose normal. No congestion or rhinorrhea. Mouth/Throat:      Mouth: Mucous membranes are moist.      Pharynx: Oropharynx is clear. No oropharyngeal exudate or posterior oropharyngeal erythema. Eyes:      General: No scleral icterus.         Right eye: No foreign body or discharge. Left eye: No discharge. Extraocular Movements: Extraocular movements intact. Conjunctiva/sclera: Conjunctivae normal.      Left eye: No exudate. Pupils: Pupils are equal, round, and reactive to light. Neck:      Vascular: No JVD. Trachea: No tracheal deviation. Comments: No meningismus. Cardiovascular:      Rate and Rhythm: Normal rate and regular rhythm. Heart sounds: Normal heart sounds. Heart sounds not distant. No murmur heard. No friction rub. No gallop. Pulmonary:      Effort: Pulmonary effort is normal. No respiratory distress. Breath sounds: Normal breath sounds. No stridor. No wheezing, rhonchi or rales. Chest:      Chest wall: No tenderness. Abdominal:      General: Abdomen is flat. Bowel sounds are normal. There is no distension. Palpations: Abdomen is soft. There is no mass. Tenderness: There is no abdominal tenderness. There is no right CVA tenderness, left CVA tenderness, guarding or rebound. Hernia: No hernia is present. Musculoskeletal:         General: No swelling, tenderness, deformity or signs of injury. Normal range of motion. Cervical back: Normal range of motion and neck supple. No rigidity. Lymphadenopathy:      Head:      Right side of head: No submental adenopathy. Left side of head: No submental adenopathy. Skin:     General: Skin is warm and dry. Capillary Refill: Capillary refill takes less than 2 seconds. Coloration: Skin is not jaundiced or pale. Findings: No bruising, erythema, lesion or rash. Neurological:      General: No focal deficit present. Mental Status: He is alert. He is confused. GCS: GCS eye subscore is 4. GCS verbal subscore is 5. GCS motor subscore is 6. Cranial Nerves: No cranial nerve deficit or facial asymmetry. Sensory: No sensory deficit. Motor: No weakness.       Coordination: Coordination normal.      Deep Tendon Reflexes: Reflexes are normal and symmetric. Comments: AOx1   Psychiatric:         Mood and Affect: Mood normal.         Behavior: Behavior normal. Behavior is cooperative. Thought Content: Thought content normal.         Judgment: Judgment normal.         DIAGNOSTIC RESULTS     EKG: All EKG's are interpreted by the Emergency Department Physician who either signs or Co-signsthis chart in the absence of a cardiologist.    EKG: Atrial fibrillation at 103 bpm.  PVC present. Left axis. QT intervals 342 ms. RADIOLOGY:   Non-plain filmimages such as CT, Ultrasound and MRI are read by the radiologist. Plain radiographic images are visualized and preliminarily interpreted by the emergency physician with the below findings:        Interpretation per the Radiologist below, if available at the time ofthis note:    [address]   Final Result      NO ACUTE INTRACRANIAL PROCESS OR SIGNIFICANT CHANGE FROM 9/1/2021 IDENTIFIED. CTA CHEST W WO CONTRAST   Final Result      SUSPECT SEGMENTAL LEFT LOWER LOBE PULMONARY EMBOLI. MILD TO MODERATE CARDIOMEGALY WITHOUT EVIDENCE OF RIGHT HEART STRAIN. MODERATE-SIZED LEFT AND SMALL RIGHT PLEURAL EFFUSIONS, SIMILAR TO 6/7/2021. MILD TO MODERATE ATELECTASIS/INFARCT OF THE LEFT LOWER LOBE AND MILD PROBABLE DEPENDENT RIGHT ATELECTASIS, SIMILAR TO 6/7/2021. XR CHEST PORTABLE   Final Result   BIBASILAR ATELECTASIS/PNEUMONIA.             ED BEDSIDE ULTRASOUND:   Performed by ED Physician - none    LABS:  Labs Reviewed   RESPIRATORY PANEL, MOLECULAR, WITH COVID-19 - Abnormal; Notable for the following components:       Result Value    Human Rhinovirus/Enterovirus by PCR DETECTED (*)     All other components within normal limits   PROCALCITONIN - Abnormal; Notable for the following components:    Procalcitonin 0.18 (*)     All other components within normal limits    Narrative:     CALL  [facility]  [facility] tel. [phone],  Troponin results called to and read back by Derek LEWIS RN, 12/02/2021  17:04, by JOHN   APTT - Abnormal; Notable for the following components:    aPTT 43.0 (*)     All other components within normal limits   CBC WITH AUTO DIFFERENTIAL - Abnormal; Notable for the following components:    RBC 3.91 (*)     Hemoglobin 13.1 (*)     Hematocrit 40.0 (*)     .3 (*)     MCH 33.6 (*)     MCHC 32.8 (*)     RDW 15.9 (*)     All other components within normal limits   COMPREHENSIVE METABOLIC PANEL - Abnormal; Notable for the following components:    CO2 17 (*)     Anion Gap 19 (*)     Glucose 151 (*)     BUN 34 (*)     CREATININE 1.65 (*)     GFR Non- 39.1 (*)     GFR  47.4 (*)     Albumin 3.4 (*)     Total Bilirubin 1.1 (*)     All other components within normal limits   HIGH SENSITIVITY CRP - Abnormal; Notable for the following components:    CRP High Sensitivity 43.5 (*)     All other components within normal limits   LACTIC ACID, PLASMA - Abnormal; Notable for the following components:    Lactic Acid 5.8 (*)     All other components within normal limits    Narrative:     CALL  [facility]  ED tel. [phone],  Lactic Acid results called to and read back by Dr Triny Zavala, 12/02/2021 16:11,  by Nelly Kenyon - Abnormal; Notable for the following components:    Protime 22.3 (*)     All other components within normal limits   TROPONIN - Abnormal; Notable for the following components:    Troponin 0.042 (*)     All other components within normal limits    Narrative:     CALL  [facility]  ED tel. [phone],  Troponin results called to and read back by Derek LEWIS RN, 12/02/2021  17:04, by JOHN   AMMONIA - Abnormal; Notable for the following components:    Ammonia <10 (*)     All other components within normal limits   POCT VENOUS - Abnormal; Notable for the following components:    POC Sodium 135 (*)     POC Glucose 146 (*)     POC Creatinine 1.4 (*)     GFR Non- 47 (*)     GFR  62 (*)     Calcium, Ion 0.92 (*)     pCO2, Nicolás 24.4 (*)     HCO3, Venous 15.0 (*)     Base Excess, Nicolás -10 (*)     Lactate 5.34 (*)     Hemoglobin 12.4 (*)     POC Hematocrit 37 (*)     All other components within normal limits   POCT VENOUS - Abnormal; Notable for the following components:    POC Creatinine 1.5 (*)     GFR Non- 44 (*)     GFR  53 (*)     All other components within normal limits   CULTURE, BLOOD 1   CULTURE, BLOOD 2   BRAIN NATRIURETIC PEPTIDE   CK   MAGNESIUM   TSH WITHOUT REFLEX    Narrative:     CALL  [facility]  [facility] tel. [phone],  Troponin results called to and read back by Sam LEWIS RN, 12/02/2021  17:04, by [address]       All other labs were within normal range or not returned as of this dictation. EMERGENCY DEPARTMENT COURSE and DIFFERENTIAL DIAGNOSIS/MDM:   Vitals:    Vitals:    12/02/21 1915 12/02/21 1945 12/02/21 2000 12/02/21 2010   BP: 116/84 104/87 117/85    Pulse: 84 98 96 99   Resp: 29 19 26 25   Temp:       TempSrc:       SpO2: 91% (!) 88%  100%   Weight:           INR 2.0 suspect warfarin therapy. Poor historian, unable to verify. MDM  Patient went into atrial fibrillation with rapid ventricular spots. He has a history of A. fib. CAT scan of the chest was done. Patient was hydrated because of renal insufficiency. Patient began to wheeze and was given a breathing treatment his pulse ox dropped to 85%. After breathing treatment SPO2 100%. ProCalcitonin is mildly elevated but even with the mild elevation it does not indicate a bacterial infection of the lung. Likely a viral pneumonia from the rhinovirus. Delay in length of stay secondary delay in obtaining urinalysis. Urine is finally obtained and sent. Urinalysis shows no urinary tract infection.     The case was discussed with the hospitalist Dr. Karol Allison who accepted the patient after teleconference with  Clarence. Dr. Alan Orosco does not want to anticoagulate the patient because INR is 2.0 and he will take over the care. CT of the chest showed a subsegmental pulmonary embolus. CRITICAL CARE TIME   Total Critical Care time was 33 minutes, excluding separately reportableprocedures. There was a high probability of clinicallysignificant/life threatening deterioration in the patient's condition which required my urgent intervention. CONSULTS:  None    PROCEDURES:  Unless otherwise noted below, none     Procedures    FINAL IMPRESSION      1. Disorientation    2. FOX (acute kidney injury) ([facility]    3. Elevated troponin    4. Atrial fibrillation with rapid ventricular response ([facility]    5. Rhinovirus    6. Viral pneumonia    7. Hypoxia    8. Bronchospasm    9. Single subsegmental pulmonary embolism without acute cor pulmonale (HCC)          DISPOSITION/PLAN   DISPOSITION        PATIENT REFERRED TO:  No follow-up provider specified.     DISCHARGE MEDICATIONS:  New Prescriptions    No medications on file          (Please note that portions of this note were completed with a voice recognition program.  Efforts were made to edit the dictations but occasionally words are mis-transcribed.)    52 Teena Sams DO (electronically signed)  Attending Emergency Physician          52 Teena Sams DO  12/02/21 2014

## 2021-12-03 LAB
ANION GAP SERPL CALCULATED.3IONS-SCNC: 19 MEQ/L (ref 9–15)
BASOPHILS ABSOLUTE: 0.1 K/UL (ref 0–0.2)
BASOPHILS RELATIVE PERCENT: 1.1 %
BUN BLDV-MCNC: 33 MG/DL (ref 8–23)
CALCIUM SERPL-MCNC: 8.7 MG/DL (ref 8.5–9.9)
CHLORIDE BLD-SCNC: 102 MEQ/L (ref 95–107)
CO2: 17 MEQ/L (ref 20–31)
CREAT SERPL-MCNC: 1.6 MG/DL (ref 0.7–1.2)
EKG ATRIAL RATE: 108 BPM
EKG Q-T INTERVAL: 342 MS
EKG QRS DURATION: 146 MS
EKG QTC CALCULATION (BAZETT): 448 MS
EKG R AXIS: -58 DEGREES
EKG T AXIS: 100 DEGREES
EKG VENTRICULAR RATE: 103 BPM
EOSINOPHILS ABSOLUTE: 0.1 K/UL (ref 0–0.7)
EOSINOPHILS RELATIVE PERCENT: 2.1 %
GFR AFRICAN AMERICAN: 49.1
GFR NON-AFRICAN AMERICAN: 40.6
GLUCOSE BLD-MCNC: 97 MG/DL (ref 70–99)
HCT VFR BLD CALC: 36.8 % (ref 42–52)
HEMOGLOBIN: 11.9 G/DL (ref 14–18)
INR BLD: 2.1
LYMPHOCYTES ABSOLUTE: 1.8 K/UL (ref 1–4.8)
LYMPHOCYTES RELATIVE PERCENT: 36.1 %
MCH RBC QN AUTO: 33.4 PG (ref 27–31.3)
MCHC RBC AUTO-ENTMCNC: 32.4 % (ref 33–37)
MCV RBC AUTO: 103.1 FL (ref 80–100)
MONOCYTES ABSOLUTE: 0.7 K/UL (ref 0.2–0.8)
MONOCYTES RELATIVE PERCENT: 13.5 %
NEUTROPHILS ABSOLUTE: 2.3 K/UL (ref 1.4–6.5)
NEUTROPHILS RELATIVE PERCENT: 47.2 %
PDW BLD-RTO: 16.2 % (ref 11.5–14.5)
PLATELET # BLD: 190 K/UL (ref 130–400)
POTASSIUM REFLEX MAGNESIUM: 4.4 MEQ/L (ref 3.4–4.9)
PROTHROMBIN TIME: 23.1 SEC (ref 12.3–14.9)
RBC # BLD: 3.57 M/UL (ref 4.7–6.1)
SODIUM BLD-SCNC: 138 MEQ/L (ref 135–144)
TROPONIN: 0.04 NG/ML (ref 0–0.01)
TROPONIN: 0.06 NG/ML (ref 0–0.01)
WBC # BLD: 4.9 K/UL (ref 4.8–10.8)

## 2021-12-03 PROCEDURE — 99223 1ST HOSP IP/OBS HIGH 75: CPT | Performed by: INTERNAL MEDICINE

## 2021-12-03 PROCEDURE — 6370000000 HC RX 637 (ALT 250 FOR IP): Performed by: FAMILY MEDICINE

## 2021-12-03 PROCEDURE — 80048 BASIC METABOLIC PNL TOTAL CA: CPT

## 2021-12-03 PROCEDURE — 99221 1ST HOSP IP/OBS SF/LOW 40: CPT | Performed by: INTERNAL MEDICINE

## 2021-12-03 PROCEDURE — 36415 COLL VENOUS BLD VENIPUNCTURE: CPT

## 2021-12-03 PROCEDURE — 84484 ASSAY OF TROPONIN QUANT: CPT

## 2021-12-03 PROCEDURE — 6360000002 HC RX W HCPCS: Performed by: FAMILY MEDICINE

## 2021-12-03 PROCEDURE — 85610 PROTHROMBIN TIME: CPT

## 2021-12-03 PROCEDURE — 1210000000 HC MED SURG R&B

## 2021-12-03 PROCEDURE — 2580000003 HC RX 258: Performed by: FAMILY MEDICINE

## 2021-12-03 PROCEDURE — 2700000000 HC OXYGEN THERAPY PER DAY

## 2021-12-03 PROCEDURE — 85379 FIBRIN DEGRADATION QUANT: CPT

## 2021-12-03 PROCEDURE — 93010 ELECTROCARDIOGRAM REPORT: CPT | Performed by: INTERNAL MEDICINE

## 2021-12-03 PROCEDURE — 85025 COMPLETE CBC W/AUTO DIFF WBC: CPT

## 2021-12-03 RX ORDER — ONDANSETRON 4 MG/1
4 TABLET, ORALLY DISINTEGRATING ORAL EVERY 8 HOURS PRN
Status: DISCONTINUED | OUTPATIENT
Start: 2021-12-03 | End: 2021-12-08 | Stop reason: HOSPADM

## 2021-12-03 RX ORDER — AZITHROMYCIN 500 MG/1
500 TABLET, FILM COATED ORAL EVERY 24 HOURS
Status: COMPLETED | OUTPATIENT
Start: 2021-12-03 | End: 2021-12-05

## 2021-12-03 RX ORDER — ACETAMINOPHEN 650 MG/1
650 SUPPOSITORY RECTAL EVERY 6 HOURS PRN
Status: DISCONTINUED | OUTPATIENT
Start: 2021-12-03 | End: 2021-12-08 | Stop reason: HOSPADM

## 2021-12-03 RX ORDER — POLYETHYLENE GLYCOL 3350 17 G/17G
17 POWDER, FOR SOLUTION ORAL DAILY PRN
Status: DISCONTINUED | OUTPATIENT
Start: 2021-12-03 | End: 2021-12-08 | Stop reason: HOSPADM

## 2021-12-03 RX ORDER — ACETAMINOPHEN 325 MG/1
650 TABLET ORAL EVERY 6 HOURS PRN
Status: DISCONTINUED | OUTPATIENT
Start: 2021-12-03 | End: 2021-12-08 | Stop reason: HOSPADM

## 2021-12-03 RX ORDER — SODIUM CHLORIDE 0.9 % (FLUSH) 0.9 %
5-40 SYRINGE (ML) INJECTION EVERY 12 HOURS SCHEDULED
Status: DISCONTINUED | OUTPATIENT
Start: 2021-12-03 | End: 2021-12-08 | Stop reason: HOSPADM

## 2021-12-03 RX ORDER — SODIUM CHLORIDE 0.9 % (FLUSH) 0.9 %
5-40 SYRINGE (ML) INJECTION PRN
Status: DISCONTINUED | OUTPATIENT
Start: 2021-12-03 | End: 2021-12-08 | Stop reason: HOSPADM

## 2021-12-03 RX ORDER — SODIUM CHLORIDE 9 MG/ML
INJECTION, SOLUTION INTRAVENOUS CONTINUOUS
Status: DISCONTINUED | OUTPATIENT
Start: 2021-12-04 | End: 2021-12-05

## 2021-12-03 RX ORDER — SODIUM CHLORIDE 9 MG/ML
25 INJECTION, SOLUTION INTRAVENOUS PRN
Status: DISCONTINUED | OUTPATIENT
Start: 2021-12-03 | End: 2021-12-08 | Stop reason: HOSPADM

## 2021-12-03 RX ORDER — ONDANSETRON 2 MG/ML
4 INJECTION INTRAMUSCULAR; INTRAVENOUS EVERY 6 HOURS PRN
Status: DISCONTINUED | OUTPATIENT
Start: 2021-12-03 | End: 2021-12-08 | Stop reason: HOSPADM

## 2021-12-03 RX ADMIN — SODIUM CHLORIDE, PRESERVATIVE FREE 10 ML: 5 INJECTION INTRAVENOUS at 21:47

## 2021-12-03 RX ADMIN — LORAZEPAM 0.5 MG: 2 INJECTION INTRAMUSCULAR; INTRAVENOUS at 06:04

## 2021-12-03 RX ADMIN — CEFTRIAXONE SODIUM 1000 MG: 1 INJECTION, POWDER, FOR SOLUTION INTRAMUSCULAR; INTRAVENOUS at 15:39

## 2021-12-03 RX ADMIN — LORAZEPAM 0.5 MG: 2 INJECTION INTRAMUSCULAR; INTRAVENOUS at 17:55

## 2021-12-03 RX ADMIN — AZITHROMYCIN MONOHYDRATE 500 MG: 500 TABLET ORAL at 11:37

## 2021-12-03 NOTE — CONSULTS
Request from Dr. Mynor Pereira to contact patient's pharmacy [facility] on [facility]. Patient has not filled warfarin at this pharmacy. Patient last picked up an order for Eliquis in April. Patient has 3 physician's currently prescribing for him:  [address] Dr.. Ph.  12/2/2021  8:21 PM

## 2021-12-03 NOTE — FLOWSHEET NOTE
Arrived from ED, alert to self, v/s stable, pt appears SOB, sats is above 90% at 4L. Burkinan speaking, unable to verify meds. Unable to provide information that helps with admission assessment. To call pts pharmacy in the morning.

## 2021-12-03 NOTE — PROGRESS NOTES
Told son I need patient home med list but he does not speak much english so talked to the daughter who is an RN who said she was going to send the med list to the son. Will await that. Will continue to monitor.

## 2021-12-03 NOTE — CONSULTS
Cardiology Consult Note  Patient: Wero Tate  Unit/Bed: R876/D881-89  YOB: 1929  MRN: [id number]  Acct: [de-identified]   Admitting Diagnosis: Bronchospasm [J98.01]  Viral pneumonia [J12.9]  Disorientation [R41.0]  Hypoxia [R09.02]  Rhinovirus [B34.8]  Elevated troponin [R77.8]  FOX (acute kidney injury) ([facility] [N17.9]  Atrial fibrillation with rapid ventricular response ([facility] [I48.91]  Single subsegmental pulmonary embolism without acute cor pulmonale ([facility] [I26.93]  Date:  12/2/2021  Hospital Day: 1      Chief Complaint:  Troponin elevation    History of Present Illness:  26-year-old Gabonese-speaking only male with significant cardiac history including severe cardiomyopathy EF 20% by TTE 8/9/2021 not a candidate for ICD, sick sinus syndrome status post pacemaker placement 8/11/21 and atrial fibrillation, as well as hypertension CKD who was admitted to the hospital last night for 3-day history of weakness. Cardiology consulted for evaluation and management of positive troponins    Of note: patient was admitted to the hospital 9/1/2021 and discharged the following day after sustaining a fall    Per son pt started feeling week and tired 3 days ago. Also 3 days ago he developed SOB which is worsening    CT of the chest reported as patient for left lower lobe pulmonary embolism.     EKG yesterday: Atrial fibrillation rate 80 old left bundle branch block  TTE 8/9/2021 EF 20%, moderate MR, moderate TR    No Known Allergies    Current Facility-Administered Medications   Medication Dose Route Frequency Provider Last Rate Last Admin    sodium chloride flush 0.9 % injection 5-40 mL  5-40 mL IntraVENous 2 times per day Shantelle Thrasher MD        sodium chloride flush 0.9 % injection 5-40 mL  5-40 mL IntraVENous PRN Shantelle Thrasher MD        0.9 % sodium chloride infusion  25 mL IntraVENous PRN Shantelle Thrasher MD        ondansetron (ZOFRAN-ODT) disintegrating tablet 4 mg  4 mg Oral Q8H PRN Carly Madrigal Madeleine Granados MD        Or    ondansetron [facility]) injection 4 mg  4 mg IntraVENous Q6H PRN Swetha Prieto MD        polyethylene glycol [facility]) packet 17 g  17 g Oral Daily PRN Swetha Prieto MD        acetaminophen (TYLENOL) tablet 650 mg  650 mg Oral Q6H PRN Swetha Prieto MD        Or    acetaminophen (TYLENOL) suppository 650 mg  650 mg Rectal Q6H PRN Swetha Prieto MD        cefTRIAXone (ROCEPHIN) 1000 mg IVPB in 50 mL D5W minibag  1,000 mg IntraVENous Q24H Swetha Prieto MD        And    azithromycin [facility]) tablet 500 mg  500 mg Oral Q24H Swetha Prieto MD        0.9 % sodium chloride bolus  500 mL IntraVENous Once Norva Citrin A Romito, DO        LORazepam (ATIVAN) injection 0.5 mg  0.5 mg IntraVENous Q6H PRN Swetha Prieto MD   0.5 mg at 12/03/21 0604       PMHx:  Past Medical History:   Diagnosis Date    Anxiety     Atrial fibrillation ([facility]     Cardiomyopathy ([facility] 9/18/2020    CHF (congestive heart failure) ([facility]     Diabetes mellitus ([facility]     Hypertension     Sleep apnea        PSHx:  Past Surgical History:   Procedure Laterality Date    BACK SURGERY      CARDIAC PACEMAKER PLACEMENT  0390    HERNIA REPAIR      THORACENTESIS Left 06/24/2021    450ml removed by Dr Wheeler Speak [phone]       Social Hx:  Social History     Socioeconomic History    Marital status:      Spouse name: Not on file    Number of children: Not on file    Years of education: Not on file    Highest education level: Not on file   Occupational History    Not on file   Tobacco Use    Smoking status: Former Smoker    Smokeless tobacco: Former User    Tobacco comment: quit 30 years ago   Vaping Use    Vaping Use: Never used   Substance and Sexual Activity    Alcohol use: Not Currently    Drug use: Not Currently    Sexual activity: Not on file   Other Topics Concern    Not on file   Social History Narrative    ** Merged History Encounter **          Social Determinants of Health     Financial Resource Strain:     Difficulty of Paying Living Expenses: Not on file   Food Insecurity:     Worried About Running Out of Food in the Last Year: Not on file    Haylie of Food in the Last Year: Not on file   Transportation Needs:     Lack of Transportation (Medical): Not on file    Lack of Transportation (Non-Medical): Not on file   Physical Activity:     Days of Exercise per Week: Not on file    Minutes of Exercise per Session: Not on file   Stress:     Feeling of Stress : Not on file   Social Connections:     Frequency of Communication with Friends and Family: Not on file    Frequency of Social Gatherings with Friends and Family: Not on file    Attends Druze Services: Not on file    Active Member of [address] [place] [address] [employer] or Organizations: Not on file    Attends Club or Organization Meetings: Not on file    Marital Status: Not on file   Intimate Partner Violence:     Fear of Current or Ex-Partner: Not on file    Emotionally Abused: Not on file    Physically Abused: Not on file    Sexually Abused: Not on file   Housing Stability:     Unable to Pay for Housing in the Last Year: Not on file    Number of [place] in the Last Year: Not on file    Unstable Housing in the Last Year: Not on file       Family Hx:  No family history on file.     Review of Systems:   Review of Systems      Physical Examination:    BP 99/75   Pulse (!) 49   Temp 97.5 °F (36.4 °C)   Resp 20   Wt 136 lb (61.7 kg)   SpO2 99%   BMI 20.08 kg/m²    Physical Exam    LABS:  CBC:  Lab Results   Component Value Date    WBC 4.9 12/03/2021    RBC 3.57 12/03/2021    HGB 11.9 12/03/2021    HCT 36.8 12/03/2021    .1 12/03/2021    MCH 33.4 12/03/2021    MCHC 32.4 12/03/2021    RDW 16.2 12/03/2021     12/03/2021     CBC with Differential:   Lab Results   Component Value Date    WBC 4.9 12/03/2021    RBC 3.57 12/03/2021    HGB 11.9 12/03/2021    HCT 36.8 12/03/2021     12/03/2021    .1 12/03/2021    MCH 33.4 12/03/2021    MCHC 32.4 12/03/2021    RDW 16.2 12/03/2021    LYMPHOPCT 36.1 12/03/2021    MONOPCT 13.5 12/03/2021    BASOPCT 1.1 12/03/2021    MONOSABS 0.7 12/03/2021    LYMPHSABS 1.8 12/03/2021    EOSABS 0.1 12/03/2021    BASOSABS 0.1 12/03/2021     CMP:    Lab Results   Component Value Date     12/03/2021    K 4.4 12/03/2021     12/03/2021    CO2 17 12/03/2021    BUN 33 12/03/2021    CREATININE 1.60 12/03/2021    GFRAA 49.1 12/03/2021    LABGLOM 40.6 12/03/2021    GLUCOSE 97 12/03/2021    PROT 6.5 12/02/2021    LABALBU 3.4 12/02/2021    CALCIUM 8.7 12/03/2021    BILITOT 1.1 12/02/2021    ALKPHOS 103 12/02/2021    AST 38 12/02/2021    ALT 21 12/02/2021     BMP:    Lab Results   Component Value Date     12/03/2021    K 4.4 12/03/2021     12/03/2021    CO2 17 12/03/2021    BUN 33 12/03/2021    LABALBU 3.4 12/02/2021    CREATININE 1.60 12/03/2021    CALCIUM 8.7 12/03/2021    GFRAA 49.1 12/03/2021    LABGLOM 40.6 12/03/2021    GLUCOSE 97 12/03/2021     Magnesium:    Lab Results   Component Value Date    MG 2.1 12/02/2021     Troponin:    Lab Results   Component Value Date    TROPONINI 0.044 12/03/2021       Radiology:  CT HEAD WO CONTRAST    Result Date: 12/2/2021  CT HEAD WO CONTRAST : 12/2/2021 CLINICAL HISTORY:  confusion x 3 days . COMPARISON: 9/1/2021. TECHNIQUE: Spiral unenhanced images were obtained of the head, with routine multiplanar reconstructions performed. All CT scans at this facility use dose modulation, iterative reconstruction, and/or weight based dosing when appropriate to reduce radiation dose to as low as reasonably achievable. FINDINGS: There is no intracranial hemorrhage, mass effect, midline shift, extra-axial collection, evidence of hydrocephalus, recent ischemic infarct, or skull fracture identified.   Chronic right frontal, temporooccipital and right cerebellar infarcts, mild generalized cerebral volume loss, and mild to moderate patchy white matter changes are again noted. The mastoid air cells and visualized paranasal sinuses are essentially clear. NO ACUTE INTRACRANIAL PROCESS OR SIGNIFICANT CHANGE FROM 9/1/2021 IDENTIFIED. CTA CHEST W WO CONTRAST    Result Date: 12/2/2021  CTA CHEST W WO CONTRAST: 12/2/2021 CLINICAL HISTORY:  tachycardic, new afib, ? effusion vs wedge shaped infarct vs infiltrate left lung base. assess for PE . COMPARISON: Chest CT without contrast 6/7/2021. Spiral enhanced images were obtained of the chest during the infusion of approximately 100 mL of Isovue 300 contrast with pulmonary artery  CTA protocol. Routine and volume rendered images were obtained on a 3-dimensional workstation. All CT scans at this facility use dose modulation, iterative reconstruction, and/or weight based dosing when appropriate to reduce radiation dose to as low as reasonably achievable. FINDINGS: The study is limited by motion artifact. Pulmonary emboli are suspected within the segmental branches of the left lower lobe, with a moderate-sized left pleural effusion and mild atelectasis/infarct of the left lung base. No other significant filling defects are identified within the central pulmonary vasculature elsewhere. The heart is mild to moderately enlarged, without evidence of right heart strain. A small layering right pleural effusion is present, with mild right atelectasis. The thoracic aorta is mildly ectatic and unfolded with mild atherosclerotic plaquing. There is no evidence of dissection, within the limits of the limited contrast opacification. There is no significant lymphadenopathy, worrisome nodules or masses. Mild degenerative changes of the thoracic spine are noted. There are no acute fractures identified. The limited imaging of the included upper abdomen is noncontributory. SUSPECT SEGMENTAL LEFT LOWER LOBE PULMONARY EMBOLI. MILD TO MODERATE CARDIOMEGALY WITHOUT EVIDENCE OF RIGHT HEART STRAIN.  MODERATE-SIZED LEFT AND SMALL

## 2021-12-03 NOTE — CARE COORDINATION
ATTEMPT TO MEET WITH PATIENT FOR CMI, DISCHARGE PLANNING. UNABLE TO OBTAIN WITH PATIENT D/T MENTAL STATUS. NO FAMILY AT BEDSIDE AT THIS TIME. ATTEMPT TO REACH DTR BY PHONE, NO ANSWER. WILL ATTEMPT AGAIN AT ANOTHER TIME.

## 2021-12-03 NOTE — CONSULTS
Pulmonary Medicine  Consult Note      Reason for consultation: Pneumonia    Consulting physician: Dr. Madden Gain:    This is a 61-year-old male with past medical history significant for hypertension, congestive heart failure, atrial fibrillation and anxiety who was presented with 3 days history of weakness change in behavior and not acting like himself. He is Syriac-speaking. I tried with  but patient is very sleepy and difficult to have any conversation with patient. Chart was reviewed for some detail. He has no vomiting diarrhea. He is not having short of breath. Not having any pain. He is on 4 L O2 via nasal cannula and his O2 saturation is 99%. He had CT chest yesterday shows left lower lobe pulmonary emboli mild to moderate cardiomegaly without evidence of right heart strain moderate sized left and small right pleural effusion which is unchanged since changed 2021 mild to moderate atelectasis/infarct of the left lower lobe with mild probable dependent right atelectasis similar to June 7, 2021. He had left-sided thoracentesis done in June 24, 2021 he had bilateral pleural effusion noted in CT chest on June 7, 2021. He is empirically started on IV Rocephin 1 g every 24 hourly and Zithromax 500 mg IV every 24 hourly. Past Medical History:        Diagnosis Date    Anxiety     Atrial fibrillation ([address]     Cardiomyopathy ([address] 9/18/2020    CHF (congestive heart failure) ([address]     Diabetes mellitus ([address]     Hypertension     Sleep apnea        Past Surgical History:        Procedure Laterality Date    BACK SURGERY      CARDIAC PACEMAKER PLACEMENT  4927    HERNIA REPAIR      THORACENTESIS Left 06/24/2021    450ml removed by Dr Irena Vasquez [phone]       Social History:     reports that he has quit smoking. He has quit using smokeless tobacco. He reports previous alcohol use. He reports previous drug use.     Family History:   No family history on file.    Allergies:  Patient has no known allergies. MEDICATIONS during current hospitalization:    Continuous Infusions:   sodium chloride         Scheduled Meds:   sodium chloride flush  5-40 mL IntraVENous 2 times per day    cefTRIAXone (ROCEPHIN) IV  1,000 mg IntraVENous Q24H    And    azithromycin  500 mg Oral Q24H    sodium chloride  500 mL IntraVENous Once       PRN Meds:sodium chloride flush, sodium chloride, ondansetron **OR** ondansetron, polyethylene glycol, acetaminophen **OR** acetaminophen, LORazepam    REVIEW OF SYSTEMS:  As in history of present illness  Other 14 point review of system is negative. PHYSICAL EXAM:    Vitals:  BP 99/75   Pulse (!) 49   Temp 97.5 °F (36.4 °C)   Resp 20   Wt 136 lb (61.7 kg)   SpO2 99%   BMI 20.08 kg/m²   General: Sleepy but arousable. .comfortable in bed, No distress. Head: Atraumatic , Normocephalic   Eyes: PERRL. No sclera icterus. No conjunctival injection. No discharge   ENT: No nasal  discharge. Pharynx clear. Neck:  Trachea midline. No thyromegaly, no JVD, No cervical adenopathy. Chest : Bilaterally symmetrical ,Normal effort,  No accessory muscle use  Lung : Diminished BS bilateraly. Few bibasilar Rales. No wheezing. No rhonchi. Heart[de-identified] Normal  rate. Regular rhythm. No mumur ,  Rub or gallop  ABD: Non-tender. Non-distended. No masses. No organmegaly. Normal bowel sounds. No hernia. Extremities: Trace pitting in both lower leg , No Cyanosis ,No clubbing  Neuro: Moving extremity did not examine in detail  Skin: Warm and dry. No erythema rash on exposed extremities.         Data Review  Recent Labs     12/02/21  1515 12/02/21  1550 12/03/21  0324   WBC 4.9  --  4.9   HGB 13.1* 12.4* 11.9*   HCT 40.0*  --  36.8*     --  190      Recent Labs     12/02/21  1515 12/02/21  1546 12/02/21  1550 12/03/21  0323     --   --  138   K 4.0  --   --  4.4     --   --  102   CO2 17*  --   --  17*   BUN 34*  --   --  33*   CREATININE 1.65* 1.5* 1.4* 1.60*   GLUCOSE 151*  --   --  97       MV Settings: ABGs: No results for input(s): PHART, NKC8OBW, PO2ART, XOW0GWR, BEART, Q4OYIHUB, CYX9SIX in the last 72 hours. O2 Device: Nasal cannula  O2 Flow Rate (L/min): 4 L/min  Lab Results   Component Value Date    LACTA 5.8 12/02/2021    LACTA 3.4 08/09/2021    LACTA 2.8 07/04/2021       Radiology  CT HEAD WO CONTRAST    Result Date: 12/2/2021  CT HEAD WO CONTRAST : 12/2/2021 CLINICAL HISTORY:  confusion x 3 days . COMPARISON: 9/1/2021. TECHNIQUE: Spiral unenhanced images were obtained of the head, with routine multiplanar reconstructions performed. All CT scans at this facility use dose modulation, iterative reconstruction, and/or weight based dosing when appropriate to reduce radiation dose to as low as reasonably achievable. FINDINGS: There is no intracranial hemorrhage, mass effect, midline shift, extra-axial collection, evidence of hydrocephalus, recent ischemic infarct, or skull fracture identified. Chronic right frontal, temporooccipital and right cerebellar infarcts, mild generalized cerebral volume loss, and mild to moderate patchy white matter changes are again noted. The mastoid air cells and visualized paranasal sinuses are essentially clear. NO ACUTE INTRACRANIAL PROCESS OR SIGNIFICANT CHANGE FROM 9/1/2021 IDENTIFIED. CTA CHEST W WO CONTRAST    Result Date: 12/2/2021  CTA CHEST W WO CONTRAST: 12/2/2021 CLINICAL HISTORY:  tachycardic, new afib, ? effusion vs wedge shaped infarct vs infiltrate left lung base. assess for PE . COMPARISON: Chest CT without contrast 6/7/2021. Spiral enhanced images were obtained of the chest during the infusion of approximately 100 mL of Isovue 300 contrast with pulmonary artery  CTA protocol. Routine and volume rendered images were obtained on a 3-dimensional workstation.  All CT scans at this facility use dose modulation, iterative reconstruction, and/or weight based dosing when appropriate to reduce radiation dose to as low as reasonably achievable. FINDINGS: The study is limited by motion artifact. Pulmonary emboli are suspected within the segmental branches of the left lower lobe, with a moderate-sized left pleural effusion and mild atelectasis/infarct of the left lung base. No other significant filling defects are identified within the central pulmonary vasculature elsewhere. The heart is mild to moderately enlarged, without evidence of right heart strain. A small layering right pleural effusion is present, with mild right atelectasis. The thoracic aorta is mildly ectatic and unfolded with mild atherosclerotic plaquing. There is no evidence of dissection, within the limits of the limited contrast opacification. There is no significant lymphadenopathy, worrisome nodules or masses. Mild degenerative changes of the thoracic spine are noted. There are no acute fractures identified. The limited imaging of the included upper abdomen is noncontributory. SUSPECT SEGMENTAL LEFT LOWER LOBE PULMONARY EMBOLI. MILD TO MODERATE CARDIOMEGALY WITHOUT EVIDENCE OF RIGHT HEART STRAIN. MODERATE-SIZED LEFT AND SMALL RIGHT PLEURAL EFFUSIONS, SIMILAR TO 6/7/2021. MILD TO MODERATE ATELECTASIS/INFARCT OF THE LEFT LOWER LOBE AND MILD PROBABLE DEPENDENT RIGHT ATELECTASIS, SIMILAR TO 6/7/2021. XR CHEST PORTABLE    Result Date: 12/2/2021  EXAMINATION: XR CHEST PORTABLE CLINICAL HISTORY: ALTERED MENTAL STATUS COMPARISONS: SEPTEMBER 1, 2021 FINDINGS: Pacemaker generator and wires unchanged. Degenerative change bilateral glenohumeral and acromioclavicular joints. Cardiopericardial silhouette normal. Ill-defined area increased opacity right lung base. Increased opacity left lung base skiers left lower lung, and left diaphragm. BIBASILAR ATELECTASIS/PNEUMONIA. Assessment and  plan:     1. Bibasilar atelectasis versus pneumonia  2.  Chronic bilateral pleural effusion , S/p thoracentesis in June 2021 on left side  3. Questionable segmental left lower lobe PE  4. Atrial fibrillation  5. Congestive heart failure, cardiomyopathy with LVEF 20%     Patient has a history of fall or head trauma high risk for anticoagulation therapy. Anticoagulation was stopped in September 2021. He also has history of atrial fibrillation and cardiomyopathy with EF 20%. CT chest shows suspected segmental left lower lobe pulmonary emboli though it is not clear and there is motion artifact. He is high risk for anticoagulation therapy. Check D-dimer. He is on 4 L O2 via nasal cannula O2 saturation 99%. He is very sleepy and unable to get any information from patient even with . At this time empirically continue antibiotic, I he has elevated CRP and procalcitonin, even though patient is afebrile and WBC is within normal range. With his age and his lethargy is risk for aspiration. He has bilateral chronic pleural effusion more on left side and thoracentesis done in the past shows protein was 1.6 and LDH was elevated. Cytology was negative for malignancy. At this time continue present treatment plan. We will follow    Thank you for consult    NOTE: This report was transcribed using voice recognition software. Every effort was made to ensure accuracy; however, inadvertent computerized transcription errors may be present.     Electronically signed by Hyla Heimlich, MD, FCCP on 12/3/2021 at 6:18 PM

## 2021-12-03 NOTE — H&P
Hospital Medicine  History and Physical    Patient:  Isela Mcclure  MRN: [id number]    CHIEF COMPLAINT:    Chief Complaint   Patient presents with    Altered Mental Status     family called EMS for Pt \"not acting right\", general weakness and loss of appetite for the past three days       History Obtained From:  patient, electronic medical record  Primary Care Physician: Ashley Dove    HISTORY OF PRESENT ILLNESS:   The patient is a 80 y.o. male who presents with a history of hypertension, congestive heart failure, atrial fibrillation and anxiety who presents with a 3-day history of weakness, changes in behavior, and not acting like himself. The patient is Armenian-speaking however, even with , it was difficult to have a conversation. The patient is alert and oriented x1. He is able to deny pain, nausea, vomiting. There've been no recent changes to his medications. Past Medical History:      Diagnosis Date    Anxiety     Atrial fibrillation ([address]     Cardiomyopathy ([facility] 9/18/2020    CHF (congestive heart failure) ([address]     Diabetes mellitus ([address]     Hypertension     Sleep apnea        Past Surgical History:      Procedure Laterality Date    BACK SURGERY      CARDIAC PACEMAKER PLACEMENT  9691    HERNIA REPAIR      THORACENTESIS Left 06/24/2021    450ml removed by Dr Meghan Posada [phone]       Medications Prior to Admission:    Prior to Admission medications    Medication Sig Start Date End Date Taking?  Authorizing Provider   furosemide (LASIX) 40 MG tablet Take 1 tablet by mouth daily 11/29/21 11/29/22  ZACH Cortés - CNP   spironolactone (ALDACTONE) 25 MG tablet Take 1 tablet by mouth daily 11/29/21   Kami Bangura APRN - CNP   lisinopril (PRINIVIL;ZESTRIL) 5 MG tablet Take 1 tablet by mouth daily 11/29/21   Kami Bangura APRN - CNP   SODIUM CHLORIDE, EXTERNAL, ([facility]) 0.9 % SOLN Apply 1 Bottle topically daily 10/28/21   Jeffery Leal DPM 13.1* 12.4*     --      Recent Labs     12/02/21  1515 12/02/21  1546 12/02/21  1550     --   --    K 4.0  --   --      --   --    CO2 17*  --   --    BUN 34*  --   --    CREATININE 1.65* 1.5* 1.4*   GLUCOSE 151*  --   --    AST 38  --   --    ALT 21  --   --    BILITOT 1.1*  --   --    ALKPHOS 103  --   --      Troponin T:   Recent Labs     12/02/21  1515   TROPONINI 0.042*       INR:   Recent Labs     12/02/21  1515   INR 2.0     URINALYSIS:  Recent Labs     12/02/21  1515   COLORU Yellow   PHUR 5.0   CLARITYU Clear   SPECGRAV 1.022   LEUKOCYTESUR Negative   UROBILINOGEN 0.2   BILIRUBINUR Negative   BLOODU Negative   GLUCOSEU Negative     -----------------------------------------------------------------   CT HEAD WO CONTRAST    Result Date: 12/2/2021  CT HEAD WO CONTRAST : 12/2/2021 CLINICAL HISTORY:  confusion x 3 days . COMPARISON: 9/1/2021. TECHNIQUE: Spiral unenhanced images were obtained of the head, with routine multiplanar reconstructions performed. All CT scans at this facility use dose modulation, iterative reconstruction, and/or weight based dosing when appropriate to reduce radiation dose to as low as reasonably achievable. FINDINGS: There is no intracranial hemorrhage, mass effect, midline shift, extra-axial collection, evidence of hydrocephalus, recent ischemic infarct, or skull fracture identified. Chronic right frontal, temporooccipital and right cerebellar infarcts, mild generalized cerebral volume loss, and mild to moderate patchy white matter changes are again noted. The mastoid air cells and visualized paranasal sinuses are essentially clear. NO ACUTE INTRACRANIAL PROCESS OR SIGNIFICANT CHANGE FROM 9/1/2021 IDENTIFIED. CTA CHEST W WO CONTRAST    Result Date: 12/2/2021  CTA CHEST W WO CONTRAST: 12/2/2021 CLINICAL HISTORY:  tachycardic, new afib, ? effusion vs wedge shaped infarct vs infiltrate left lung base.   assess for PE . COMPARISON: Chest CT without contrast 6/7/2021. Spiral enhanced images were obtained of the chest during the infusion of approximately 100 mL of Isovue 300 contrast with pulmonary artery  CTA protocol. Routine and volume rendered images were obtained on a 3-dimensional workstation. All CT scans at this facility use dose modulation, iterative reconstruction, and/or weight based dosing when appropriate to reduce radiation dose to as low as reasonably achievable. FINDINGS: The study is limited by motion artifact. Pulmonary emboli are suspected within the segmental branches of the left lower lobe, with a moderate-sized left pleural effusion and mild atelectasis/infarct of the left lung base. No other significant filling defects are identified within the central pulmonary vasculature elsewhere. The heart is mild to moderately enlarged, without evidence of right heart strain. A small layering right pleural effusion is present, with mild right atelectasis. The thoracic aorta is mildly ectatic and unfolded with mild atherosclerotic plaquing. There is no evidence of dissection, within the limits of the limited contrast opacification. There is no significant lymphadenopathy, worrisome nodules or masses. Mild degenerative changes of the thoracic spine are noted. There are no acute fractures identified. The limited imaging of the included upper abdomen is noncontributory. SUSPECT SEGMENTAL LEFT LOWER LOBE PULMONARY EMBOLI. MILD TO MODERATE CARDIOMEGALY WITHOUT EVIDENCE OF RIGHT HEART STRAIN. MODERATE-SIZED LEFT AND SMALL RIGHT PLEURAL EFFUSIONS, SIMILAR TO 6/7/2021. MILD TO MODERATE ATELECTASIS/INFARCT OF THE LEFT LOWER LOBE AND MILD PROBABLE DEPENDENT RIGHT ATELECTASIS, SIMILAR TO 6/7/2021. XR CHEST PORTABLE    Result Date: 12/2/2021  EXAMINATION: XR CHEST PORTABLE CLINICAL HISTORY: ALTERED MENTAL STATUS COMPARISONS: SEPTEMBER 1, 2021 FINDINGS: Pacemaker generator and wires unchanged. Degenerative change bilateral glenohumeral and acromioclavicular joints. Cardiopericardial silhouette normal. Ill-defined area increased opacity right lung base. Increased opacity left lung base skiers left lower lung, and left diaphragm. BIBASILAR ATELECTASIS/PNEUMONIA.      EKG: Atrial fibrillation with rvr, LAD, LBBB    Assessment and Plan   1. Acute subsegmental PE with acute hypoxic respiratory failure  1. Patient unable to report meds, however INR 2. Hold additional a/c and recheck INR in am.  O2 support. Currently on 4L  2. Acute viral pneumonia  1. Positive for rhinovirus on PCR. No s/s bacterial infection. Supportive care  3. Elevated troponin  1. Chest pain free, cycle troponin  4. Afib with RVR  1. Patient received diltiazem 5mg iv once with rate control achieved. Monitor, c/s cardiology.   5. Hx chronic combined CHF    Patient Active Problem List   Diagnosis Code    Chronic atrial fibrillation ([facility]) I48.20    SSS (sick sinus syndrome) ([facility]) I49.5    Cardi[facility] I42.9    SOB (shortness of breath) R06.02    Pleural effusion on right J90    Atelectasis of right lung J98.11    CHF (congestive heart failure), NYHA class I, acute on chronic, combined ([facility]) I50.43    Acute on chronic combined systolic and diastolic CHF (congestive heart failure) ([facility]) I50.43    Acute on chronic diastolic CHF (congestive heart failure) ([facility]) I50.33    Pacemaker Z95.0    Goals of care, counseling/discussion Z71.89    Syncope and collapse R55    Pressure injury of deep tissue of left heel L89.626    Viral pneumonia J12.9       Selina Ashford MD, MD  Admitting Hospitalist

## 2021-12-04 LAB
ANION GAP SERPL CALCULATED.3IONS-SCNC: 20 MEQ/L (ref 9–15)
BUN BLDV-MCNC: 33 MG/DL (ref 8–23)
CALCIUM SERPL-MCNC: 8.7 MG/DL (ref 8.5–9.9)
CHLORIDE BLD-SCNC: 101 MEQ/L (ref 95–107)
CO2: 13 MEQ/L (ref 20–31)
CREAT SERPL-MCNC: 1.3 MG/DL (ref 0.7–1.2)
D DIMER: 2.43 MG/L FEU (ref 0–0.5)
GFR AFRICAN AMERICAN: >60
GFR NON-AFRICAN AMERICAN: 51.5
GLUCOSE BLD-MCNC: 101 MG/DL (ref 70–99)
POTASSIUM REFLEX MAGNESIUM: 4.9 MEQ/L (ref 3.4–4.9)
SODIUM BLD-SCNC: 134 MEQ/L (ref 135–144)
TROPONIN: 0.04 NG/ML (ref 0–0.01)

## 2021-12-04 PROCEDURE — 6370000000 HC RX 637 (ALT 250 FOR IP): Performed by: FAMILY MEDICINE

## 2021-12-04 PROCEDURE — 1210000000 HC MED SURG R&B

## 2021-12-04 PROCEDURE — 84484 ASSAY OF TROPONIN QUANT: CPT

## 2021-12-04 PROCEDURE — 36415 COLL VENOUS BLD VENIPUNCTURE: CPT

## 2021-12-04 PROCEDURE — 99233 SBSQ HOSP IP/OBS HIGH 50: CPT | Performed by: INTERNAL MEDICINE

## 2021-12-04 PROCEDURE — 97162 PT EVAL MOD COMPLEX 30 MIN: CPT

## 2021-12-04 PROCEDURE — 2580000003 HC RX 258: Performed by: INTERNAL MEDICINE

## 2021-12-04 PROCEDURE — 6360000002 HC RX W HCPCS: Performed by: FAMILY MEDICINE

## 2021-12-04 PROCEDURE — 80048 BASIC METABOLIC PNL TOTAL CA: CPT

## 2021-12-04 PROCEDURE — 2580000003 HC RX 258: Performed by: FAMILY MEDICINE

## 2021-12-04 RX ORDER — LISINOPRIL 5 MG/1
5 TABLET ORAL DAILY
Status: DISCONTINUED | OUTPATIENT
Start: 2021-12-04 | End: 2021-12-08

## 2021-12-04 RX ORDER — SPIRONOLACTONE 25 MG/1
25 TABLET ORAL DAILY
Status: DISCONTINUED | OUTPATIENT
Start: 2021-12-04 | End: 2021-12-08 | Stop reason: HOSPADM

## 2021-12-04 RX ORDER — FUROSEMIDE 40 MG/1
40 TABLET ORAL DAILY
Status: DISCONTINUED | OUTPATIENT
Start: 2021-12-04 | End: 2021-12-08 | Stop reason: HOSPADM

## 2021-12-04 RX ADMIN — SODIUM CHLORIDE: 9 INJECTION, SOLUTION INTRAVENOUS at 15:51

## 2021-12-04 RX ADMIN — SODIUM CHLORIDE: 9 INJECTION, SOLUTION INTRAVENOUS at 00:30

## 2021-12-04 RX ADMIN — CEFTRIAXONE SODIUM 1000 MG: 1 INJECTION, POWDER, FOR SOLUTION INTRAMUSCULAR; INTRAVENOUS at 15:50

## 2021-12-04 RX ADMIN — SODIUM CHLORIDE, PRESERVATIVE FREE 10 ML: 5 INJECTION INTRAVENOUS at 10:42

## 2021-12-04 RX ADMIN — AZITHROMYCIN MONOHYDRATE 500 MG: 500 TABLET ORAL at 10:42

## 2021-12-04 ASSESSMENT — ENCOUNTER SYMPTOMS
EYES NEGATIVE: 1
BLOOD IN STOOL: 0
WHEEZING: 0
GASTROINTESTINAL NEGATIVE: 1
STRIDOR: 0
SHORTNESS OF BREATH: 1
CHEST TIGHTNESS: 0
COUGH: 1
NAUSEA: 0

## 2021-12-04 NOTE — PROGRESS NOTES
Physical Therapy   Facility/[facility] MED SURG S564/M437-32    NAME: Clementina Vega    : 1929 (80 y.o.)  MRN: [id number]    Account: [de-identified]  Gender: male    PT evaluation and treatment orders received. Chart reviewed. PT eval attempted. Hold PT eval: Critical lab of troponin and elevated D-dimer. Also 21  CTA Chest \"SUSPECT SEGMENTAL LEFT LOWER LOBE PULMONARY EMBOLI\"    Awaiting for further medical assessment/plan, (anticoagulation?) prior to mobility. Will attempt PT evaluation again at earliest convenience.       Electronically signed by Jevon Sun PT on 21 at 10:24 AM EST

## 2021-12-04 NOTE — PROGRESS NOTES
Daily Update    From:HOME    PMH:    Anticipated Discharge Date:TBD    Anticipated Discharge Disposition:HOME    Patient Mobility or PT/OT ordered:  CONSULTS:  PULM; INTERV.  CARD    Covid Test Date and Result:COVID NEGATIVE 12/2/21;   ++RHINOVIRUS 12/2/21    D-DIMER: 2..43  TROPONIN: 0.059--->0.042  CXR: BIBASILAR ATELECTASIS/PNEUMONIA  UA: NEG  BLOOD CX X 2 : NEG  CT HEAD: NEGATIVE    Barriers to Discharge:LANGUAGE BARRIER (Mauritanian)  NEEDS CMI    Readmission Risk              Risk of Unplanned Readmission:  29

## 2021-12-04 NOTE — PROGRESS NOTES
Critical lab of troponin 0.059 and D-dimer 2.43 resulted. Pt started on IVF as ordered. Repeat troponin 0.042. MD notified. No new orders given. Pt slept okay overnight. Pt remain on 4L nasal cannula sat >96%.

## 2021-12-04 NOTE — PROGRESS NOTES
Progress Note  Patient: Chalo Michelle  Unit/Bed: T386/C721-01  YOB: 1929  MRN: [id number]  Acct: [de-identified]   Admitting Diagnosis: Bronchospasm [J98.01]  Viral pneumonia [J12.9]  Disorientation [R41.0]  Hypoxia [R09.02]  Rhinovirus [B34.8]  Elevated troponin [R77.8]  FOX (acute kidney injury) ([address] [N17.9]  Atrial fibrillation with rapid ventricular response ([address] [I48.91]  Single subsegmental pulmonary embolism without acute cor pulmonale ([address] [I26.93]  Admit Date:  12/2/2021  Hospital Day: 2    Chief Complaint: Respiratory failure    Histories:  Past Medical History:   Diagnosis Date    Anxiety     Atrial fibrillation ([address]     Cardiomyopathy ([address] 9/18/2020    CHF (congestive heart failure) ([address]     Diabetes mellitus ([address]     Hypertension     Sleep apnea      Past Surgical History:   Procedure Laterality Date    BACK SURGERY      CARDIAC PACEMAKER PLACEMENT  5685    HERNIA REPAIR      THORACENTESIS Left 06/24/2021    450ml removed by Dr Jaiden Dunne [phone]     No family history on file.   Social History     Socioeconomic History    Marital status:      Spouse name: Not on file    Number of children: Not on file    Years of education: Not on file    Highest education level: Not on file   Occupational History    Not on file   Tobacco Use    Smoking status: Former Smoker    Smokeless tobacco: Former User    Tobacco comment: quit 30 years ago   Vaping Use    Vaping Use: Never used   Substance and Sexual Activity    Alcohol use: Not Currently    Drug use: Not Currently    Sexual activity: Not on file   Other Topics Concern    Not on file   Social History Narrative    ** Merged History Encounter **          Social Determinants of Health     Financial Resource Strain:     Difficulty of Paying Living Expenses: Not on file   Food Insecurity:     Worried About [address] in the Last Year: Not on file    [address] in the Last Year: Not on file Transportation Needs:     Lack of Transportation (Medical): Not on file    Lack of Transportation (Non-Medical): Not on file   Physical Activity:     Days of Exercise per Week: Not on file    Minutes of Exercise per Session: Not on file   Stress:     Feeling of Stress : Not on file   Social Connections:     Frequency of Communication with Friends and Family: Not on file    Frequency of Social Gatherings with Friends and Family: Not on file    Attends Hinduism Services: Not on file    Active Member of [address] [place] [address] [employer] or Organizations: Not on file    Attends Club or Organization Meetings: Not on file    Marital Status: Not on file   Intimate Partner Violence:     Fear of Current or Ex-Partner: Not on file    Emotionally Abused: Not on file    Physically Abused: Not on file    Sexually Abused: Not on file   Housing Stability:     Unable to Pay for Housing in the Last Year: Not on file    Number of [place] in the Last Year: Not on file    Unstable Housing in the Last Year: Not on file       Subjective/HPI son in room. Pt denies CP. Trying to eat but coughing a lot. On 4L O2 no fever. EKG:  V Paced with underlying AF rate 60        Review of Systems:   Review of Systems   Constitutional: Negative. Negative for diaphoresis and fatigue. HENT: Negative. Eyes: Negative. Respiratory: Positive for cough and shortness of breath. Negative for chest tightness, wheezing and stridor. Cardiovascular: Negative. Negative for chest pain, palpitations and leg swelling. Gastrointestinal: Negative. Negative for blood in stool and nausea. Genitourinary: Negative. Musculoskeletal: Negative. Skin: Negative. Neurological: Negative. Negative for dizziness, syncope, weakness and light-headedness. Hematological: Negative. Psychiatric/Behavioral: Negative.           Physical Examination:    /64   Pulse 76   Temp 97.5 °F (36.4 °C)   Resp 20   Wt 136 lb (61.7 kg)   SpO2 92%   BMI 20.08 kg/m²    Physical Exam   Constitutional: He appears healthy. No distress. HENT:   Normal cephalic and Atraumatic   Eyes: Pupils are equal, round, and reactive to light. Neck: Thyroid normal. No JVD present. No neck adenopathy. No thyromegaly present. Cardiovascular: Normal rate, regular rhythm, intact distal pulses and normal pulses. Murmur heard. Pulmonary/Chest: Increased effort noted. He has no rales. He has diffuse wheezes. He exhibits no tenderness. Course BS   Abdominal: Soft. Bowel sounds are normal. There is no abdominal tenderness. Musculoskeletal:         General: No tenderness or edema. Normal range of motion. Cervical back: Normal range of motion and neck supple. Neurological: He is alert and oriented to person, place, and time. Skin: Skin is warm. No cyanosis. Nails show no clubbing.        LABS:  CBC:   Lab Results   Component Value Date    WBC 4.9 12/03/2021    RBC 3.57 12/03/2021    HGB 11.9 12/03/2021    HCT 36.8 12/03/2021    .1 12/03/2021    MCH 33.4 12/03/2021    MCHC 32.4 12/03/2021    RDW 16.2 12/03/2021     12/03/2021     CBC with Differential:    Lab Results   Component Value Date    WBC 4.9 12/03/2021    RBC 3.57 12/03/2021    HGB 11.9 12/03/2021    HCT 36.8 12/03/2021     12/03/2021    .1 12/03/2021    MCH 33.4 12/03/2021    MCHC 32.4 12/03/2021    RDW 16.2 12/03/2021    LYMPHOPCT 36.1 12/03/2021    MONOPCT 13.5 12/03/2021    BASOPCT 1.1 12/03/2021    MONOSABS 0.7 12/03/2021    LYMPHSABS 1.8 12/03/2021    EOSABS 0.1 12/03/2021    BASOSABS 0.1 12/03/2021     CMP:    Lab Results   Component Value Date     12/03/2021    K 4.4 12/03/2021     12/03/2021    CO2 17 12/03/2021    BUN 33 12/03/2021    CREATININE 1.60 12/03/2021    GFRAA 49.1 12/03/2021    LABGLOM 40.6 12/03/2021    GLUCOSE 97 12/03/2021    PROT 6.5 12/02/2021    LABALBU 3.4 12/02/2021    CALCIUM 8.7 12/03/2021    BILITOT 1.1 12/02/2021    ALKPHOS 103 12/02/2021    AST 38 12/02/2021    ALT 21 12/02/2021     BMP:    Lab Results   Component Value Date     12/03/2021    K 4.4 12/03/2021     12/03/2021    CO2 17 12/03/2021    BUN 33 12/03/2021    LABALBU 3.4 12/02/2021    CREATININE 1.60 12/03/2021    CALCIUM 8.7 12/03/2021    GFRAA 49.1 12/03/2021    LABGLOM 40.6 12/03/2021    GLUCOSE 97 12/03/2021     Magnesium:    Lab Results   Component Value Date    MG 2.1 12/02/2021     Troponin:    Lab Results   Component Value Date    TROPONINI 0.042 12/04/2021        Active Hospital Problems    Diagnosis Date Noted    Viral pneumonia [J12.9] 12/02/2021     Priority: Low        Assessment/Plan:  1. Bronchspasm  2. Possible PNA  3. Pleural Effusion - no change in size. Had prior Thoracentesis  4. Possible small segmental LLL PE - felt to be high risk of bleed and falls due to age and debility. No Plans for A/C long term  5. Recent fall requiring sutures for scalp Laceration. 6. PPM V Paced with underlying AF. AF is permanent. Again. No plans for long term A/C  7. HTN Stable   8. 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 9. CKD  10. Dr. Dorothy Kirkland has discussed with pt and family regarding palliative.         Electronically signed by Kirill Benton MD on 12/4/2021 at 10:52 AM

## 2021-12-04 NOTE — PROGRESS NOTES
Assessment completed and charted on by this RN. Patient asleep all morning. Pt woke up when this RN walked in room.  in room. Patient given PO med this morning. Son is at bedside. Foam dressing applied to coccyx for prevention. Pt frequently rounded on and checked/changed. 1430: Dr. Mekhi Markham notified face to face that daughter in law Alejo Gibbons would like to speak with him. Alejo Gibbons updated on care and asked again about home medications. Stated she was going to give the son Brandon Dyer a list again. 1530: Lab attempted draw twice. Shana Youssef RN attempted lab draw on patient and was unsuccessful. 1750: Medication list completed with help from [address] in [facility]. 1830: Upon walking in to room, patient was talking anxiously. Using the  in room, patient states he is having trouble breathing. Pt upped to 6 L from 4 L NC. Pt has poor circulation so hard to get a pulse ox reading. Flashes of 95% and 100% SPO2 were present. Respiratory called. Multiple pulse ox devices used to get a reading. Patient shows no signs of hypoxia. Patient`s heat in room turned up and warm blankets put on patient.

## 2021-12-04 NOTE — PROGRESS NOTES
Hospitalist Progress Note      Date of Admission: 12/2/2021  Chief Complaint:    Chief Complaint   Patient presents with    Altered Mental Status     family called EMS for Pt \"not acting right\", general weakness and loss of appetite for the past three days     Subjective:  No new complaints.     Incomplete subjective evaluation due to patient's clinical status    Medications:    Infusion Medications    sodium chloride       Scheduled Medications    sodium chloride flush  5-40 mL IntraVENous 2 times per day    cefTRIAXone (ROCEPHIN) IV  1,000 mg IntraVENous Q24H    And    azithromycin  500 mg Oral Q24H    sodium chloride  500 mL IntraVENous Once     PRN Meds: sodium chloride flush, sodium chloride, ondansetron **OR** ondansetron, polyethylene glycol, acetaminophen **OR** acetaminophen, LORazepam    Intake/Output Summary (Last 24 hours) at 12/3/2021 2342  Last data filed at 12/3/2021 8034  Gross per 24 hour   Intake 120 ml   Output --   Net 120 ml     Exam:  /79   Pulse 82   Temp 97.5 °F (36.4 °C)   Resp 20   Wt 136 lb (61.7 kg)   SpO2 99%   BMI 20.08 kg/m²   Head: Normocephalic, atraumatic  Sclera clear  Neck JVD flat  Lungs: normal effort of breathing, scattered crackles      Labs:   Recent Labs     12/02/21  1515 12/02/21  1550 12/03/21  0324   WBC 4.9  --  4.9   HGB 13.1* 12.4* 11.9*   HCT 40.0*  --  36.8*     --  190     Recent Labs     12/02/21  1515 12/02/21  1546 12/02/21  1550 12/03/21  0323     --   --  138   K 4.0  --   --  4.4     --   --  102   CO2 17*  --   --  17*   BUN 34*  --   --  33*   CREATININE 1.65* 1.5* 1.4* 1.60*   CALCIUM 8.6  --   --  8.7   AST 38  --   --   --    ALT 21  --   --   --    BILITOT 1.1*  --   --   --    ALKPHOS 103  --   --   --      Recent Labs     12/02/21  1515 12/03/21  0324   INR 2.0 2.1     Recent Labs     12/02/21  1515 12/03/21  0324 12/03/21  2157   CKTOTAL 117  --   --    TROPONINI 0.042* 0.044* 0.059*     Radiology:  CT HEAD WO CONTRAST   Final Result      NO ACUTE INTRACRANIAL PROCESS OR SIGNIFICANT CHANGE FROM 9/1/2021 IDENTIFIED. CTA CHEST W WO CONTRAST   Final Result      SUSPECT SEGMENTAL LEFT LOWER LOBE PULMONARY EMBOLI. MILD TO MODERATE CARDIOMEGALY WITHOUT EVIDENCE OF RIGHT HEART STRAIN. MODERATE-SIZED LEFT AND SMALL RIGHT PLEURAL EFFUSIONS, SIMILAR TO 6/7/2021. MILD TO MODERATE ATELECTASIS/INFARCT OF THE LEFT LOWER LOBE AND MILD PROBABLE DEPENDENT RIGHT ATELECTASIS, SIMILAR TO 6/7/2021. XR CHEST PORTABLE   Final Result   BIBASILAR ATELECTASIS/PNEUMONIA. Assessment/Plan:    Hypoxic respiratory failure secondary to pneumonia. Pneumonia: Most likely secondary to rhinovirus as detailed in nasopharyngeal swab results  Procalcitonin 0.18. Pulmonary primarily following/managing    Possible subsegmental pulmonary embolism: High risk anticoagulation as detailed by pulmonary and cardiology. Will defer to consultants on anticoagulation. FOX: Baseline normal.  Currently 1.6, downtrending. Continue IV fluids.     35 minutes total care time, >1/2 in unit/floor time and care coordination     Ap Zuniga MD ,MD

## 2021-12-04 NOTE — PROGRESS NOTES
Hospitalist Progress Note      PCP: Fadi Xavier    Date of Admission: 12/2/2021    Chief Complaint:    Chief Complaint   Patient presents with    Altered Mental Status     family called EMS for Pt \"not acting right\", general weakness and loss of appetite for the past three days     Subjective:  Patient awakes; talked to me on the  ipad; states he feels a little better; still tired; not talking in full sentences; unable to complete full 12 point ROS     Medications:  Reviewed    Infusion Medications    sodium chloride      sodium chloride 75 mL/hr at 12/04/21 0030     Scheduled Medications    sodium chloride flush  5-40 mL IntraVENous 2 times per day    cefTRIAXone (ROCEPHIN) IV  1,000 mg IntraVENous Q24H    And    azithromycin  500 mg Oral Q24H    sodium chloride  500 mL IntraVENous Once     PRN Meds: sodium chloride flush, sodium chloride, ondansetron **OR** ondansetron, polyethylene glycol, acetaminophen **OR** acetaminophen, LORazepam    No intake or output data in the 24 hours ending 12/04/21 1446    Exam:    /64   Pulse 76   Temp 97.5 °F (36.4 °C)   Resp 20   Wt 136 lb (61.7 kg)   SpO2 92%   BMI 20.08 kg/m²     General appearance: No apparent distress, appears stated age and cooperative. HEENT: . Conjunctivae/corneas clear. Neck: Trachea midline. Respiratory:  Wheezes throughout  Cardiovascular: Irregular  Abdomen: Soft, non-tender, non-distended with normal bowel sounds. Musculoskeletal: No clubbing, cyanosis or edema bilaterally.   Neuro: Non Focal.   Capillary Refill: Brisk,< 3 seconds   Peripheral Pulses: +2 palpable, equal bilaterally     Labs:   Recent Labs     12/02/21  1515 12/02/21  1550 12/03/21  0324   WBC 4.9  --  4.9   HGB 13.1* 12.4* 11.9*   HCT 40.0*  --  36.8*     --  190     Recent Labs     12/02/21  1515 12/02/21  1546 12/02/21  1550 12/03/21  0323     --   --  138   K 4.0  --   --  4.4     --   --  102   CO2 17*  --   --  17*   BUN 34*  --

## 2021-12-05 ENCOUNTER — APPOINTMENT (OUTPATIENT)
Dept: GENERAL RADIOLOGY | Age: 86
DRG: 175 | End: 2021-12-05
Payer: MEDICARE

## 2021-12-05 ENCOUNTER — APPOINTMENT (OUTPATIENT)
Dept: ULTRASOUND IMAGING | Age: 86
DRG: 175 | End: 2021-12-05
Payer: MEDICARE

## 2021-12-05 LAB
ANION GAP SERPL CALCULATED.3IONS-SCNC: 16 MEQ/L (ref 9–15)
BUN BLDV-MCNC: 28 MG/DL (ref 8–23)
CALCIUM SERPL-MCNC: 8.6 MG/DL (ref 8.5–9.9)
CHLORIDE BLD-SCNC: 97 MEQ/L (ref 95–107)
CO2: 15 MEQ/L (ref 20–31)
CREAT SERPL-MCNC: 1.04 MG/DL (ref 0.7–1.2)
GFR AFRICAN AMERICAN: >60
GFR NON-AFRICAN AMERICAN: >60
GLUCOSE BLD-MCNC: 125 MG/DL (ref 70–99)
POTASSIUM SERPL-SCNC: 4.1 MEQ/L (ref 3.4–4.9)
REASON FOR REJECTION: NORMAL
REJECTED TEST: NORMAL
SODIUM BLD-SCNC: 128 MEQ/L (ref 135–144)

## 2021-12-05 PROCEDURE — 6370000000 HC RX 637 (ALT 250 FOR IP): Performed by: INTERNAL MEDICINE

## 2021-12-05 PROCEDURE — 36415 COLL VENOUS BLD VENIPUNCTURE: CPT

## 2021-12-05 PROCEDURE — 6370000000 HC RX 637 (ALT 250 FOR IP): Performed by: FAMILY MEDICINE

## 2021-12-05 PROCEDURE — 73620 X-RAY EXAM OF FOOT: CPT

## 2021-12-05 PROCEDURE — 93925 LOWER EXTREMITY STUDY: CPT

## 2021-12-05 PROCEDURE — 99233 SBSQ HOSP IP/OBS HIGH 50: CPT | Performed by: INTERNAL MEDICINE

## 2021-12-05 PROCEDURE — 6360000002 HC RX W HCPCS: Performed by: FAMILY MEDICINE

## 2021-12-05 PROCEDURE — 99222 1ST HOSP IP/OBS MODERATE 55: CPT | Performed by: INTERNAL MEDICINE

## 2021-12-05 PROCEDURE — 71046 X-RAY EXAM CHEST 2 VIEWS: CPT

## 2021-12-05 PROCEDURE — 1210000000 HC MED SURG R&B

## 2021-12-05 PROCEDURE — 2580000003 HC RX 258: Performed by: FAMILY MEDICINE

## 2021-12-05 PROCEDURE — 80048 BASIC METABOLIC PNL TOTAL CA: CPT

## 2021-12-05 PROCEDURE — 2700000000 HC OXYGEN THERAPY PER DAY

## 2021-12-05 RX ADMIN — AZITHROMYCIN MONOHYDRATE 500 MG: 500 TABLET ORAL at 09:41

## 2021-12-05 RX ADMIN — ACETAMINOPHEN 650 MG: 325 TABLET ORAL at 22:20

## 2021-12-05 RX ADMIN — METOPROLOL TARTRATE 25 MG: 25 TABLET, FILM COATED ORAL at 21:05

## 2021-12-05 RX ADMIN — CEFTRIAXONE SODIUM 1000 MG: 1 INJECTION, POWDER, FOR SOLUTION INTRAMUSCULAR; INTRAVENOUS at 22:10

## 2021-12-05 RX ADMIN — SODIUM CHLORIDE, PRESERVATIVE FREE 10 ML: 5 INJECTION INTRAVENOUS at 20:19

## 2021-12-05 ASSESSMENT — ENCOUNTER SYMPTOMS
NAUSEA: 0
BLOOD IN STOOL: 0
GASTROINTESTINAL NEGATIVE: 1
STRIDOR: 0
SHORTNESS OF BREATH: 1
EYES NEGATIVE: 1
WHEEZING: 0
CHEST TIGHTNESS: 0
COUGH: 1

## 2021-12-05 ASSESSMENT — PAIN SCALES - GENERAL
PAINLEVEL_OUTOF10: 3
PAINLEVEL_OUTOF10: 0

## 2021-12-05 NOTE — CONSULTS
PODIATRIC MEDICINE AND SURGERY  CONSULT HISTORY AND PHYSICAL      Consulting Service:  medicine  Requesting Provider: Chago Duenas MD  Opinion/advice regarding: right foot cellulitis  Staff Doctor:  Dr. Javier Fair:  This 80 y.o. male with significant PMH of a fib, cardiomyopathy, CHF, DM, HTN and NABIL presents with heel ulcer left foot and possible cellulitis vs dependent rubor right foot. PLAN AND RECOMMENDATIONS[de-identified]  Patient examined and evaluated  Previous and today's labs reviewed   Prior imaging reviewed   Dressing change orders placed for daily betadine WTD to left heel. Arterial duplex ultrasound BLE ordered  Patient to be discussed with staff, Dr. Miguel A Delgado, who will provide final recommendations going forward. Patient will need follow up after discharge with Dr. Miguel A Delgado at the wound care center within 7 days of discharge. HPI: This very pleasant 80y.o. year old male with significant PMH of a fib, cardiomyopathy, CHF, DM, HTN and NABIL seen today for \"right foot cellulitis\". Patient is Greek speaking and AO x 1. He was seen by Dr. Miguel A Delgado at the wound care center at the end of October for a left heel pressure injury. Since that time the left heel has ulcerated. In regards to the right foot we have little information. The patient appears to have two small pressure injuries to the lateral right foot without ulceration. Patient admits to mild pain right forefoot. Patient lives at home and is mostly nonambulatory. Radiographs were negative right foot.        Past Medical History:   Diagnosis Date    Anxiety     Atrial fibrillation ([address]     Cardiomyopathy ([address] 9/18/2020    CHF (congestive heart failure) ([address]     Diabetes mellitus ([address]     Hypertension     Sleep apnea        Past Surgical History:   Procedure Laterality Date    BACK SURGERY      CARDIAC PACEMAKER PLACEMENT  2950    HERNIA REPAIR      THORACENTESIS Left 06/24/2021    450ml removed by Dr Jorge Morgan [phone]       No current facility-administered medications on file prior to encounter. Current Outpatient Medications on File Prior to Encounter   Medication Sig Dispense Refill    furosemide (LASIX) 40 MG tablet Take 1 tablet by mouth daily 30 tablet 6    spironolactone (ALDACTONE) 25 MG tablet Take 1 tablet by mouth daily 30 tablet 3    lisinopril (PRINIVIL;ZESTRIL) 5 MG tablet Take 1 tablet by mouth daily 30 tablet 3    metoprolol tartrate (LOPRESSOR) 50 MG tablet Take 0.5 tablets by mouth 2 times daily 60 tablet 5    tiotropium (SPIRIVA RESPIMAT) 2.5 MCG/ACT AERS inhaler Inhale 2 puffs into the lungs daily      SODIUM CHLORIDE, EXTERNAL, (SALINE WOUND WASH) 0.9 % SOLN Apply 1 Bottle topically daily 210 mL 2    aspirin 81 MG chewable tablet Take 1 tablet by mouth daily 30 tablet 3    fluticasone-salmeterol (ADVAIR DISKUS) 250-50 MCG/DOSE AEPB Inhale 1 puff into the lungs every 12 hours for 14 days 1 Inhaler 0       No Known Allergies    No family history on file.     Social History     Socioeconomic History    Marital status:      Spouse name: Not on file    Number of children: Not on file    Years of education: Not on file    Highest education level: Not on file   Occupational History    Not on file   Tobacco Use    Smoking status: Former Smoker    Smokeless tobacco: Former User    Tobacco comment: quit 30 years ago   Vaping Use    Vaping Use: Never used   Substance and Sexual Activity    Alcohol use: Not Currently    Drug use: Not Currently    Sexual activity: Not on file   Other Topics Concern    Not on file   Social History Narrative    ** Merged History Encounter **          Social Determinants of Health     Financial Resource Strain:     Difficulty of Paying Living Expenses: Not on file   Food Insecurity:     Worried About [address] in the Last Year: Not on file    [address] in the Last Year: Not on file   Transportation Needs:     Lack of heel  Measurements: 6 cm x 6 cm x 0.2 cm  Base: mixed granular, fibrotic, with central necrotic eschar  Borders: extensive hyperkeratosis . Exudate: minimal drainage         LABS:   Lab Results   Component Value Date    WBC 4.9 12/03/2021    HGB 11.9 (L) 12/03/2021    HCT 36.8 (L) 12/03/2021    .1 (H) 12/03/2021     12/03/2021     Lab Results   Component Value Date     12/04/2021    K 4.9 12/04/2021     12/04/2021    CO2 13 12/04/2021    BUN 33 12/04/2021    CREATININE 1.30 12/04/2021    GLUCOSE 101 12/04/2021    CALCIUM 8.7 12/04/2021      Lab Results   Component Value Date    LABALBU 3.4 (L) 12/02/2021     No results found for: Franklyn Luna  Lab Results   Component Value Date    CRP 6.6 (H) 01/12/2021     No results found for: LABA1C  No results found for: EAG    MICROBIOLOGY:   Type   Date  Results  Blood Culture:  12/2/21 NGTD        IMAGING:   XR right foot 12/5/21:  Impression       No acute osseous findings. VASCULAR:  Art duplex ordered    Thank you for the consult.     Clementina Barba DPM, PGY3  Please first page [facility], [phone]  December 5, 2021  3:53 PM

## 2021-12-05 NOTE — PROGRESS NOTES
Hospitalist Progress Note      PCP: Brenda Curiel    Date of Admission: 12/2/2021    Chief Complaint:    Chief Complaint   Patient presents with    Altered Mental Status     family called EMS for Pt \"not acting right\", general weakness and loss of appetite for the past three days     Subjective:  Patient awakes; talked to me on the  ipad; son bedside; speaking more full sentences; complaining of pain in his right foot; unable to complete full 12 point ROS     Medications:  Reviewed    Infusion Medications    sodium chloride       Scheduled Medications    lisinopril  5 mg Oral Daily    metoprolol tartrate  25 mg Oral BID    spironolactone  25 mg Oral Daily    [Held by provider] furosemide  40 mg Oral Daily    sodium chloride flush  5-40 mL IntraVENous 2 times per day    cefTRIAXone (ROCEPHIN) IV  1,000 mg IntraVENous Q24H    sodium chloride  500 mL IntraVENous Once     PRN Meds: sodium chloride flush, sodium chloride, ondansetron **OR** ondansetron, polyethylene glycol, acetaminophen **OR** acetaminophen    No intake or output data in the 24 hours ending 12/05/21 1437    Exam:    BP (!) 90/54   Pulse 75   Temp 97.3 °F (36.3 °C)   Resp 18   Wt 136 lb (61.7 kg)   SpO2 95%   BMI 20.08 kg/m²     General appearance: No apparent distress, appears stated age and cooperative. HEENT: . Conjunctivae/corneas clear. Neck: Trachea midline. Respiratory:  Wheezes throughout  Cardiovascular: Irregular  Abdomen: Soft, non-tender, non-distended with normal bowel sounds.   Musculoskeletal: Erythema and edema of his right foot  Neuro: Non Focal.   Capillary Refill: Brisk,< 3 seconds   Peripheral Pulses: +2 palpable, equal bilaterally     Labs:   Recent Labs     12/02/21  1515 12/02/21  1550 12/03/21  0324   WBC 4.9  --  4.9   HGB 13.1* 12.4* 11.9*   HCT 40.0*  --  36.8*     --  190     Recent Labs     12/02/21  1515 12/02/21  1546 12/02/21  1550 12/03/21  0323 12/04/21  1629     --   --  138 134* K 4.0  --   --  4.4 4.9     --   --  102 101   CO2 17*  --   --  17* 13*   BUN 34*  --   --  33* 33*   CREATININE 1.65*   < > 1.4* 1.60* 1.30*   CALCIUM 8.6  --   --  8.7 8.7    < > = values in this interval not displayed. Recent Labs     12/02/21  1515   AST 38   ALT 21   BILITOT 1.1*   ALKPHOS 103     Recent Labs     12/02/21  1515 12/03/21  0324   INR 2.0 2.1     Recent Labs     12/02/21  1515 12/02/21  1515 12/03/21  0324 12/03/21  2157 12/04/21  0243   CKTOTAL 117  --   --   --   --    TROPONINI 0.042*   < > 0.044* 0.059* 0.042*    < > = values in this interval not displayed. Urinalysis:      Lab Results   Component Value Date    NITRU Negative 12/02/2021    WBCUA 20-50 08/09/2021    BACTERIA Negative 08/09/2021    RBCUA 3-5 08/09/2021    BLOODU Negative 12/02/2021    SPECGRAV 1.022 12/02/2021    GLUCOSEU Negative 12/02/2021     Radiology:  CT HEAD WO CONTRAST   Final Result      NO ACUTE INTRACRANIAL PROCESS OR SIGNIFICANT CHANGE FROM 9/1/2021 IDENTIFIED. CTA CHEST W WO CONTRAST   Final Result      SUSPECT SEGMENTAL LEFT LOWER LOBE PULMONARY EMBOLI. MILD TO MODERATE CARDIOMEGALY WITHOUT EVIDENCE OF RIGHT HEART STRAIN. MODERATE-SIZED LEFT AND SMALL RIGHT PLEURAL EFFUSIONS, SIMILAR TO 6/7/2021. MILD TO MODERATE ATELECTASIS/INFARCT OF THE LEFT LOWER LOBE AND MILD PROBABLE DEPENDENT RIGHT ATELECTASIS, SIMILAR TO 6/7/2021. XR CHEST PORTABLE   Final Result   BIBASILAR ATELECTASIS/PNEUMONIA. XR CHEST (2 VW)    (Results Pending)   XR FOOT RIGHT (2 VIEWS)    (Results Pending)     Assessment/Plan:    #Acute Hypoxic respiratory failure secondary to rhino virus pneumonia; repeat CXR  #Pneumonia: Most likely secondary to rhinovirus as detailed in nasopharyngeal swab results  #Procalcitonin 0.18.   Pulmonary primarily following/managing; continue Abx  #Possible subsegmental pulmonary embolism: High risk anticoagulation as detailed by pulmonary and cardiology; discussed with Massiel Bautista (daughter in law; Karlene Otero confirmed)  #FOX:  Gentle hydration   #A Fib; SSS:  COntinue home meds   #Chronic combined systolic and diastolic CHF:  Continue home meds  #Right foot cellulitis:  ID consult as he has been on IV CTX and its non purulent; podiatry consulted as well    Active Hospital Problems    Diagnosis Date Noted    Viral pneumonia [J12.9] 12/02/2021     Additional work up or/and treatment plan may be added today or then after based on clinical progression. I am managing a portion of pt care. Some medical issues are handled by other specialists. Additional work up and treatment should be done in out pt setting by pt PCP and other out pt providers. In addition to examining and evaluating pt, I spent additional time explaining care, normal and abnormal findings, and treatment plan. All of pt questions were answered. Counseling, diet and education were  provided. Case will be discussed with nursing staff when appropriate. Family will be updated if and when appropriate. Diet: ADULT DIET;  Regular    Code Status: DNR-CCA    PT/OT Eval     Electronically signed by Regina Hamm MD on 12/5/2021 at 2:37 PM

## 2021-12-05 NOTE — PROGRESS NOTES
Progress Note  Patient: Luis Enrique Odell  Unit/Bed: G695/B039-51  YOB: 1929  MRN: [id number]  Acct: [de-identified]   Admitting Diagnosis: Bronchospasm [J98.01]  Viral pneumonia [J12.9]  Disorientation [R41.0]  Hypoxia [R09.02]  Rhinovirus [B34.8]  Elevated troponin [R77.8]  FOX (acute kidney injury) ([address] [N17.9]  Atrial fibrillation with rapid ventricular response ([address] [I48.91]  Single subsegmental pulmonary embolism without acute cor pulmonale ([address] [I26.93]  Admit Date:  12/2/2021  Hospital Day: 3    Chief Complaint: Respiratory failure    Histories:  Past Medical History:   Diagnosis Date    Anxiety     Atrial fibrillation ([address]     Cardiomyopathy ([address] 9/18/2020    CHF (congestive heart failure) ([address]     Diabetes mellitus ([address]     Hypertension     Sleep apnea      Past Surgical History:   Procedure Laterality Date    BACK SURGERY      CARDIAC PACEMAKER PLACEMENT  2255    HERNIA REPAIR      THORACENTESIS Left 06/24/2021    450ml removed by Dr Cecilio Aguilar [phone]     No family history on file.   Social History     Socioeconomic History    Marital status:      Spouse name: Not on file    Number of children: Not on file    Years of education: Not on file    Highest education level: Not on file   Occupational History    Not on file   Tobacco Use    Smoking status: Former Smoker    Smokeless tobacco: Former User    Tobacco comment: quit 30 years ago   Vaping Use    Vaping Use: Never used   Substance and Sexual Activity    Alcohol use: Not Currently    Drug use: Not Currently    Sexual activity: Not on file   Other Topics Concern    Not on file   Social History Narrative    ** Merged History Encounter **          Social Determinants of Health     Financial Resource Strain:     Difficulty of Paying Living Expenses: Not on file   Food Insecurity:     Worried About [address] in the Last Year: Not on file    [address] in the Last Year: Not on file Transportation Needs:     Lack of Transportation (Medical): Not on file    Lack of Transportation (Non-Medical): Not on file   Physical Activity:     Days of Exercise per Week: Not on file    Minutes of Exercise per Session: Not on file   Stress:     Feeling of Stress : Not on file   Social Connections:     Frequency of Communication with Friends and Family: Not on file    Frequency of Social Gatherings with Friends and Family: Not on file    Attends Scientologist Services: Not on file    Active Member of [address] [place] [address] [employer] or Organizations: Not on file    Attends Club or Organization Meetings: Not on file    Marital Status: Not on file   Intimate Partner Violence:     Fear of Current or Ex-Partner: Not on file    Emotionally Abused: Not on file    Physically Abused: Not on file    Sexually Abused: Not on file   Housing Stability:     Unable to Pay for Housing in the Last Year: Not on file    Number of [place] in the Last Year: Not on file    Unstable Housing in the Last Year: Not on file       Subjective/HPI  Pt denies CP. Trying to eat but coughing a lot. On 5L O2  sats 92-95 no fever. EKG:  V Paced with underlying AF rate 78        Review of Systems:   Review of Systems   Constitutional: Negative. Negative for diaphoresis and fatigue. HENT: Negative. Eyes: Negative. Respiratory: Positive for cough and shortness of breath. Negative for chest tightness, wheezing and stridor. Cardiovascular: Negative. Negative for chest pain, palpitations and leg swelling. Gastrointestinal: Negative. Negative for blood in stool and nausea. Genitourinary: Negative. Musculoskeletal: Negative. Skin: Negative. Neurological: Negative. Negative for dizziness, syncope, weakness and light-headedness. Hematological: Negative. Psychiatric/Behavioral: Negative.           Physical Examination:    /64   Pulse 83   Temp 97.3 °F (36.3 °C)   Resp 20   Wt 136 lb (61.7 kg)   SpO2 95%   BMI 20.08 kg/m²    Physical Exam   Constitutional: He appears healthy. No distress. HENT:   Normal cephalic and Atraumatic   Eyes: Pupils are equal, round, and reactive to light. Neck: Thyroid normal. No JVD present. No neck adenopathy. No thyromegaly present. Cardiovascular: Normal rate, regular rhythm, intact distal pulses and normal pulses. Murmur heard. Pulmonary/Chest: Increased effort noted. He has no rales. He has diffuse wheezes. He exhibits no tenderness. Course BS   Abdominal: Soft. Bowel sounds are normal. There is no abdominal tenderness. Musculoskeletal:         General: No tenderness or edema. Normal range of motion. Cervical back: Normal range of motion and neck supple. Neurological: He is alert and oriented to person, place, and time. Skin: Skin is warm. No cyanosis. Nails show no clubbing.        LABS:  CBC:   Lab Results   Component Value Date    WBC 4.9 12/03/2021    RBC 3.57 12/03/2021    HGB 11.9 12/03/2021    HCT 36.8 12/03/2021    .1 12/03/2021    MCH 33.4 12/03/2021    MCHC 32.4 12/03/2021    RDW 16.2 12/03/2021     12/03/2021     CBC with Differential:    Lab Results   Component Value Date    WBC 4.9 12/03/2021    RBC 3.57 12/03/2021    HGB 11.9 12/03/2021    HCT 36.8 12/03/2021     12/03/2021    .1 12/03/2021    MCH 33.4 12/03/2021    MCHC 32.4 12/03/2021    RDW 16.2 12/03/2021    LYMPHOPCT 36.1 12/03/2021    MONOPCT 13.5 12/03/2021    BASOPCT 1.1 12/03/2021    MONOSABS 0.7 12/03/2021    LYMPHSABS 1.8 12/03/2021    EOSABS 0.1 12/03/2021    BASOSABS 0.1 12/03/2021     CMP:    Lab Results   Component Value Date     12/04/2021    K 4.9 12/04/2021     12/04/2021    CO2 13 12/04/2021    BUN 33 12/04/2021    CREATININE 1.30 12/04/2021    GFRAA >60.0 12/04/2021    LABGLOM 51.5 12/04/2021    GLUCOSE 101 12/04/2021    PROT 6.5 12/02/2021    LABALBU 3.4 12/02/2021    CALCIUM 8.7 12/04/2021    BILITOT 1.1 12/02/2021    ALKPHOS 103 12/02/2021    AST 38

## 2021-12-05 NOTE — PROGRESS NOTES
Assessment completed. Denies pain at this time. Pt does have moments of anxiety but appears to be calm and comfortable with even and unlabored breathing at this time. Water given. Will continue to monitor.

## 2021-12-05 NOTE — PROGRESS NOTES
Physician Progress Note      Radha Mcelroy  CSN #:                  [id number]  :                       1929  ADMIT DATE:       2021 2:40 PM  [address] DATE:  RESPONDING  PROVIDER #:        Constanza Albert MD          QUERY TEXT:    Pt admitted with acute hypoxic respiratory failure 2/2 PE and viral PNA. Pt   noted to have confusion/AMS/decreased responsiveness. If possible, please   document in the progress notes and discharge summary if you are evaluating and   / or treating any of the following: The medical record reflects the following:  Risk Factors: acute hypoxic respiratory failure 2/2 PE and viral PNA  Clinical Indicators:  Patient presented with confusion, AAO x1, \"not acting   himself\". This am nursing notes patient is \"sleepy\", responds to voice, not   following commands. GCS 12-14  venous ABG 7.397/24.4/105/15  WBC 4.9/4.9  CRP 43.5 LA 5.8 Lactate 5.34  CT   head shows no acute intracranial process  Treatment: IVF, abx, supplemental O2, imaging, droplet isolation, pulmonology   consult    Thank you, Janey Ann RN BSN CDS  [phone]  Options provided:  -- Metabolic encephalopathy  -- Metabolic and hypoxic encephalopathy  -- Other - I will add my own diagnosis  -- Disagree - Not applicable / Not valid  -- Disagree - Clinically unable to determine / Unknown  -- Refer to Clinical Documentation Reviewer    PROVIDER RESPONSE TEXT:    This patient has metabolic encephalopathy.     Query created by: Juarez Greene on 12/3/2021 10:07 AM      Electronically signed by:  Constanza Albert MD 2021 11:50 AM

## 2021-12-05 NOTE — PROGRESS NOTES
[facility] Occupational Therapy      Date: 2021  Patient Name: Clementina Vega        MRN: [id number]  Account: [de-identified]   : 1929  (80 y.o.)  Room: [place] [address]    Chart reviewed, attempted OT at 11:40 for OT eval. Patient not seen 2° to:    Hold per nursing request due to: small PE present and her concern that patient's LE is reddened    Spoke to Trace Segovia RN aware. Will attempt again when able.     Electronically signed by DAVID Hilliard on 2021 at 11:40 AM

## 2021-12-05 NOTE — CARE COORDINATION
[facility] Case Management Initial Discharge Assessment    Met with Patient and Family to discuss discharge plan. PCP: Quyen Richardson                                Date of Last Visit: UNSURE    If no PCP, list provided? N/A    Discharge Planning    Living Arrangements: at home dependent on family care    Who do you live with? [address] [facility]    Who helps you with your care:  self or family    If lives at home:     Do you have any barriers navigating in your home? no    Patient can perform ADL? No    Current Services (outpatient and in home) : Yaneli Weems RN, PT    Dialysis: No    Is transportation available to get to your appointments? Yes    DME Equipment:  yes - Debara Dose; W/C  Respiratory equipment: None    Respiratory provider:  no     Pharmacy:  yes     Consult with Medication Assistance Program?  No      Patient agreeable to [facility]? Yes, Baylee Perea. WILL NEED RESUMPTION ORDER    Patient agreeable to SNF/Rehab? N/A    Other discharge needs identified? Palliative Care      Initial Discharge Plan? (Note: please see concurrent daily documentation for any updates after initial note). D/C PLAN TO BE DETERMINED BY CLINICAL PROGRESS. CM TO MONITOR FOR NEEDS. MET WITH PATIENT AND HIS DAUGHTER VIA FACE TIME TO OBTAIN CMI INFORMATION, AS WELL AS CHART REVIEW. UNABLE TO COMPLETELY OBTAIN ALL OF CMI INFORMATION DUE TO LANGUAGE BARRIER AND PATIENT'S COGNITION.     Readmission Risk              Risk of Unplanned Readmission:  30         Electronically signed by Eduardo Gorman RN on 12/5/2021 at 12:09 PM

## 2021-12-06 ENCOUNTER — APPOINTMENT (OUTPATIENT)
Dept: INTERVENTIONAL RADIOLOGY/VASCULAR | Age: 86
DRG: 175 | End: 2021-12-06
Payer: MEDICARE

## 2021-12-06 LAB
ANION GAP SERPL CALCULATED.3IONS-SCNC: 16 MEQ/L (ref 9–15)
BASOPHILS ABSOLUTE: 0 K/UL (ref 0–0.2)
BASOPHILS RELATIVE PERCENT: 0.7 %
BUN BLDV-MCNC: 27 MG/DL (ref 8–23)
CALCIUM SERPL-MCNC: 8.8 MG/DL (ref 8.5–9.9)
CHLORIDE BLD-SCNC: 102 MEQ/L (ref 95–107)
CO2: 17 MEQ/L (ref 20–31)
CREAT SERPL-MCNC: 1.12 MG/DL (ref 0.7–1.2)
EOSINOPHILS ABSOLUTE: 0.3 K/UL (ref 0–0.7)
EOSINOPHILS RELATIVE PERCENT: 5.6 %
GFR AFRICAN AMERICAN: >60
GFR NON-AFRICAN AMERICAN: >60
GLUCOSE BLD-MCNC: 94 MG/DL (ref 70–99)
HCT VFR BLD CALC: 40.6 % (ref 42–52)
HEMOGLOBIN: 13.4 G/DL (ref 14–18)
INR BLD: 1.6
LYMPHOCYTES ABSOLUTE: 1.7 K/UL (ref 1–4.8)
LYMPHOCYTES RELATIVE PERCENT: 31.8 %
MCH RBC QN AUTO: 33.7 PG (ref 27–31.3)
MCHC RBC AUTO-ENTMCNC: 33 % (ref 33–37)
MCV RBC AUTO: 102.2 FL (ref 80–100)
MONOCYTES ABSOLUTE: 0.7 K/UL (ref 0.2–0.8)
MONOCYTES RELATIVE PERCENT: 13.4 %
NEUTROPHILS ABSOLUTE: 2.7 K/UL (ref 1.4–6.5)
NEUTROPHILS RELATIVE PERCENT: 48.5 %
PDW BLD-RTO: 16.9 % (ref 11.5–14.5)
PLATELET # BLD: 187 K/UL (ref 130–400)
POTASSIUM SERPL-SCNC: 4.5 MEQ/L (ref 3.4–4.9)
PROCALCITONIN: 0.18 NG/ML (ref 0–0.15)
PROTHROMBIN TIME: 18.9 SEC (ref 12.3–14.9)
RBC # BLD: 3.97 M/UL (ref 4.7–6.1)
SODIUM BLD-SCNC: 135 MEQ/L (ref 135–144)
WBC # BLD: 5.5 K/UL (ref 4.8–10.8)

## 2021-12-06 PROCEDURE — 85610 PROTHROMBIN TIME: CPT

## 2021-12-06 PROCEDURE — 80048 BASIC METABOLIC PNL TOTAL CA: CPT

## 2021-12-06 PROCEDURE — 1210000000 HC MED SURG R&B

## 2021-12-06 PROCEDURE — 88305 TISSUE EXAM BY PATHOLOGIST: CPT

## 2021-12-06 PROCEDURE — 2709999900 IR MIDLINE CATH

## 2021-12-06 PROCEDURE — 6360000002 HC RX W HCPCS: Performed by: FAMILY MEDICINE

## 2021-12-06 PROCEDURE — 2700000000 HC OXYGEN THERAPY PER DAY

## 2021-12-06 PROCEDURE — 6370000000 HC RX 637 (ALT 250 FOR IP): Performed by: INTERNAL MEDICINE

## 2021-12-06 PROCEDURE — 6360000002 HC RX W HCPCS: Performed by: INTERNAL MEDICINE

## 2021-12-06 PROCEDURE — 76937 US GUIDE VASCULAR ACCESS: CPT | Performed by: RADIOLOGY

## 2021-12-06 PROCEDURE — 99233 SBSQ HOSP IP/OBS HIGH 50: CPT | Performed by: INTERNAL MEDICINE

## 2021-12-06 PROCEDURE — 2580000003 HC RX 258: Performed by: FAMILY MEDICINE

## 2021-12-06 PROCEDURE — 05H933Z INSERTION OF INFUSION DEVICE INTO RIGHT BRACHIAL VEIN, PERCUTANEOUS APPROACH: ICD-10-PCS | Performed by: INTERNAL MEDICINE

## 2021-12-06 PROCEDURE — 84145 PROCALCITONIN (PCT): CPT

## 2021-12-06 PROCEDURE — 36415 COLL VENOUS BLD VENIPUNCTURE: CPT

## 2021-12-06 PROCEDURE — 36410 VNPNXR 3YR/> PHY/QHP DX/THER: CPT | Performed by: RADIOLOGY

## 2021-12-06 PROCEDURE — 85025 COMPLETE CBC W/AUTO DIFF WBC: CPT

## 2021-12-06 PROCEDURE — 88112 CYTOPATH CELL ENHANCE TECH: CPT

## 2021-12-06 PROCEDURE — 6370000000 HC RX 637 (ALT 250 FOR IP): Performed by: FAMILY MEDICINE

## 2021-12-06 RX ORDER — LIDOCAINE HYDROCHLORIDE 10 MG/ML
5 INJECTION, SOLUTION INFILTRATION; PERINEURAL ONCE
Status: COMPLETED | OUTPATIENT
Start: 2021-12-06 | End: 2021-12-06

## 2021-12-06 RX ORDER — FUROSEMIDE 10 MG/ML
20 INJECTION INTRAMUSCULAR; INTRAVENOUS ONCE
Status: COMPLETED | OUTPATIENT
Start: 2021-12-06 | End: 2021-12-06

## 2021-12-06 RX ADMIN — ACETAMINOPHEN 650 MG: 325 TABLET ORAL at 20:22

## 2021-12-06 RX ADMIN — FUROSEMIDE 20 MG: 10 INJECTION INTRAMUSCULAR; INTRAVENOUS at 19:17

## 2021-12-06 RX ADMIN — SODIUM CHLORIDE, PRESERVATIVE FREE 10 ML: 5 INJECTION INTRAVENOUS at 09:32

## 2021-12-06 RX ADMIN — METOPROLOL TARTRATE 25 MG: 25 TABLET, FILM COATED ORAL at 09:32

## 2021-12-06 RX ADMIN — CEFTRIAXONE SODIUM 1000 MG: 1 INJECTION, POWDER, FOR SOLUTION INTRAMUSCULAR; INTRAVENOUS at 16:37

## 2021-12-06 RX ADMIN — LIDOCAINE HYDROCHLORIDE 5 ML: 10 INJECTION, SOLUTION INFILTRATION; PERINEURAL at 16:13

## 2021-12-06 RX ADMIN — METOPROLOL TARTRATE 25 MG: 25 TABLET, FILM COATED ORAL at 23:14

## 2021-12-06 RX ADMIN — SODIUM CHLORIDE, PRESERVATIVE FREE 10 ML: 5 INJECTION INTRAVENOUS at 20:24

## 2021-12-06 ASSESSMENT — PAIN SCALES - GENERAL
PAINLEVEL_OUTOF10: 0
PAINLEVEL_OUTOF10: 3

## 2021-12-06 NOTE — PROGRESS NOTES
Hospitalist Progress Note      PCP: Viral Harris    Date of Admission: 12/2/2021    Chief Complaint:    Chief Complaint   Patient presents with    Altered Mental Status     family called EMS for Pt \"not acting right\", general weakness and loss of appetite for the past three days     Subjective:  Patient awakes; talked to me on the  ipad; son bedside; speaking more full sentences; no complaints today;  12 point ROS negative    Medications:  Reviewed    Infusion Medications    sodium chloride       Scheduled Medications    [Held by provider] lisinopril  5 mg Oral Daily    metoprolol tartrate  25 mg Oral BID    [Held by provider] spironolactone  25 mg Oral Daily    [Held by provider] furosemide  40 mg Oral Daily    sodium chloride flush  5-40 mL IntraVENous 2 times per day    cefTRIAXone (ROCEPHIN) IV  1,000 mg IntraVENous Q24H    sodium chloride  500 mL IntraVENous Once     PRN Meds: sodium chloride flush, sodium chloride, ondansetron **OR** ondansetron, polyethylene glycol, acetaminophen **OR** acetaminophen      Intake/Output Summary (Last 24 hours) at 12/6/2021 1208  Last data filed at 12/5/2021 2019  Gross per 24 hour   Intake 10 ml   Output --   Net 10 ml       Exam:    BP 99/61   Pulse 59   Temp 97 °F (36.1 °C) (Temporal)   Resp 18   Wt 136 lb (61.7 kg)   SpO2 94%   BMI 20.08 kg/m²     General appearance: No apparent distress, appears stated age and cooperative. HEENT: . Conjunctivae/corneas clear. Neck: Trachea midline. Respiratory:  Basilar rales  Cardiovascular: Irregular  Abdomen: Soft, non-tender, non-distended with normal bowel sounds.   Musculoskeletal: Erythema and edema of his right foot  Neuro: Non Focal.   Capillary Refill: Brisk,< 3 seconds   Peripheral Pulses: +2 palpable, equal bilaterally     Labs:   Recent Labs     12/06/21  0714   WBC 5.5   HGB 13.4*   HCT 40.6*        Recent Labs     12/04/21  1629 12/05/21  1832 12/06/21  0714   * 128* 135   K 4.9 4.1 4.5    97 102   CO2 13* 15* 17*   BUN 33* 28* 27*   CREATININE 1.30* 1.04 1.12   CALCIUM 8.7 8.6 8.8     No results for input(s): AST, ALT, BILIDIR, BILITOT, ALKPHOS in the last 72 hours. No results for input(s): INR in the last 72 hours. Recent Labs     12/03/21  2157 12/04/21  0243   TROPONINI 0.059* 0.042*     Urinalysis:      Lab Results   Component Value Date    NITRU Negative 12/02/2021    WBCUA 20-50 08/09/2021    BACTERIA Negative 08/09/2021    RBCUA 3-5 08/09/2021    BLOODU Negative 12/02/2021    SPECGRAV 1.022 12/02/2021    GLUCOSEU Negative 12/02/2021     Radiology:  US DUP LOWER ART/BYPASS GRAFTS BILATERAL COMPLETE   Final Result      30-49% stenosis involving the right proximal posterior tibial artery and around 50% stenosis involving the left proximal posterior tibial artery. No evidence of arterial occlusion, or aneurysm. Mild to moderate distal atherosclerotic disease of both lower extremities. .                  XR CHEST (2 VW)   Final Result      No significant interval change from prior. Bibasilar opacities/atelectasis. Bilateral pleural effusions, larger on the left. XR FOOT RIGHT (2 VIEWS)   Final Result      No acute osseous findings. CT HEAD WO CONTRAST   Final Result      NO ACUTE INTRACRANIAL PROCESS OR SIGNIFICANT CHANGE FROM 9/1/2021 IDENTIFIED. CTA CHEST W WO CONTRAST   Final Result      SUSPECT SEGMENTAL LEFT LOWER LOBE PULMONARY EMBOLI. MILD TO MODERATE CARDIOMEGALY WITHOUT EVIDENCE OF RIGHT HEART STRAIN. MODERATE-SIZED LEFT AND SMALL RIGHT PLEURAL EFFUSIONS, SIMILAR TO 6/7/2021. MILD TO MODERATE ATELECTASIS/INFARCT OF THE LEFT LOWER LOBE AND MILD PROBABLE DEPENDENT RIGHT ATELECTASIS, SIMILAR TO 6/7/2021. XR CHEST PORTABLE   Final Result   BIBASILAR ATELECTASIS/PNEUMONIA.       IR MIDLINE CATH    (Results Pending)     Assessment/Plan:    #Acute Hypoxic respiratory failure secondary to rhino virus pneumonia as well as bilateral pleural effusions:  Continue Abx for concern for superimposed bacterial PNA; IR consultation for diagnostic/therapeutic paracentesis; nephrology consult for volume status  #Possible subsegmental pulmonary embolism: High risk anticoagulation as detailed by pulmonary and cardiology; discussed with Vivi Palacios (daughter in law; Klaudia Nagy confirmed)  #FOX:  Gentle hydration   #A Fib; SSS:  COntinue home meds   #acute on Chronic combined systolic and diastolic CHF:  Cardiology is managing; medications are held due to SBP at this moment  #Right foot cellulitis:  ID consult as he has been on IV CTX and its non purulent; podiatry consulted as well; continue IV Abx; slightly improved    Active Hospital Problems    Diagnosis Date Noted    Unstageable pressure ulcer of left heel (HCC) [L89.620]     Cellulitis of right foot [M56.468]     Acute pulmonary embolism without acute cor pulmonale ([facility] [address] [I26.99]     Viral pneumonia [J12.9] 12/02/2021     Additional work up or/and treatment plan may be added today or then after based on clinical progression. I am managing a portion of pt care. Some medical issues are handled by other specialists. Additional work up and treatment should be done in out pt setting by pt PCP and other out pt providers. In addition to examining and evaluating pt, I spent additional time explaining care, normal and abnormal findings, and treatment plan. All of pt questions were answered. Counseling, diet and education were  provided. Case will be discussed with nursing staff when appropriate. Family will be updated if and when appropriate. Diet: ADULT DIET;  Regular    Code Status: DNR-CCA    PT/OT Eval     Electronically signed by Madelin Fragoso MD on 12/6/2021 at 12:08 PM

## 2021-12-06 NOTE — CONSULTS
[address]                      [address], [address] [facility]                                  CONSULTATION    PATIENT NAME: Oliver Parisi                    :        1929  MED REC NO:   [id number]                            ROOM:       [id number]  ACCOUNT NO:   [de-identified]                           ADMIT DATE: 2021  PROVIDER:     Aggie Painting DO    CONSULT DATE:  2021    RENAL CONSULTATION    HISTORY OF PRESENT ILLNESS:  This is a 41-year-old admitted through the  emergency room due to hypertension, CHF, atrial fibrillation. The  patient is a poor historian, speaks majority of [place] The patient  seems to be alert, in no distress. He is being seen at the request of  the hospitalist due to hyponatremia. On admission to the hospital, the  patient's sodium was 134 that did decline to 128 and now is at 134 mEq. Renal function during this whole time was normal.  He was negative for  influenza and COVID. Old records show that in August of this year, the patient had normal  electrolytes and renal function. Difficult to tell the patient has been  taking nonsteroidal anti-inflammatory medications or excessive fluid  intake. No documented diarrhea. He is admitted to the hospital with  known history of chronic atrial fibrillation, cardiomyopathy, sick sinus  syndrome. He has a pacemaker in place. 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 PAST SURGICAL HISTORY:  Left thoracentesis, cardiac pacemaker,  laminectomy, hernia repair. FAMILY HISTORY:  Could not be obtained, but are negative on the chart. MEDICATIONS:  At the time of his admission; Lasix, Aldactone, Zestril,  Lopressor, Spiriva, aspirin, Advair. ALLERGIES TO MEDICATIONS:  None. REVIEW OF SYSTEMS:  Could not be obtained, but are negative on the  chart.     PHYSICAL EXAMINATION:  VITAL SIGNS:  5 feet and 9 inches, 136 pounds. Blood pressure is  100/60, heart rate 80, respirations 20, afebrile. HEENT:  Normocephalic. Pupils reactive to light. Sclerae are clear. Throat shows no exudates. CHEST:  Lungs show decreased sounds. No expiratory wheezing. No  accessory muscle use. Scattered rhonchi. Left chest wall shows  pacemaker in place. CARDIOVASCULAR:  Heart is regular, 1/6 systolic murmur. ABDOMEN:  Soft. The patient's abdomen is flat. Bowel sounds are  present. EXTREMITIES:  Show no edema. SKIN:  Warm and dry. IMPRESSION:  1. Hyponatremia possibly related to CHF and medications. 2.  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 3.  Suspected recent viral pneumonia. 4.  Severe cardiomyopathy with ejection fraction estimated to be 20%. PLAN:  Restrict fluids. Diuresis has improved overall status of his  breathing. Once discharged, we will need to follow his electrolytes  closer to prevent any future hyponatremia. The patient will most likely  need to be on loop diuretic and MRA medication to keep him out of heart  failure. Avoid nonsteroidals.         Miguel Angel Reynolds DO    D: 12/06/2021 11:59:53       T: 12/06/2021 12:04:19     [email or web address]  Job#: [id number]     Doc#: [id number]    CC:

## 2021-12-06 NOTE — PROGRESS NOTES
PODIATRIC MEDICINE AND SURGERY  CONSULT HISTORY AND PHYSICAL      Consulting Service:  medicine  Requesting Provider: Francis Garcia MD  Opinion/advice regarding: right foot cellulitis  Staff Doctor:  Dr. Winston Cat:  This 80 y.o. male with significant PMH of a fib, cardiomyopathy, CHF, DM, HTN and NABIL presents with heel ulcer left foot and possible cellulitis vs dependent rubor right foot. PLAN AND RECOMMENDATIONS:  Arterial Duplex Reviewed  We recommend Palliative wound care and offloading the left heel  Dressing change orders placed for daily betadine WTD to left heel. Patient will need follow up after discharge with Dr. Devin Figueroa at the wound care center within 7 days of discharge. INTERVAL HISTORY:  No acute events  Patient at Special [facility]        Past Medical History:   Diagnosis Date    Anxiety     At[facility] fibrillation ([facility] [address]     Cardiomyopathy ([facility] [address] 9/18/2020    CHF (congestive heart failure) (HCC)     Diabetes mellitus ([facility] [address]     Hypertension     Sleep apnea        Past Surgical History:   Procedure Laterality Date    BACK SURGERY      CARDIAC PACEMAKER PLACEMENT  9938    HERNIA REPAIR      THORACENTESIS Left 06/24/2021    450ml removed by Dr Stephen Marino [phone]       No current facility-administered medications on file prior to encounter.      Current Outpatient Medications on File Prior to Encounter   Medication Sig Dispense Refill    furosemide (LASIX) 40 MG tablet Take 1 tablet by mouth daily 30 tablet 6    spironolactone (ALDACTONE) 25 MG tablet Take 1 tablet by mouth daily 30 tablet 3    lisinopril (PRINIVIL;ZESTRIL) 5 MG tablet Take 1 tablet by mouth daily 30 tablet 3    metoprolol tartrate (LOPRESSOR) 50 MG tablet Take 0.5 tablets by mouth 2 times daily 60 tablet 5    tiotropium (SPIRIVA RESPIMAT) 2.5 MCG/ACT AERS inhaler Inhale 2 puffs into the lungs daily      SODIUM CHLORIDE, EXTERNAL, (SALINE WOUND WASH) 0.9 % SOLN Apply 1 Bottle topically daily 210 mL 2    aspirin 81 MG chewable tablet Take 1 tablet by mouth daily 30 tablet 3    fluticasone-salmeterol (ADVAIR DISKUS) 250-50 MCG/DOSE AEPB Inhale 1 puff into the lungs every 12 hours for 14 days 1 Inhaler 0       No Known Allergies    No family history on file. Social History     Socioeconomic History    Marital status:      Spouse name: Not on file    Number of children: Not on file    Years of education: Not on file    Highest education level: Not on file   Occupational History    Not on file   Tobacco Use    Smoking status: Former Smoker    Smokeless tobacco: Former User    Tobacco comment: quit 30 years ago   Vaping Use    Vaping Use: Never used   Substance and Sexual Activity    Alcohol use: Not Currently    Drug use: Not Currently    Sexual activity: Not on file   Other Topics Concern    Not on file   Social History Narrative    ** Merged History Encounter **          Social Determinants of Health     Financial Resource Strain:     Difficulty of Paying Living Expenses: Not on file   Food Insecurity:     Worried About [address] in the Last Year: Not on file    [address] in the Last Year: Not on file   Transportation Needs:     Lack of Transportation (Medical): Not on file    Lack of Transportation (Non-Medical):  Not on file   Physical Activity:     Days of Exercise per Week: Not on file    Minutes of Exercise per Session: Not on file   Stress:     Feeling of Stress : Not on file   Social Connections:     Frequency of Communication with Friends and Family: Not on file    Frequency of Social Gatherings with Friends and Family: Not on file    Attends Anabaptist Services: Not on file    Active Member of Clubs or Organizations: Not on file    Attends Club or Organization Meetings: Not on file    Marital Status: Not on file   Intimate Partner Violence:     Fear of Current or Ex-Partner: Not on file    Emotionally Abused: Not on file    Physically Abused: Not on file    Sexually Abused: Not on file   Housing Stability:     Unable to Pay for Housing in the Last Year: Not on file    Number of Places Lived in the Last Year: Not on file    Unstable Housing in the Last Year: Not on file       Review of Systems  Difficult to complete. OBJECTIVE:  BP 99/61   Pulse 59   Temp 97 °F (36.1 °C) (Temporal)   Resp 18   Wt 136 lb (61.7 kg)   SpO2 94%   BMI 20.08 kg/m²   Patient is alert and oriented x 3 in NAD. Ulceration #1:   Location: left posterior and plantar heel  Measurements: 6 cm x 6 cm x 0.2 cm  Base: mixed granular, fibrotic, with central necrotic eschar  Borders: extensive hyperkeratosis . Exudate: minimal drainage         LABS:   Lab Results   Component Value Date    WBC 5.5 12/06/2021    HGB 13.4 (L) 12/06/2021    HCT 40.6 (L) 12/06/2021    .2 (H) 12/06/2021     12/06/2021     Lab Results   Component Value Date     12/06/2021    K 4.5 12/06/2021    K 4.9 12/04/2021     12/06/2021    CO2 17 12/06/2021    BUN 27 12/06/2021    CREATININE 1.12 12/06/2021    GLUCOSE 94 12/06/2021    CALCIUM 8.8 12/06/2021      Lab Results   Component Value Date    LABALBU 3.4 (L) 12/02/2021     No results found for: SEDRATE  Lab Results   Component Value Date    CRP 6.6 (H) 01/12/2021     No results found for: LABA1C  No results found for: EAG    MICROBIOLOGY:   Type   Date  Results  Blood Culture:  12/2/21 NGTD        IMAGING:   XR right foot 12/5/21:  Impression       No acute osseous findings.      VASCULAR:    US DUP LOWER ART/BYPASS GRAFTS BILATERAL COMPLETE    IMPRESSION:    30-49% stenosis involving the right proximal posterior tibial artery and around 50% stenosis involving the left proximal posterior tibial artery.        No evidence of arterial occlusion, or aneurysm.       Mild to moderate distal atherosclerotic disease of both lower extremities         Kervin Perez DPM, PGY- 1  Please first page Podiatry On Call, [phone]  December 6, 2021  12:25 PM

## 2021-12-06 NOTE — CARE COORDINATION
[facility] Case Management Initial Discharge Assessment    Met with Family to discuss discharge plan. PCP: Jennifer Rivero                                Date of Last Visit: unknown    If no PCP, list provided? N/A    Discharge Planning    Living Arrangements: at home dependent on family care    Who do you live with? Wife, son    Who helps you with your care:  family    If lives at home:     Do you have any barriers navigating in your home? no    Patient can perform ADL? No    Current Services (outpatient and in home) :  none     Dialysis: No    Is transportation available to get to your appointments? Yes    DME Equipment:  yes - walker, shower chair    Respiratory equipment: None    Respiratory provider:  no     Pharmacy:  yes - roberto carlos / Angie Baker with Medication Assistance Program?  No      Patient agreeable to [employer] Yes, [employer]    Patient agreeable to SNF/Rehab? No    Other discharge needs identified? N/A    Does Patient Have a High-Risk for Readmission Diagnosis (CHF, PN, MI, COPD)? No         Initial Discharge Plan? (Note: please see concurrent daily documentation for any updates after initial note). Patient and family plan to return home upon discharge. Patient is open to [facility] upon discharge.      Readmission Risk              Risk of Unplanned Readmission:  [address]  Electronically signed by VALENTINE Auguste, BEBAW on 12/6/2021 at 12:21 PM

## 2021-12-06 NOTE — CONSULTS
Infectious Disease     Patient Name: Lilliam Cartagena  Date: 12/5/2021  YOB: 1929  Medical Record Number: [id number]        Chief Complaint   Patient presents with    Altered Mental Status     family called EMS for Pt \"not acting right\", general weakness and loss of appetite for the past three days          History of Present Illness: Presents with weakness. Is confused at presentation. He had some shortness of breath. Respiratory viral panel positive for rhinovirus. Skin observed that his right leg was appearing more erythematous. Not able to give much history given his confusion and language barrier at this time is been afebrile. Review of systems cannot be obtained because of patient's mental status    Social History     Tobacco Use    Smoking status: Former Smoker    Smokeless tobacco: Former User    Tobacco comment: quit 30 years ago   Vaping Use    Vaping Use: Never used   Substance Use Topics    Alcohol use: Not Currently    Drug use: Not Currently         Past Medical History:   Diagnosis Date    Anxiety     Atrial fibrillation ([address]     Cardiomyopathy ([facility] [address] 9/18/2020    CHF (congestive heart failure) ([address]     Diabetes mellitus ([address]     Hypertension     Sleep apnea            Past Surgical History:   Procedure Laterality Date    BACK SURGERY      CARDIAC PACEMAKER PLACEMENT  2345    HERNIA REPAIR      THORACENTESIS Left 06/24/2021    450ml removed by Dr Abby Malik [phone]         No current facility-administered medications on file prior to encounter.      Current Outpatient Medications on File Prior to Encounter   Medication Sig Dispense Refill    furosemide (LASIX) 40 MG tablet Take 1 tablet by mouth daily 30 tablet 6    spironolactone (ALDACTONE) 25 MG tablet Take 1 tablet by mouth daily 30 tablet 3    lisinopril (PRINIVIL;ZESTRIL) 5 MG tablet Take 1 tablet by mouth daily 30 tablet 3    metoprolol tartrate (LOPRESSOR) 50 MG tablet Take 0.5 tablets by mouth 2 times daily 60 tablet 5    tiotropium (SPIRIVA RESPIMAT) 2.5 MCG/ACT AERS inhaler Inhale 2 puffs into the lungs daily      SODIUM CHLORIDE, EXTERNAL, (SALINE WOUND WASH) 0.9 % SOLN Apply 1 Bottle topically daily 210 mL 2    aspirin 81 MG chewable tablet Take 1 tablet by mouth daily 30 tablet 3    fluticasone-salmeterol (ADVAIR DISKUS) 250-50 MCG/DOSE AEPB Inhale 1 puff into the lungs every 12 hours for 14 days 1 Inhaler 0       No Known Allergies      Family medical history cannot be obtained from patient    Physical Exam:     Blood pressure 122/87, pulse 75, temperature 97.3 °F (36.3 °C), resp. rate 18, weight 136 lb (61.7 kg), SpO2 95 %. General: Patient appears ok at the present time. NAD  Skin: no new rashes, right heel ulcer with eschar necrotic  HEENT:  Neck is supple, No subconjunctival hemorrhages, no oral exudates  Heart: S1 S2  Lungs: Diminished bases  Abdomen: soft, ND, NTTP,   Back :no CVA tenderness  Extrem: Some slight hyperemia right lower extremity, left heel ulcer with necrosis  Neuro exam: CN II-XII intact  Psych: cooperative    Labs: I have reviewed all lab results by electronic record, including most recent CBC, metabolic panel, and pertinent abnormalities were addressed from an infectious disease perspective. Trends are being monitored over time. Lab Results   Component Value Date    WBC 4.9 12/03/2021    HGB 11.9 (L) 12/03/2021    HCT 36.8 (L) 12/03/2021    .1 (H) 12/03/2021     12/03/2021     Lab Results   Component Value Date     12/05/2021    K 4.1 12/05/2021    K 4.9 12/04/2021    CL 97 12/05/2021    CO2 15 12/05/2021    BUN 28 12/05/2021    CREATININE 1.04 12/05/2021    GLUCOSE 125 12/05/2021    CALCIUM 8.6 12/05/2021        Radiology:  I have reviewed imaging results per electronic record and most pertinent abnormalities are being addressed from an infectious disease standpoint.              ASSESSMENT:  Altered mental status  Acute hypoxic respiratory failure   Right leg redness  Heel ulcer     Agree with podiatric assessment based on the appearance of his right leg this could be ischemia related rather than infection  PLAN:  Continue current antimicrobial therapy follow closely  Vascular work-up

## 2021-12-06 NOTE — PROGRESS NOTES
Cardiology Progress Note  Patient: Felicia Bryant  Unit/Bed: I736/E829-84  YOB: 1929  MRN: [id number]  Acct: [de-identified]   Admitting Diagnosis: Bronchospasm [J98.01]  Viral pneumonia [J12.9]  Disorientation [R41.0]  Hypoxia [R09.02]  Rhinovirus [B34.8]  Elevated troponin [R77.8]  FOX (acute kidney injury) ([facility] [address] [N17.9]  Atrial fibrillation with rapid ventricular response ([facility] [address] [I48.91]  Single subsegmental pulmonary embolism without acute cor pulmonale ([facility] [address] [I26.93]  Date:  12/2/2021  Hospital Day: 4      Reason of this consult:  Troponin elevation    History of Present Illness:  72-year-old Citizen of [place]-speaking only male with significant cardiac history including severe cardiomyopathy EF 20% by TTE 8/9/2021 not a candidate for ICD, sick sinus syndrome status post pacemaker placement 8/11/21 and atrial fibrillation, as well as hypertension CKD who was admitted to the hospital last night for 3-day history of weakness. Cardiology consulted for evaluation and management of positive troponins    Of note: patient was admitted to the hospital 9/1/2021 and discharged the following day after sustaining a fall  Per son pt started feeling week and tired 3 days ago. Also 3 days ago he developed SOB which is worsening    CT of the chest reported as patient for left lower lobe pulmonary embolism.   CXR 12/2/21: bibasilar atelectasis/pneumonia  EKG at admission: Atrial fibrillation rate 80 old left bundle branch block  TTE 8/9/2021 EF 20%, moderate MR, moderate TR    12/6/2021  Patient reports shortness of breath    No Known Allergies    Current Facility-Administered Medications   Medication Dose Route Frequency Provider Last Rate Last Admin    [Held by provider] lisinopril (PRINIVIL;ZESTRIL) tablet 5 mg  5 mg Oral Daily Rufina Sorto MD        metoprolol tartrate (LOPRESSOR) tablet 25 mg  25 mg Oral BID Rufina Sorto MD   25 mg at 12/06/21 0932    [Held by provider] spironolactone (ALDACTONE) tablet 25 mg  25 mg Oral Daily Sadie Foster MD        [Held by provider] furosemide (LASIX) tablet 40 mg  40 mg Oral Daily Sadie Foster MD        sodium chloride flush 0.9 % injection 5-40 mL  5-40 mL IntraVENous 2 times per day Ludmila Villalpando MD   10 mL at 12/06/21 0932    sodium chloride flush 0.9 % injection 5-40 mL  5-40 mL IntraVENous PRN Ludmila Villalpando MD        0.9 % sodium chloride infusion  25 mL IntraVENous PRN Ludmila Villalpando MD        ondansetron (ZOFRAN-ODT) disintegrating tablet 4 mg  4 mg Oral Q8H PRN Ludmila Villalpando MD        Or    [facility]ans[facility]ron [facility] injection 4 mg  4 mg IntraVENous Q6H PRN Ludmila Villalpando MD        polyethylene glycol [facility]) packet 17 g  17 g Oral Daily PRN Ludmila Villalpando MD        acetaminophen (TYLENOL) tablet 650 mg  650 mg Oral Q6H PRN Ludmila Villalpando MD   650 mg at 12/05/21 2220    Or    acetaminophen (TYLENOL) suppository 650 mg  650 mg Rectal Q6H PRN Ludmila Villalpando MD        cefTRIAXone (ROCEPHIN) 1000 mg IVPB in 50 mL D5W minibag  1,000 mg IntraVENous Q24H Ludmila Villalpando MD   Stopped at 12/05/21 2300    0.9 % sodium chloride bolus  500 mL IntraVENous Once Koreen Skiff, DO           PMHx:  Past Medical History:   Diagnosis Date    Anxiety     Atrial fibrillation ([facility] [address]     Cardiomyopathy ([facility] [address] 9/18/2020    CHF (congestive heart failure) (HCC)     Diabetes mellitus ([facility] [address]     Hypertension     Sleep apnea        PSHx:  Past Surgical History:   Procedure Laterality Date    BACK SURGERY      CARDIAC PACEMAKER PLACEMENT  2374    HERNIA REPAIR      THORACENTESIS Left 06/24/2021    450ml removed by Dr Jaiden Dunne [phone]       Social Hx:  Social History     Socioeconomic History    Marital status:      Spouse name: Not on file    Number of children: Not on file    Years of education: Not on file    Highest education level: Not on file   Occupational History    Not on file   Tobacco Use    Smoking status: Former Smoker    Smokeless tobacco: Former User    Tobacco comment: quit 30 years ago   Vaping Use    Vaping Use: Never used   Substance and Sexual Activity    Alcohol use: Not Currently    Drug use: Not Currently    Sexual activity: Not on file   Other Topics Concern    Not on file   Social History Narrative    ** Merged History Encounter **          Social Determinants of Health     Financial Resource Strain:     Difficulty of Paying Living Expenses: Not on file   Food Insecurity:     Worried About Running Out of Food in the Last Year: Not on file    Haylie of Food in the Last Year: Not on file   Transportation Needs:     Lack of Transportation (Medical): Not on file    Lack of Transportation (Non-Medical): Not on file   Physical Activity:     Days of Exercise per Week: Not on file    Minutes of Exercise per Session: Not on file   Stress:     Feeling of Stress : Not on file   Social Connections:     Frequency of Communication with Friends and Family: Not on file    Frequency of Social Gatherings with Friends and Family: Not on file    Attends Jew Services: Not on file    Active Member of [address] [place] [address] or Organizations: Not on file    Attends Club or Organization Meetings: Not on file    Marital Status: Not on file   Intimate Partner Violence:     Fear of Current or Ex-Partner: Not on file    Emotionally Abused: Not on file    Physically Abused: Not on file    Sexually Abused: Not on file   Housing Stability:     Unable to Pay for Housing in the Last Year: Not on file    Number of [place] in the Last Year: Not on file    Unstable Housing in the Last Year: Not on file       Family Hx:  No family history on file.     Review of Systems:   Review of Systems      Physical Examination:    BP 99/61   Pulse 59   Temp 97 °F (36.1 °C) (Temporal)   Resp 18   Wt 136 lb (61.7 kg)   SpO2 100%   BMI 20.08 kg/m²    Physical Exam    LABS:  CBC:  Lab Results   Component Value Date    WBC 5.5 12/06/2021    RBC 3.97 12/06/2021    HGB 13.4 12/06/2021    HCT 40.6 12/06/2021    .2 12/06/2021    MCH 33.7 12/06/2021    MCHC 33.0 12/06/2021    RDW 16.9 12/06/2021     12/06/2021     CBC with Differential:   Lab Results   Component Value Date    WBC 5.5 12/06/2021    RBC 3.97 12/06/2021    HGB 13.4 12/06/2021    HCT 40.6 12/06/2021     12/06/2021    .2 12/06/2021    MCH 33.7 12/06/2021    MCHC 33.0 12/06/2021    RDW 16.9 12/06/2021    LYMPHOPCT 31.8 12/06/2021    MONOPCT 13.4 12/06/2021    BASOPCT 0.7 12/06/2021    MONOSABS 0.7 12/06/2021    LYMPHSABS 1.7 12/06/2021    EOSABS 0.3 12/06/2021    BASOSABS 0.0 12/06/2021     CMP:    Lab Results   Component Value Date     12/06/2021    K 4.5 12/06/2021    K 4.9 12/04/2021     12/06/2021    CO2 17 12/06/2021    BUN 27 12/06/2021    CREATININE 1.12 12/06/2021    GFRAA >60.0 12/06/2021    LABGLOM >60.0 12/06/2021    GLUCOSE 94 12/06/2021    PROT 6.5 12/02/2021    LABALBU 3.4 12/02/2021    CALCIUM 8.8 12/06/2021    BILITOT 1.1 12/02/2021    ALKPHOS 103 12/02/2021    AST 38 12/02/2021    ALT 21 12/02/2021     BMP:    Lab Results   Component Value Date     12/06/2021    K 4.5 12/06/2021    K 4.9 12/04/2021     12/06/2021    CO2 17 12/06/2021    BUN 27 12/06/2021    LABALBU 3.4 12/02/2021    CREATININE 1.12 12/06/2021    CALCIUM 8.8 12/06/2021    GFRAA >60.0 12/06/2021    LABGLOM >60.0 12/06/2021    GLUCOSE 94 12/06/2021     Magnesium:    Lab Results   Component Value Date    MG 2.1 12/02/2021     Troponin:    Lab Results   Component Value Date    TROPONINI 0.042 12/04/2021       Radiology:  CT HEAD WO CONTRAST    Result Date: 12/2/2021  CT HEAD WO CONTRAST : 12/2/2021 CLINICAL HISTORY:  confusion x 3 days . COMPARISON: 9/1/2021. TECHNIQUE: Spiral unenhanced images were obtained of the head, with routine multiplanar reconstructions performed.  All CT scans at this facility use dose modulation, iterative reconstruction, and/or weight based dosing when appropriate to reduce radiation dose to as low as reasonably achievable. FINDINGS: There is no intracranial hemorrhage, mass effect, midline shift, extra-axial collection, evidence of hydrocephalus, recent ischemic infarct, or skull fracture identified. Chronic right frontal, temporooccipital and right cerebellar infarcts, mild generalized cerebral volume loss, and mild to moderate patchy white matter changes are again noted. The mastoid air cells and visualized paranasal sinuses are essentially clear. NO ACUTE INTRACRANIAL PROCESS OR SIGNIFICANT CHANGE FROM 9/1/2021 IDENTIFIED. CTA CHEST W WO CONTRAST    Result Date: 12/2/2021  CTA CHEST W WO CONTRAST: 12/2/2021 CLINICAL HISTORY:  tachycardic, new afib, ? effusion vs wedge shaped infarct vs infiltrate left lung base. assess for PE . COMPARISON: Chest CT without contrast 6/7/2021. Spiral enhanced images were obtained of the chest during the infusion of approximately 100 mL of Isovue 300 contrast with pulmonary artery  CTA protocol. Routine and volume rendered images were obtained on a 3-dimensional workstation. All CT scans at this facility use dose modulation, iterative reconstruction, and/or weight based dosing when appropriate to reduce radiation dose to as low as reasonably achievable. FINDINGS: The study is limited by motion artifact. Pulmonary emboli are suspected within the segmental branches of the left lower lobe, with a moderate-sized left pleural effusion and mild atelectasis/infarct of the left lung base. No other significant filling defects are identified within the central pulmonary vasculature elsewhere. The heart is mild to moderately enlarged, without evidence of right heart strain. A small layering right pleural effusion is present, with mild right atelectasis. The thoracic aorta is mildly ectatic and unfolded with mild atherosclerotic plaquing.  There is no evidence of dissection, within the limits of the limited contrast opacification. There is no significant lymphadenopathy, worrisome nodules or masses. Mild degenerative changes of the thoracic spine are noted. There are no acute fractures identified. The limited imaging of the included upper abdomen is noncontributory. SUSPECT SEGMENTAL LEFT LOWER LOBE PULMONARY EMBOLI. MILD TO MODERATE CARDIOMEGALY WITHOUT EVIDENCE OF RIGHT HEART STRAIN. MODERATE-SIZED LEFT AND SMALL RIGHT PLEURAL EFFUSIONS, SIMILAR TO 6/7/2021. MILD TO MODERATE ATELECTASIS/INFARCT OF THE LEFT LOWER LOBE AND MILD PROBABLE DEPENDENT RIGHT ATELECTASIS, SIMILAR TO 6/7/2021. XR CHEST PORTABLE    Result Date: 12/2/2021  EXAMINATION: XR CHEST PORTABLE CLINICAL HISTORY: ALTERED MENTAL STATUS COMPARISONS: SEPTEMBER 1, 2021 FINDINGS: Pacemaker generator and wires unchanged. Degenerative change bilateral glenohumeral and acromioclavicular joints. Cardiopericardial silhouette normal. Ill-defined area increased opacity right lung base. Increased opacity left lung base skiers left lower lung, and left diaphragm. BIBASILAR ATELECTASIS/PNEUMONIA.       EKG: atrial fibrillation, rate 103      Assessment:    Active Hospital Problems    Diagnosis Date Noted    Unstageable pressure ulcer of left heel ([facility]) [L89.620]      Priority: Low    Cellulitis of right foot [L03.115]      Priority: Low    Acute pulmonary embolism without acute cor pulmonale (HCC) [I26.99]      Priority: Low    Viral pneumonia [J12.9] 12/02/2021     Priority: Low     Detectable troponins but not significant levels. Troponin level off at 0.04x2  LLL PE  General weakness. Patient found with viral pneumonia during this admission, Covid negative  Chronic atrial fibrillation. No longer on anticoagulation secondary to high risks bleeding and recent fall 3 months ago. Severe cardiomyopathy EF 20 to 25%.   Patient not a candidate for ICD  Sick sinus syndrome status post pacemaker placement 8/11/21  Chronic combined heart failure  Moderate MR  Moderate TR  CKD  Severely debilitated patient  Pleural effusions with 3 of prior thoracentesis. left pleural effusion bigger. Plan:  Recommend supportive care for PE and pneumonia as per primary team  Would not recommend long term anticoagulation for this severely debilitated and elderly patient, he is at high risk for bleeding  Continue aspirin  Continue metoprolol  I would not recommend invasive procedures for this elderly patient and severely debilitated.   Recommend conservative management  Plan for thoracentesis later on today  Highly recommend palliative care consult to discuss goals of care with family members        Electronically signed by Jose Cruz Hayes MD on 12/6/2021 at 3:12 PM

## 2021-12-06 NOTE — PROGRESS NOTES
Infectious Diseases Inpatient Progress Note          HISTORY OF PRESENT ILLNESS:  Follow up viral pneumonia, foot cellulitis/ischemia on IV Rocephin, well tolerated. Patient has very poor appetite. Wants to go home. Positive moist cough. Weak all over. Has moderate pain in left heel with nonhealing wound. Incontinent of urine without urinary symptoms. Somewhat forgetful. Son is at bedside. No fevers. Normal white blood cell, elevated D-dimer. On 5 L nasal cannula    Current Medications:     [Held by provider] lisinopril  5 mg Oral Daily    metoprolol tartrate  25 mg Oral BID    [Held by provider] spironolactone  25 mg Oral Daily    [Held by provider] furosemide  40 mg Oral Daily    sodium chloride flush  5-40 mL IntraVENous 2 times per day    cefTRIAXone (ROCEPHIN) IV  1,000 mg IntraVENous Q24H    sodium chloride  500 mL IntraVENous Once       Allergies:  Patient has no known allergies. Review of Systems  14 system review is negative other than HPI    Physical Exam  Vitals:    12/05/21 2002 12/05/21 2107 12/06/21 0248 12/06/21 0745   BP: 122/87  98/70 99/61   Pulse: 78  83 59   Resp: 18  20 18   Temp: 97.8 °F (36.6 °C)  97 °F (36.1 °C)    TempSrc: Temporal  Temporal    SpO2:  100% 94%    Weight:         General Appearance: alert and oriented to person, place, frail-appearing, in no acute distress  Skin: warm and dry, no rash. Head: normocephalic and atraumatic  Eyes: anicteric sclerae  ENT: oropharynx clear and moist with normal mucous membranes.  No oral thrush  Lungs: normal respiratory effort, diffuse bilateral scattered rales  Heart normal C1-Z6 systolic ejection murmur grade 2/6 best heard over right upper sternal border  Abdomen: soft, no tenderness  No leg edema  No erythema, no tenderness  Left heel with black eschar, no drainage or surrounding erythema, no warmth, positive tenderness  Right foot is cold and discolored with some superficial ulcers  Weak peripheral pulses    DATA: Lab Results   Component Value Date    WBC 5.5 12/06/2021    HGB 13.4 (L) 12/06/2021    HCT 40.6 (L) 12/06/2021    .2 (H) 12/06/2021     12/06/2021     Lab Results   Component Value Date    CREATININE 1.12 12/06/2021    BUN 27 (H) 12/06/2021     12/06/2021    K 4.5 12/06/2021     12/06/2021    CO2 17 (L) 12/06/2021       Hepatic Function Panel:  Lab Results   Component Value Date    ALKPHOS 103 12/02/2021    ALT 21 12/02/2021    AST 38 12/02/2021    PROT 6.5 12/02/2021    BILITOT 1.1 12/02/2021    BILIDIR 0.6 11/20/2020    IBILI 1.2 11/20/2020    LABALBU 3.4 12/02/2021       IMPRESSION:    · Right foot cellulitis/ischemia   · viral pneumonia  · CHF, cardiomyopathy ejection fraction 20% in a patient with atrial fibrillation, questionable left lower lobe pulmonary embolus  · Left heel unstageable decubitus ulcer    Patient Active Problem List   Diagnosis    Chronic atrial fibrillation (HCC)    SSS (sick sinus syndrome) (HCC)    Cardiomyopathy (HCC)    SOB (shortness of breath)    Pleural effusion on right    Atelectasis of right lung    CHF (congestive heart failure), NYHA class I, acute on chronic, combined (HCC)    Acute on chronic combined systolic and diastolic CHF (congestive heart failure) (HCC)    Acute on chronic diastolic CHF (congestive heart failure) (HCC)    Pacemaker    Goals of care, counseling/discussion    Syncope and collapse    Pressure injury of deep tissue of left heel    Viral pneumonia       PLAN:  · Continue IV Rocephin for now  · Check arterial duplex  · Follow-up pulmonary and podiatry recommendation  · Oxygen support and weaning as tolerated  · Recommend nutritional supplementation  35 minutes were spent examining patient reviewing chart and implementing care  Discussed with patient, adult child and RN, using [facility] iPad     Yu Torres MD

## 2021-12-07 ENCOUNTER — APPOINTMENT (OUTPATIENT)
Dept: GENERAL RADIOLOGY | Age: 86
DRG: 175 | End: 2021-12-07
Payer: MEDICARE

## 2021-12-07 ENCOUNTER — APPOINTMENT (OUTPATIENT)
Dept: ULTRASOUND IMAGING | Age: 86
DRG: 175 | End: 2021-12-07
Payer: MEDICARE

## 2021-12-07 LAB
AMYLASE FLUID: 20 U/L
ANION GAP SERPL CALCULATED.3IONS-SCNC: 16 MEQ/L (ref 9–15)
APPEARANCE FLUID: CLEAR
BASOPHILS ABSOLUTE: 0 K/UL (ref 0–0.2)
BASOPHILS RELATIVE PERCENT: 0.7 %
BLOOD CULTURE, ROUTINE: NORMAL
BUN BLDV-MCNC: 26 MG/DL (ref 8–23)
CALCIUM SERPL-MCNC: 8.7 MG/DL (ref 8.5–9.9)
CELL COUNT FLUID TYPE: NORMAL
CHLORIDE BLD-SCNC: 101 MEQ/L (ref 95–107)
CLOT EVALUATION: NORMAL
CO2: 18 MEQ/L (ref 20–31)
COLOR FLUID: YELLOW
CREAT SERPL-MCNC: 1.09 MG/DL (ref 0.7–1.2)
CULTURE, BLOOD 2: NORMAL
EOSINOPHILS ABSOLUTE: 0.3 K/UL (ref 0–0.7)
EOSINOPHILS RELATIVE PERCENT: 5.2 %
FLUID PATH CONSULT: YES
FLUID TYPE: NORMAL
GFR AFRICAN AMERICAN: >60
GFR NON-AFRICAN AMERICAN: >60
GLUCOSE BLD-MCNC: 88 MG/DL (ref 70–99)
GLUCOSE, FLUID: 102 MG/DL
GRAM STAIN RESULT: NORMAL
HCT VFR BLD CALC: 38.9 % (ref 42–52)
HEMOGLOBIN: 12.8 G/DL (ref 14–18)
LYMPHOCYTES ABSOLUTE: 1.6 K/UL (ref 1–4.8)
LYMPHOCYTES RELATIVE PERCENT: 27.9 %
LYMPHOCYTES, BODY FLUID: 81 %
MCH RBC QN AUTO: 34.1 PG (ref 27–31.3)
MCHC RBC AUTO-ENTMCNC: 32.8 % (ref 33–37)
MCV RBC AUTO: 103.7 FL (ref 80–100)
MESOTHELIAL FLUID: 1 %
MONOCYTE, FLUID: 2 %
MONOCYTES ABSOLUTE: 0.8 K/UL (ref 0.2–0.8)
MONOCYTES RELATIVE PERCENT: 14.2 %
NEUTROPHIL, FLUID: 16 %
NEUTROPHILS ABSOLUTE: 3 K/UL (ref 1.4–6.5)
NEUTROPHILS RELATIVE PERCENT: 52 %
NUCLEATED CELLS FLUID: 438 /CUMM
NUMBER OF CELLS COUNTED FLUID: 100
PDW BLD-RTO: 16.8 % (ref 11.5–14.5)
PLATELET # BLD: 179 K/UL (ref 130–400)
POTASSIUM SERPL-SCNC: 4.2 MEQ/L (ref 3.4–4.9)
PROCALCITONIN: 0.15 NG/ML (ref 0–0.15)
PROTEIN FLUID: 1.3 G/DL
RBC # BLD: 3.75 M/UL (ref 4.7–6.1)
RBC FLUID: 224 /CUMM
REASON FOR REJECTION: NORMAL
REJECTED TEST: NORMAL
SODIUM BLD-SCNC: 135 MEQ/L (ref 135–144)
WBC # BLD: 5.8 K/UL (ref 4.8–10.8)

## 2021-12-07 PROCEDURE — 87070 CULTURE OTHR SPECIMN AEROBIC: CPT

## 2021-12-07 PROCEDURE — 6370000000 HC RX 637 (ALT 250 FOR IP): Performed by: INTERNAL MEDICINE

## 2021-12-07 PROCEDURE — 99233 SBSQ HOSP IP/OBS HIGH 50: CPT | Performed by: INTERNAL MEDICINE

## 2021-12-07 PROCEDURE — 80048 BASIC METABOLIC PNL TOTAL CA: CPT

## 2021-12-07 PROCEDURE — 71046 X-RAY EXAM CHEST 2 VIEWS: CPT

## 2021-12-07 PROCEDURE — 99221 1ST HOSP IP/OBS SF/LOW 40: CPT | Performed by: FAMILY MEDICINE

## 2021-12-07 PROCEDURE — 6360000002 HC RX W HCPCS: Performed by: FAMILY MEDICINE

## 2021-12-07 PROCEDURE — 84145 PROCALCITONIN (PCT): CPT

## 2021-12-07 PROCEDURE — 89051 BODY FLUID CELL COUNT: CPT

## 2021-12-07 PROCEDURE — 99223 1ST HOSP IP/OBS HIGH 75: CPT | Performed by: RADIOLOGY

## 2021-12-07 PROCEDURE — 82945 GLUCOSE OTHER FLUID: CPT

## 2021-12-07 PROCEDURE — 32555 ASPIRATE PLEURA W/ IMAGING: CPT | Performed by: RADIOLOGY

## 2021-12-07 PROCEDURE — 99232 SBSQ HOSP IP/OBS MODERATE 35: CPT | Performed by: INTERNAL MEDICINE

## 2021-12-07 PROCEDURE — 2580000003 HC RX 258: Performed by: INTERNAL MEDICINE

## 2021-12-07 PROCEDURE — 85025 COMPLETE CBC W/AUTO DIFF WBC: CPT

## 2021-12-07 PROCEDURE — 83615 LACTATE (LD) (LDH) ENZYME: CPT

## 2021-12-07 PROCEDURE — 2709999900 US THORACENTESIS

## 2021-12-07 PROCEDURE — 2700000000 HC OXYGEN THERAPY PER DAY

## 2021-12-07 PROCEDURE — 36415 COLL VENOUS BLD VENIPUNCTURE: CPT

## 2021-12-07 PROCEDURE — 2580000003 HC RX 258: Performed by: FAMILY MEDICINE

## 2021-12-07 PROCEDURE — 2500000003 HC RX 250 WO HCPCS: Performed by: RADIOLOGY

## 2021-12-07 PROCEDURE — 87205 SMEAR GRAM STAIN: CPT

## 2021-12-07 PROCEDURE — 82150 ASSAY OF AMYLASE: CPT

## 2021-12-07 PROCEDURE — 1210000000 HC MED SURG R&B

## 2021-12-07 PROCEDURE — 84157 ASSAY OF PROTEIN OTHER: CPT

## 2021-12-07 RX ORDER — LIDOCAINE HYDROCHLORIDE 20 MG/ML
INJECTION, SOLUTION INFILTRATION; PERINEURAL
Status: COMPLETED | OUTPATIENT
Start: 2021-12-07 | End: 2021-12-07

## 2021-12-07 RX ORDER — MIDODRINE HYDROCHLORIDE 5 MG/1
2.5 TABLET ORAL
Status: DISCONTINUED | OUTPATIENT
Start: 2021-12-07 | End: 2021-12-08

## 2021-12-07 RX ORDER — 0.9 % SODIUM CHLORIDE 0.9 %
250 INTRAVENOUS SOLUTION INTRAVENOUS ONCE
Status: COMPLETED | OUTPATIENT
Start: 2021-12-07 | End: 2021-12-07

## 2021-12-07 RX ADMIN — SODIUM CHLORIDE, PRESERVATIVE FREE 10 ML: 5 INJECTION INTRAVENOUS at 08:37

## 2021-12-07 RX ADMIN — MIDODRINE HYDROCHLORIDE 2.5 MG: 5 TABLET ORAL at 16:07

## 2021-12-07 RX ADMIN — LIDOCAINE HYDROCHLORIDE 7 ML: 20 INJECTION, SOLUTION INFILTRATION; PERINEURAL at 10:55

## 2021-12-07 RX ADMIN — CEFTRIAXONE SODIUM 1000 MG: 1 INJECTION, POWDER, FOR SOLUTION INTRAMUSCULAR; INTRAVENOUS at 16:07

## 2021-12-07 RX ADMIN — SODIUM CHLORIDE, PRESERVATIVE FREE 10 ML: 5 INJECTION INTRAVENOUS at 22:04

## 2021-12-07 RX ADMIN — SODIUM CHLORIDE 250 ML: 9 INJECTION, SOLUTION INTRAVENOUS at 13:14

## 2021-12-07 ASSESSMENT — PAIN SCALES - GENERAL: PAINLEVEL_OUTOF10: 0

## 2021-12-07 NOTE — PROGRESS NOTES
2005: Patient placed on BIPAP by RT, however pt not tolerating well, continues to yell and appears restless, BIPAP removed by RN and pt placed back on 5L NC O2, poor peripheral perfusion noted, however pulse ox reading better on earlobe. SPO2 @ 98%. R brachial single lumen PICC line flushed, noted blood return. .  Call light placed with pt's reach. RN will continue to monitor. 0440: AM labs obtained from PICC line, awaiting results.

## 2021-12-07 NOTE — PROGRESS NOTES
Physical Therapy   [facility] [id number]/[id number]    NAME: Shahzad Mendez    : 1929 (80 y.o.)  MRN: [id number]    Account: [de-identified]  Gender: male    PT evaluation and treatment orders received. Chart reviewed. PT eval attempted. Hold PT eval: page out to podiatry to clarify if pt has any WB restrictions regarding L heel ulcer. Will attempt PT evaluation again at earliest convenience.       Electronically signed by Hayden Navarro PT on 21 at 2:20 PM EST

## 2021-12-07 NOTE — SEDATION DOCUMENTATION
NO SEDATION - Ultrasound Guided Thoracentesis     Consent obtained from patient's son, who is present / visiting in hospital.     VS monitor applied. Pt on 6L NC.     1001 - Patient lying on cart right side. Posterior lung fields scanned with ultrasound by Ultrasound technician. 200 - Dr. Celena Thrasher paged. 36 - Dr. Celena Thrasher paged a second time. [address] reviewed by performing Radiologist.     [phone] - Procedure explained, consent verified. Time out completed for ultrasound guided thoracentesis. Site marked by Dr. Celena Thrasher, then procedure site prepped with chloraprep, and draped with sterile drape and towels. 1055 - Left Posterior chest site then numbed with lidocaine 2% by Dr Celena Thrasher, [id number] 5F catheter placed into pleural space using US guidance. Fluid draining appears clear yellow. Sample of fluid obtained and placed into specimen containers to be sent for testing as ordered. 1057 - Catheter attached to [facility] machine to remove fluid. 1059 - Suction increased to 75 mm Hg per Dr. Celena Thrasher verbal direction. 1109 - Total 705 ml removed. Catheter removed. Bandaid applied. VSS. Pt tolerated well. Verbal and tactile reassurance given throughout. Patient taken for CXR and specimen fluid taken to lab. Report called to patient's nurse Jeronimo Argueta and put in transport to return to room.

## 2021-12-07 NOTE — CONSULTS
Palliative Care Consult Note  Patient: Ifrah Rose  Gender: male  YOB: 1929  Unit/Bed: L432/T502-75  CodeStatus: DNR-CCA  Inpatient Treatment Team: Treatment Team: Attending Provider: Shakira Diallo MD; Consulting Physician: Fransisca Rdz MD; Consulting Physician: Saúl Tafoya MD; Consulting Physician: Zelalem Dominguez DPM; Consulting Physician: Tamia Underwood MD; Consulting Physician: Jacky Meehan MD; Utilization Reviewer: Melia Mcmanus RN; Consulting Physician: Wagner León, APRN - CNP; Consulting Physician: Alaina Tang MD; Utilization Reviewer: Irvin Núñez, RN; Registered Nurse: Betty Salcedo RN; : Zachary Alcala RN  Admit Date:  12/2/2021    Chief Complaint: Altered mental status, Viral pneumonia    History of Presenting Illness: Ifrah Rose is a 80 y.o. male on hospital day 5 with a history of Altered mental status, Viral pneumonia. He has a pertinent PMh of hypertension, congestive heart failure, atrial fibrillation and anxiety. Presented to the Er on 12/2 with confusion per family x 3 days. Noted to be A+Ox1. Occitan speaking only. CT of the chest showed a subsegmental pulmonary embolus and she was subsequently admitted. Cardiology saw on 12/3 and noted prior history of severe cardiomyopathy EF 20% by TTE 8/9/2021 and recommended supportive care without long term anticoagulation as severely debilitated and at high risk for bleeding. On 12/5 noted oxygenation status increased to 5L O2 sats 92-95. Also noted RLE cellulitis with ID consulted. Noted Respiratory viral panel positive for rhinovirus. Stared on IV Rocephin. Had thoracentesis with 700 cc of fluids drained today. Today per the nurse on duty the patient presents as A+Ox3.  The patient is Occitan-speaking and attempted visit with , #[id number], but patient is very sleepy post thoracentesis and was unable to have meaning Full conversation as remained sleeping during visit. remains on 5L o2. No family at bedside for [address] and ACP conversation. Review of Systems:       Review of Systems   Unable to perform ROS: Other       Physical Examination:       BP 88/60   Pulse 65   Temp 97.2 °F (36.2 °C)   Resp 20   Wt 136 lb (61.7 kg)   SpO2 100%   BMI 20.08 kg/m²    Physical Exam  Vitals reviewed. Constitutional:       Appearance: He is ill-appearing. HENT:      Head: Normocephalic and atraumatic. Eyes:      Pupils: Pupils are equal, round, and reactive to light. Cardiovascular:      Rate and Rhythm: Normal rate. Pulmonary:      Effort: Pulmonary effort is normal.      Breath sounds: Normal breath sounds. Abdominal:      General: Bowel sounds are normal.      Palpations: Abdomen is soft. Musculoskeletal:      Cervical back: Normal range of motion. Skin:     General: Skin is warm and dry. Neurological:      Mental Status: He is alert and oriented to person, place, and time. Motor: Weakness present.       Gait: Gait abnormal.   Psychiatric:         Behavior: Behavior normal.         Allergies:      No Known Allergies    Medications:      Current Facility-Administered Medications   Medication Dose Route Frequency Provider Last Rate Last Admin    0.9 % sodium chloride bolus  250 mL IntraVENous Once Abril Naranjo  mL/hr at 12/07/21 1314 250 mL at 12/07/21 1314    [Held by provider] lisinopril (PRINIVIL;ZESTRIL) tablet 5 mg  5 mg Oral Daily Sadie Foster MD        metoprolol tartrate (LOPRESSOR) tablet 25 mg  25 mg Oral BID Sadie Foster MD   25 mg at 12/06/21 2314    [Held by provider] spironolactone (ALDACTONE) tablet 25 mg  25 mg Oral Daily Sadie Foster MD        [Held by provider] furosemide (LASIX) tablet 40 mg  40 mg Oral Daily Sadie Foster MD        sodium chloride flush 0.9 % injection 5-40 mL  5-40 mL IntraVENous 2 times per day Ludimla Villalpando MD   10 mL at 12/07/21 0837    sodium chloride flush 0.9 % injection 5-40 mL  5-40 mL IntraVENous PRN Kiah Hernandez MD        0.9 % sodium chloride infusion  25 mL IntraVENous PRN Kiah Hernandez MD        ondansetron (ZOFRAN-ODT) disintegrating tablet 4 mg  4 mg Oral Q8H PRN Kiah Hernandez MD        Or    ondansetron [facility]) injection 4 mg  4 mg IntraVENous Q6H PRN Kiah Hernandez MD        polyethylene glycol [facility]) packet 17 g  17 g Oral Daily PRN Kiah Hernandez MD        acetaminophen (TYLENOL) tablet 650 mg  650 mg Oral Q6H PRN Kiah Hernandez MD   650 mg at 12/06/21 2022    Or    acetaminophen (TYLENOL) suppository 650 mg  650 mg Rectal Q6H PRN Kiah Hernandez MD        cefTRIAXone (ROCEPHIN) 1000 mg IVPB in 50 mL D5W minibag  1,000 mg IntraVENous Q24H Kiah Hernandez MD   Stopped at 12/06/21 1710    0.9 % sodium chloride bolus  500 mL IntraVENous Once Phillip Lima,            History:       PMHx:  Past Medical History:   Diagnosis Date    Anxiety     Atrial fibrillation ([facility]     Cardiomyopathy ([facility] 9/18/2020    CHF (congestive heart failure) (HCC)     Diabetes mellitus ([facility]     Hypertension     Sleep apnea        PSHx:  Past Surgical History:   Procedure Laterality Date    BACK SURGERY      CARDIAC PACEMAKER PLACEMENT  1684    HERNIA REPAIR      THORACENTESIS Left 06/24/2021    450ml removed by Dr Ralph Gallo [phone]    THORACENTESIS Left 12/07/2021    705 ml removed by Dr. Amparo Dominguez Hx:  Social History     Socioeconomic History    Marital status:      Spouse name: Not on file    Number of children: Not on file    Years of education: Not on file    Highest education level: Not on file   Occupational History    Not on file   Tobacco Use    Smoking status: Former Smoker    Smokeless tobacco: Former User    Tobacco comment: quit 30 years ago   Vaping Use    Vaping Use: Never used   Substance and Sexual Activity    Alcohol use: Not Currently    Drug use: Not Currently    Sexual activity: Not on file   Other Topics Concern    Not on file   Social History Narrative    ** Merged History Encounter **          Social Determinants of Health     Financial Resource Strain:     Difficulty of Paying Living Expenses: Not on file   Food Insecurity:     Worried About Running Out of Food in the Last Year: Not on file    Haylie of Food in the Last Year: Not on file   Transportation Needs:     Lack of Transportation (Medical): Not on file    Lack of Transportation (Non-Medical): Not on file   Physical Activity:     Days of Exercise per Week: Not on file    Minutes of Exercise per Session: Not on file   Stress:     Feeling of Stress : Not on file   Social Connections:     Frequency of Communication with Friends and Family: Not on file    Frequency of Social Gatherings with Friends and Family: Not on file    Attends Holiness Services: Not on file    Active Member of [address] [place] [address] [employer] or Organizations: Not on file    Attends Club or Organization Meetings: Not on file    Marital Status: Not on file   Intimate Partner Violence:     Fear of Current or Ex-Partner: Not on file    Emotionally Abused: Not on file    Physically Abused: Not on file    Sexually Abused: Not on file   Housing Stability:     Unable to Pay for Housing in the Last Year: Not on file    Number of [place] in the Last Year: Not on file    Unstable Housing in the Last Year: Not on file       Family Hx:  No family history on file.     LABS: Reviewed     CBC:  Lab Results   Component Value Date    WBC 5.8 12/07/2021    RBC 3.75 12/07/2021    HGB 12.8 12/07/2021    HCT 38.9 12/07/2021    .7 12/07/2021    MCH 34.1 12/07/2021    MCHC 32.8 12/07/2021    RDW 16.8 12/07/2021     12/07/2021     CBC with Differential:   Lab Results   Component Value Date    WBC 5.8 12/07/2021    RBC 3.75 12/07/2021    HGB 12.8 12/07/2021    HCT 38.9 12/07/2021     12/07/2021    .7 12/07/2021    MCH 34.1 12/07/2021    MCHC 32.8 12/07/2021    RDW 16.8 12/07/2021    LYMPHOPCT 27.9 12/07/2021    MONOPCT 14.2 12/07/2021    BASOPCT 0.7 12/07/2021    MONOSABS 0.8 12/07/2021    LYMPHSABS 1.6 12/07/2021    EOSABS 0.3 12/07/2021    BASOSABS 0.0 12/07/2021     CMP:    Lab Results   Component Value Date     12/07/2021    K 4.2 12/07/2021    K 4.9 12/04/2021     12/07/2021    CO2 18 12/07/2021    BUN 26 12/07/2021    CREATININE 1.09 12/07/2021    GFRAA >60.0 12/07/2021    LABGLOM >60.0 12/07/2021    GLUCOSE 88 12/07/2021    PROT 6.5 12/02/2021    LABALBU 3.4 12/02/2021    CALCIUM 8.7 12/07/2021    BILITOT 1.1 12/02/2021    ALKPHOS 103 12/02/2021    AST 38 12/02/2021    ALT 21 12/02/2021     BMP:    Lab Results   Component Value Date     12/07/2021    K 4.2 12/07/2021    K 4.9 12/04/2021     12/07/2021    CO2 18 12/07/2021    BUN 26 12/07/2021    LABALBU 3.4 12/02/2021    CREATININE 1.09 12/07/2021    CALCIUM 8.7 12/07/2021    GFRAA >60.0 12/07/2021    LABGLOM >60.0 12/07/2021    GLUCOSE 88 12/07/2021     TSH:   Lab Results   Component Value Date    TSH 1.750 12/02/2021     Vitamin B12 and Folate: No components found for: FOLIC,  V71  Urinalysis:   Lab Results   Component Value Date    NITRU Negative 12/02/2021    WBCUA 20-50 08/09/2021    BACTERIA Negative 08/09/2021    RBCUA 3-5 08/09/2021    BLOODU Negative 12/02/2021    SPECGRAV 1.022 12/02/2021    GLUCOSEU Negative 12/02/2021           FUNCTIONAL ADL´S:     Independent: [  ] Eating, [   ] Dressing, [   ] Transferring, [   ] Brian Marian, [   ] Colleen Bo, [   ] Continence  Dependent   : [  ] Eating, [   ] Dressing, [   ] Transferring, [   ] Brian Marian, [   ] Bathing, [   ] Continence  W. assistant : [  ] Eating, [   ] Dressing, [   ] Transferring, [   ] Brian Marian, [   ] Colleen Bo, [   ] Continence    Radiology: Reviewed      US DUP LOWER ART/BYPASS GRAFTS BILATERAL COMPLETE    Result Date: 12/5/2021  US DUP LOWER ART/BYPASS GRAFTS BILATERAL COMPLETE: 12/5/2021 4:47 PM CLINICAL HISTORY:  BLE nonpalpable pulses, rubor right foot . Right foot discolored. Left heel wound. COMPARISON: None available. Grayscale, color and waveform Doppler analysis of both lower extremity arterial systems was performed. FINDINGS: Moderate atherosclerotic disease, with pulsatile mild to moderately diminished waveforms worsening distally. Elevated velocities within the right proximal posterior tibial artery suggestive of 30-49% stenosis. Elevated velocities in the left proximal posterior tibial artery suggestive of around 50% stenosis. No evidence for arterial occlusion. The arterial system is normal in caliber, without evidence of aneurysm or dissection. Maximum systolic velocities are: RIGHT LOWER EXTREMITY: Right common femoral artery 57 cm/s. Right proximal superficial femoral artery 68 cm/s. Right mid superficial femoral artery 60 cm/s. Right distal superficial femoral artery 29 cm/s. Right popliteal artery 26 cm/s. Right proximal anterior tibial artery 29 cm/s. Right mid anterior tibial artery 33  cm/s. Right distal anterior tibial artery 24 cm/s. Right proximal posterior tibial artery 172 cm/s. Right mid posterior tibial artery 62  cm/s. Right distal posterior tibial artery 21  cm/s. Right proximal peroneal artery 70 cm/s. Right mid peroneal artery 64 cm/s. Right distal peroneal artery 33 cm/s. LEFT LOWER EXTREMITY: Left common femoral artery 92 cm/s. Left proximal superficial femoral artery 61 cm/s. Left mid superficial femoral artery 76 cm/s. Left distal superficial femoral artery 40 cm/s. Left popliteal artery 40 cm/s. Left proximal anterior tibial artery 36 cm/s. Left mid anterior tibial artery 35  cm/s. Left distal anterior tibial artery 33 cm/s. Left proximal posterior tibial artery 217 cm/s. Left mid posterior tibial artery 79  cm/s. Left distal posterior tibial artery 43  cm/s. Left proximal peroneal artery 101 cm/s. Left mid peroneal artery 24 cm/s.  Left distal peroneal artery 20 cm/s.     30-49% stenosis involving the right proximal posterior tibial artery and around 50% stenosis involving the left proximal posterior tibial artery. No evidence of arterial occlusion, or aneurysm. Mild to moderate distal atherosclerotic disease of both lower extremities. Brandon Ngo MIDLINE CATH    Result Date: 12/7/2021  NO IMAGES NO DICTATION         Assessment and plan:  1. Encounter for palliative care. Attempted visit though patient lethargic and falling asleep multiple times throughout visit. Per nurse children are mostly [address] speaking though wife whos phone number not on chart [address] speaking. Primary descision make Trena Cornet normally at bedside. Will reattempt visit tomorrow when at bedside to discuss [address] and ACP. Patient is canadate for hospice care if wishes related to PE without anticoagulation due to high risk and 20% EF with increasing oxygenation needs. Likely POC home with hospice vs pall care dependant on family wishes post conversation.      -Advance Care Planning  -Goals of Care Discussion:  Deferred due to patient lethargy      Electronically signed by ZACH Ricketts CNP on 12/7/2021 at 2:44 PM

## 2021-12-07 NOTE — PROGRESS NOTES
Hospitalist Progress Note      PCP: Brenda Curiel    Date of Admission: 12/2/2021    Chief Complaint:    Chief Complaint   Patient presents with    Altered Mental Status     family called EMS for Pt \"not acting right\", general weakness and loss of appetite for the past three days     Subjective:  Patient awakes; talked to me on the  ipad; denies any complaints other than hunger;  12 point ROS negative    Medications:  Reviewed    Infusion Medications    sodium chloride       Scheduled Medications    midodrine  2.5 mg Oral TID WC    [Held by provider] lisinopril  5 mg Oral Daily    metoprolol tartrate  25 mg Oral BID    [Held by provider] spironolactone  25 mg Oral Daily    [Held by provider] furosemide  40 mg Oral Daily    sodium chloride flush  5-40 mL IntraVENous 2 times per day    cefTRIAXone (ROCEPHIN) IV  1,000 mg IntraVENous Q24H    sodium chloride  500 mL IntraVENous Once     PRN Meds: sodium chloride flush, sodium chloride, ondansetron **OR** ondansetron, polyethylene glycol, acetaminophen **OR** acetaminophen      Intake/Output Summary (Last 24 hours) at 12/7/2021 1545  Last data filed at 12/6/2021 2024  Gross per 24 hour   Intake 10 ml   Output --   Net 10 ml       Exam:    BP 88/60   Pulse 65   Temp 97.2 °F (36.2 °C)   Resp 20   Wt 136 lb (61.7 kg)   SpO2 100%   BMI 20.08 kg/m²     General appearance: No apparent distress, appears stated age and cooperative. HEENT: . Conjunctivae/corneas clear. Neck: Trachea midline. Respiratory:  Basilar rales  Cardiovascular: Irregular  Abdomen: Soft, non-tender, non-distended with normal bowel sounds.   Musculoskeletal: Erythema and edema of his right foot  Neuro: Non Focal.   Capillary Refill: Brisk,< 3 seconds   Peripheral Pulses: +2 palpable, equal bilaterally     Labs:   Recent Labs     12/06/21  0714 12/07/21  0708   WBC 5.5 5.8   HGB 13.4* 12.8*   HCT 40.6* 38.9*    179     Recent Labs     12/05/21  1832 12/06/21  0714 12/07/21  0600   * 135 135   K 4.1 4.5 4.2   CL 97 102 101   CO2 15* 17* 18*   BUN 28* 27* 26*   CREATININE 1.04 1.12 1.09   CALCIUM 8.6 8.8 8.7     No results for input(s): AST, ALT, BILIDIR, BILITOT, ALKPHOS in the last 72 hours. Recent Labs     12/06/21  1753   INR 1.6     No results for input(s): Adonica Hoose in the last 72 hours. Urinalysis:      Lab Results   Component Value Date    NITRU Negative 12/02/2021    WBCUA 20-50 08/09/2021    BACTERIA Negative 08/09/2021    RBCUA 3-5 08/09/2021    BLOODU Negative 12/02/2021    SPECGRAV 1.022 12/02/2021    GLUCOSEU Negative 12/02/2021     Radiology:  IR MIDLINE CATH         US DUP LOWER ART/BYPASS GRAFTS BILATERAL COMPLETE   Final Result      30-49% stenosis involving the right proximal posterior tibial artery and around 50% stenosis involving the left proximal posterior tibial artery. No evidence of arterial occlusion, or aneurysm. Mild to moderate distal atherosclerotic disease of both lower extremities. .                  XR CHEST (2 VW)   Final Result      No significant interval change from prior. Bibasilar opacities/atelectasis. Bilateral pleural effusions, larger on the left. XR FOOT RIGHT (2 VIEWS)   Final Result      No acute osseous findings. CT HEAD WO CONTRAST   Final Result      NO ACUTE INTRACRANIAL PROCESS OR SIGNIFICANT CHANGE FROM 9/1/2021 IDENTIFIED. CTA CHEST W WO CONTRAST   Final Result      SUSPECT SEGMENTAL LEFT LOWER LOBE PULMONARY EMBOLI. MILD TO MODERATE CARDIOMEGALY WITHOUT EVIDENCE OF RIGHT HEART STRAIN. MODERATE-SIZED LEFT AND SMALL RIGHT PLEURAL EFFUSIONS, SIMILAR TO 6/7/2021. MILD TO MODERATE ATELECTASIS/INFARCT OF THE LEFT LOWER LOBE AND MILD PROBABLE DEPENDENT RIGHT ATELECTASIS, SIMILAR TO 6/7/2021. XR CHEST PORTABLE   Final Result   BIBASILAR ATELECTASIS/PNEUMONIA.       XR CHEST (2 VW)    (Results Pending)   US THORACENTESIS Which side should the procedure be performed? Left    (Results Pending)     Assessment/Plan:    #Acute Hypoxic respiratory failure secondary to rhino virus pneumonia as well as bilateral pleural effusions:  S/p thoracentesis with 700cc out; appears more comfortable  #Possible subsegmental pulmonary embolism: High risk anticoagulation as detailed by pulmonary and cardiology; discussed with Saadia Shculz (daughter in law; Miranda Paulaf confirmed)  #FOX:  Resolved  #A Fib; SSS:  COntinue home meds   #acute on Chronic combined systolic and diastolic CHF:  Cardiology is managing; medications are held due to SBP at this moment  #Hypotension:  Will start low dose midodrine  #Right foot cellulitis:  PO keflex on d/c    Active Hospital Problems    Diagnosis Date Noted    Unstageable pressure ulcer of left heel ([address] [L89.620]     Cellulitis of right foot [I75.710]     Acute pulmonary embolism without acute cor pulmonale ([address] [I26.99]     Viral pneumonia [J12.9] 12/02/2021     Additional work up or/and treatment plan may be added today or then after based on clinical progression. I am managing a portion of pt care. Some medical issues are handled by other specialists. Additional work up and treatment should be done in out pt setting by pt PCP and other out pt providers. In addition to examining and evaluating pt, I spent additional time explaining care, normal and abnormal findings, and treatment plan. All of pt questions were answered. Counseling, diet and education were  provided. Case will be discussed with nursing staff when appropriate. Family will be updated if and when appropriate. Diet: ADULT DIET;  Regular    Code Status: DNR-CCA    PT/OT Eval     Electronically signed by Francis Garcia MD on 12/7/2021 at 3:45 PM

## 2021-12-07 NOTE — FLOWSHEET NOTE
Antony Ar #[id number] Welsh interpeter used to complete shift assessment. A&Ox 2-3 disorient to place but easily re-oriented. Denies pain No sign of SOB. SpO2 is 100%  On 5L NC. Repeat BP 93/61 HR 60 Metoprolol held this AM will notify MD of low BP'S during rounding. Patient refusing breakfast \"it's to early\". No other needs Call light in reach Bed alarm on.     Orrspelsv 7 Welsh interpeter used. Explained to patient the purpose of the bolus. Patient had no complaints or needs. Call light in reach.

## 2021-12-07 NOTE — PROGRESS NOTES
Cardiology Progress Note  Patient: Shahzad Hick  Unit/Bed: I567/G663-73  YOB: 1929  MRN: [id number]  Acct: [de-identified]   Admitting Diagnosis: Bronchospasm [J98.01]  Viral pneumonia [J12.9]  Disorientation [R41.0]  Hypoxia [R09.02]  Rhinovirus [B34.8]  Elevated troponin [R77.8]  FOX (acute kidney injury) ([facility] [address] [N17.9]  Atrial fibrillation with rapid ventricular response ([facility] [address] [I48.91]  Single subsegmental pulmonary embolism without acute cor pulmonale ([facility] [address] [I26.93]  Date:  12/2/2021  Hospital Day: 5      Reason of this consult:  Troponin elevation    History of Present Illness:  80-year-old Azeri-speaking only male with significant cardiac history including severe cardiomyopathy EF 20% by TTE 8/9/2021 not a candidate for ICD, sick sinus syndrome status post pacemaker placement 8/11/21 and atrial fibrillation, as well as hypertension CKD who was admitted to the hospital last night for 3-day history of weakness. Cardiology consulted for evaluation and management of positive troponins    Of note: patient was admitted to the hospital 9/1/2021 and discharged the following day after sustaining a fall  Per son pt started feeling week and tired 3 days ago. Also 3 days ago he developed SOB which is worsening    CT of the chest reported as patient for left lower lobe pulmonary embolism.   CXR 12/2/21: bibasilar atelectasis/pneumonia  EKG at admission: Atrial fibrillation rate 80 old left bundle branch block  TTE 8/9/2021 EF 20%, moderate MR, moderate TR    12/7/21  No chest pain  Still feels short of breath  Plan for thoracentesis today, yesterday it was cancelled due to elevated INR a few days ago      12/6/2021  Patient reports shortness of breath    No Known Allergies    Current Facility-Administered Medications   Medication Dose Route Frequency Provider Last Rate Last Admin    [Held by provider] lisinopril (PRINIVIL;ZESTRIL) tablet 5 mg  5 mg Oral Daily Lucio Coelho MD        metoprolol tartrate (LOPRESSOR) tablet 25 mg  25 mg Oral BID Akin Sampson MD   25 mg at 12/06/21 2314    [Held by provider] spironolactone (ALDACTONE) tablet 25 mg  25 mg Oral Daily Akin Sampson MD        [Held by provider] furosemide (LASIX) tablet 40 mg  40 mg Oral Daily Akin Sampson MD        sodium chloride flush 0.9 % injection 5-40 mL  5-40 mL IntraVENous 2 times per day Dean Gonzalez MD   10 mL at 12/06/21 2024    sodium chloride flush 0.9 % injection 5-40 mL  5-40 mL IntraVENous PRN Dean Gonzalez MD        0.9 % sodium chloride infusion  25 mL IntraVENous PRN Dean Gonzalez MD        ondansetron (ZOFRAN-ODT) disintegrating tablet 4 mg  4 mg Oral Q8H PRN Dean Gonzalez MD        Or    [facility] injection 4 mg  4 mg IntraVENous Q6H PRN Dean Gonzalez MD        polyethylene glycol [facility]) packet 17 g  17 g Oral Daily PRN Dean Gonzalez MD        acetaminophen (TYLENOL) tablet 650 mg  650 mg Oral Q6H PRN Dean Gonzalez MD   650 mg at 12/06/21 2022    Or    acetaminophen (TYLENOL) suppository 650 mg  650 mg Rectal Q6H PRN Dean Gonzalez MD        cefTRIAXone (ROCEPHIN) 1000 mg IVPB in 50 mL D5W minibag  1,000 mg IntraVENous Q24H Dean Gonzalez MD   Stopped at 12/06/21 1710    0.9 % sodium chloride bolus  500 mL IntraVENous Once Jose Larson DO           PMHx:  Past Medical History:   Diagnosis Date    Anxiety     Atrial fibrillation ([facility] [address]     Cardiomyopathy ([facility] [address] 9/18/2020    CHF (congestive heart failure) (HCC)     Diabetes mellitus ([facility] [address]     Hypertension     Sleep apnea        PSHx:  Past Surgical History:   Procedure Laterality Date    BACK SURGERY      CARDIAC PACEMAKER PLACEMENT  7115    HERNIA REPAIR      THORACENTESIS Left 06/24/2021    450ml removed by Dr Julietta Schwab [phone]       Social Hx:  Social History     Socioeconomic History    Marital status:      Spouse name: Not on file    Number of children: Not on file    Years of education: Not on file    Highest education level: Not on file   Occupational History    Not on file   Tobacco Use    Smoking status: Former Smoker    Smokeless tobacco: Former User    Tobacco comment: quit 30 years ago   Vaping Use    Vaping Use: Never used   Substance and Sexual Activity    Alcohol use: Not Currently    Drug use: Not Currently    Sexual activity: Not on file   Other Topics Concern    Not on file   Social History Narrative    ** Merged History Encounter **          Social Determinants of Health     Financial Resource Strain:     Difficulty of Paying Living Expenses: Not on file   Food Insecurity:     Worried About [address] [employer] in the Last Year: Not on file    Haylie of Food in the Last Year: Not on file   Transportation Needs:     Lack of Transportation (Medical): Not on file    Lack of Transportation (Non-Medical): Not on file   Physical Activity:     Days of Exercise per Week: Not on file    Minutes of Exercise per Session: Not on file   Stress:     Feeling of Stress : Not on file   Social Connections:     Frequency of Communication with Friends and Family: Not on file    Frequency of Social Gatherings with Friends and Family: Not on file    Attends Hindu Services: Not on file    Active Member of [address] [place] [address] or Organizations: Not on file    Attends Club or Organization Meetings: Not on file    Marital Status: Not on file   Intimate Partner Violence:     Fear of Current or Ex-Partner: Not on file    Emotionally Abused: Not on file    Physically Abused: Not on file    Sexually Abused: Not on file   Housing Stability:     Unable to Pay for Housing in the Last Year: Not on file    Number of [place] in the Last Year: Not on file    Unstable Housing in the Last Year: Not on file       Family Hx:  No family history on file.         Physical Examination:    BP (!) 112/95   Pulse 64   Temp 97.2 °F (36.2 °C)   Resp 20   Wt 136 lb (61.7 kg)   SpO2 100%   BMI 20.08 kg/m²    Physical Exam    LABS:  CBC:  Lab Results   Component Value Date    WBC 5.8 12/07/2021    RBC 3.75 12/07/2021    HGB 12.8 12/07/2021    HCT 38.9 12/07/2021    .7 12/07/2021    MCH 34.1 12/07/2021    MCHC 32.8 12/07/2021    RDW 16.8 12/07/2021     12/07/2021     CBC with Differential:   Lab Results   Component Value Date    WBC 5.8 12/07/2021    RBC 3.75 12/07/2021    HGB 12.8 12/07/2021    HCT 38.9 12/07/2021     12/07/2021    .7 12/07/2021    MCH 34.1 12/07/2021    MCHC 32.8 12/07/2021    RDW 16.8 12/07/2021    LYMPHOPCT 27.9 12/07/2021    MONOPCT 14.2 12/07/2021    BASOPCT 0.7 12/07/2021    MONOSABS 0.8 12/07/2021    LYMPHSABS 1.6 12/07/2021    EOSABS 0.3 12/07/2021    BASOSABS 0.0 12/07/2021     CMP:    Lab Results   Component Value Date     12/07/2021    K 4.2 12/07/2021    K 4.9 12/04/2021     12/07/2021    CO2 18 12/07/2021    BUN 26 12/07/2021    CREATININE 1.09 12/07/2021    GFRAA >60.0 12/07/2021    LABGLOM >60.0 12/07/2021    GLUCOSE 88 12/07/2021    PROT 6.5 12/02/2021    LABALBU 3.4 12/02/2021    CALCIUM 8.7 12/07/2021    BILITOT 1.1 12/02/2021    ALKPHOS 103 12/02/2021    AST 38 12/02/2021    ALT 21 12/02/2021     BMP:    Lab Results   Component Value Date     12/07/2021    K 4.2 12/07/2021    K 4.9 12/04/2021     12/07/2021    CO2 18 12/07/2021    BUN 26 12/07/2021    LABALBU 3.4 12/02/2021    CREATININE 1.09 12/07/2021    CALCIUM 8.7 12/07/2021    GFRAA >60.0 12/07/2021    LABGLOM >60.0 12/07/2021    GLUCOSE 88 12/07/2021     Magnesium:    Lab Results   Component Value Date    MG 2.1 12/02/2021     Troponin:    Lab Results   Component Value Date    TROPONINI 0.042 12/04/2021       Radiology:  CT HEAD WO CONTRAST    Result Date: 12/2/2021  CT HEAD WO CONTRAST : 12/2/2021 CLINICAL HISTORY:  confusion x 3 days . COMPARISON: 9/1/2021.  TECHNIQUE: Spiral unenhanced images were obtained of the head, with routine multiplanar reconstructions performed. All CT scans at this facility use dose modulation, iterative reconstruction, and/or weight based dosing when appropriate to reduce radiation dose to as low as reasonably achievable. FINDINGS: There is no intracranial hemorrhage, mass effect, midline shift, extra-axial collection, evidence of hydrocephalus, recent ischemic infarct, or skull fracture identified. Chronic right frontal, temporooccipital and right cerebellar infarcts, mild generalized cerebral volume loss, and mild to moderate patchy white matter changes are again noted. The mastoid air cells and visualized paranasal sinuses are essentially clear. NO ACUTE INTRACRANIAL PROCESS OR SIGNIFICANT CHANGE FROM 9/1/2021 IDENTIFIED. CTA CHEST W WO CONTRAST    Result Date: 12/2/2021  CTA CHEST W WO CONTRAST: 12/2/2021 CLINICAL HISTORY:  tachycardic, new afib, ? effusion vs wedge shaped infarct vs infiltrate left lung base. assess for PE . COMPARISON: Chest CT without contrast 6/7/2021. Spiral enhanced images were obtained of the chest during the infusion of approximately 100 mL of Isovue 300 contrast with pulmonary artery  CTA protocol. Routine and volume rendered images were obtained on a 3-dimensional workstation. All CT scans at this facility use dose modulation, iterative reconstruction, and/or weight based dosing when appropriate to reduce radiation dose to as low as reasonably achievable. FINDINGS: The study is limited by motion artifact. Pulmonary emboli are suspected within the segmental branches of the left lower lobe, with a moderate-sized left pleural effusion and mild atelectasis/infarct of the left lung base. No other significant filling defects are identified within the central pulmonary vasculature elsewhere. The heart is mild to moderately enlarged, without evidence of right heart strain. A small layering right pleural effusion is present, with mild right atelectasis.  The thoracic aorta is mildly ectatic and unfolded with mild atherosclerotic plaquing. There is no evidence of dissection, within the limits of the limited contrast opacification. There is no significant lymphadenopathy, worrisome nodules or masses. Mild degenerative changes of the thoracic spine are noted. There are no acute fractures identified. The limited imaging of the included upper abdomen is noncontributory. SUSPECT SEGMENTAL LEFT LOWER LOBE PULMONARY EMBOLI. MILD TO MODERATE CARDIOMEGALY WITHOUT EVIDENCE OF RIGHT HEART STRAIN. MODERATE-SIZED LEFT AND SMALL RIGHT PLEURAL EFFUSIONS, SIMILAR TO 6/7/2021. MILD TO MODERATE ATELECTASIS/INFARCT OF THE LEFT LOWER LOBE AND MILD PROBABLE DEPENDENT RIGHT ATELECTASIS, SIMILAR TO 6/7/2021. XR CHEST PORTABLE    Result Date: 12/2/2021  EXAMINATION: XR CHEST PORTABLE CLINICAL HISTORY: ALTERED MENTAL STATUS COMPARISONS: SEPTEMBER 1, 2021 FINDINGS: Pacemaker generator and wires unchanged. Degenerative change bilateral glenohumeral and acromioclavicular joints. Cardiopericardial silhouette normal. Ill-defined area increased opacity right lung base. Increased opacity left lung base skiers left lower lung, and left diaphragm. BIBASILAR ATELECTASIS/PNEUMONIA.       EKG: atrial fibrillation, rate 103      Assessment:    Active Hospital Problems    Diagnosis Date Noted    Unstageable pressure ulcer of left heel (HCC) [L89.620]      Priority: Low    Cellulitis of right foot [L03.115]      Priority: Low    Acute pulmonary embolism without acute cor pulmonale (HCC) [I26.99]      Priority: Low    Viral pneumonia [J12.9] 12/02/2021     Priority: Low     Detectable troponins but not significant levels. Troponin level off at 0.04x2  LLL PE  General weakness. Patient found with viral pneumonia during this admission, Covid negative  Chronic atrial fibrillation. No longer on anticoagulation secondary to high risks bleeding and recent fall 3 months ago. Severe cardiomyopathy EF 20 to 25%.   Patient not a candidate for ICD  Sick sinus syndrome status post pacemaker placement 8/11/21  Chronic combined heart failure  Moderate MR  Moderate TR  CKD  Severely debilitated patient  Pleural effusions with 3 prior thoracentesis. left pleural effusion bigger. Plan:  Recommend supportive care for PE and pneumonia as per primary team  Plan for thoracentesis today  Would not recommend long term anticoagulation for this severely debilitated and elderly patient, he is at high risk for bleeding  Continue aspirin  Continue metoprolol  I would not recommend invasive procedures for this elderly patient and severely debilitated.   Recommend conservative management  Highly recommend palliative care consult to discuss goals of care with family members        Electronically signed by Galileo Astorga MD on 12/7/2021 at 8:25 AM

## 2021-12-07 NOTE — PROGRESS NOTES
Nephrology Progress Note    Assessment:  Hyponatremia resolved  S/P thorocentesis  CAF SSS  OHD systolic/diastolic HF     Plan: maintain hydration  Bolus saline BP low    Patient Active Problem List:     Chronic atrial fibrillation (HCC)     SSS (sick sinus syndrome) ([facility])     Cardiomyopathy (HCC)     SOB (shortness of breath)     Pleural effusion on right     Atelectasis of right lung     CHF (congestive heart failure), NYHA class I, acute on chronic, combined ([address]     Acute on chronic combined systolic and diastolic CHF (congestive heart failure) (HCC)     Acute on chronic diastolic CHF (congestive heart failure) ([facility])     Pacemaker     Goals of care, counseling/discussion     Syncope and collapse     Pressure injury of deep tissue of left heel     Viral pneumonia     Unstageable pressure ulcer of left heel ([facility] [address]     Cellulitis of right foot     Acute pulmonary embolism without acute cor pulmonale ([facility])      Subjective:  Admit Date: 12/2/2021    Interval History:speaks Setswana  No apparent distress    Medications:  Scheduled Meds:   [Held by provider] lisinopril  5 mg Oral Daily    metoprolol tartrate  25 mg Oral BID    [Held by provider] spironolactone  25 mg Oral Daily    [Held by provider] furosemide  40 mg Oral Daily    sodium chloride flush  5-40 mL IntraVENous 2 times per day    cefTRIAXone (ROCEPHIN) IV  1,000 mg IntraVENous Q24H    sodium chloride  500 mL IntraVENous Once     Continuous Infusions:   sodium chloride         CBC:   Recent Labs     12/06/21  0714 12/07/21  0708   WBC 5.5 5.8   HGB 13.4* 12.8*    179     CMP:    Recent Labs     12/05/21  1832 12/06/21  0714 12/07/21  0600   * 135 135   K 4.1 4.5 4.2   CL 97 102 101   CO2 15* 17* 18*   BUN 28* 27* 26*   CREATININE 1.04 1.12 1.09   GLUCOSE 125* 94 88   CALCIUM 8.6 8.8 8.7   LABGLOM >60.0 >60.0 >60.0     Troponin: No results for input(s): TROPONINI in the last 72 hours.   BNP: No results for input(s): BNP in the last 72

## 2021-12-07 NOTE — PROGRESS NOTES
Infectious Diseases Inpatient Progress Note          HISTORY OF PRESENT ILLNESS:   Status post thoracentesis of 700 cc of fluids done today  Currently has his oxygen off and is not in any distress  No significant cough  Refusing to eat and wants to go home  Has left heel tenderness  Arterial duplex with partial arterial occlusion   follow up viral pneumonia, foot cellulitis/ischemia on IV Rocephin, well tolerated. Weak all over. Has moderate pain in left heel with nonhealing wound. Incontinent of urine without urinary symptoms. Somewhat forgetful. No fevers. Normal white blood cell, elevated D-dimer. Current Medications:     sodium chloride  250 mL IntraVENous Once    [Held by provider] lisinopril  5 mg Oral Daily    metoprolol tartrate  25 mg Oral BID    [Held by provider] spironolactone  25 mg Oral Daily    [Held by provider] furosemide  40 mg Oral Daily    sodium chloride flush  5-40 mL IntraVENous 2 times per day    cefTRIAXone (ROCEPHIN) IV  1,000 mg IntraVENous Q24H    sodium chloride  500 mL IntraVENous Once       Allergies:  Patient has no known allergies. Review of Systems  14 system review is negative other than HPI    Physical Exam  Vitals:    12/07/21 1004 12/07/21 1108 12/07/21 1111 12/07/21 1157   BP: 99/63  (!) 91/46 88/60   Pulse: 61 60 63 65   Resp: 20   20   Temp:       TempSrc:       SpO2: 100% 100% 100% 100%   Weight:         General Appearance: alert and oriented to person, place, frail-appearing, in no acute distress  Skin: warm and dry, no rash. Head: normocephalic and atraumatic  Eyes: anicteric sclerae  ENT: oropharynx clear and moist with normal mucous membranes.  No oral thrush  Lungs: normal respiratory effort, diffuse bilateral scattered rales  Heart normal G7-X6 systolic ejection murmur grade 2/6 best heard over right upper sternal border  Abdomen: soft, no tenderness  No leg edema  No erythema, no tenderness  Left heel with intact dressing   right foot is less supplementation  25 minutes were spent examining patient reviewing chart and implementing care  Discussed with patient and RN    Dolores Brizuela MD

## 2021-12-07 NOTE — PROGRESS NOTES
[facility] Occupational Therapy      Date: 2021  Patient Name: Erika Minor        MRN: [id number]  Account: [de-identified]   : 1929  (80 y.o.)  Room: [place] [address]    Chart reviewed, attempted OT at 1430 for eval. Patient not seen 2° to: Other: Pending WB status per podiatry. Page out to resident however no orders yet. Spoke to [employer], RN aware. Will attempt again when able.     Electronically signed by DAVID Mac on 2021 at 2:39 PM

## 2021-12-07 NOTE — PROGRESS NOTES
INPATIENT PROGRESS NOTES    PATIENT NAME: Armand Massey  MRN: [id number]  SERVICE DATE:  December 7, 2021   SERVICE TIME:  4:38 PM      PRIMARY SERVICE: Pulmonary Disease    CHIEF COMPLAIN: Shortness of breath      INTERVAL HPI: Patient seen and examined at bedside, Interval Notes, orders reviewed. Nursing notes noted  Patient is much more awake today. He underwent for thoracentesis and about 700 cc of pleural fluid removed. He has low blood pressure. His O2 saturation is 100% on 5 L. Discussed with RN decrease O2 to 3 L. OBJECTIVE    Body mass index is 20.08 kg/m². PHYSICAL EXAM:  Vitals:  BP 88/60   Pulse 65   Temp 97.2 °F (36.2 °C)   Resp 20   Wt 136 lb (61.7 kg)   SpO2 100%   BMI 20.08 kg/m²   General:Alert, awake . comfortable in bed, No distress. Head: Atraumatic , Normocephalic   Eyes: PERRL. No sclera icterus. No conjunctival injection. No discharge   ENT: No nasal  discharge. Pharynx clear. Neck:  Trachea midline. No thyromegaly, no JVD, No cervical adenopathy. Chest : Bilaterally symmetrical ,Normal effort,  No accessory muscle use  Lung : . Fair BS bilateral, decreased BS at bases. Bibasilar Rales. No wheezing. No rhonchi. Heart[de-identified] Normal  rate. Regular rhythm. No mumur ,  Rub or gallop  ABD: Non-tender. Non-distended. No masses. No organmegaly. Normal bowel sounds. No hernia. Ext : No Pitting both leg , No Cyanosis No clubbing  Neuro: no focal weakness          DATA:   Recent Labs     12/06/21  0714 12/07/21  0708   WBC 5.5 5.8   HGB 13.4* 12.8*   HCT 40.6* 38.9*   .2* 103.7*    179     Recent Labs     12/06/21  0714 12/06/21  0714 12/07/21  0600     --  135   K 4.5  --  4.2     --  101   CO2 17*  --  18*   BUN 27*  --  26*   CREATININE 1.12  --  1.09   GLUCOSE 94  --  88   CALCIUM 8.8  --  8.7   LABGLOM >60.0   < > >60.0   GFRAA >60.0   < > >60.0    < > = values in this interval not displayed.        MV Settings:          No results for input(s): PHART, YLA2IBD, PO2ART, MHF0ZAS, BEART, U0KBNNOC in the last 72 hours. O2 Device: Nasal cannula  O2 Flow Rate (L/min): 5 L/min    ADULT DIET; Regular     MEDICATIONS during current hospitalization:    Continuous Infusions:   sodium chloride         Scheduled Meds:   midodrine  2.5 mg Oral TID WC    [Held by provider] lisinopril  5 mg Oral Daily    metoprolol tartrate  25 mg Oral BID    [Held by provider] spironolactone  25 mg Oral Daily    [Held by provider] furosemide  40 mg Oral Daily    sodium chloride flush  5-40 mL IntraVENous 2 times per day    cefTRIAXone (ROCEPHIN) IV  1,000 mg IntraVENous Q24H    sodium chloride  500 mL IntraVENous Once       PRN Meds:sodium chloride flush, sodium chloride, ondansetron **OR** ondansetron, polyethylene glycol, acetaminophen **OR** acetaminophen    Radiology  XR CHEST (2 VW)    Result Date: 12/5/2021  XR CHEST (2 VW) Clinical History:  Chest congestion. Comparison:  12/2/2021. RESULT: Left transvenous pacemaker, unchanged. Bibasilar opacity/atelectasis, unchanged. Small to moderate left pleural effusion and small right pleural effusions, grossly unchanged. Stable enlarged cardiomediastinal silhouette. Aortic vascular calcifications. No acute osseous finding. Degenerative changes in the spine and shoulders. No significant interval change from prior. Bibasilar opacities/atelectasis. Bilateral pleural effusions, larger on the left. XR FOOT RIGHT (2 VIEWS)    Result Date: 12/5/2021  EXAMINATION/TECHNIQUE:  XR FOOT RIGHT (2 VIEWS) HISTORY:  Right foot edema. COMPARISON: None RESULT: No distinct acute fracture. No dislocation. Chronic appearing degenerative changes throughout the foot. Hypertrophic changes dorsal mid foot. Tibiotalar osteophytes. Calcaneal enthesophytes. Diffuse nonspecific soft tissue edema. No other significant abnormality. No acute osseous findings.     CT HEAD WO CONTRAST    Result Date: 12/2/2021  CT HEAD WO CONTRAST : 12/2/2021 CLINICAL HISTORY:  confusion x 3 days . COMPARISON: 9/1/2021. TECHNIQUE: Spiral unenhanced images were obtained of the head, with routine multiplanar reconstructions performed. All CT scans at this facility use dose modulation, iterative reconstruction, and/or weight based dosing when appropriate to reduce radiation dose to as low as reasonably achievable. FINDINGS: There is no intracranial hemorrhage, mass effect, midline shift, extra-axial collection, evidence of hydrocephalus, recent ischemic infarct, or skull fracture identified. Chronic right frontal, temporooccipital and right cerebellar infarcts, mild generalized cerebral volume loss, and mild to moderate patchy white matter changes are again noted. The mastoid air cells and visualized paranasal sinuses are essentially clear. NO ACUTE INTRACRANIAL PROCESS OR SIGNIFICANT CHANGE FROM 9/1/2021 IDENTIFIED. CTA CHEST W WO CONTRAST    Result Date: 12/2/2021  CTA CHEST W WO CONTRAST: 12/2/2021 CLINICAL HISTORY:  tachycardic, new afib, ? effusion vs wedge shaped infarct vs infiltrate left lung base. assess for PE . COMPARISON: Chest CT without contrast 6/7/2021. Spiral enhanced images were obtained of the chest during the infusion of approximately 100 mL of Isovue 300 contrast with pulmonary artery  CTA protocol. Routine and volume rendered images were obtained on a 3-dimensional workstation. All CT scans at this facility use dose modulation, iterative reconstruction, and/or weight based dosing when appropriate to reduce radiation dose to as low as reasonably achievable. FINDINGS: The study is limited by motion artifact. Pulmonary emboli are suspected within the segmental branches of the left lower lobe, with a moderate-sized left pleural effusion and mild atelectasis/infarct of the left lung base. No other significant filling defects are identified within the central pulmonary vasculature elsewhere.  The heart is mild to moderately enlarged, without evidence of right heart strain. A small layering right pleural effusion is present, with mild right atelectasis. The thoracic aorta is mildly ectatic and unfolded with mild atherosclerotic plaquing. There is no evidence of dissection, within the limits of the limited contrast opacification. There is no significant lymphadenopathy, worrisome nodules or masses. Mild degenerative changes of the thoracic spine are noted. There are no acute fractures identified. The limited imaging of the included upper abdomen is noncontributory. SUSPECT SEGMENTAL LEFT LOWER LOBE PULMONARY EMBOLI. MILD TO MODERATE CARDIOMEGALY WITHOUT EVIDENCE OF RIGHT HEART STRAIN. MODERATE-SIZED LEFT AND SMALL RIGHT PLEURAL EFFUSIONS, SIMILAR TO 6/7/2021. MILD TO MODERATE ATELECTASIS/INFARCT OF THE LEFT LOWER LOBE AND MILD PROBABLE DEPENDENT RIGHT ATELECTASIS, SIMILAR TO 6/7/2021. XR CHEST PORTABLE    Result Date: 12/2/2021  EXAMINATION: XR CHEST PORTABLE CLINICAL HISTORY: ALTERED MENTAL STATUS COMPARISONS: SEPTEMBER 1, 2021 FINDINGS: Pacemaker generator and wires unchanged. Degenerative change bilateral glenohumeral and acromioclavicular joints. Cardiopericardial silhouette normal. Ill-defined area increased opacity right lung base. Increased opacity left lung base skiers left lower lung, and left diaphragm. BIBASILAR ATELECTASIS/PNEUMONIA. US DUP LOWER ART/BYPASS GRAFTS BILATERAL COMPLETE    Result Date: 12/5/2021  US DUP LOWER ART/BYPASS GRAFTS BILATERAL COMPLETE: 12/5/2021 4:47 PM CLINICAL HISTORY:  BLE nonpalpable pulses, rubor right foot . Right foot discolored. Left heel wound. COMPARISON: None available. Grayscale, color and waveform Doppler analysis of both lower extremity arterial systems was performed. FINDINGS: Moderate atherosclerotic disease, with pulsatile mild to moderately diminished waveforms worsening distally. Elevated velocities within the right proximal posterior tibial artery suggestive of 30-49% stenosis.  Elevated velocities in the left proximal posterior tibial artery suggestive of around 50% stenosis. No evidence for arterial occlusion. The arterial system is normal in caliber, without evidence of aneurysm or dissection. Maximum systolic velocities are: RIGHT LOWER EXTREMITY: Right common femoral artery 57 cm/s. Right proximal superficial femoral artery 68 cm/s. Right mid superficial femoral artery 60 cm/s. Right distal superficial femoral artery 29 cm/s. Right popliteal artery 26 cm/s. Right proximal anterior tibial artery 29 cm/s. Right mid anterior tibial artery 33  cm/s. Right distal anterior tibial artery 24 cm/s. Right proximal posterior tibial artery 172 cm/s. Right mid posterior tibial artery 62  cm/s. Right distal posterior tibial artery 21  cm/s. Right proximal peroneal artery 70 cm/s. Right mid peroneal artery 64 cm/s. Right distal peroneal artery 33 cm/s. LEFT LOWER EXTREMITY: Left common femoral artery 92 cm/s. Left proximal superficial femoral artery 61 cm/s. Left mid superficial femoral artery 76 cm/s. Left distal superficial femoral artery 40 cm/s. Left popliteal artery 40 cm/s. Left proximal anterior tibial artery 36 cm/s. Left mid anterior tibial artery 35  cm/s. Left distal anterior tibial artery 33 cm/s. Left proximal posterior tibial artery 217 cm/s. Left mid posterior tibial artery 79  cm/s. Left distal posterior tibial artery 43  cm/s. Left proximal peroneal artery 101 cm/s. Left mid peroneal artery 24 cm/s. Left distal peroneal artery 20 cm/s.     30-49% stenosis involving the right proximal posterior tibial artery and around 50% stenosis involving the left proximal posterior tibial artery. No evidence of arterial occlusion, or aneurysm. Mild to moderate distal atherosclerotic disease of both lower extremities. Mauricio [facility] CATH    Result Date: 12/7/2021  NO IMAGES NO DICTATION        IMPRESSION AND SUGGESTION:  1. Bibasilar atelectasis versus pneumonia  2.  Chronic bilateral pleural effusion , S/p thoracentesis in June 2021 on left side  3. Questionable segmental left lower lobe PE  4. Atrial fibrillation  5. Congestive heart failure, cardiomyopathy with LVEF 20%    Patient underwent for thoracentesis and about 800 cc of pleural fluid removed on left side. On 5 L O2 SpO2 is 100%. Patient blood pressure is low side. Discussed with RN titrate FiO2 down. Getting IV fluid. Pleural fluid protein is 1.3. Amylase is 20. LDH is pending. Procalcitonin is not elevated. Continue present treatment plan        NOTE: This report was transcribed using voice recognition software. Every effort was made to ensure accuracy; however, inadvertent computerized transcription errors may be present.       Electronically signed by Jamie Ramírez MD, FCCP on 12/7/2021 at 4:38 PM

## 2021-12-07 NOTE — PROGRESS NOTES
Call placed to Dr. Mary Hartmann who was made aware INR was 2.0 on 7/22 and patient should continue warfarin 7.5mg daily and recheck of INR will be 8/3 by [facility] home draw.  Understanding verbalized.    Refuses bipap. Wont leave mask on. Pt refuses high flow nasal cannula. Placed on 5lnc sats 100%.

## 2021-12-08 VITALS
SYSTOLIC BLOOD PRESSURE: 103 MMHG | WEIGHT: 136 LBS | TEMPERATURE: 97.2 F | HEART RATE: 50 BPM | RESPIRATION RATE: 16 BRPM | OXYGEN SATURATION: 100 % | BODY MASS INDEX: 20.08 KG/M2 | DIASTOLIC BLOOD PRESSURE: 68 MMHG

## 2021-12-08 LAB
ANION GAP SERPL CALCULATED.3IONS-SCNC: 15 MEQ/L (ref 9–15)
BASOPHILS ABSOLUTE: 0 K/UL (ref 0–0.2)
BASOPHILS RELATIVE PERCENT: 0.5 %
BUN BLDV-MCNC: 24 MG/DL (ref 8–23)
CALCIUM SERPL-MCNC: 8.7 MG/DL (ref 8.5–9.9)
CHLORIDE BLD-SCNC: 100 MEQ/L (ref 95–107)
CO2: 18 MEQ/L (ref 20–31)
CREAT SERPL-MCNC: 1.14 MG/DL (ref 0.7–1.2)
EOSINOPHILS ABSOLUTE: 0.4 K/UL (ref 0–0.7)
EOSINOPHILS RELATIVE PERCENT: 6.9 %
GFR AFRICAN AMERICAN: >60
GFR NON-AFRICAN AMERICAN: 60
GLUCOSE BLD-MCNC: 95 MG/DL (ref 70–99)
HCT VFR BLD CALC: 39.7 % (ref 42–52)
HEMOGLOBIN: 13.1 G/DL (ref 14–18)
LYMPHOCYTES ABSOLUTE: 1.7 K/UL (ref 1–4.8)
LYMPHOCYTES RELATIVE PERCENT: 31.9 %
MCH RBC QN AUTO: 33.6 PG (ref 27–31.3)
MCHC RBC AUTO-ENTMCNC: 33.1 % (ref 33–37)
MCV RBC AUTO: 101.7 FL (ref 80–100)
MONOCYTES ABSOLUTE: 0.5 K/UL (ref 0.2–0.8)
MONOCYTES RELATIVE PERCENT: 9.2 %
NEUTROPHILS ABSOLUTE: 2.8 K/UL (ref 1.4–6.5)
NEUTROPHILS RELATIVE PERCENT: 51.5 %
PATH CONSULT FLUID: NORMAL
PDW BLD-RTO: 16.5 % (ref 11.5–14.5)
PLATELET # BLD: 187 K/UL (ref 130–400)
POTASSIUM SERPL-SCNC: 4 MEQ/L (ref 3.4–4.9)
PROCALCITONIN: 0.14 NG/ML (ref 0–0.15)
RBC # BLD: 3.91 M/UL (ref 4.7–6.1)
SARS-COV-2, NAAT: NOT DETECTED
SODIUM BLD-SCNC: 133 MEQ/L (ref 135–144)
WBC # BLD: 5.3 K/UL (ref 4.8–10.8)

## 2021-12-08 PROCEDURE — 2700000000 HC OXYGEN THERAPY PER DAY

## 2021-12-08 PROCEDURE — 80048 BASIC METABOLIC PNL TOTAL CA: CPT

## 2021-12-08 PROCEDURE — 0W9B3ZZ DRAINAGE OF LEFT PLEURAL CAVITY, PERCUTANEOUS APPROACH: ICD-10-PCS | Performed by: RADIOLOGY

## 2021-12-08 PROCEDURE — 99233 SBSQ HOSP IP/OBS HIGH 50: CPT | Performed by: INTERNAL MEDICINE

## 2021-12-08 PROCEDURE — 99232 SBSQ HOSP IP/OBS MODERATE 35: CPT | Performed by: INTERNAL MEDICINE

## 2021-12-08 PROCEDURE — 6370000000 HC RX 637 (ALT 250 FOR IP): Performed by: INTERNAL MEDICINE

## 2021-12-08 PROCEDURE — 84145 PROCALCITONIN (PCT): CPT

## 2021-12-08 PROCEDURE — 99497 ADVNCD CARE PLAN 30 MIN: CPT | Performed by: FAMILY MEDICINE

## 2021-12-08 PROCEDURE — 85025 COMPLETE CBC W/AUTO DIFF WBC: CPT

## 2021-12-08 PROCEDURE — 87635 SARS-COV-2 COVID-19 AMP PRB: CPT

## 2021-12-08 PROCEDURE — 99233 SBSQ HOSP IP/OBS HIGH 50: CPT | Performed by: FAMILY MEDICINE

## 2021-12-08 PROCEDURE — 6360000002 HC RX W HCPCS: Performed by: FAMILY MEDICINE

## 2021-12-08 PROCEDURE — 2580000003 HC RX 258: Performed by: FAMILY MEDICINE

## 2021-12-08 RX ORDER — MIDODRINE HYDROCHLORIDE 5 MG/1
5 TABLET ORAL
Status: DISCONTINUED | OUTPATIENT
Start: 2021-12-08 | End: 2021-12-08 | Stop reason: HOSPADM

## 2021-12-08 RX ORDER — MEGESTROL ACETATE 40 MG/1
40 TABLET ORAL DAILY
Status: DISCONTINUED | OUTPATIENT
Start: 2021-12-08 | End: 2021-12-08 | Stop reason: HOSPADM

## 2021-12-08 RX ADMIN — MIDODRINE HYDROCHLORIDE 5 MG: 5 TABLET ORAL at 16:17

## 2021-12-08 RX ADMIN — MIDODRINE HYDROCHLORIDE 2.5 MG: 5 TABLET ORAL at 10:16

## 2021-12-08 RX ADMIN — SODIUM CHLORIDE, PRESERVATIVE FREE 10 ML: 5 INJECTION INTRAVENOUS at 10:18

## 2021-12-08 RX ADMIN — MEGESTROL ACETATE 40 MG: 40 TABLET ORAL at 15:02

## 2021-12-08 RX ADMIN — CEFTRIAXONE SODIUM 1000 MG: 1 INJECTION, POWDER, FOR SOLUTION INTRAMUSCULAR; INTRAVENOUS at 16:17

## 2021-12-08 NOTE — PROGRESS NOTES
Spoke to [facility] [employer] to follow up after yesterday's thoracentesis. Dressing C, D, I, and site looks good. No concerns at this time.

## 2021-12-08 NOTE — PROGRESS NOTES
Cardiology Progress Note  Patient: Felicia Bryant  Unit/Bed: E888/T377-77  YOB: 1929  MRN: [id number]  Acct: [de-identified]   Admitting Diagnosis: Bronchospasm [J98.01]  Viral pneumonia [J12.9]  Disorientation [R41.0]  Hypoxia [R09.02]  Rhinovirus [B34.8]  Elevated troponin [R77.8]  FOX (acute kidney injury) ([address] [N17.9]  Atrial fibrillation with rapid ventricular response ([facility] [address] [I48.91]  Single subsegmental pulmonary embolism without acute cor pulmonale ([facility] [address] [I26.93]  Date:  12/2/2021  Hospital Day: 6      Reason of this consult:  Troponin elevation    History of Present Illness:  80-year-old Greek-speaking only male with significant cardiac history including severe cardiomyopathy EF 20% by TTE 8/9/2021 not a candidate for ICD, sick sinus syndrome status post pacemaker placement 8/11/21 and atrial fibrillation, as well as hypertension CKD who was admitted to the hospital last night for 3-day history of weakness. Cardiology consulted for evaluation and management of positive troponins    Of note: patient was admitted to the hospital 9/1/2021 and discharged the following day after sustaining a fall  Per son pt started feeling week and tired 3 days ago. Also 3 days ago he developed SOB which is worsening    CT of the chest reported as patient for left lower lobe pulmonary embolism.   CXR 12/2/21: bibasilar atelectasis/pneumonia  EKG at admission: Atrial fibrillation rate 80 old left bundle branch block  TTE 8/9/2021 EF 20%, moderate MR, moderate TR    12/8/2021  Patient denies chest pain, states that shortness of breath is improving  Patient underwent thoracentesis with 700 cc of fluid removed    12/7/21  No chest pain  Still feels short of breath  Plan for thoracentesis today, yesterday it was cancelled due to elevated INR a few days ago      12/6/2021  Patient reports shortness of breath    No Known Allergies    Current Facility-Administered Medications   Medication Dose Route Frequency Provider Last Rate Last Admin    megestrol (MEGACE) tablet 40 mg  40 mg Oral Daily Maria Esther Muro DO        midodrine (PROAMATINE) tablet 2.5 mg  2.5 mg Oral TID WC Cj Prince MD   2.5 mg at 12/08/21 1016    metoprolol tartrate (LOPRESSOR) tablet 25 mg  25 mg Oral BID Cj Prince MD   25 mg at 12/06/21 2314    [Held by provider] spironolactone (ALDACTONE) tablet 25 mg  25 mg Oral Daily Cj Prince MD        [Held by provider] furosemide (LASIX) tablet 40 mg  40 mg Oral Daily Cj Prince MD        sodium chloride flush 0.9 % injection 5-40 mL  5-40 mL IntraVENous 2 times per day Kathya Herzog MD   10 mL at 12/08/21 1018    sodium chloride flush 0.9 % injection 5-40 mL  5-40 mL IntraVENous PRN Kathya Herzog MD        0.9 % sodium chloride infusion  25 mL IntraVENous PRN Kathya Herzog MD        ondansetron (ZOFRAN-ODT) disintegrating tablet 4 mg  4 mg Oral Q8H PRN Kathya Herzog MD        Or    ondansetron [facility]) injection 4 mg  4 mg IntraVENous Q6H PRN Kathya Herzog MD        polyethylene glycol [facility]) packet 17 g  17 g Oral Daily PRN Kathya Herzog MD        acetaminophen (TYLENOL) tablet 650 mg  650 mg Oral Q6H PRN Kathya Herzog MD   650 mg at 12/06/21 2022    Or    acetaminophen (TYLENOL) suppository 650 mg  650 mg Rectal Q6H PRN Kathya Herzog MD        cefTRIAXone (ROCEPHIN) 1000 mg IVPB in 50 mL D5W minibag  1,000 mg IntraVENous Q24H Kathya Herzog MD   Stopped at 12/07/21 1640    0.9 % sodium chloride bolus  500 mL IntraVENous Once Mary Shipley DO           PMHx:  Past Medical History:   Diagnosis Date    Anxiety     Atrial fibrillation ([facility] [address]     Cardiomyopathy ([facility] [address] 9/18/2020    CHF (congestive heart failure) ([facility] [address]     Diabetes mellitus ([facility] [address]     Hypertension     Sleep apnea        PSHx:  Past Surgical History:   Procedure Laterality Date    BACK SURGERY      CARDIAC PACEMAKER PLACEMENT  2020    HERNIA REPAIR      THORACENTESIS Left 06/24/2021    450ml removed by Dr Julietta Schwab [phone]    THORACENTESIS Left 12/07/2021    705 ml removed by Dr. Aliza Delgado Hx:  Social History     Socioeconomic History    Marital status:      Spouse name: Not on file    Number of children: Not on file    Years of education: Not on file    Highest education level: Not on file   Occupational History    Not on file   Tobacco Use    Smoking status: Former Smoker    Smokeless tobacco: Former User    Tobacco comment: quit 30 years ago   Vaping Use    Vaping Use: Never used   Substance and Sexual Activity    Alcohol use: Not Currently    Drug use: Not Currently    Sexual activity: Not on file   Other Topics Concern    Not on file   Social History Narrative    ** Merged History Encounter **          Social Determinants of Health     Financial Resource Strain:     Difficulty of Paying Living Expenses: Not on file   Food Insecurity:     Worried About [address] in the Last Year: Not on file    [address] in the Last Year: Not on file   Transportation Needs:     Lack of Transportation (Medical): Not on file    Lack of Transportation (Non-Medical):  Not on file   Physical Activity:     Days of Exercise per Week: Not on file    Minutes of Exercise per Session: Not on file   Stress:     Feeling of Stress : Not on file   Social Connections:     Frequency of Communication with Friends and Family: Not on file    Frequency of Social Gatherings with Friends and Family: Not on file    Attends [employer]anda[employer] Services: Not on file    Active Member of Clubs or Organizations: Not on file    Attends Club or Organization Meetings: Not on file    Marital Status: Not on file   Intimate Partner Violence:     Fear of Current or Ex-Partner: Not on file    Emotionally Abused: Not on file    Physically Abused: Not on file    Sexually Abused: Not on file   Housing Stability:     Unable to Pay for Housing in the Last Year: Not on file  Number of Places Lived in the Last Year: Not on file    Unstable Housing in the Last Year: Not on file       Family Hx:  No family history on file. Physical Examination:    BP (!) 85/55   Pulse 72   Temp 97.3 °F (36.3 °C)   Resp 18   Wt 136 lb (61.7 kg)   SpO2 (!) 77%   BMI 20.08 kg/m²    Physical Exam  Vitals and nursing note reviewed. Constitutional:       Appearance: Normal appearance. HENT:      Head: Normocephalic and atraumatic. Mouth/Throat:      Mouth: Mucous membranes are moist.      Pharynx: Oropharynx is clear. Eyes:      Extraocular Movements: Extraocular movements intact. Conjunctiva/sclera: Conjunctivae normal.      Pupils: Pupils are equal, round, and reactive to light. Cardiovascular:      Rate and Rhythm: Normal rate and regular rhythm. Pulses: Normal pulses. Heart sounds: Normal heart sounds. Pulmonary:      Effort: Pulmonary effort is normal.      Breath sounds: Rales present. Abdominal:      General: Abdomen is flat. Bowel sounds are normal.      Palpations: Abdomen is soft. Musculoskeletal:         General: Normal range of motion. Cervical back: Normal range of motion and neck supple. Skin:     General: Skin is warm. Neurological:      General: No focal deficit present. Mental Status: He is alert and oriented to person, place, and time. Mental status is at baseline.    Psychiatric:         Mood and Affect: Mood normal.         LABS:  CBC:  Lab Results   Component Value Date    WBC 5.3 12/08/2021    RBC 3.91 12/08/2021    HGB 13.1 12/08/2021    HCT 39.7 12/08/2021    .7 12/08/2021    MCH 33.6 12/08/2021    MCHC 33.1 12/08/2021    RDW 16.5 12/08/2021     12/08/2021     CBC with Differential:   Lab Results   Component Value Date    WBC 5.3 12/08/2021    RBC 3.91 12/08/2021    HGB 13.1 12/08/2021    HCT 39.7 12/08/2021     12/08/2021    .7 12/08/2021    MCH 33.6 12/08/2021    MCHC 33.1 12/08/2021    RDW 16.5 12/08/2021    LYMPHOPCT 31.9 12/08/2021    MONOPCT 9.2 12/08/2021    BASOPCT 0.5 12/08/2021    MONOSABS 0.5 12/08/2021    LYMPHSABS 1.7 12/08/2021    EOSABS 0.4 12/08/2021    BASOSABS 0.0 12/08/2021     CMP:    Lab Results   Component Value Date     12/08/2021    K 4.0 12/08/2021    K 4.9 12/04/2021     12/08/2021    CO2 18 12/08/2021    BUN 24 12/08/2021    CREATININE 1.14 12/08/2021    GFRAA >60.0 12/08/2021    LABGLOM 60.0 12/08/2021    GLUCOSE 95 12/08/2021    PROT 6.5 12/02/2021    LABALBU 3.4 12/02/2021    CALCIUM 8.7 12/08/2021    BILITOT 1.1 12/02/2021    ALKPHOS 103 12/02/2021    AST 38 12/02/2021    ALT 21 12/02/2021     BMP:    Lab Results   Component Value Date     12/08/2021    K 4.0 12/08/2021    K 4.9 12/04/2021     12/08/2021    CO2 18 12/08/2021    BUN 24 12/08/2021    LABALBU 3.4 12/02/2021    CREATININE 1.14 12/08/2021    CALCIUM 8.7 12/08/2021    GFRAA >60.0 12/08/2021    LABGLOM 60.0 12/08/2021    GLUCOSE 95 12/08/2021     Magnesium:    Lab Results   Component Value Date    MG 2.1 12/02/2021     Troponin:    Lab Results   Component Value Date    TROPONINI 0.042 12/04/2021       Radiology:  CT HEAD WO CONTRAST    Result Date: 12/2/2021  CT HEAD WO CONTRAST : 12/2/2021 CLINICAL HISTORY:  confusion x 3 days . COMPARISON: 9/1/2021. TECHNIQUE: Spiral unenhanced images were obtained of the head, with routine multiplanar reconstructions performed. All CT scans at this facility use dose modulation, iterative reconstruction, and/or weight based dosing when appropriate to reduce radiation dose to as low as reasonably achievable. FINDINGS: There is no intracranial hemorrhage, mass effect, midline shift, extra-axial collection, evidence of hydrocephalus, recent ischemic infarct, or skull fracture identified. Chronic right frontal, temporooccipital and right cerebellar infarcts, mild generalized cerebral volume loss, and mild to moderate patchy white matter changes are again noted. The mastoid air cells and visualized paranasal sinuses are essentially clear. NO ACUTE INTRACRANIAL PROCESS OR SIGNIFICANT CHANGE FROM 9/1/2021 IDENTIFIED. CTA CHEST W WO CONTRAST    Result Date: 12/2/2021  CTA CHEST W WO CONTRAST: 12/2/2021 CLINICAL HISTORY:  tachycardic, new afib, ? effusion vs wedge shaped infarct vs infiltrate left lung base. assess for PE . COMPARISON: Chest CT without contrast 6/7/2021. Spiral enhanced images were obtained of the chest during the infusion of approximately 100 mL of Isovue 300 contrast with pulmonary artery  CTA protocol. Routine and volume rendered images were obtained on a 3-dimensional workstation. All CT scans at this facility use dose modulation, iterative reconstruction, and/or weight based dosing when appropriate to reduce radiation dose to as low as reasonably achievable. FINDINGS: The study is limited by motion artifact. Pulmonary emboli are suspected within the segmental branches of the left lower lobe, with a moderate-sized left pleural effusion and mild atelectasis/infarct of the left lung base. No other significant filling defects are identified within the central pulmonary vasculature elsewhere. The heart is mild to moderately enlarged, without evidence of right heart strain. A small layering right pleural effusion is present, with mild right atelectasis. The thoracic aorta is mildly ectatic and unfolded with mild atherosclerotic plaquing. There is no evidence of dissection, within the limits of the limited contrast opacification. There is no significant lymphadenopathy, worrisome nodules or masses. Mild degenerative changes of the thoracic spine are noted. There are no acute fractures identified. The limited imaging of the included upper abdomen is noncontributory. SUSPECT SEGMENTAL LEFT LOWER LOBE PULMONARY EMBOLI. MILD TO MODERATE CARDIOMEGALY WITHOUT EVIDENCE OF RIGHT HEART STRAIN.  MODERATE-SIZED LEFT AND SMALL RIGHT PLEURAL EFFUSIONS, SIMILAR TO 6/7/2021. MILD TO MODERATE ATELECTASIS/INFARCT OF THE LEFT LOWER LOBE AND MILD PROBABLE DEPENDENT RIGHT ATELECTASIS, SIMILAR TO 6/7/2021. XR CHEST PORTABLE    Result Date: 12/2/2021  EXAMINATION: XR CHEST PORTABLE CLINICAL HISTORY: ALTERED MENTAL STATUS COMPARISONS: SEPTEMBER 1, 2021 FINDINGS: Pacemaker generator and wires unchanged. Degenerative change bilateral glenohumeral and acromioclavicular joints. Cardiopericardial silhouette normal. Ill-defined area increased opacity right lung base. Increased opacity left lung base skiers left lower lung, and left diaphragm. BIBASILAR ATELECTASIS/PNEUMONIA.       EKG: atrial fibrillation, rate 103      Assessment:    Active Hospital Problems    Diagnosis Date Noted    Unstageable pressure ulcer of left heel (HCC) [L89.620]      Priority: Low    Cellulitis of right foot [L03.115]      Priority: Low    Acute pulmonary embolism without acute cor pulmonale (HCC) [I26.99]      Priority: Low    Viral pneumonia [J12.9] 12/02/2021     Priority: Low     Detectable troponins but not significant levels. Troponin level off at 0.04x2  LLL PE  General weakness. Patient found with viral pneumonia during this admission, Covid negative  Chronic atrial fibrillation. No longer on anticoagulation secondary to high risks bleeding and recent fall 3 months ago. Severe cardiomyopathy EF 20 to 25%. Patient not a candidate for ICD  Sick sinus syndrome status post pacemaker placement 8/11/21  Chronic combined heart failure  Moderate MR  Moderate TR  CKD  Severely debilitated patient  Pleural effusions with 3 prior thoracentesis. left pleural effusion bigger.     Plan:  Recommend supportive care for PE and pneumonia as per primary team  Status post thoracentesis yesterday with 700 of fluid removed  Would not recommend long term anticoagulation for this severely debilitated and elderly patient, he is at high risk for bleeding  Continue aspirin  Continue metoprolol  I would not recommend invasive procedures for this elderly patient and severely debilitated.   Recommend conservative management  Palliative team following, patient and family leaning into possible hospice    Electronically signed by Kathlen Goodpasture, MD on 12/8/2021 at 2:02 PM

## 2021-12-08 NOTE — PROGRESS NOTES
Palliative Care Progress Note  Patient: Allison Colon  Gender: male  YOB: 1929  Unit/Bed: V811/L661-05  CodeStatus: DNR-CCA  Inpatient Treatment Team: Treatment Team: Attending Provider: Damian Taylor MD; Consulting Physician: Justin Mcallister MD; Consulting Physician: Walt Lindsey MD; Consulting Physician: Che Baker DPM; Consulting Physician: Faby Lea MD; Consulting Physician: Stoney Zavala MD; Utilization Reviewer: Nancy Stone, RN; Consulting Physician: Romario Greene, APRN - CNP; Consulting Physician: Daniel Arora MD; Utilization Reviewer: Naeem Arellano, RN; : Elsy Ramesh, RN; Registered Nurse: Lydia Brownlee RN; Registered Nurse: Zoraida Flores RN  Admit Date:  12/2/2021    Chief Complaint: Altered mental status, Viral pneumonia    History of Presenting Illness: Allison Colon is a 80 y.o. male on hospital day 6 with a history of Altered mental status, Viral pneumonia. He has a pertinent PMh of hypertension, congestive heart failure, atrial fibrillation and anxiety. Presented to the Er on 12/2 with confusion per family x 3 days. Noted to be A+Ox1. Bulgarian speaking only. CT of the chest showed a subsegmental pulmonary embolus and she was subsequently admitted. Cardiology saw on 12/3 and noted prior history of severe cardiomyopathy EF 20% by TTE 8/9/2021 and recommended supportive care without long term anticoagulation as severely debilitated and at high risk for bleeding. On 12/5 noted oxygenation status increased to 5L O2 sats 92-95. Also noted RLE cellulitis with ID consulted. Noted Respiratory viral panel positive for rhinovirus. Stared on IV Rocephin. Had thoracentesis with 700 cc of fluids drained today. Today per the nurse on duty the patient presents as A+Ox3.  The patient is Bulgarian-speaking and attempted visit with , #[id number], but patient is very sleepy post thoracentesis and was unable to have meaning Full conversation as remained sleeping during visit. remains on 5L o2. No family at bedside for [address] and ACP conversation. 12/8/21      Spoke with patient and son at bedside. Used  via [employer] services. Patient is A+Ox3 though remains SOB and tachynic at times. Remains hypotensice with last noted BP 85/55 HR 72. GOC and ACP conversation held with son Trena Ramirez and patient. Review of Systems:       Review of Systems   Unable to perform ROS: Other       Physical Examination:       BP (!) 85/55   Pulse 72   Temp 97.3 °F (36.3 °C)   Resp 18   Wt 136 lb (61.7 kg)   SpO2 (!) 77%   BMI 20.08 kg/m²    Physical Exam  Vitals reviewed. Constitutional:       Appearance: He is ill-appearing. HENT:      Head: Normocephalic and atraumatic. Eyes:      Pupils: Pupils are equal, round, and reactive to light. Cardiovascular:      Rate and Rhythm: Normal rate. Pulmonary:      Effort: Pulmonary effort is normal.      Breath sounds: Normal breath sounds. Abdominal:      General: Bowel sounds are normal.      Palpations: Abdomen is soft. Musculoskeletal:      Cervical back: Normal range of motion. Skin:     General: Skin is warm and dry. Neurological:      Mental Status: He is alert and oriented to person, place, and time. Motor: Weakness present.       Gait: Gait abnormal.   Psychiatric:         Behavior: Behavior normal.         Allergies:      No Known Allergies    Medications:      Current Facility-Administered Medications   Medication Dose Route Frequency Provider Last Rate Last Admin    megestrol (MEGACE) tablet 40 mg  40 mg Oral Daily Renetta Lunch Bescak, DO        midodrine (PROAMATINE) tablet 2.5 mg  2.5 mg Oral TID WC Sierra Mason MD   2.5 mg at 12/08/21 1016    metoprolol tartrate (LOPRESSOR) tablet 25 mg  25 mg Oral BID Sierra Mason MD   25 mg at 12/06/21 9438    [Held by provider] spironolactone (ALDACTONE) tablet 25 mg  25 mg Oral Daily Sierra Mason MD        [Held by provider] furosemide (LASIX) tablet 40 mg  40 mg Oral Daily Ivet Anand MD        sodium chloride flush 0.9 % injection 5-40 mL  5-40 mL IntraVENous 2 times per day Swetha Prieto MD   10 mL at 12/08/21 1018    sodium chloride flush 0.9 % injection 5-40 mL  5-40 mL IntraVENous PRN Swetha Prieto MD        0.9 % sodium chloride infusion  25 mL IntraVENous PRN Swetha Prieto MD        ondansetron (ZOFRAN-ODT) disintegrating tablet 4 mg  4 mg Oral Q8H PRN Swetha Prieto MD        Or    [facility]dans[facility]ron [facility] injection 4 mg  4 mg IntraVENous Q6H PRN Swetha Prieto MD        polyethylene glycol Derek Joyce) packet 17 g  17 g Oral Daily PRN Swetha Prieto MD        acetaminophen (TYLENOL) tablet 650 mg  650 mg Oral Q6H PRN Swetha Prieto MD   650 mg at 12/06/21 2022    Or    acetaminophen (TYLENOL) suppository 650 mg  650 mg Rectal Q6H PRN Swetha Prieto MD        cefTRIAXone (ROCEPHIN) 1000 mg IVPB in 50 mL D5W minibag  1,000 mg IntraVENous Q24H Swetha Prieto MD   Stopped at 12/07/21 1640    0.9 % sodium chloride bolus  500 mL IntraVENous Once Sofia Lima,            History:       PMHx:  Past Medical History:   Diagnosis Date    Anxiety     Atrial fibrillation ([facility]     Cardiomyopathy ([facility] 9/18/2020    CHF (congestive heart failure) (HCC)     Diabetes mellitus ([facility]     Hypertension     Sleep apnea        PSHx:  Past Surgical History:   Procedure Laterality Date    BACK SURGERY      CARDIAC PACEMAKER PLACEMENT  5589    HERNIA REPAIR      THORACENTESIS Left 06/24/2021    450ml removed by Dr Liam Tello [phone]    THORACENTESIS Left 12/07/2021    705 ml removed by Dr. Yan Negron Hx:  Social History     Socioeconomic History    Marital status:      Spouse name: Not on file    Number of children: Not on file    Years of education: Not on file    Highest education level: Not on file   Occupational History    Not on file   Tobacco Use    Smoking status: Former Smoker    Smokeless tobacco: Former User    Tobacco comment: quit 30 years ago   Vaping Use    Vaping Use: Never used   Substance and Sexual Activity    Alcohol use: Not Currently    Drug use: Not Currently    Sexual activity: Not on file   Other Topics Concern    Not on file   Social History Narrative    ** Merged History Encounter **          Social Determinants of Health     Financial Resource Strain:     Difficulty of Paying Living Expenses: Not on file   Food Insecurity:     Worried About Running Out of Food in the Last Year: Not on file    Haylie of Food in the Last Year: Not on file   Transportation Needs:     Lack of Transportation (Medical): Not on file    Lack of Transportation (Non-Medical): Not on file   Physical Activity:     Days of Exercise per Week: Not on file    Minutes of Exercise per Session: Not on file   Stress:     Feeling of Stress : Not on file   Social Connections:     Frequency of Communication with Friends and Family: Not on file    Frequency of Social Gatherings with Friends and Family: Not on file    Attends Mandaen Services: Not on file    Active Member of [address] [place] [address] or Organizations: Not on file    Attends Club or Organization Meetings: Not on file    Marital Status: Not on file   Intimate Partner Violence:     Fear of Current or Ex-Partner: Not on file    Emotionally Abused: Not on file    Physically Abused: Not on file    Sexually Abused: Not on file   Housing Stability:     Unable to Pay for Housing in the Last Year: Not on file    Number of [place] in the Last Year: Not on file    Unstable Housing in the Last Year: Not on file       Family Hx:  No family history on file.     LABS: Reviewed     CBC:  Lab Results   Component Value Date    WBC 5.3 12/08/2021    RBC 3.91 12/08/2021    HGB 13.1 12/08/2021    HCT 39.7 12/08/2021    .7 12/08/2021    MCH 33.6 12/08/2021    MCHC 33.1 12/08/2021    RDW 16.5 12/08/2021     12/08/2021     CBC with Differential:   Lab Results   Component Value Date    WBC 5.3 12/08/2021    RBC 3.91 12/08/2021    HGB 13.1 12/08/2021    HCT 39.7 12/08/2021     12/08/2021    .7 12/08/2021    MCH 33.6 12/08/2021    MCHC 33.1 12/08/2021    RDW 16.5 12/08/2021    LYMPHOPCT 31.9 12/08/2021    MONOPCT 9.2 12/08/2021    BASOPCT 0.5 12/08/2021    MONOSABS 0.5 12/08/2021    LYMPHSABS 1.7 12/08/2021    EOSABS 0.4 12/08/2021    BASOSABS 0.0 12/08/2021     CMP:    Lab Results   Component Value Date     12/08/2021    K 4.0 12/08/2021    K 4.9 12/04/2021     12/08/2021    CO2 18 12/08/2021    BUN 24 12/08/2021    CREATININE 1.14 12/08/2021    GFRAA >60.0 12/08/2021    LABGLOM 60.0 12/08/2021    GLUCOSE 95 12/08/2021    PROT 6.5 12/02/2021    LABALBU 3.4 12/02/2021    CALCIUM 8.7 12/08/2021    BILITOT 1.1 12/02/2021    ALKPHOS 103 12/02/2021    AST 38 12/02/2021    ALT 21 12/02/2021     BMP:    Lab Results   Component Value Date     12/08/2021    K 4.0 12/08/2021    K 4.9 12/04/2021     12/08/2021    CO2 18 12/08/2021    BUN 24 12/08/2021    LABALBU 3.4 12/02/2021    CREATININE 1.14 12/08/2021    CALCIUM 8.7 12/08/2021    GFRAA >60.0 12/08/2021    LABGLOM 60.0 12/08/2021    GLUCOSE 95 12/08/2021     TSH:   Lab Results   Component Value Date    TSH 1.750 12/02/2021     Vitamin B12 and Folate: No components found for: FOLIC,  L50  Urinalysis:   Lab Results   Component Value Date    NITRU Negative 12/02/2021    WBCUA 20-50 08/09/2021    BACTERIA Negative 08/09/2021    RBCUA 3-5 08/09/2021    BLOODU Negative 12/02/2021    SPECGRAV 1.022 12/02/2021    GLUCOSEU Negative 12/02/2021       Radiology: Reviewed      US DUP LOWER ART/BYPASS GRAFTS BILATERAL COMPLETE    Result Date: 12/5/2021  US DUP LOWER ART/BYPASS GRAFTS BILATERAL COMPLETE: 12/5/2021 4:47 PM CLINICAL HISTORY:  BLE nonpalpable pulses, rubor right foot . Right foot discolored. Left heel wound. COMPARISON: None available.  Grayscale, color and waveform Doppler analysis of both lower extremity arterial systems was performed. FINDINGS: Moderate atherosclerotic disease, with pulsatile mild to moderately diminished waveforms worsening distally. Elevated velocities within the right proximal posterior tibial artery suggestive of 30-49% stenosis. Elevated velocities in the left proximal posterior tibial artery suggestive of around 50% stenosis. No evidence for arterial occlusion. The arterial system is normal in caliber, without evidence of aneurysm or dissection. Maximum systolic velocities are: RIGHT LOWER EXTREMITY: Right common femoral artery 57 cm/s. Right proximal superficial femoral artery 68 cm/s. Right mid superficial femoral artery 60 cm/s. Right distal superficial femoral artery 29 cm/s. Right popliteal artery 26 cm/s. Right proximal anterior tibial artery 29 cm/s. Right mid anterior tibial artery 33  cm/s. Right distal anterior tibial artery 24 cm/s. Right proximal posterior tibial artery 172 cm/s. Right mid posterior tibial artery 62  cm/s. Right distal posterior tibial artery 21  cm/s. Right proximal peroneal artery 70 cm/s. Right mid peroneal artery 64 cm/s. Right distal peroneal artery 33 cm/s. LEFT LOWER EXTREMITY: Left common femoral artery 92 cm/s. Left proximal superficial femoral artery 61 cm/s. Left mid superficial femoral artery 76 cm/s. Left distal superficial femoral artery 40 cm/s. Left popliteal artery 40 cm/s. Left proximal anterior tibial artery 36 cm/s. Left mid anterior tibial artery 35  cm/s. Left distal anterior tibial artery 33 cm/s. Left proximal posterior tibial artery 217 cm/s. Left mid posterior tibial artery 79  cm/s. Left distal posterior tibial artery 43  cm/s. Left proximal peroneal artery 101 cm/s. Left mid peroneal artery 24 cm/s. Left distal peroneal artery 20 cm/s.     30-49% stenosis involving the right proximal posterior tibial artery and around 50% stenosis involving the left proximal posterior tibial artery.  No evidence of arterial occlusion, or aneurysm. Mild to moderate distal atherosclerotic disease of both lower extremities. Kenney Cochran MIDLINE CATH    Result Date: 12/7/2021  NO IMAGES NO DICTATION         Assessment and plan:  1. Encounter for palliative care. Patient A+Ox3 at todays visit. Had [address] and ACP conversation with patient and son Shyla Rey at bedside. Explained that patient has poor prognosis  related to PE without anticoagulation due to high risk and 20% EF with increasing oxygenation needs. Discussed hospice care and its philosophy with patient and his son. Pt amicable at this time to speak with hospice. referal ordered by hospitalist      -Advance Care Planning  Discussed goals of care with patient. Explained in extensive detail nuances between full code, DNR CCA and DNR CC. Discussed focusing on maximizing length of life vs maximizing quality of life. Patient has made the decision to be [facility].          Electronically signed by ZACH Marlow CNP on 12/8/2021 at 1:04 PM

## 2021-12-08 NOTE — PROGRESS NOTES
Physical Therapy   Facility/[facility] MED SURG D489/I024-63    NAME: Brittani Barcenas    : 1929 (80 y.o.)  MRN: [id number]    Account: [de-identified]  Gender: male    PT evaluation and treatment orders received. Chart reviewed. PT eval attempted. Patient Unavailable: pt sleeping upon arrival. Per chart review, pt and family considering hospice. Will await results of planned hospice meeting. Will attempt PT evaluation again at earliest convenience.       Electronically signed by Morenita Veras PT on 21 at 4:02 PM EST

## 2021-12-08 NOTE — PROGRESS NOTES
PODIATRIC MEDICINE AND SURGERY  CONSULT HISTORY AND PHYSICAL      Consulting Service:  medicine  Requesting Provider: Chago Duenas MD  Opinion/advice regarding: right foot cellulitis  Staff Doctor:  Dr. Yaya Samuels:  This 80 y.o. male with significant PMH of a fib, cardiomyopathy, CHF, DM, HTN and NABIL presents with heel ulcer left foot and possible cellulitis vs dependent rubor right foot. PLAN AND RECOMMENDATIONS:  We recommend Palliative wound care and offloading the left heel  Dressing Changed Betadine Wet to Dry and Mepilex Heel Border  Patient will need follow up after discharge with Dr. Miguel A Delgado at the wound care center within 7 days of discharge. INTERVAL HISTORY:  No acute events  Wound is stable        Past Medical History:   Diagnosis Date    Anxiety     Atrial fibrillation ([facility]     Cardiomyopathy ([facility] [facility] 9/18/2020    CHF (congestive heart failure) ([facility] [facility]     Diabetes mellitus ([facility] [facility]     Hypertension     Sleep apnea        Past Surgical History:   Procedure Laterality Date    BACK SURGERY      CARDIAC PACEMAKER PLACEMENT  7859    HERNIA REPAIR      THORACENTESIS Left 06/24/2021    450ml removed by Dr Jorge Morgan [phone]    THORACENTESIS Left 12/07/2021    705 ml removed by Dr. Joo Harris       No current facility-administered medications on file prior to encounter.      Current Outpatient Medications on File Prior to Encounter   Medication Sig Dispense Refill    furosemide (LASIX) 40 MG tablet Take 1 tablet by mouth daily 30 tablet 6    spironolactone (ALDACTONE) 25 MG tablet Take 1 tablet by mouth daily 30 tablet 3    lisinopril (PRINIVIL;ZESTRIL) 5 MG tablet Take 1 tablet by mouth daily 30 tablet 3    metoprolol tartrate (LOPRESSOR) 50 MG tablet Take 0.5 tablets by mouth 2 times daily 60 tablet 5    tiotropium (SPIRIVA RESPIMAT) 2.5 MCG/ACT AERS inhaler Inhale 2 puffs into the lungs daily      SODIUM CHLORIDE, EXTERNAL, (SALINE WOUND WASH) 0.9 % SOLN Apply 1 Bottle topically daily 210 mL 2    aspirin 81 MG chewable tablet Take 1 tablet by mouth daily 30 tablet 3    fluticasone-salmeterol (ADVAIR DISKUS) 250-50 MCG/DOSE AEPB Inhale 1 puff into the lungs every 12 hours for 14 days 1 Inhaler 0       No Known Allergies    No family history on file. Social History     Socioeconomic History    Marital status:      Spouse name: Not on file    Number of children: Not on file    Years of education: Not on file    Highest education level: Not on file   Occupational History    Not on file   Tobacco Use    Smoking status: Former Smoker    Smokeless tobacco: Former User    Tobacco comment: quit 30 years ago   Vaping Use    Vaping Use: Never used   Substance and Sexual Activity    Alcohol use: Not Currently    Drug use: Not Currently    Sexual activity: Not on file   Other Topics Concern    Not on file   Social History Narrative    ** Merged History Encounter **          Social Determinants of Health     Financial Resource Strain:     Difficulty of Paying Living Expenses: Not on file   Food Insecurity:     Worried About [address] in the Last Year: Not on file    [address] in the Last Year: Not on file   Transportation Needs:     Lack of Transportation (Medical): Not on file    Lack of Transportation (Non-Medical):  Not on file   Physical Activity:     Days of Exercise per Week: Not on file    Minutes of Exercise per Session: Not on file   Stress:     Feeling of Stress : Not on file   Social Connections:     Frequency of Communication with Friends and Family: Not on file    Frequency of Social Gatherings with Friends and Family: Not on file    Attends Caodaism Services: Not on file    Active Member of Clubs or Organizations: Not on file    Attends Club or Organization Meetings: Not on file    Marital Status: Not on file   Intimate Partner Violence:     Fear of Current or Ex-Partner: Not on file    Emotionally Abused: Not on file  Physically Abused: Not on file    Sexually Abused: Not on file   Housing Stability:     Unable to Pay for Housing in the Last Year: Not on file    Number of Places Lived in the Last Year: Not on file    Unstable Housing in the Last Year: Not on file       Review of Systems  Difficult to complete. OBJECTIVE:  BP (!) 98/50   Pulse 81   Temp 97.3 °F (36.3 °C)   Resp 18   Wt 136 lb (61.7 kg)   SpO2 (!) 77%   BMI 20.08 kg/m²   Patient is alert and oriented x 3 in NAD. Wound is stable with hyperkeratotic rim          Ulceration #1:   Location: left posterior and plantar heel  Measurements: 6 cm x 6 cm x 0.2 cm  Base: mixed granular, fibrotic, with central necrotic eschar  Borders: extensive hyperkeratosis . Exudate: minimal drainage         LABS:   Lab Results   Component Value Date    WBC 5.3 12/08/2021    HGB 13.1 (L) 12/08/2021    HCT 39.7 (L) 12/08/2021    .7 (H) 12/08/2021     12/08/2021     Lab Results   Component Value Date     12/08/2021    K 4.0 12/08/2021    K 4.9 12/04/2021     12/08/2021    CO2 18 12/08/2021    BUN 24 12/08/2021    CREATININE 1.14 12/08/2021    GLUCOSE 95 12/08/2021    CALCIUM 8.7 12/08/2021      Lab Results   Component Value Date    LABALBU 3.4 (L) 12/02/2021     No results found for: Santino Cashing  Lab Results   Component Value Date    CRP 6.6 (H) 01/12/2021     No results found for: LABA1C  No results found for: EAG    MICROBIOLOGY:   Type   Date  Results  Blood Culture:  12/2/21 NGTD        IMAGING:   XR right foot 12/5/21:  Impression       No acute osseous findings.      VASCULAR:    US DUP LOWER ART/BYPASS GRAFTS BILATERAL COMPLETE    IMPRESSION:    30-49% stenosis involving the right proximal posterior tibial artery and around 50% stenosis involving the left proximal posterior tibial artery.        No evidence of arterial occlusion, or aneurysm.       Mild to moderate distal atherosclerotic disease of both lower extremities         Kervin Perez ADAMS, PGY- 1  Please first page Podiat[facility] On Call, [phone]  December 8, 2021  6:34 PM

## 2021-12-08 NOTE — PROGRESS NOTES
12/08/21  0437    135 133*   K 4.5 4.2 4.0    101 100   CO2 17* 18* 18*   BUN 27* 26* 24*   CREATININE 1.12 1.09 1.14   CALCIUM 8.8 8.7 8.7     No results for input(s): AST, ALT, BILIDIR, BILITOT, ALKPHOS in the last 72 hours. Recent Labs     12/06/21  1753   INR 1.6     No results for input(s): Miki Garner in the last 72 hours. Urinalysis:      Lab Results   Component Value Date    NITRU Negative 12/02/2021    WBCUA 20-50 08/09/2021    BACTERIA Negative 08/09/2021    RBCUA 3-5 08/09/2021    BLOODU Negative 12/02/2021    SPECGRAV 1.022 12/02/2021    GLUCOSEU Negative 12/02/2021     Radiology:  XR CHEST (2 VW)   Final Result   1. No evidence of pneumothorax. Interval decrease in left pleural effusion with a residual small left pleural effusion. Persistent right lower lobe pneumonia versus atelectasis. COMPARISON: December 5, 2021      DIAGNOSIS:  Post thoracentesis       COMMENTS: J98.01 Bronchospasm ICD10      TECHNIQUE: XR CHEST (2 VW)      FINDINGS:   Right lung base opacification may represent pneumonia versus compressive atelectasis. Small right pleural effusion. Interval decrease in left pleural effusion with a residual small left pleural effusion. No evidence of pneumothorax. Cardiac silhouette    remains enlarged. Visualized soft tissues, and osseous structures are unremarkable. US THORACENTESIS Which side should the procedure be performed? Left   Final Result   Technically successful ultrasound-guided thoracentesis without immediate complications. HISTORY: Maira Camarena is a Male of 80 years age. DIAGNOSIS: Left pleural effusion      COMMENTS: J98.01 Bronchospasm ICD10         PROCEDURE:   Following the discussion of procedure, risks versus benefits, possible complications, informed consent was obtained. Following universal protocol, patient and site verification was performed with a \"timeout\" prior to the procedure.        Brief survey of the patient's ATELECTASIS, SIMILAR TO 6/7/2021. XR CHEST PORTABLE   Final Result   BIBASILAR ATELECTASIS/PNEUMONIA. Assessment/Plan:    #Acute Hypoxic respiratory failure secondary to rhino virus pneumonia as well as bilateral pleural effusions:  S/p thoracentesis with 700cc out; appears more comfortable  #Possible subsegmental pulmonary embolism: High risk anticoagulation as detailed by pulmonary and cardiology  #FOX:  Resolved  #A Fib; SSS:  COntinue home meds   #acute on Chronic combined systolic and diastolic CHF:  Cardiology is managing; medications are held due to SBP at this moment  #Hypotension:  uptitrate midodrine  #Right foot cellulitis:  PO keflex on d/c    Active Hospital Problems    Diagnosis Date Noted    Unstageable pressure ulcer of left heel (HCC) [L89.620]     Cellulitis of right foot [E77.689]     Acute pulmonary embolism without acute cor pulmonale (HCC) [I26.99]     Viral pneumonia [J12.9] 12/02/2021     Additional work up or/and treatment plan may be added today or then after based on clinical progression. I am managing a portion of pt care. Some medical issues are handled by other specialists. Additional work up and treatment should be done in out pt setting by pt PCP and other out pt providers. In addition to examining and evaluating pt, I spent additional time explaining care, normal and abnormal findings, and treatment plan. All of pt questions were answered. Counseling, diet and education were  provided. Case will be discussed with nursing staff when appropriate. Family will be updated if and when appropriate. Diet: ADULT DIET;  Regular  ADULT ORAL NUTRITION SUPPLEMENT; Lunch; Standard High Calorie/High Protein Oral Supplement    Code Status: DNR-CCA    Dispo:  Discussed at length with son and patient; they are considering home with hospice; consult placed    Electronically signed by Coni Sanchez MD on 12/8/2021 at 2:05 PM

## 2021-12-08 NOTE — CONSULTS
CC:   Chief Complaint   Patient presents with    Altered Mental Status     family called EMS for Pt \"not acting right\", general weakness and loss of appetite for the past three days        HPI: Patient is a pleasant 80-year-old gentleman who presented to the hospital with acute change in mental status. Patient has a history of atrial fibrillation, hypertension, congestive heart failure. Upon admission, he was noted to have a large left pleural effusion. Interventional radiology was consulted for diagnostic and therapeutic ultrasound-guided thoracentesis. No family history on file.     Past Surgical History:   Procedure Laterality Date    BACK SURGERY      CARDIAC PACEMAKER PLACEMENT  1501    HERNIA REPAIR      THORACENTESIS Left 06/24/2021    450ml removed by Dr Liam Tello [phone]    THORACENTESIS Left 12/07/2021    705 ml removed by Dr. Neal        Past Medical History:   Diagnosis Date    Anxiety     [facility]     Cardiomyopathy ([facility] 9/18/2020    CHF [facility]congestive heart failure) ([facility]     Diabetes mellitus ([facility]     Hypertension     Sleep apnea        Social History     Socioeconomic History    Marital status:      Spouse name: Not on file    Number of children: Not on file    Years of education: Not on file    Highest education level: Not on file   Occupational History    Not on file   Tobacco Use    Smoking status: Former Smoker    Smokeless tobacco: Former User    Tobacco comment: quit 30 years ago   Vaping Use    Vaping Use: Never used   Substance and Sexual Activity    Alcohol use: Not Currently    Drug use: Not Currently    Sexual activity: Not on file   Other Topics Concern    Not on file   Social History Narrative    ** Merged History Encounter **          Social Determinants of Health     Financial Resource Strain:     Difficulty of Paying Living Expenses: Not on file   Food Insecurity:     Worried About [address] in the Last Year: Not on file    Ran Out of Food in the Last Year: Not on file   Transportation Needs:     Lack of Transportation (Medical): Not on file    Lack of Transportation (Non-Medical): Not on file   Physical Activity:     Days of Exercise per Week: Not on file    Minutes of Exercise per Session: Not on file   Stress:     Feeling of Stress : Not on file   Social Connections:     Frequency of Communication with Friends and Family: Not on file    Frequency of Social Gatherings with Friends and Family: Not on file    Attends Muslim Services: Not on file    Active Member of [address] [place] [address] [employer] or Organizations: Not on file    Attends Club or Organization Meetings: Not on file    Marital Status: Not on file   Intimate Partner Violence:     Fear of Current or Ex-Partner: Not on file    Emotionally Abused: Not on file    Physically Abused: Not on file    Sexually Abused: Not on file   Housing Stability:     Unable to Pay for Housing in the Last Year: Not on file    Number of [place] in the Last Year: Not on file    Unstable Housing in the Last Year: Not on file       No Known Allergies    No current facility-administered medications on file prior to encounter.      Current Outpatient Medications on File Prior to Encounter   Medication Sig Dispense Refill    furosemide (LASIX) 40 MG tablet Take 1 tablet by mouth daily 30 tablet 6    spironolactone (ALDACTONE) 25 MG tablet Take 1 tablet by mouth daily 30 tablet 3    lisinopril (PRINIVIL;ZESTRIL) 5 MG tablet Take 1 tablet by mouth daily 30 tablet 3    metoprolol tartrate (LOPRESSOR) 50 MG tablet Take 0.5 tablets by mouth 2 times daily 60 tablet 5    tiotropium (SPIRIVA RESPIMAT) 2.5 MCG/ACT AERS inhaler Inhale 2 puffs into the lungs daily      SODIUM CHLORIDE, EXTERNAL, (SALINE WOUND WASH) 0.9 % SOLN Apply 1 Bottle topically daily 210 mL 2    aspirin 81 MG chewable tablet Take 1 tablet by mouth daily 30 tablet 3    fluticasone-salmeterol (ADVAIR DISKUS) 250-50 MCG/DOSE AEPB Inhale 1 puff into the lungs every 12 hours for 14 days 1 Inhaler 0       Review of Systems   Unable to perform ROS: Mental status change       OBJECTIVE:  BP (!) 85/55   Pulse 72   Temp 97.3 °F (36.3 °C)   Resp 18   Wt 136 lb (61.7 kg)   SpO2 (!) 77%   BMI 20.08 kg/m²     Physical Exam  Constitutional:       General: He is not in acute distress. Appearance: He is well-developed. He is not diaphoretic. HENT:      Head: Normocephalic and atraumatic. Nose: Nose normal.      Mouth/Throat:      Pharynx: No oropharyngeal exudate. Eyes:      General: No scleral icterus. Right eye: No discharge. Left eye: No discharge. Conjunctiva/sclera: Conjunctivae normal.   Neck:      Thyroid: No thyromegaly. Vascular: No JVD. Trachea: No tracheal deviation. Cardiovascular:      Rate and Rhythm: Normal rate and regular rhythm. Heart sounds: Normal heart sounds. No murmur heard. No friction rub. No gallop. Pulmonary:      Effort: No respiratory distress. Breath sounds: No stridor. Examination of the left-upper field reveals decreased breath sounds. Examination of the left-middle field reveals decreased breath sounds. Examination of the left-lower field reveals decreased breath sounds. Decreased breath sounds present. No wheezing or rales. Chest:      Chest wall: No tenderness. Abdominal:      General: Bowel sounds are normal. There is no distension. Palpations: Abdomen is soft. There is no mass. Tenderness: There is no abdominal tenderness. There is no guarding or rebound. Hernia: No hernia is present. Musculoskeletal:         General: No tenderness or deformity. Cervical back: Neck supple. Skin:     General: Skin is dry. Coloration: Skin is not pale. Findings: No erythema or rash. Neurological:      Mental Status: He is lethargic.              IR MIDLINE CATH    Result Date: 12/7/2021  NO IMAGES NO DICTATION ASSESSMENT ANDPLAN:    Assessment: Patient with large left pleural effusion and acute change in mental status. Plan: Ultrasound-guided diagnostic and therapeutic thoracentesis.     Penny Pimentel MD, Mena Degroot

## 2021-12-08 NOTE — CARE COORDINATION
RN SPOKE WITH FAMILY AND CHOSE [facility]. REFERRAL CALLED TO JOSIAH AT [facility]. WILL CLARIFY WITH ELDER IS POA WAS DRAWN TO DETERMINE POA.

## 2021-12-08 NOTE — CARE COORDINATION
[address] [place] [address] TONIGHT WITH GRANDDAUGHTER PRESENT 5-5:15PM. HOSPICE RN TO LEAVE NOTE IN CHART WITH PLAN PER JOSIAH.

## 2021-12-08 NOTE — PROGRESS NOTES
Patient resting in bed. Alert to person and place with use of I-pad . SPO2 @ 100% on 5L NC. Low SBP 96/71, HR 86. R- brachial PICC line flushed with + blood return. Incontinent care performed. No further need from pt at this time. Call light placed within pt's reach. Bed-alarm activated. RN will continue to monitor pt.

## 2021-12-08 NOTE — PROGRESS NOTES
Nephrology Progress Note    Assessment:  Resolved FOX  CAF  Malnutrition  viremia      Plan: PT  Boost  Megace  D/c prinavil    Patient Active Problem List:     Chronic atrial fibrillation (HCC)     SSS (sick sinus syndrome) (HCC)     Cardiomyopathy (HCC)     SOB (shortness of breath)     Pleural effusion on right     Atelectasis of right lung     CHF (congestive heart failure), NYHA class I, acute on chronic, combined ([address]     Acute on chronic combined systolic and diastolic CHF (congestive heart failure) (HCC)     Acute on chronic diastolic CHF (congestive heart failure) (HCC)     Pacemaker     Goals of care, counseling/discussion     Syncope and collapse     Pressure injury of deep tissue of left heel     Viral pneumonia     Unstageable pressure ulcer of left heel ([address]     Cellulitis of right foot     Acute pulmonary embolism without acute cor pulmonale ([facility])      Subjective:  Admit Date: 12/2/2021    Interval History: relative in room patient stable     Medications:  Scheduled Meds:   midodrine  2.5 mg Oral TID WC    metoprolol tartrate  25 mg Oral BID    [Held by provider] spironolactone  25 mg Oral Daily    [Held by provider] furosemide  40 mg Oral Daily    sodium chloride flush  5-40 mL IntraVENous 2 times per day    cefTRIAXone (ROCEPHIN) IV  1,000 mg IntraVENous Q24H    sodium chloride  500 mL IntraVENous Once     Continuous Infusions:   sodium chloride         CBC:   Recent Labs     12/07/21  0708 12/08/21  0438   WBC 5.8 5.3   HGB 12.8* 13.1*    187     CMP:    Recent Labs     12/06/21  0714 12/07/21  0600 12/08/21  0437    135 133*   K 4.5 4.2 4.0    101 100   CO2 17* 18* 18*   BUN 27* 26* 24*   CREATININE 1.12 1.09 1.14   GLUCOSE 94 88 95   CALCIUM 8.8 8.7 8.7   LABGLOM >60.0 >60.0 60.0     Troponin: No results for input(s): TROPONINI in the last 72 hours. BNP: No results for input(s): BNP in the last 72 hours.   INR:   Recent Labs     12/06/21  1753   INR 1.6     Lipids: No results for input(s): CHOL, LDLDIRECT, TRIG, HDL, AMYLASE, LIPASE in the last 72 hours. Liver: No results for input(s): AST, ALT, ALKPHOS, PROT, LABALBU, BILITOT in the last 72 hours. Invalid input(s): BILDIR  Iron:  No results for input(s): IRONS, FERRITIN in the last 72 hours. Invalid input(s): LABIRONS  Urinalysis: No results for input(s): UA in the last 72 hours.     Objective:  Vitals: BP (!) 85/55   Pulse 72   Temp 97.3 °F (36.3 °C)   Resp 18   Wt 136 lb (61.7 kg)   SpO2 (!) 77%   BMI 20.08 kg/m²    Wt Readings from Last 3 Encounters:   12/03/21 136 lb (61.7 kg)   10/25/21 134 lb (60.8 kg)   09/02/21 134 lb 14.4 oz (61.2 kg)      24HR INTAKE/OUTPUT:      Intake/Output Summary (Last 24 hours) at 12/8/2021 1138  Last data filed at 12/7/2021 2204  Gross per 24 hour   Intake 10 ml   Output --   Net 10 ml       General: alert, in no apparent distress  HEENT: normocephalic, atraumatic, anicteric  Neck: supple, no mass  Lungs: non-labored respirations, clear to auscultation bilaterally  Heart: regular rate and rhythm, no murmurs or rubs  Abdomen: soft, non-tender, non-distended  Ext: no cyanosis, no peripheral edema  Neuro: alert and oriented, no gross abnormalities  Psych: normal mood and affect  Skin: no rash      Electronically signed by Dallie Goodell, DO, MD

## 2021-12-09 LAB — LD, FLUID: 56 U/L

## 2021-12-09 NOTE — PROGRESS NOTES
INPATIENT PROGRESS NOTES    PATIENT NAME: Jeff Coto  MRN: [id number]  SERVICE DATE:  December 8, 2021   SERVICE TIME:  9:14 PM      PRIMARY SERVICE: Pulmonary Disease    CHIEF COMPLAIN: Shortness of breath      INTERVAL HPI: Patient seen and examined at bedside, Interval Notes, orders reviewed. Nursing notes noted  Patient is on 4 L O2 via nasal cannula O2 saturation 100%. He is complaining of shortness of breath. Procalcitonin is not elevated. WBC is 5.3. Pleural fluid protein is 1.3    OBJECTIVE    Body mass index is 20.08 kg/m². PHYSICAL EXAM:  Vitals:  /68   Pulse 50   Temp 97.2 °F (36.2 °C) (Oral)   Resp 16   Wt 136 lb (61.7 kg)   SpO2 100%   BMI 20.08 kg/m²   General:Alert, awake . comfortable in bed, No distress. Head: Atraumatic , Normocephalic   Eyes: PERRL. No sclera icterus. No conjunctival injection. No discharge   ENT: No nasal  discharge. Pharynx clear. Neck:  Trachea midline. No thyromegaly, no JVD, No cervical adenopathy. Chest : Bilaterally symmetrical ,Normal effort,  No accessory muscle use  Lung : . Fair BS bilateral, decreased BS at bases. Bibasilar Rales. No wheezing. No rhonchi. Heart[de-identified] Normal  rate. Regular rhythm. No mumur ,  Rub or gallop  ABD: Non-tender. Non-distended. No masses. No organmegaly. Normal bowel sounds. No hernia. Ext : No Pitting both leg , No Cyanosis No clubbing  Neuro: no focal weakness          DATA:   Recent Labs     12/07/21  0708 12/08/21 0438   WBC 5.8 5.3   HGB 12.8* 13.1*   HCT 38.9* 39.7*   .7* 101.7*    187     Recent Labs     12/07/21  0600 12/07/21  0600 12/08/21 0437     --  133*   K 4.2  --  4.0     --  100   CO2 18*  --  18*   BUN 26*  --  24*   CREATININE 1.09  --  1.14   GLUCOSE 88  --  95   CALCIUM 8.7  --  8.7   LABGLOM >60.0   < > 60.0   GFRAA >60.0   < > >60.0    < > = values in this interval not displayed.        MV Settings:          No results for input(s): PHART, SOM6XQQ, PO2ART, JIR9LZF, 12/2/2021  CT HEAD WO CONTRAST : 12/2/2021 CLINICAL HISTORY:  confusion x 3 days . COMPARISON: 9/1/2021. TECHNIQUE: Spiral unenhanced images were obtained of the head, with routine multiplanar reconstructions performed. All CT scans at this facility use dose modulation, iterative reconstruction, and/or weight based dosing when appropriate to reduce radiation dose to as low as reasonably achievable. FINDINGS: There is no intracranial hemorrhage, mass effect, midline shift, extra-axial collection, evidence of hydrocephalus, recent ischemic infarct, or skull fracture identified. Chronic right frontal, temporooccipital and right cerebellar infarcts, mild generalized cerebral volume loss, and mild to moderate patchy white matter changes are again noted. The mastoid air cells and visualized paranasal sinuses are essentially clear. NO ACUTE INTRACRANIAL PROCESS OR SIGNIFICANT CHANGE FROM 9/1/2021 IDENTIFIED. CTA CHEST W WO CONTRAST    Result Date: 12/2/2021  CTA CHEST W WO CONTRAST: 12/2/2021 CLINICAL HISTORY:  tachycardic, new afib, ? effusion vs wedge shaped infarct vs infiltrate left lung base. assess for PE . COMPARISON: Chest CT without contrast 6/7/2021. Spiral enhanced images were obtained of the chest during the infusion of approximately 100 mL of Isovue 300 contrast with pulmonary artery  CTA protocol. Routine and volume rendered images were obtained on a 3-dimensional workstation. All CT scans at this facility use dose modulation, iterative reconstruction, and/or weight based dosing when appropriate to reduce radiation dose to as low as reasonably achievable. FINDINGS: The study is limited by motion artifact. Pulmonary emboli are suspected within the segmental branches of the left lower lobe, with a moderate-sized left pleural effusion and mild atelectasis/infarct of the left lung base. No other significant filling defects are identified within the central pulmonary vasculature elsewhere.  The heart is mild to moderately enlarged, without evidence of right heart strain. A small layering right pleural effusion is present, with mild right atelectasis. The thoracic aorta is mildly ectatic and unfolded with mild atherosclerotic plaquing. There is no evidence of dissection, within the limits of the limited contrast opacification. There is no significant lymphadenopathy, worrisome nodules or masses. Mild degenerative changes of the thoracic spine are noted. There are no acute fractures identified. The limited imaging of the included upper abdomen is noncontributory. SUSPECT SEGMENTAL LEFT LOWER LOBE PULMONARY EMBOLI. MILD TO MODERATE CARDIOMEGALY WITHOUT EVIDENCE OF RIGHT HEART STRAIN. MODERATE-SIZED LEFT AND SMALL RIGHT PLEURAL EFFUSIONS, SIMILAR TO 6/7/2021. MILD TO MODERATE ATELECTASIS/INFARCT OF THE LEFT LOWER LOBE AND MILD PROBABLE DEPENDENT RIGHT ATELECTASIS, SIMILAR TO 6/7/2021. XR CHEST PORTABLE    Result Date: 12/2/2021  EXAMINATION: XR CHEST PORTABLE CLINICAL HISTORY: ALTERED MENTAL STATUS COMPARISONS: SEPTEMBER 1, 2021 FINDINGS: Pacemaker generator and wires unchanged. Degenerative change bilateral glenohumeral and acromioclavicular joints. Cardiopericardial silhouette normal. Ill-defined area increased opacity right lung base. Increased opacity left lung base skiers left lower lung, and left diaphragm. BIBASILAR ATELECTASIS/PNEUMONIA. US DUP LOWER ART/BYPASS GRAFTS BILATERAL COMPLETE    Result Date: 12/5/2021  US DUP LOWER ART/BYPASS GRAFTS BILATERAL COMPLETE: 12/5/2021 4:47 PM CLINICAL HISTORY:  BLE nonpalpable pulses, rubor right foot . Right foot discolored. Left heel wound. COMPARISON: None available. Grayscale, color and waveform Doppler analysis of both lower extremity arterial systems was performed. FINDINGS: Moderate atherosclerotic disease, with pulsatile mild to moderately diminished waveforms worsening distally.  Elevated velocities within the right proximal posterior tibial artery suggestive of 30-49% stenosis. Elevated velocities in the left proximal posterior tibial artery suggestive of around 50% stenosis. No evidence for arterial occlusion. The arterial system is normal in caliber, without evidence of aneurysm or dissection. Maximum systolic velocities are: RIGHT LOWER EXTREMITY: Right common femoral artery 57 cm/s. Right proximal superficial femoral artery 68 cm/s. Right mid superficial femoral artery 60 cm/s. Right distal superficial femoral artery 29 cm/s. Right popliteal artery 26 cm/s. Right proximal anterior tibial artery 29 cm/s. Right mid anterior tibial artery 33  cm/s. Right distal anterior tibial artery 24 cm/s. Right proximal posterior tibial artery 172 cm/s. Right mid posterior tibial artery 62  cm/s. Right distal posterior tibial artery 21  cm/s. Right proximal peroneal artery 70 cm/s. Right mid peroneal artery 64 cm/s. Right distal peroneal artery 33 cm/s. LEFT LOWER EXTREMITY: Left common femoral artery 92 cm/s. Left proximal superficial femoral artery 61 cm/s. Left mid superficial femoral artery 76 cm/s. Left distal superficial femoral artery 40 cm/s. Left popliteal artery 40 cm/s. Left proximal anterior tibial artery 36 cm/s. Left mid anterior tibial artery 35  cm/s. Left distal anterior tibial artery 33 cm/s. Left proximal posterior tibial artery 217 cm/s. Left mid posterior tibial artery 79  cm/s. Left distal posterior tibial artery 43  cm/s. Left proximal peroneal artery 101 cm/s. Left mid peroneal artery 24 cm/s. Left distal peroneal artery 20 cm/s.     30-49% stenosis involving the right proximal posterior tibial artery and around 50% stenosis involving the left proximal posterior tibial artery. No evidence of arterial occlusion, or aneurysm. Mild to moderate distal atherosclerotic disease of both lower extremities. Her[facility] Frankel MIDLINE CATH    Result Date: 12/7/2021  NO IMAGES NO DICTATION    IMPRESSION AND SUGGESTION:  1. Bibasilar atelectasis versus pneumonia  2. Chronic bilateral pleural effusion , S/p thoracentesis in June 2021 on left side  3. Questionable segmental left lower lobe PE  4. Atrial fibrillation  5. Congestive heart failure, cardiomyopathy with LVEF 20%     On 5 L O2 SpO2 is 100%. Pleural fluid protein is 1.3. Amylase is 20. LDH is pending. Most likely transudate of pleural effusion. Procalcitonin is not elevated. WBC is within normal range. Continue present treatment plan        NOTE: This report was transcribed using voice recognition software. Every effort was made to ensure accuracy; however, inadvertent computerized transcription errors may be present.       Electronically signed by Jocelynn Huynh MD, FCCP on 12/8/2021 at 9:14 PM

## 2021-12-10 LAB — BODY FLUID CULTURE, STERILE: NORMAL

## 2021-12-22 NOTE — DISCHARGE SUMMARY
Hospital Medicine Discharge Summary    Tyler Urrutia  :  1929  MRN:  [id number]    Admit date:  2021  Discharge date:  2021    Admitting Physician:  Severo Seltzer, MD  Primary Care Physician:  Jany Flores    Discharge Diagnoses:    Rhinovirus    Chief Complaint   Patient presents with    Altered Mental Status     family called EMS for Pt \"not acting right\", general weakness and loss of appetite for the past three days     Hospital Course:   Patient presented with Acute Hypoxic respiratory failure secondary to rhino virus pneumonia as well as bilateral pleural effusions. He improved with time and did have a thoracentesis with 700cc fluid removal for his pleural effusion. He had a foot cellulitis for which he was placed on PO keflex      Patient was seen by the following consultants   Consults:  IP CONSULT TO PULMONOLOGY  IP CONSULT TO CARDIOLOGY  IP CONSULT TO PALLIATIVE CARE  IP CONSULT TO INFECTIOUS DISEASES  IP CONSULT TO PODIATRY  IP CONSULT TO NEPHROLOGY  IP CONSULT TO INTERVENTIONAL RADIOLOGY  IP CONSULT TO HOSPICE    Significant Diagnostic Studies:    Refer to chart     Please refer to chart if no studies are shown here    No results found.     Discharge Medications:         Medication List      ASK your doctor about these medications    aspirin 81 MG chewable tablet  Take 1 tablet by mouth daily     fluticasone-salmeterol 250-50 MCG/DOSE Aepb  Commonly known as: Advair Diskus  Inhale 1 puff into the lungs every 12 hours for 14 days     furosemide 40 MG tablet  Commonly known as: Lasix  Take 1 tablet by mouth daily     lisinopril 5 MG tablet  Commonly known as: PRINIVIL;ZESTRIL  Take 1 tablet by mouth daily     Lopressor 50 MG tablet  Generic drug: metoprolol tartrate     Saline Wound Wash 0.9 % Soln  Generic drug: SODIUM CHLORIDE (EXTERNAL)  Apply 1 Bottle topically daily     Spiriva Respimat 2.5 MCG/ACT Aers inhaler  Generic drug: tiotropium     spironolactone 25 MG tablet  Commonly known as: ALDACTONE  Take 1 tablet by mouth daily            Disposition:   If discharged to Home, Any [facility] needs that were indicated and/or required as been addressed and set up by Social Work. Condition at discharge: good     Activity: activity as tolerated    Total time taken for discharging this patient: 40 minutes. Greater than 70% of time was spent focused exclusively on this patient. Time was taken to review chart, discuss plans with consultants, reconciling medications, discussing plan answering questions with patient.      Signed:  Paty Becerra MD  12/22/2021, 3:46 PM  ----------------------------------------------------------------------------------------------------------------------    Adrienne Hathaway

## 2025-04-27 NOTE — ED NOTES
Bed: 19  Expected date: 12/2/21  Expected time:   Means of arrival:   Comments:  80year old general weakness.  Not eating lethargy x 3 days 98/52, a fib in the 90's, 97% 2 liters     April JUAN Jefferson RN  12/02/21 1676
Change in condition, plan for admission, and diagnosis explained to family and pt via .  Denies further questions and verbalized understanding     Julieth Henderson RN  12/02/21 2054
Ct ready
Ct wait istat
Decreased WOB and anxiety after breathing treatment. LS fine crackles in B/L bases.       Doc Stern RN  12/02/21 2052
Dr. Sabina Mckeon updated on pt CHF and verified to continue to give fluids.   Ok to give fluids due to ER status     Darwin Isabel, DWAYNE  12/02/21 4988
Pt anxious calling out stating oxygen not working. Pt reassured oxygen is working and to leave in nose.       Vickie Gonzales RN  12/02/21 2052
Pt through interpretor was unable to state , year, where he was or where he is.  Pt was unable to sign for authorization of care, verified with registration at bedside     [place] DWAYNE Jerry  21 4193
Pt with increased WOB and becoming anxious. Unable to obtain good SPO2 with adequate pleth. Attempted different POX sites without improvement in pleth. SPO2 85-88% on O2 5L. Expiratory wheeze in B/L bases. Dr Doris Duran notified and orders received for duoneb treatment.       Berna Blizzard, RN  12/02/21 [address] [facility] Johan, RN  12/02/21 2001
Report called to 4W.  Instructed nurse that would not be able to give new medication orders prior to transport arrival.      Yaneth Castaneda RN  12/02/21 6088
Updated Dr. Jolanta Briceño of pt lung sounds and crackles in bases and Stop any further fluids     Bertha Gonzalez RN  12/02/21 0700
Updated Dr. Khoi Espinal on critical troponin at this time     Rocio Ragland RN  12/02/21 1950
Bed/Stretcher in lowest position, wheels locked, appropriate side rails in place/Call bell, personal items and telephone in reach/Instruct patient to call for assistance before getting out of bed/chair/stretcher/Non-slip footwear applied when patient is off stretcher/[place] to call system/Physically safe environment - no spills, clutter or unnecessary equipment/Purposeful proactive rounding/Room/bathroom lighting operational, light cord in reach